# Patient Record
Sex: MALE | Race: WHITE | Employment: OTHER | RURAL
[De-identification: names, ages, dates, MRNs, and addresses within clinical notes are randomized per-mention and may not be internally consistent; named-entity substitution may affect disease eponyms.]

---

## 2017-06-14 ENCOUNTER — HOSPITAL ENCOUNTER (OUTPATIENT)
Dept: PHYSICAL THERAPY | Age: 77
Discharge: HOME | End: 2017-06-30
Attending: INTERNAL MEDICINE | Admitting: INTERNAL MEDICINE

## 2017-06-14 ASSESSMENT — PAIN DESCRIPTION - DESCRIPTORS: DESCRIPTORS: ACHING;THROBBING

## 2017-06-14 ASSESSMENT — PAIN DESCRIPTION - LOCATION: LOCATION: KNEE

## 2017-06-14 ASSESSMENT — PAIN DESCRIPTION - ORIENTATION: ORIENTATION: RIGHT

## 2017-06-14 ASSESSMENT — PAIN SCALES - GENERAL: PAINLEVEL_OUTOF10: 7

## 2017-06-19 ENCOUNTER — LAB REQUISITION (OUTPATIENT)
Dept: LAB | Facility: HOSPITAL | Age: 77
End: 2017-06-19

## 2017-06-19 DIAGNOSIS — M25.461 EFFUSION OF RIGHT KNEE: ICD-10-CM

## 2017-06-19 LAB
APPEARANCE FLD: ABNORMAL
COLOR FLD: YELLOW
CRYSTALS FLD MICRO: NORMAL
GLUCOSE FLD-MCNC: 85 MG/DL
GRAM STN SPEC: NORMAL
GRAM STN SPEC: NORMAL
MONOS+MACROS NFR FLD: 7 %
NEUTROPHILS NFR FLD MANUAL: 93 %
PROT FLD-MCNC: 3.6 G/DL
RBC # FLD AUTO: 0 /MM3 (ref 0–200000)
WBC # FLD: ABNORMAL /MM3 (ref 0–1000)

## 2017-06-19 PROCEDURE — 89060 EXAM SYNOVIAL FLUID CRYSTALS: CPT | Performed by: INTERNAL MEDICINE

## 2017-06-19 PROCEDURE — 89051 BODY FLUID CELL COUNT: CPT | Performed by: INTERNAL MEDICINE

## 2017-06-19 PROCEDURE — 87015 SPECIMEN INFECT AGNT CONCNTJ: CPT | Performed by: INTERNAL MEDICINE

## 2017-06-19 PROCEDURE — 84157 ASSAY OF PROTEIN OTHER: CPT | Performed by: INTERNAL MEDICINE

## 2017-06-19 PROCEDURE — 87070 CULTURE OTHR SPECIMN AEROBIC: CPT | Performed by: INTERNAL MEDICINE

## 2017-06-19 PROCEDURE — 82945 GLUCOSE OTHER FLUID: CPT | Performed by: INTERNAL MEDICINE

## 2017-06-19 PROCEDURE — 87205 SMEAR GRAM STAIN: CPT | Performed by: INTERNAL MEDICINE

## 2017-06-23 LAB — BACTERIA FLD CULT: NO GROWTH

## 2017-08-30 PROBLEM — I10 ESSENTIAL HYPERTENSION: Status: ACTIVE | Noted: 2017-08-30

## 2017-08-30 PROBLEM — R00.2 PALPITATIONS: Status: ACTIVE | Noted: 2017-08-30

## 2017-08-30 PROBLEM — I48.19 PERSISTENT ATRIAL FIBRILLATION: Status: ACTIVE | Noted: 2017-08-30

## 2017-08-30 RX ORDER — METHOCARBAMOL 500 MG/1
500 TABLET, FILM COATED ORAL 2 TIMES DAILY
COMMUNITY
End: 2018-04-04 | Stop reason: ALTCHOICE

## 2017-08-30 RX ORDER — FUROSEMIDE 40 MG/1
40 TABLET ORAL 2 TIMES DAILY
COMMUNITY
End: 2017-09-06 | Stop reason: DRUGHIGH

## 2017-08-30 RX ORDER — MELOXICAM 7.5 MG/1
7.5 TABLET ORAL DAILY
COMMUNITY
End: 2017-09-06

## 2017-08-30 RX ORDER — HYDROCODONE BITARTRATE AND ACETAMINOPHEN 7.5; 325 MG/1; MG/1
1 TABLET ORAL EVERY 6 HOURS PRN
COMMUNITY
End: 2018-04-04 | Stop reason: ALTCHOICE

## 2017-08-30 RX ORDER — RAMIPRIL 10 MG/1
10 CAPSULE ORAL DAILY
COMMUNITY
End: 2018-04-04 | Stop reason: HOSPADM

## 2017-08-30 RX ORDER — DICLOFENAC SODIUM 75 MG/1
75 TABLET, DELAYED RELEASE ORAL 2 TIMES DAILY
COMMUNITY
End: 2017-09-06

## 2017-08-30 RX ORDER — ASPIRIN 325 MG
325 TABLET ORAL DAILY
COMMUNITY
End: 2017-09-06

## 2017-08-30 RX ORDER — TRIAMTERENE AND HYDROCHLOROTHIAZIDE 37.5; 25 MG/1; MG/1
1 CAPSULE ORAL EVERY MORNING
COMMUNITY
End: 2017-09-06

## 2017-08-31 ENCOUNTER — TELEPHONE (OUTPATIENT)
Dept: CARDIOLOGY | Facility: CLINIC | Age: 77
End: 2017-08-31

## 2017-09-06 ENCOUNTER — CONSULT (OUTPATIENT)
Dept: CARDIOLOGY | Facility: CLINIC | Age: 77
End: 2017-09-06

## 2017-09-06 VITALS
SYSTOLIC BLOOD PRESSURE: 100 MMHG | RESPIRATION RATE: 18 BRPM | HEART RATE: 94 BPM | DIASTOLIC BLOOD PRESSURE: 72 MMHG | BODY MASS INDEX: 45.04 KG/M2 | WEIGHT: 314.6 LBS | HEIGHT: 70 IN | OXYGEN SATURATION: 98 %

## 2017-09-06 DIAGNOSIS — I49.5 TACHY-BRADY SYNDROME (HCC): ICD-10-CM

## 2017-09-06 DIAGNOSIS — I50.21 ACUTE SYSTOLIC CONGESTIVE HEART FAILURE (HCC): ICD-10-CM

## 2017-09-06 DIAGNOSIS — E66.01 MORBID (SEVERE) OBESITY DUE TO EXCESS CALORIES (HCC): ICD-10-CM

## 2017-09-06 DIAGNOSIS — R06.02 SOB (SHORTNESS OF BREATH): ICD-10-CM

## 2017-09-06 DIAGNOSIS — I10 ESSENTIAL HYPERTENSION: ICD-10-CM

## 2017-09-06 DIAGNOSIS — I48.19 PERSISTENT ATRIAL FIBRILLATION (HCC): Primary | ICD-10-CM

## 2017-09-06 PROCEDURE — 99205 OFFICE O/P NEW HI 60 MIN: CPT | Performed by: INTERNAL MEDICINE

## 2017-09-06 RX ORDER — DIGOXIN 125 MCG
125 TABLET ORAL
Qty: 30 TABLET | Refills: 11 | Status: SHIPPED | OUTPATIENT
Start: 2017-09-06 | End: 2017-10-04 | Stop reason: ALTCHOICE

## 2017-09-06 RX ORDER — SPIRONOLACTONE 25 MG/1
12.5 TABLET ORAL DAILY
Qty: 15 TABLET | Refills: 11 | Status: SHIPPED | OUTPATIENT
Start: 2017-09-06 | End: 2018-08-06 | Stop reason: SDUPTHER

## 2017-09-06 RX ORDER — FUROSEMIDE 40 MG/1
40 TABLET ORAL DAILY
Qty: 45 TABLET | Refills: 11 | Status: SHIPPED | OUTPATIENT
Start: 2017-09-06 | End: 2018-10-03 | Stop reason: SDUPTHER

## 2017-09-06 RX ORDER — NEBIVOLOL 10 MG/1
10 TABLET ORAL DAILY
Qty: 30 TABLET | Refills: 11 | Status: SHIPPED | OUTPATIENT
Start: 2017-09-06 | End: 2018-08-06 | Stop reason: SDUPTHER

## 2017-09-06 NOTE — PROGRESS NOTES
Subjective:     Encounter Date:09/06/2017      Patient ID: Bobby Dodd is a 76 y.o. male.    Chief Complaint:Shortness of breath, edema, palpitations  HPI  This is a 76-year-old male patient who was recently diagnosed with new onset atrial fibrillation after having a preop cardiovascular evaluation with a 12-lead electrocardiogram showing atrial fibrillation.  The patient has had atrial fibrillation for the last 12+ years.  The patient was treated in 2003 with flecainide for atrial fibrillation with subsequent cardioversion to normal rhythm.  The patient required a synchronized external cardioversion in 2006 for recurrent atrial fibrillation.  At that time the patient also had a dual-chamber rate responsive pacemaker placed.  The patient was being evaluated for elective knee replacement surgery due to crippling arthritis affecting both knees.  A 12-lead electrocardiogram showed recurrent atrial fibrillation with rapid ventricular response.  A Holter monitor was placed which showed persistent atrial fibrillation.  The patient has been experiencing palpitations dizziness and fatigue.  There has been no syncope.  The patient was unaware that his heart was out of rhythm.  His palpitations however were similar in quality to that which she was experiencing with previous bouts of atrial fibrillation.  The patient underwent an echocardiogram which showed left ventricular dilatation with an ejection fraction of 20%.  There were no regional wall motion abnormalities.  There was evidence of significant biatrial enlargement.  There is no evidence of valvular disease or pericardial disease.  The patient reports having shortness of breath both at rest and with activity.  Patient indicates that his shortness of breath has been present for the last several years.  This has been progressively worse over the last several months.  The patient had his pacemaker interrogated which showed normal function with persistent atrial  fibrillation.  The patient has had increasing swelling in his lower extremities.  He has no history of DVT.  He has experienced some orthopnea but no PND.  He has no prior history of myocardial infarction or coronary revascularization.  The patient has morbid obesity with severe crippling arthritis in both knees.  His cholesterol status is unknown.  He has a history of hypertension but no personal history of diabetes.  His family history is strongly positive for premature coronary disease.  He is a former smoker.  The following portions of the patient's history were reviewed and updated as appropriate: allergies, current medications, past family history, past medical history, past social history, past surgical history and problem  Review of Systems   Constitution: Positive for weakness and malaise/fatigue. Negative for chills, diaphoresis, fever, night sweats, weight gain and weight loss.   HENT: Negative for ear discharge, hearing loss, hoarse voice and nosebleeds.    Eyes: Negative for discharge, double vision, pain and photophobia.   Cardiovascular: Positive for dyspnea on exertion, irregular heartbeat, leg swelling and palpitations. Negative for chest pain, claudication, cyanosis, near-syncope, orthopnea, paroxysmal nocturnal dyspnea and syncope.   Respiratory: Positive for shortness of breath. Negative for cough, hemoptysis, sputum production and wheezing.    Endocrine: Negative for cold intolerance, heat intolerance, polydipsia, polyphagia and polyuria.   Hematologic/Lymphatic: Negative for adenopathy and bleeding problem. Does not bruise/bleed easily.   Skin: Negative for color change, flushing, itching and rash.   Musculoskeletal: Negative for muscle cramps, muscle weakness, myalgias and stiffness.   Gastrointestinal: Negative for abdominal pain, diarrhea, hematemesis, hematochezia, nausea and vomiting.   Genitourinary: Negative for dysuria, frequency and nocturia.   Neurological: Positive for dizziness.  Negative for focal weakness, loss of balance, numbness, paresthesias and seizures.   Psychiatric/Behavioral: Negative for altered mental status, hallucinations and suicidal ideas.   Allergic/Immunologic: Negative for HIV exposure, hives and persistent infections.       Current Outpatient Prescriptions:   •  HYDROcodone-acetaminophen (NORCO) 7.5-325 MG per tablet, Take 1 tablet by mouth Every 6 (Six) Hours As Needed for Moderate Pain ., Disp: , Rfl:   •  methocarbamol (ROBAXIN) 500 MG tablet, Take 500 mg by mouth 2 (Two) Times a Day., Disp: , Rfl:   •  ramipril (ALTACE) 10 MG capsule, Take 10 mg by mouth Daily., Disp: , Rfl:      Objective:     Physical Exam   Constitutional: He is oriented to person, place, and time. He appears well-developed and well-nourished.   HENT:   Head: Normocephalic and atraumatic.   Mouth/Throat: Oropharynx is clear and moist.   Eyes: Conjunctivae and EOM are normal. Pupils are equal, round, and reactive to light. No scleral icterus.   Neck: Normal range of motion. Neck supple. No JVD present. No tracheal deviation present. No thyromegaly present.   Cardiovascular: Normal rate, S1 normal, S2 normal, normal heart sounds, intact distal pulses and normal pulses.  An irregularly irregular rhythm present. PMI is not displaced.  Exam reveals no gallop and no friction rub.    No murmur heard.  Pulmonary/Chest: Effort normal and breath sounds normal. No respiratory distress. He has no wheezes. He has no rales.   Abdominal: Soft. Bowel sounds are normal. He exhibits no distension and no mass. There is no tenderness. There is no rebound and no guarding.   Musculoskeletal: Normal range of motion. He exhibits edema. He exhibits no deformity.   Neurological: He is alert and oriented to person, place, and time. He displays normal reflexes. No cranial nerve deficit. Coordination normal.   Skin: Skin is warm and dry. No rash noted. No erythema.   Psychiatric: He has a normal mood and affect. His behavior  "is normal. Thought content normal.     Blood pressure 100/72, pulse 94, resp. rate 18, height 70\" (177.8 cm), weight (!) 314 lb 9.6 oz (143 kg), SpO2 98 %.   Lab Review:       Assessment:         1. Persistent atrial fibrillation  Newly recognized atrial fibrillation with rapid ventricular response.  He is currently a chads 2 vascular score of 5.  Aspirin has no place in his management.  - Stress Test With Myocardial Perfusion (2 Day)    2. Essential hypertension  Acceptable blood pressure control.  - Stress Test With Myocardial Perfusion (2 Day)    3. Tachy-mercedes syndrome  Normal dual-chamber rate responsive pacemaker function  - Stress Test With Myocardial Perfusion (2 Day)    4. SOB (shortness of breath)  I suspect this is multifactorial due to morbid obesity, aerobic deconditioning, congestive heart failure, arrhythmia and possibly an ischemic equivalent.  The patient has multiple risk factors for atherosclerosis.  He is unable to do treadmill stress testing due to severe bilateral knee pain.  - Stress Test With Myocardial Perfusion (2 Day)    5. Acute systolic congestive heart failure  This was discovered after an echocardiogram showed an ejection fraction of 20%.  It is certainly likely that atrial fibrillation and tachycardia mediated LV dysfunction is the etiology to his decreased ejection fraction.  He is currently [place] Heart Association functional class III in regards to symptoms.  He has not had an ischemic evaluation.  His ejection fraction was previously 60%.  - Stress Test With Myocardial Perfusion (2 Day)    6. Morbid (severe) obesity due to excess calories  This is certainly contributing to his medical issues at hand.  Procedures     Plan:       I have recommended discontinuation of the Maxide diuretic.  I have recommended that his Lasix be changed to 80 mg taken once in the morning for 2-3 days on an as-needed basis for increasing peripheral edema plus shortness of breath plus increased weight " gain.  Once his symptoms are back to baseline the Lasix should drop back to 40 mg once in the morning.  The patient has been counseled to avoid twice a day or 3 times a day Lasix dosing in order to avoid diuretic resistance.  The patient has been counseled regarding the essential need to eliminate dietary sodium.  He has been given specific instructions on dietary sodium restriction.  The patient has been started on a 1.5 L per day fluid restriction.  All of the patient's prescription nonsteroidal anti-inflammatory medications have been discontinued.  He has been advised to no longer use over-the-counter nonsteroidal anti-inflammatory medications.  He has been instructed to use over-the-counter extra strength Tylenol for osteoarthritis of the knees.  He can continue using his muscle relaxant as well as moderate potency narcotic analgesia as necessary.  He is not a candidate for elective knee surgery at this time.  I have recommended discontinuation of aspirin.  I have recommended the initiation of Eliquis 5 mg orally twice per day.  I have recommended starting digoxin 0.125 mg once per day.  I have recommended starting Bystolic 10 mg once per day.  I have recommended starting spironolactone 12.5 mg once in the morning.  I have recommended a 2 day vasodilator nuclear stress test given his inability to exercise due to crippling knee pain as well as morbid obesity.  Further recommendations will be predicated on the results of this testing.  Diet and weigh reduction or essential.  The patient will have a pacemaker interrogation on follow-up visit.  Her 4 weeks of anticoagulation with a DOAC we will initiate antiarrhythmic drug therapy.  If he does not pharmacologically cardiovert he will require synchronized external cardioversion as an outpatient.  If his ejection fraction remains less than 35% he will require upgrade of his dual-chamber pacemaker to a prophylactic defibrillator.  A total of 2-1/2 hours were spent  reviewing the patient's medical records and discussing the plan with the patient as well as entering his orders and other treatment plans.  More than 50% of the time was spent with direct face-to-face contact with the patient and his daughter.  The patient has been given written instructions to all of the recommendations described above.  Both the patient and the daughter express understanding.

## 2017-09-07 ENCOUNTER — TELEPHONE (OUTPATIENT)
Dept: CARDIOLOGY | Facility: CLINIC | Age: 77
End: 2017-09-07

## 2017-09-07 NOTE — TELEPHONE ENCOUNTER
Patient notified to stop aspirin as per instructed by Dr. Martel at end of day 9-6-17.  Patient states understanding.  Shelli Laird MA

## 2017-09-08 NOTE — TELEPHONE ENCOUNTER
Pharmacist notified of Eliquis being sent in and Copay $47.00.  I also went over all the changes that Dr. Martel made on Wednesday.  Shelli Larid MA

## 2017-09-13 ENCOUNTER — HOSPITAL ENCOUNTER (OUTPATIENT)
Dept: NUCLEAR MEDICINE | Age: 77
Discharge: HOME | End: 2017-09-13
Attending: INTERNAL MEDICINE | Admitting: INTERNAL MEDICINE

## 2017-09-14 ENCOUNTER — HOSPITAL ENCOUNTER (OUTPATIENT)
Dept: OTHER | Age: 77
Discharge: HOME | End: 2017-09-14
Attending: INTERNAL MEDICINE | Admitting: INTERNAL MEDICINE

## 2017-09-20 ENCOUNTER — OUTSIDE FACILITY SERVICE (OUTPATIENT)
Dept: CARDIOLOGY | Facility: CLINIC | Age: 77
End: 2017-09-20

## 2017-09-20 PROCEDURE — 78452 HT MUSCLE IMAGE SPECT MULT: CPT | Performed by: INTERNAL MEDICINE

## 2017-10-04 ENCOUNTER — OFFICE VISIT (OUTPATIENT)
Dept: CARDIOLOGY | Facility: CLINIC | Age: 77
End: 2017-10-04

## 2017-10-04 VITALS
OXYGEN SATURATION: 97 % | SYSTOLIC BLOOD PRESSURE: 92 MMHG | HEIGHT: 70 IN | DIASTOLIC BLOOD PRESSURE: 62 MMHG | RESPIRATION RATE: 18 BRPM | HEART RATE: 85 BPM | WEIGHT: 297.5 LBS | BODY MASS INDEX: 42.59 KG/M2

## 2017-10-04 DIAGNOSIS — I10 ESSENTIAL HYPERTENSION: ICD-10-CM

## 2017-10-04 DIAGNOSIS — I48.19 PERSISTENT ATRIAL FIBRILLATION (HCC): Primary | ICD-10-CM

## 2017-10-04 DIAGNOSIS — I50.22 CHRONIC SYSTOLIC CONGESTIVE HEART FAILURE (HCC): ICD-10-CM

## 2017-10-04 DIAGNOSIS — I49.5 TACHY-BRADY SYNDROME (HCC): ICD-10-CM

## 2017-10-04 DIAGNOSIS — E66.01 MORBID (SEVERE) OBESITY DUE TO EXCESS CALORIES (HCC): ICD-10-CM

## 2017-10-04 DIAGNOSIS — R06.02 SOB (SHORTNESS OF BREATH): ICD-10-CM

## 2017-10-04 PROCEDURE — 99214 OFFICE O/P EST MOD 30 MIN: CPT | Performed by: INTERNAL MEDICINE

## 2017-10-04 RX ORDER — AMIODARONE HYDROCHLORIDE 200 MG/1
200 TABLET ORAL DAILY
Qty: 90 TABLET | Refills: 4 | Status: SHIPPED | OUTPATIENT
Start: 2017-10-04 | End: 2018-10-03 | Stop reason: SDUPTHER

## 2017-10-04 NOTE — PROGRESS NOTES
Subjective:     Encounter Date:10/04/2017      Patient ID: Bobby Dodd is a 76 y.o. male.    Chief Complaint:Shortness of breath  HPI  This is a 76-year-old male patient who presents to follow up after having outpatient testing.  In August of this year the patient underwent an echocardiogram through a local cardiologist's office and was reported to have a global ejection fraction of 20-25%.  The patient had been experiencing shortness of breath at rest as well as dyspnea with activity and intermittent swelling of his feet and ankles.  The patient has permanent atrial fibrillation with tachycardia-bradycardia syndrome and a single-chamber pacemaker.  The patient underwent a vasodilator nuclear stress test which showed a large area of inferior and apical photopenia with no reversibility consistent with either prior myocardial infarction and subsequent scar formation versus diaphragm attenuation artifact.  The patient also has morbid obesity.  The patient had his pacemaker interrogated today which has been programmed to the VVIr mode.  The patient's lead impedance on the right ventricular lead was 540 AND there was almost 10 years of battery life remaining.  Ventricular lead sensing was greater than 12 and Sugar was 0.5 mV at 0.5 ms.  The patient is paced 30% of the time in the ventricle.  Over the last 6 months the patient had 271 high ventricular heart rate episodes consistent with atrial fibrillation and rapid ventricular response.  He remains anticoagulated on Eliquis.  Achieving adequate heart rate control has been quite difficult.  The following portions of the patient's history were reviewed and updated as appropriate: allergies, current medications, past family history, past medical history, past social history, past surgical history and problem  Review of Systems   Constitution: Positive for weakness and malaise/fatigue. Negative for chills, diaphoresis, fever, night sweats, weight gain and weight loss.    HENT: Negative for ear discharge, hearing loss, hoarse voice and nosebleeds.    Eyes: Negative for discharge, double vision, pain and photophobia.   Cardiovascular: Positive for dyspnea on exertion. Negative for chest pain, claudication, cyanosis, irregular heartbeat, leg swelling, near-syncope, orthopnea, palpitations, paroxysmal nocturnal dyspnea and syncope.   Respiratory: Positive for shortness of breath. Negative for cough, hemoptysis, sputum production and wheezing.    Endocrine: Negative for cold intolerance, heat intolerance, polydipsia, polyphagia and polyuria.   Hematologic/Lymphatic: Negative for adenopathy and bleeding problem. Does not bruise/bleed easily.   Skin: Negative for color change, flushing, itching and rash.   Musculoskeletal: Negative for muscle cramps, muscle weakness, myalgias and stiffness.   Gastrointestinal: Negative for abdominal pain, diarrhea, hematemesis, hematochezia, nausea and vomiting.   Genitourinary: Negative for dysuria, frequency and nocturia.   Neurological: Negative for focal weakness, loss of balance, numbness, paresthesias and seizures.   Psychiatric/Behavioral: Negative for altered mental status, hallucinations and suicidal ideas.   Allergic/Immunologic: Negative for HIV exposure, hives and persistent infections.       Current Outpatient Prescriptions:   •  apixaban (ELIQUIS) 5 MG tablet tablet, Take 1 tablet by mouth 2 (Two) Times a Day., Disp: 60 tablet, Rfl: 6  •  furosemide (LASIX) 40 MG tablet, Take 1 tablet by mouth Daily., Disp: 45 tablet, Rfl: 11  •  nebivolol (BYSTOLIC) 10 MG tablet, Take 1 tablet by mouth Daily., Disp: 30 tablet, Rfl: 11  •  ramipril (ALTACE) 10 MG capsule, Take 10 mg by mouth Daily., Disp: , Rfl:   •  spironolactone (ALDACTONE) 25 MG tablet, Take 0.5 tablets by mouth Daily., Disp: 15 tablet, Rfl: 11  •  amiodarone (PACERONE) 200 MG tablet, Take 1 tablet by mouth Daily., Disp: 90 tablet, Rfl: 4  •  HYDROcodone-acetaminophen (NORCO) 7.5-325 MG  "per tablet, Take 1 tablet by mouth Every 6 (Six) Hours As Needed for Moderate Pain ., Disp: , Rfl:   •  methocarbamol (ROBAXIN) 500 MG tablet, Take 500 mg by mouth 2 (Two) Times a Day., Disp: , Rfl:      Objective:     Physical Exam  Blood pressure 92/62, pulse 85, resp. rate 18, height 70\" (177.8 cm), weight 297 lb 8 oz (135 kg), SpO2 97 %.   Lab Review:       Assessment:         1. Persistent atrial fibrillation  There is no reasonable option for cardioversion or other invasive therapies for atrial fibrillation.  If we are unable to rate control with pharmacology he may require AV node ablation.    2. Essential hypertension  His blood pressure control is excellent.    3. Tachy-mercedes syndrome  Normal pacemaker function    4. Morbid (severe) obesity due to excess calories  The patient has lost 17 pounds since his last visit.    5. Chronic systolic congestive heart failure  The patient's ejection fraction by nuclear criteria was 47% which is decidedly better than that which was estimated from his echocardiogram.  His nuclear scan shows no inducible ischemia.    6. SOB (shortness of breath)  His shortness of breath is multifactorial in etiology.  The bulk is related to his underlying left ventricular systolic heart failure.  There is also an element related to his morbid obesity.  He may have an element of unrecognized obstructive sleep apnea.  The parade control overall for his atrial fibrillation is also contributing to his shortness of breath.  Procedures     Plan:       I recommended discontinuation of digoxin.  We will initiate amiodarone 200 mg orally once per day as a measure to hopefully achieve better rate control.  There is no hope that the patient will pharmacologically cardiovert to sinus rhythm therefore the use of amiodarone is primarily for rate control purposes.  If the patient continues to demonstrate frequent tachycardia despite multiple AV node blocking medications we will have to consider AV node " ablation.  We will continue to monitor the patient's ejection fraction closely.  If he continues to demonstrate a left ventricular ejection fraction of less than 30% he may require conversion of his single chamber pacemaker to a defibrillator.  No additional ischemic testing is necessary at this point as the patient's vasodilator stress test showed only a fixed inferior perfusion defect.  I continue to emphasize the importance of diet and exercise and weight management.  He is congratulated on his weight loss and encouraged to continue these efforts.  We have also reinforced the need to greatly restrict dietary sodium intake.

## 2017-10-05 ENCOUNTER — TELEPHONE (OUTPATIENT)
Dept: CARDIOLOGY | Facility: CLINIC | Age: 77
End: 2017-10-05

## 2017-10-05 DIAGNOSIS — M25.561 ACUTE PAIN OF RIGHT KNEE: Primary | ICD-10-CM

## 2017-10-05 RX ORDER — DICLOFENAC SODIUM 75 MG/1
75 TABLET, DELAYED RELEASE ORAL 2 TIMES DAILY
Qty: 60 TABLET | Refills: 1 | Status: SHIPPED | OUTPATIENT
Start: 2017-10-05 | End: 2017-11-02 | Stop reason: SDUPTHER

## 2017-10-05 NOTE — TELEPHONE ENCOUNTER
Patient called and stated Diclofenac 75mg bid should be been called in but was not. Can I send this in to [facility] [place].

## 2017-11-02 DIAGNOSIS — M25.561 ACUTE PAIN OF RIGHT KNEE: ICD-10-CM

## 2017-11-02 RX ORDER — DICLOFENAC SODIUM 75 MG/1
TABLET, DELAYED RELEASE ORAL
Qty: 60 TABLET | Refills: 1 | Status: SHIPPED | OUTPATIENT
Start: 2017-11-02 | End: 2018-03-28 | Stop reason: SDUPTHER

## 2018-03-06 RX ORDER — APIXABAN 5 MG/1
TABLET, FILM COATED ORAL
Qty: 60 TABLET | Refills: 6 | Status: SHIPPED | OUTPATIENT
Start: 2018-03-06 | End: 2018-04-04 | Stop reason: SDUPTHER

## 2018-03-28 DIAGNOSIS — M25.561 ACUTE PAIN OF RIGHT KNEE: ICD-10-CM

## 2018-03-28 RX ORDER — DICLOFENAC SODIUM 75 MG/1
TABLET, DELAYED RELEASE ORAL
Qty: 180 TABLET | Refills: 3 | Status: SHIPPED | OUTPATIENT
Start: 2018-03-28 | End: 2019-10-01 | Stop reason: HOSPADM

## 2018-04-04 ENCOUNTER — HOSPITAL ENCOUNTER (OUTPATIENT)
Dept: OTHER | Age: 78
Discharge: HOME | End: 2018-04-04
Attending: INTERNAL MEDICINE | Admitting: INTERNAL MEDICINE

## 2018-04-04 ENCOUNTER — OFFICE VISIT (OUTPATIENT)
Dept: CARDIOLOGY | Facility: CLINIC | Age: 78
End: 2018-04-04

## 2018-04-04 VITALS
HEART RATE: 75 BPM | HEIGHT: 70 IN | SYSTOLIC BLOOD PRESSURE: 136 MMHG | OXYGEN SATURATION: 95 % | RESPIRATION RATE: 18 BRPM | BODY MASS INDEX: 45.1 KG/M2 | WEIGHT: 315 LBS | DIASTOLIC BLOOD PRESSURE: 82 MMHG

## 2018-04-04 DIAGNOSIS — E66.01 MORBID (SEVERE) OBESITY DUE TO EXCESS CALORIES (HCC): ICD-10-CM

## 2018-04-04 DIAGNOSIS — R06.02 SOB (SHORTNESS OF BREATH): ICD-10-CM

## 2018-04-04 DIAGNOSIS — I10 ESSENTIAL HYPERTENSION: ICD-10-CM

## 2018-04-04 DIAGNOSIS — Z95.0 CARDIAC PACEMAKER IN SITU: ICD-10-CM

## 2018-04-04 DIAGNOSIS — I49.5 TACHY-BRADY SYNDROME (HCC): ICD-10-CM

## 2018-04-04 DIAGNOSIS — I48.20 CHRONIC ATRIAL FIBRILLATION (HCC): ICD-10-CM

## 2018-04-04 DIAGNOSIS — I50.22 CHRONIC SYSTOLIC CONGESTIVE HEART FAILURE (HCC): Primary | ICD-10-CM

## 2018-04-04 PROCEDURE — 99214 OFFICE O/P EST MOD 30 MIN: CPT | Performed by: INTERNAL MEDICINE

## 2018-04-04 PROCEDURE — 93288 INTERROG EVL PM/LDLS PM IP: CPT | Performed by: INTERNAL MEDICINE

## 2018-04-04 NOTE — PROGRESS NOTES
Subjective:     Encounter Date:04/04/2018      Patient ID: Bobby Dodd is a 77 y.o. male.    Chief Complaint:Shortness of breath  HPI  This is a 77-year-old male patient with persistent atrial fibrillation and tachycardia-bradycardia syndrome.  The patient underwent pacemaker interrogation today.  He has a dual-chamber rate responsive [employer] model number 2210.  The patient is paced 83% in the right ventricle.  The device is programmed to VVIr.  The R-wave is greater than 12 mV the pacing threshold in the right ventricular chamber is 0.5 V at 0.5 ms.  The right ventricular lead impedance is 480 ohms.  There have been no high heart rate events.  The patient reports having shortness of breath both at rest but primarily with light physical activity.  He has had increasing peripheral edema.  His weight has increased to 335 pounds from his previous 297 pounds.  There is no orthopnea or PND.  He has no dizziness palpitations or syncope.  He has no chest discomfort at rest or with activity.  There is no exertional chest arm neck jaw shoulder or back discomfort.  The following portions of the patient's history were reviewed and updated as appropriate: allergies, current medications, past family history, past medical history, past social history, past surgical history and problem  Review of Systems   Constitution: Negative for chills, diaphoresis, fever, weakness, malaise/fatigue, weight gain and weight loss.   HENT: Negative for ear discharge, hearing loss, hoarse voice and nosebleeds.    Eyes: Negative for discharge, double vision, pain and photophobia.   Cardiovascular: Positive for dyspnea on exertion and leg swelling. Negative for chest pain, claudication, cyanosis, irregular heartbeat, near-syncope, orthopnea, palpitations, paroxysmal nocturnal dyspnea and syncope.   Respiratory: Positive for shortness of breath. Negative for cough, hemoptysis, sputum production and wheezing.    Endocrine: Negative for cold  intolerance, heat intolerance, polydipsia, polyphagia and polyuria.   Hematologic/Lymphatic: Negative for adenopathy and bleeding problem. Does not bruise/bleed easily.   Skin: Negative for color change, flushing, itching and rash.   Musculoskeletal: Negative for muscle cramps, muscle weakness, myalgias and stiffness.   Gastrointestinal: Negative for abdominal pain, diarrhea, hematemesis, hematochezia, nausea and vomiting.   Genitourinary: Negative for dysuria, frequency and nocturia.   Neurological: Negative for focal weakness, loss of balance, numbness, paresthesias and seizures.   Psychiatric/Behavioral: Negative for altered mental status, hallucinations and suicidal ideas.   Allergic/Immunologic: Negative for HIV exposure, hives and persistent infections.           Current Outpatient Prescriptions:   •  amiodarone (PACERONE) 200 MG tablet, Take 1 tablet by mouth Daily., Disp: 90 tablet, Rfl: 4  •  diclofenac (VOLTAREN) 75 MG EC tablet, TAKE ONE TABLET BY MOUTH TWO TIMES A DAY, Disp: 180 tablet, Rfl: 3  •  ELIQUIS 5 MG tablet tablet, TAKE ONE TABLET BY MOUTH TWO TIMES A DAY, Disp: 60 tablet, Rfl: 6  •  furosemide (LASIX) 40 MG tablet, Take 1 tablet by mouth Daily., Disp: 45 tablet, Rfl: 11  •  nebivolol (BYSTOLIC) 10 MG tablet, Take 1 tablet by mouth Daily., Disp: 30 tablet, Rfl: 11  •  spironolactone (ALDACTONE) 25 MG tablet, Take 0.5 tablets by mouth Daily., Disp: 15 tablet, Rfl: 11     Objective:     Physical Exam   Constitutional: He is oriented to person, place, and time. He appears well-developed and well-nourished.   HENT:   Head: Normocephalic and atraumatic.   Eyes: Conjunctivae are normal. No scleral icterus.   Neck: No JVD present.   Cardiovascular: Normal rate, regular rhythm, normal heart sounds and intact distal pulses.  Exam reveals no gallop and no friction rub.    No murmur heard.  Pulmonary/Chest: Effort normal and breath sounds normal. No respiratory distress.   Abdominal: Soft. Bowel sounds  "are normal. There is no tenderness.   Musculoskeletal: He exhibits edema.   Neurological: He is alert and oriented to person, place, and time.   Skin: Skin is warm and dry. No rash noted.   Psychiatric: He has a normal mood and affect. His behavior is normal.     Blood pressure 136/82, pulse 75, resp. rate 18, height 177.8 cm (70\"), weight (!) 152 kg (335 lb 6.4 oz), SpO2 95 %.   Lab Review:       Assessment:         1. Chronic systolic congestive heart failure  His left ventricular ejection fraction is 47% by calculated analysis.  We have not started ACE inhibitor therapy or an angiotensin II receptor blockade as his previous blood pressures have been 90/60.  As his ejection fraction is greater than 40% he is not a candidate for Entresto.    2. Essential hypertension  Acceptable blood pressure control.    3. Tachy-mercedes syndrome  Normal permanent pacemaker function    4. Morbid (severe) obesity due to excess calories  The patient has gained 40 pounds of weight since his last visit.  He indicates that he ate excessively over the holidays particularly [place].  He is also started drinking sodas again.    5. SOB (shortness of breath)  This is multifactorial in etiology.  Some is certainly related to his morbid obesity and aerobic deconditioning.  There may also be an element related to his underlying congestive heart failure.    6. Chronic atrial fibrillation  The patient is now better rate control than before.  We have use beta blocker in combination with amiodarone for rate control.  Now that his blood pressure is somewhat higher we may actually be able to eliminate the amiodarone in favor of a higher dose of beta blockade.  I have discussed with the patient the option of an attempted radiofrequency ablation procedure.  The patient may not be a candidate for pulmonary vein isolation but may be a candidate for Rotor ablation.  AV node ablation does not appear to be necessary any longer as we have been unable to " adequately rate control him with medications.  His chads 2 vascular score is 5.  He is tolerating direct oral anticoagulation therapy much better than his previous experience with Coumadin.  He has had no signs or symptoms of bleeding.      ECG 12 Lead  Date/Time: 4/4/2018 1:37 PM  Performed by: RUSTY CARDONA  Authorized by: RUSTY CARDONA   Rhythm: atrial fibrillation and paced  Rate: normal  QRS axis: left  Clinical impression: abnormal ECG             Plan:       I have recommended an opinion from the electrophysiologist regarding possible ablation options.  No changes in his medication therapy have been made at today's visit.  His anticoagulation therapy have been renewed.  No additional cardiovascular testing is necessary at this time.

## 2018-05-07 NOTE — PROGRESS NOTES
Electrophysiology Consult     Bobby Dodd  1940  [phone]      05/16/18    DATE OF ADMISSION: (Not on file)  [facility] CARDIOLOGY    Jaya Schmitt MD  None    Chief Complaint   Patient presents with   • Atrial Fibrillation   • Congestive Heart Failure     Problem List:  1. Persistent atrial fibrillation:   A. Diagnosed during preop cardiovascular evaluation in 2003   B. Initially treated with flecainide with external cardioversion to normal sinus rhythm   C. Recurrent atrial fibrillation in 2006, s/p external cardioversion to normal sinus rhythm   D. Left and right heart catheterization 5/29/2009: EF 60%, no significant coronary artery disease, normal right heart catheterization   E. Initiation of sotalol with ECV to NSR 04/2013   F. Echocardiogram 10/28/13: EF 50%, grade 1 diastolic dysfunction, no significant valvular disease   G. Echocardiogram 8/1/2017: EF 20%, severe biatrial enlargement, no significant valvular disease   H. 24-hour Holter monitor 8/7/17: Predominantly atrial fibrillation, average heart rate 106 bpm, min 74 bpm, maximum 169 bpm, rare PVCs   I. Lexiscan stress test 9/20/17: EF 47%, positive stress myocardial perfusion image with a large severe inferoapical scar with no reversibility   J. Initiated on amiodarone 10/2017  2. Tachy mercedes syndrome:   A.  Significant bradycardia noted after sotalol and ECV to normal sinus rhythm 04/2013, remained with symptomatic sinus bradycardia off sotalol and underwent dual-chamber permanent pacemaker ([employer]) 4/19/2013  3. Chronic systolic congestive heart failure   A.  Echocardiogram 8/1/2017: EF 20%, severe biatrial enlargement, no significant valvular disease   B.  Lexiscan stress test 9/20/17: EF 47%, positive stress myocardial perfusion image with a large severe inferoapical scar with no reversibility  4. Essential hypertension  5. Hyperlipidemia  6. Obstructive sleep apnea  7. Morbid obesity  8.  Surgical  history:   A.  Removal of benign lung mass, 2004   B.  Prostate surgery, 2008   C.  Hernia repair, 2007   D.  Cholecystectomy, 2007   E.  Right knee arthroscopy   F.  Goiter removal, 2001   G.  Dual-chamber permanent pacemaker implant, 2013    History of Present Illness:   Patient is a 77-year-old  male with the above-noted past medical history who we are asked to see in consultation by Dr. Martel for further recommendations regarding persistent atrial fibrillation.  He was diagnosed with atrial fibrillation back in 2003 as part of a preop cardiovascular evaluation.  He has previously been treated with flecainide and sotalol with resultant symptomatic sinus bradycardia and is now s/p dual chamber PPM in 2013.  He is currently on amiodarone.  He is anticoagulated with Eliquis for a CHADSVasc score of 4.  He denies any bleeding issues, missed doses, or TIA/CVA symptoms.  He has been in persistent atrial fibrillation since the fall despite treatment with beta blocker and amiodarone so we are now asked to see him to evaluate for possible AV node ablation.  He notes chronic fatigue but is unsure if this is from his afib.  Some shortness of breath with exertion but again he is unsure if this is from his afib since he has had SOB with exertion for quite some time.  He is otherwise unaware of it.  No palpitations.  No chest pains.  Blood pressures 115-120/80's at home.  He does have YUNG but he felt the mask didn't help him much so he no longer wears it.  He is a former smoker, no ETOH, occasional caffeine.  No stimulants.  No GERD symptoms.      No Known Allergies     Cannot display prior to admission medications because the patient has not been admitted in this contact.            Current Outpatient Prescriptions:   •  amiodarone (PACERONE) 200 MG tablet, Take 1 tablet by mouth Daily., Disp: 90 tablet, Rfl: 4  •  apixaban (ELIQUIS) 5 MG tablet tablet, Take 5 mg by mouth 2 (Two) Times a Day., Disp: , Rfl:   •   "diclofenac (VOLTAREN) 75 MG EC tablet, TAKE ONE TABLET BY MOUTH TWO TIMES A DAY, Disp: 180 tablet, Rfl: 3  •  furosemide (LASIX) 40 MG tablet, Take 1 tablet by mouth Daily., Disp: 45 tablet, Rfl: 11  •  nebivolol (BYSTOLIC) 10 MG tablet, Take 1 tablet by mouth Daily., Disp: 30 tablet, Rfl: 11  •  spironolactone (ALDACTONE) 25 MG tablet, Take 0.5 tablets by mouth Daily., Disp: 15 tablet, Rfl: 11    Social History     Social History   • Marital status: Unknown     Social History Main Topics   • Smoking status: Former Smoker     Types: Cigarettes     Quit date: 1975   • Smokeless tobacco: Never Used   • Alcohol use No   • Drug use: No     Other Topics Concern   • Not on file       Family History   Problem Relation Age of Onset   • Arthritis Mother    • Hypertension Mother    • Heart attack Father    • Arthritis Sister        REVIEW OF SYSTEMS:   CONST:  + fatigue, no weight loss, fever, chills  HEENT:  No visual loss, blurred vision, double vision, yellow sclerae.                   No hearing loss, congestion, sore throat.   SKIN:      No rashes, urticaria, ulcers, sores.     RESP:     + shortness of breath, hemoptysis, cough, sputum.   GI:           No anorexia, nausea, vomiting, diarrhea. No abdominal pain, melena.   :         No burning on urination, hematuria or increased frequency.  ENDO:    No diaphoresis, cold or heat intolerance. No polyuria or polydipsia.   NEURO:  No headache, dizziness, syncope, paralysis, ataxia, or parasthesias.                  No change in bowel or bladder control. No history of CVA/TIA  MUSC:    No muscle, back pain, joint pain or stiffness.   HEME:    No anemia, bleeding, bruising. No history of DVT/PE.  PSYCH:  No history of depression, anxiety    Vitals:    05/16/18 1551   BP: 134/90   BP Location: Left arm   Patient Position: Sitting   Pulse: 86   Weight: (!) 144 kg (317 lb)   Height: 175.3 cm (69\")                 Physical Exam:  GEN: Obese gentleman, Well nourished, Well- " developed  No acute distress  HEENT: Normocephalic, Atraumatic, PERRLA, moist mucous membranes  NECK: supple, NO JVD, no thyromegaly, no lymphadenopathy  CARD: Irregularly irregular, S1S2, no murmur, gallop, rub  LUNGS: Clear to auscultataion, normal respiratory effort  ABDOMEN: Soft, nontender, normal bowel sounds  EXTREMITIES:No gross deformities,  No clubbing, cyanosis, trace LE edema  SKIN: Warm, dry  NEURO: No focal deficits  PSYCHIATRIC: Normal affect and mood        ECG 12 Lead  Date/Time: 5/16/2018 4:27 PM  Performed by: JOSELITO BOYER  Authorized by: JOSELITO BOYER   Comparison: compared with previous ECG from 4/4/2018  Similar to previous ECG  Rhythm: atrial fibrillation  BPM: 90            Device interrogation: [employer] dual chamber PPM set at VVIR due to atrial fibrillation, device with normal function, RV paced 79%, 9.6 years battery life       ICD-10-CM ICD-9-CM   1. Persistent atrial fibrillation I48.1 427.31   2. Tachy-mercedes syndrome I49.5 427.81   3. Chronic systolic congestive heart failure I50.22 428.22     428.0   4. Essential hypertension I10 401.9   5. YUNG (obstructive sleep apnea) G47.33 327.23       Assessment and Plan:   1. Persistent atrial fibrillation:  - Recurrent, mildy symptomatic atrial fibrillation despite previous treatment with flecainide and sotalol, now on amiodarone but with persistent atrial fibrillation.  Unclear if his symptoms are from atrial fibrillation versus deconditioning/CHF.   - Since he has been on amiodarone for quite some time now, we will try ECV to get him back in normal sinus rhythm.  He has been anticoagulated with Eliquis with no missed doses so can schedule ECV at earliest convenience.  He would need to be off amiodarone for a few months prior to an ablation so would benefit from ECV first to try and get him back in normal rhythm.  If he feels better in normal rhythm, then we can consider PVA for the future.    - Need MUGA scan to better assess LVEF  once he is back in NSR  2. Tachy-mercedes syndrome:  - S/p dual chamber PPM ([employer]), device with normal function  3. Chronic systolic CHF:  - Euvolemic, no ACE/ARB due to reported hypotension with these, continue current medications, continue follow up with Dr. Martel  - MUGA scan as noted above  4. Essential hypertension:  - Well controlled on current medications  5. YUNG:  - Discussed retrying CPAP mask and role of YUNG as a trigger for afib    Scribed for Tho Richards MD by ROSALINDA Ramirez. 5/16/2018  4:59 PM     I, Tho Richards MD, personally performed the services described in this documentation as scribed by the above named individual in my presence, and it is both accurate and complete.  5/16/2018  5:22 PM

## 2018-05-16 ENCOUNTER — OFFICE VISIT (OUTPATIENT)
Dept: CARDIOLOGY | Facility: CLINIC | Age: 78
End: 2018-05-16

## 2018-05-16 VITALS
SYSTOLIC BLOOD PRESSURE: 134 MMHG | DIASTOLIC BLOOD PRESSURE: 90 MMHG | HEIGHT: 69 IN | HEART RATE: 86 BPM | WEIGHT: 315 LBS | BODY MASS INDEX: 46.65 KG/M2

## 2018-05-16 DIAGNOSIS — I49.5 TACHY-BRADY SYNDROME (HCC): ICD-10-CM

## 2018-05-16 DIAGNOSIS — I50.22 CHRONIC SYSTOLIC CONGESTIVE HEART FAILURE (HCC): ICD-10-CM

## 2018-05-16 DIAGNOSIS — I10 ESSENTIAL HYPERTENSION: ICD-10-CM

## 2018-05-16 DIAGNOSIS — I48.19 PERSISTENT ATRIAL FIBRILLATION (HCC): Primary | ICD-10-CM

## 2018-05-16 DIAGNOSIS — G47.33 OSA (OBSTRUCTIVE SLEEP APNEA): ICD-10-CM

## 2018-05-16 PROCEDURE — 99204 OFFICE O/P NEW MOD 45 MIN: CPT | Performed by: INTERNAL MEDICINE

## 2018-05-16 PROCEDURE — 93280 PM DEVICE PROGR EVAL DUAL: CPT | Performed by: INTERNAL MEDICINE

## 2018-05-21 ENCOUNTER — PREP FOR SURGERY (OUTPATIENT)
Dept: OTHER | Facility: HOSPITAL | Age: 78
End: 2018-05-21

## 2018-05-21 DIAGNOSIS — I48.19 PERSISTENT ATRIAL FIBRILLATION (HCC): Primary | ICD-10-CM

## 2018-05-21 RX ORDER — PROMETHAZINE HYDROCHLORIDE 25 MG/ML
12.5 INJECTION, SOLUTION INTRAMUSCULAR; INTRAVENOUS EVERY 4 HOURS PRN
Status: CANCELLED | OUTPATIENT
Start: 2018-05-21

## 2018-05-21 RX ORDER — ACETAMINOPHEN 325 MG/1
650 TABLET ORAL EVERY 4 HOURS PRN
Status: CANCELLED | OUTPATIENT
Start: 2018-05-21

## 2018-05-21 RX ORDER — NITROGLYCERIN 0.4 MG/1
0.4 TABLET SUBLINGUAL
Status: CANCELLED | OUTPATIENT
Start: 2018-05-21

## 2018-05-21 RX ORDER — SODIUM CHLORIDE 0.9 % (FLUSH) 0.9 %
1-10 SYRINGE (ML) INJECTION AS NEEDED
Status: CANCELLED | OUTPATIENT
Start: 2018-05-21

## 2018-05-31 ENCOUNTER — HOSPITAL ENCOUNTER (OUTPATIENT)
Dept: CARDIOLOGY | Facility: HOSPITAL | Age: 78
Discharge: HOME | End: 2018-05-31
Attending: INTERNAL MEDICINE | Admitting: INTERNAL MEDICINE

## 2018-05-31 LAB
MAXIMAL PREDICTED HEART RATE: 144 BPM
STRESS TARGET HR: 122 BPM

## 2018-05-31 PROCEDURE — 78472 GATED HEART PLANAR SINGLE: CPT

## 2018-05-31 PROCEDURE — 78472 GATED HEART PLANAR SINGLE: CPT | Performed by: INTERNAL MEDICINE

## 2018-05-31 PROCEDURE — A9560 TC99M LABELED RBC: HCPCS | Performed by: INTERNAL MEDICINE

## 2018-05-31 PROCEDURE — 0 TECHNETIUM LABELED RED BLOOD CELLS: Performed by: INTERNAL MEDICINE

## 2018-05-31 RX ADMIN — TECHNETIUM TC 99M-LABELED RED BLOOD CELLS 1 DOSE: KIT at 08:30

## 2018-06-04 ENCOUNTER — HOSPITAL ENCOUNTER (OUTPATIENT)
Dept: CARDIOLOGY | Facility: HOSPITAL | Age: 78
Discharge: HOME | End: 2018-06-04
Attending: INTERNAL MEDICINE | Admitting: INTERNAL MEDICINE

## 2018-06-04 VITALS
WEIGHT: 315 LBS | RESPIRATION RATE: 14 BRPM | OXYGEN SATURATION: 97 % | DIASTOLIC BLOOD PRESSURE: 76 MMHG | HEIGHT: 69 IN | SYSTOLIC BLOOD PRESSURE: 112 MMHG | TEMPERATURE: 98.5 F | HEART RATE: 69 BPM | BODY MASS INDEX: 46.65 KG/M2

## 2018-06-04 DIAGNOSIS — I48.19 PERSISTENT ATRIAL FIBRILLATION (HCC): ICD-10-CM

## 2018-06-04 LAB
ANION GAP SERPL CALCULATED.3IONS-SCNC: 7 MMOL/L (ref 3–11)
BUN BLD-MCNC: 24 MG/DL (ref 9–23)
BUN/CREAT SERPL: 16 (ref 7–25)
CALCIUM SPEC-SCNC: 9 MG/DL (ref 8.7–10.4)
CHLORIDE SERPL-SCNC: 103 MMOL/L (ref 99–109)
CO2 SERPL-SCNC: 32 MMOL/L (ref 20–31)
CREAT BLD-MCNC: 1.5 MG/DL (ref 0.6–1.3)
DEPRECATED RDW RBC AUTO: 48 FL (ref 37–54)
ERYTHROCYTE [DISTWIDTH] IN BLOOD BY AUTOMATED COUNT: 14.2 % (ref 11.3–14.5)
GFR SERPL CREATININE-BSD FRML MDRD: 46 ML/MIN/1.73
GLUCOSE BLD-MCNC: 94 MG/DL (ref 70–100)
HCT VFR BLD AUTO: 43.4 % (ref 38.9–50.9)
HGB BLD-MCNC: 14.2 G/DL (ref 13.1–17.5)
MCH RBC QN AUTO: 30.1 PG (ref 27–31)
MCHC RBC AUTO-ENTMCNC: 32.7 G/DL (ref 32–36)
MCV RBC AUTO: 92.1 FL (ref 80–99)
PLATELET # BLD AUTO: 191 10*3/MM3 (ref 150–450)
PMV BLD AUTO: 10.5 FL (ref 6–12)
POTASSIUM BLD-SCNC: 4.3 MMOL/L (ref 3.5–5.5)
RBC # BLD AUTO: 4.71 10*6/MM3 (ref 4.2–5.76)
SODIUM BLD-SCNC: 142 MMOL/L (ref 132–146)
WBC NRBC COR # BLD: 4.81 10*3/MM3 (ref 3.5–10.8)

## 2018-06-04 PROCEDURE — 25010000002 ONDANSETRON PER 1 MG: Performed by: INTERNAL MEDICINE

## 2018-06-04 PROCEDURE — 92960 CARDIOVERSION ELECTRIC EXT: CPT | Performed by: INTERNAL MEDICINE

## 2018-06-04 PROCEDURE — 80048 BASIC METABOLIC PNL TOTAL CA: CPT | Performed by: INTERNAL MEDICINE

## 2018-06-04 PROCEDURE — 36415 COLL VENOUS BLD VENIPUNCTURE: CPT

## 2018-06-04 PROCEDURE — C1894 INTRO/SHEATH, NON-LASER: HCPCS | Performed by: INTERNAL MEDICINE

## 2018-06-04 PROCEDURE — 92961 CARDIOVERSION ELECTRIC INT: CPT | Performed by: INTERNAL MEDICINE

## 2018-06-04 PROCEDURE — 92960 CARDIOVERSION ELECTRIC EXT: CPT

## 2018-06-04 PROCEDURE — 25010000002 MIDAZOLAM PER 1 MG: Performed by: INTERNAL MEDICINE

## 2018-06-04 PROCEDURE — 99153 MOD SED SAME PHYS/QHP EA: CPT

## 2018-06-04 PROCEDURE — 93010 ELECTROCARDIOGRAM REPORT: CPT | Performed by: INTERNAL MEDICINE

## 2018-06-04 PROCEDURE — 93005 ELECTROCARDIOGRAM TRACING: CPT | Performed by: INTERNAL MEDICINE

## 2018-06-04 PROCEDURE — 85027 COMPLETE CBC AUTOMATED: CPT | Performed by: INTERNAL MEDICINE

## 2018-06-04 PROCEDURE — G0378 HOSPITAL OBSERVATION PER HR: HCPCS

## 2018-06-04 PROCEDURE — 99152 MOD SED SAME PHYS/QHP 5/>YRS: CPT

## 2018-06-04 PROCEDURE — 99152 MOD SED SAME PHYS/QHP 5/>YRS: CPT | Performed by: INTERNAL MEDICINE

## 2018-06-04 RX ORDER — ONDANSETRON 2 MG/ML
INJECTION INTRAMUSCULAR; INTRAVENOUS AS NEEDED
Status: DISCONTINUED | OUTPATIENT
Start: 2018-06-04 | End: 2018-06-04 | Stop reason: HOSPADM

## 2018-06-04 RX ORDER — ACETAMINOPHEN 325 MG/1
650 TABLET ORAL EVERY 4 HOURS PRN
Status: DISCONTINUED | OUTPATIENT
Start: 2018-06-04 | End: 2018-06-04 | Stop reason: HOSPADM

## 2018-06-04 RX ORDER — MIDAZOLAM HYDROCHLORIDE 1 MG/ML
INJECTION INTRAMUSCULAR; INTRAVENOUS
Status: COMPLETED | OUTPATIENT
Start: 2018-06-04 | End: 2018-06-04

## 2018-06-04 RX ORDER — FLUMAZENIL 0.1 MG/ML
INJECTION INTRAVENOUS
Status: DISCONTINUED
Start: 2018-06-04 | End: 2018-06-04 | Stop reason: WASHOUT

## 2018-06-04 RX ORDER — NALOXONE HYDROCHLORIDE 0.4 MG/ML
INJECTION, SOLUTION INTRAMUSCULAR; INTRAVENOUS; SUBCUTANEOUS
Status: DISCONTINUED
Start: 2018-06-04 | End: 2018-06-04 | Stop reason: WASHOUT

## 2018-06-04 RX ORDER — SODIUM CHLORIDE 0.9 % (FLUSH) 0.9 %
1-10 SYRINGE (ML) INJECTION AS NEEDED
Status: DISCONTINUED | OUTPATIENT
Start: 2018-06-04 | End: 2018-06-04 | Stop reason: HOSPADM

## 2018-06-04 RX ORDER — MIDAZOLAM HYDROCHLORIDE 1 MG/ML
INJECTION INTRAMUSCULAR; INTRAVENOUS AS NEEDED
Status: DISCONTINUED | OUTPATIENT
Start: 2018-06-04 | End: 2018-06-04 | Stop reason: HOSPADM

## 2018-06-04 RX ORDER — MIDAZOLAM HYDROCHLORIDE 1 MG/ML
INJECTION INTRAMUSCULAR; INTRAVENOUS
Status: DISCONTINUED
Start: 2018-06-04 | End: 2018-06-04 | Stop reason: HOSPADM

## 2018-06-04 RX ORDER — PROMETHAZINE HYDROCHLORIDE 25 MG/ML
12.5 INJECTION, SOLUTION INTRAMUSCULAR; INTRAVENOUS EVERY 4 HOURS PRN
Status: DISCONTINUED | OUTPATIENT
Start: 2018-06-04 | End: 2018-06-04 | Stop reason: HOSPADM

## 2018-06-04 RX ORDER — NITROGLYCERIN 0.4 MG/1
0.4 TABLET SUBLINGUAL
Status: DISCONTINUED | OUTPATIENT
Start: 2018-06-04 | End: 2018-06-04 | Stop reason: HOSPADM

## 2018-06-04 RX ORDER — FENTANYL CITRATE 50 UG/ML
INJECTION, SOLUTION INTRAMUSCULAR; INTRAVENOUS
Status: DISCONTINUED
Start: 2018-06-04 | End: 2018-06-04 | Stop reason: WASHOUT

## 2018-06-04 RX ORDER — SODIUM CHLORIDE 9 MG/ML
INJECTION, SOLUTION INTRAVENOUS CONTINUOUS PRN
Status: COMPLETED | OUTPATIENT
Start: 2018-06-04 | End: 2018-06-04

## 2018-06-04 RX ADMIN — METHOHEXITAL SODIUM 10 MG: 500 INJECTION, POWDER, LYOPHILIZED, FOR SOLUTION INTRAMUSCULAR; INTRAVENOUS; RECTAL at 10:25

## 2018-06-04 RX ADMIN — METHOHEXITAL SODIUM 10 MG: 500 INJECTION, POWDER, LYOPHILIZED, FOR SOLUTION INTRAMUSCULAR; INTRAVENOUS; RECTAL at 10:22

## 2018-06-04 RX ADMIN — METHOHEXITAL SODIUM 10 MG: 500 INJECTION, POWDER, LYOPHILIZED, FOR SOLUTION INTRAMUSCULAR; INTRAVENOUS; RECTAL at 10:21

## 2018-06-04 RX ADMIN — METHOHEXITAL SODIUM 20 MG: 500 INJECTION, POWDER, LYOPHILIZED, FOR SOLUTION INTRAMUSCULAR; INTRAVENOUS; RECTAL at 10:19

## 2018-06-04 RX ADMIN — MIDAZOLAM HYDROCHLORIDE 1 MG: 1 INJECTION, SOLUTION INTRAMUSCULAR; INTRAVENOUS at 10:20

## 2018-06-04 RX ADMIN — METHOHEXITAL SODIUM 20 MG: 500 INJECTION, POWDER, LYOPHILIZED, FOR SOLUTION INTRAMUSCULAR; INTRAVENOUS; RECTAL at 10:20

## 2018-06-04 RX ADMIN — METHOHEXITAL SODIUM 10 MG: 500 INJECTION, POWDER, LYOPHILIZED, FOR SOLUTION INTRAMUSCULAR; INTRAVENOUS; RECTAL at 10:24

## 2018-06-04 RX ADMIN — METHOHEXITAL SODIUM 10 MG: 500 INJECTION, POWDER, LYOPHILIZED, FOR SOLUTION INTRAMUSCULAR; INTRAVENOUS; RECTAL at 10:26

## 2018-06-04 RX ADMIN — METHOHEXITAL SODIUM 10 MG: 500 INJECTION, POWDER, LYOPHILIZED, FOR SOLUTION INTRAMUSCULAR; INTRAVENOUS; RECTAL at 10:23

## 2018-06-04 NOTE — INTERVAL H&P NOTE
H&P reviewed. The patient was examined and there are no changes to the H&P.   Patient has been compliant with Eliquis with no missed doses.  We will proceed today with external cardioversion.  The risks, benefits, and alternatives of the procedure have been reviewed and the patient wishes to proceed.     ROSALINDA Ramirez  [facility]  6/4/2018  9:59 AM

## 2018-06-04 NOTE — H&P (VIEW-ONLY)
Electrophysiology Consult     Bobby Dodd  1940  [phone]      05/16/18    DATE OF ADMISSION: (Not on file)  [facility] CARDIOLOGY    Jaya Schmitt MD  None    Chief Complaint   Patient presents with   • Atrial Fibrillation   • Congestive Heart Failure     Problem List:  1. Persistent atrial fibrillation:   A. Diagnosed during preop cardiovascular evaluation in 2003   B. Initially treated with flecainide with external cardioversion to normal sinus rhythm   C. Recurrent atrial fibrillation in 2006, s/p external cardioversion to normal sinus rhythm   D. Left and right heart catheterization 5/29/2009: EF 60%, no significant coronary artery disease, normal right heart catheterization   E. Initiation of sotalol with ECV to NSR 04/2013   F. Echocardiogram 10/28/13: EF 50%, grade 1 diastolic dysfunction, no significant valvular disease   G. Echocardiogram 8/1/2017: EF 20%, severe biatrial enlargement, no significant valvular disease   H. 24-hour Holter monitor 8/7/17: Predominantly atrial fibrillation, average heart rate 106 bpm, min 74 bpm, maximum 169 bpm, rare PVCs   I. Lexiscan stress test 9/20/17: EF 47%, positive stress myocardial perfusion image with a large severe inferoapical scar with no reversibility   J. Initiated on amiodarone 10/2017  2. Tachy mercedes syndrome:   A.  Significant bradycardia noted after sotalol and ECV to normal sinus rhythm 04/2013, remained with symptomatic sinus bradycardia off sotalol and underwent dual-chamber permanent pacemaker ([employer]) 4/19/2013  3. Chronic systolic congestive heart failure   A.  Echocardiogram 8/1/2017: EF 20%, severe biatrial enlargement, no significant valvular disease   B.  Lexiscan stress test 9/20/17: EF 47%, positive stress myocardial perfusion image with a large severe inferoapical scar with no reversibility  4. Essential hypertension  5. Hyperlipidemia  6. Obstructive sleep apnea  7. Morbid obesity  8.  Surgical  history:   A.  Removal of benign lung mass, 2004   B.  Prostate surgery, 2008   C.  Hernia repair, 2007   D.  Cholecystectomy, 2007   E.  Right knee arthroscopy   F.  Goiter removal, 2001   G.  Dual-chamber permanent pacemaker implant, 2013    History of Present Illness:   Patient is a 77-year-old  male with the above-noted past medical history who we are asked to see in consultation by Dr. Martel for further recommendations regarding persistent atrial fibrillation.  He was diagnosed with atrial fibrillation back in 2003 as part of a preop cardiovascular evaluation.  He has previously been treated with flecainide and sotalol with resultant symptomatic sinus bradycardia and is now s/p dual chamber PPM in 2013.  He is currently on amiodarone.  He is anticoagulated with Eliquis for a CHADSVasc score of 4.  He denies any bleeding issues, missed doses, or TIA/CVA symptoms.  He has been in persistent atrial fibrillation since the fall despite treatment with beta blocker and amiodarone so we are now asked to see him to evaluate for possible AV node ablation.  He notes chronic fatigue but is unsure if this is from his afib.  Some shortness of breath with exertion but again he is unsure if this is from his afib since he has had SOB with exertion for quite some time.  He is otherwise unaware of it.  No palpitations.  No chest pains.  Blood pressures 115-120/80's at home.  He does have YUNG but he felt the mask didn't help him much so he no longer wears it.  He is a former smoker, no ETOH, occasional caffeine.  No stimulants.  No GERD symptoms.      No Known Allergies     Cannot display prior to admission medications because the patient has not been admitted in this contact.            Current Outpatient Prescriptions:   •  amiodarone (PACERONE) 200 MG tablet, Take 1 tablet by mouth Daily., Disp: 90 tablet, Rfl: 4  •  apixaban (ELIQUIS) 5 MG tablet tablet, Take 5 mg by mouth 2 (Two) Times a Day., Disp: , Rfl:   •   "diclofenac (VOLTAREN) 75 MG EC tablet, TAKE ONE TABLET BY MOUTH TWO TIMES A DAY, Disp: 180 tablet, Rfl: 3  •  furosemide (LASIX) 40 MG tablet, Take 1 tablet by mouth Daily., Disp: 45 tablet, Rfl: 11  •  nebivolol (BYSTOLIC) 10 MG tablet, Take 1 tablet by mouth Daily., Disp: 30 tablet, Rfl: 11  •  spironolactone (ALDACTONE) 25 MG tablet, Take 0.5 tablets by mouth Daily., Disp: 15 tablet, Rfl: 11    Social History     Social History   • Marital status: Unknown     Social History Main Topics   • Smoking status: Former Smoker     Types: Cigarettes     Quit date: 1975   • Smokeless tobacco: Never Used   • Alcohol use No   • Drug use: No     Other Topics Concern   • Not on file       Family History   Problem Relation Age of Onset   • Arthritis Mother    • Hypertension Mother    • Heart attack Father    • Arthritis Sister        REVIEW OF SYSTEMS:   CONST:  + fatigue, no weight loss, fever, chills  HEENT:  No visual loss, blurred vision, double vision, yellow sclerae.                   No hearing loss, congestion, sore throat.   SKIN:      No rashes, urticaria, ulcers, sores.     RESP:     + shortness of breath, hemoptysis, cough, sputum.   GI:           No anorexia, nausea, vomiting, diarrhea. No abdominal pain, melena.   :         No burning on urination, hematuria or increased frequency.  ENDO:    No diaphoresis, cold or heat intolerance. No polyuria or polydipsia.   NEURO:  No headache, dizziness, syncope, paralysis, ataxia, or parasthesias.                  No change in bowel or bladder control. No history of CVA/TIA  MUSC:    No muscle, back pain, joint pain or stiffness.   HEME:    No anemia, bleeding, bruising. No history of DVT/PE.  PSYCH:  No history of depression, anxiety    Vitals:    05/16/18 1551   BP: 134/90   BP Location: Left arm   Patient Position: Sitting   Pulse: 86   Weight: (!) 144 kg (317 lb)   Height: 175.3 cm (69\")                 Physical Exam:  GEN: Obese gentleman, Well nourished, Well- " developed  No acute distress  HEENT: Normocephalic, Atraumatic, PERRLA, moist mucous membranes  NECK: supple, NO JVD, no thyromegaly, no lymphadenopathy  CARD: Irregularly irregular, S1S2, no murmur, gallop, rub  LUNGS: Clear to auscultataion, normal respiratory effort  ABDOMEN: Soft, nontender, normal bowel sounds  EXTREMITIES:No gross deformities,  No clubbing, cyanosis, trace LE edema  SKIN: Warm, dry  NEURO: No focal deficits  PSYCHIATRIC: Normal affect and mood        ECG 12 Lead  Date/Time: 5/16/2018 4:27 PM  Performed by: JOSELITO BOYER  Authorized by: JOSELITO BOYER   Comparison: compared with previous ECG from 4/4/2018  Similar to previous ECG  Rhythm: atrial fibrillation  BPM: 90            Device interrogation: [employer] dual chamber PPM set at VVIR due to atrial fibrillation, device with normal function, RV paced 79%, 9.6 years battery life       ICD-10-CM ICD-9-CM   1. Persistent atrial fibrillation I48.1 427.31   2. Tachy-mercedes syndrome I49.5 427.81   3. Chronic systolic congestive heart failure I50.22 428.22     428.0   4. Essential hypertension I10 401.9   5. YUNG (obstructive sleep apnea) G47.33 327.23       Assessment and Plan:   1. Persistent atrial fibrillation:  - Recurrent, mildy symptomatic atrial fibrillation despite previous treatment with flecainide and sotalol, now on amiodarone but with persistent atrial fibrillation.  Unclear if his symptoms are from atrial fibrillation versus deconditioning/CHF.   - Since he has been on amiodarone for quite some time now, we will try ECV to get him back in normal sinus rhythm.  He has been anticoagulated with Eliquis with no missed doses so can schedule ECV at earliest convenience.  He would need to be off amiodarone for a few months prior to an ablation so would benefit from ECV first to try and get him back in normal rhythm.  If he feels better in normal rhythm, then we can consider PVA for the future.    - Need MUGA scan to better assess LVEF  once he is back in NSR  2. Tachy-mercedes syndrome:  - S/p dual chamber PPM ([employer]), device with normal function  3. Chronic systolic CHF:  - Euvolemic, no ACE/ARB due to reported hypotension with these, continue current medications, continue follow up with Dr. Martel  - MUGA scan as noted above  4. Essential hypertension:  - Well controlled on current medications  5. YUNG:  - Discussed retrying CPAP mask and role of YUNG as a trigger for afib    Scribed for Tho Richards MD by ROSALINDA Ramirez. 5/16/2018  4:59 PM     I, Tho Richards MD, personally performed the services described in this documentation as scribed by the above named individual in my presence, and it is both accurate and complete.  5/16/2018  5:22 PM

## 2018-06-04 NOTE — DISCHARGE INSTR - APPOINTMENTS
You have an appointment scheduled with Dr. Richards on Thursday August 16th, 2018 at 11 am. Please arrive by 10:45 am. If you have any questions or need to reschedule please call [phone].

## 2018-06-07 ENCOUNTER — TELEPHONE (OUTPATIENT)
Dept: CARDIOLOGY | Facility: CLINIC | Age: 78
End: 2018-06-07

## 2018-06-07 NOTE — TELEPHONE ENCOUNTER
I talked with the patient this morning.  He has intermittent headaches and slight lightheadedness.  His blood pressure and heart rates have been stable.  His headache is frontal in nature and only mild per his description.  He denies any trauma or recent falls.  He denies any other associated neurological symptoms.  I asked him to take Tylenol ATC and see if the headache improves.  He is a notify us if it worsens or persist over the next 48 hours.  If it does persist or worsen, would consider head CT scan to rule out possible bleed since he is on Eliquis.

## 2018-06-07 NOTE — TELEPHONE ENCOUNTER
Patient had an internal cardioversion for persisitent A-fib on 6/4/18. Since the procedure he has been lightheaded with a slight headache during the day. When he lays down at night he said that his head starts pounding. Tylenol is not helping at all.

## 2018-08-06 RX ORDER — NEBIVOLOL HYDROCHLORIDE 10 MG/1
10 TABLET ORAL DAILY
Qty: 30 TABLET | Refills: 11 | Status: SHIPPED | OUTPATIENT
Start: 2018-08-06 | End: 2019-07-08 | Stop reason: SDUPTHER

## 2018-08-06 RX ORDER — SPIRONOLACTONE 25 MG/1
TABLET ORAL
Qty: 15 TABLET | Refills: 11 | Status: SHIPPED | OUTPATIENT
Start: 2018-08-06 | End: 2019-08-08 | Stop reason: SDUPTHER

## 2018-08-15 NOTE — PROGRESS NOTES
[facility] at [facility] - Office Note  Bobby Dodd         [address] [address]  1940   [phone] (home)      LOCATION:  [place] office.  Visit Type: Follow Up.    PCP:  Jaya Schmitt MD    08/16/18   Bobby Dodd is a 77 y.o.  male  retired.      Chief Complaint:   FU on AFib, CHF, HTN  1. Persistent atrial fibrillation:              A. Diagnosed during preop cardiovascular evaluation in 2003              B. Initially treated with flecainide with external cardioversion to normal sinus rhythm              C. Recurrent atrial fibrillation in 2006, s/p external cardioversion to normal sinus rhythm              D. Left and right heart catheterization 5/29/2009: EF 60%, no significant coronary artery disease, normal right heart catheterization              E. Initiation of sotalol with ECV to NSR 04/2013              F. Echocardiogram 10/28/13: EF 50%, grade 1 diastolic dysfunction, no significant valvular disease              G. Echocardiogram 8/1/2017: EF 20%, severe biatrial enlargement, no significant valvular disease              H. 24-hour Holter monitor 8/7/17: Predominantly atrial fibrillation, average heart rate 106 bpm, min 74 bpm, maximum 169 bpm, rare PVCs              I. Lexiscan stress test 9/20/17: EF 47%, positive stress myocardial perfusion image with a large severe inferoapical scar with no reversibility              J. Initiated on amiodarone 10/2017   K.  Recurrent AFib s/p Successful internal cardioversion of atrial fibrillation using a 50 -joule shock, 6/4/18.     2. Tachy mercedes syndrome:              A.  Significant bradycardia noted after sotalol and ECV to normal sinus rhythm 04/2013, remained with symptomatic sinus bradycardia off sotalol and underwent dual-chamber permanent pacemaker ([employer]) 4/19/2013  3. Chronic systolic congestive heart failure              A.  Echocardiogram 8/1/2017: EF 20%, severe biatrial enlargement, no significant  valvular disease              B.  Lexiscan stress test 9/20/17: EF 47%, positive stress myocardial perfusion image with a large severe inferoapical scar with no reversibility  4. Essential hypertension  5. Hyperlipidemia  6. Obstructive sleep apnea, noncompliant with CPAP  7. Morbid obesity  8.  Former tobacco abuse, quit 1975  9.  Surgical history:              A.  Removal of benign lung mass, 2004              B.  Prostate surgery, 2008              C.  Hernia repair, 2007              D.  Cholecystectomy, 2007              E.  Right knee arthroscopy              F.  Goiter removal, 2001              G.  Dual-chamber permanent pacemaker implant, 2013               Current Outpatient Prescriptions:   •  amiodarone (PACERONE) 200 MG tablet, Take 1 tablet by mouth Daily., Disp: 90 tablet, Rfl: 4  •  apixaban (ELIQUIS) 5 MG tablet tablet, Take 5 mg by mouth 2 (Two) Times a Day., Disp: , Rfl:   •  BYSTOLIC 10 MG tablet, TAKE 1 TABLET BY MOUTH DAILY., Disp: 30 tablet, Rfl: 11  •  diclofenac (VOLTAREN) 75 MG EC tablet, TAKE ONE TABLET BY MOUTH TWO TIMES A DAY, Disp: 180 tablet, Rfl: 3  •  furosemide (LASIX) 40 MG tablet, Take 1 tablet by mouth Daily., Disp: 45 tablet, Rfl: 11  •  spironolactone (ALDACTONE) 25 MG tablet, TAKE ONE-HALF TABLET BY MOUTH EVERY DAY, Disp: 15 tablet, Rfl: 11    HPI  Bobby Dodd is here today for his first office follow up after undergoing internal cardioversion for persistent atrial fibrillation.  He also has controlled hypertension and chronic systolic CHF which exacerbates with AFib.  Overall, he is doing well from a cardiac standpoint.  When he was in atrial fibrillation he was quite fatigued, had shortness of breath and was weak.  The symptoms have improved any feeling much better.  Over the last 3 days he has noticed increased swelling of his legs particularly of the right side but admits to not having elevated his feet as much as usual.  He also admits to an increased sodium intake with  "fresh vegetables from the garden.  He denies chest pain, denies wheezing, denies bleeding issues.  He has been compliant with medications.    When asked if he is compliant with his CPAP machine, he he states he is not.        The following portions of the patient's history were reviewed in the chart and updated as appropriate: allergies, current medications, past family history, past medical history, past social history, past surgical history and problem list.    Review of Systems   Constitution: Negative for weakness and malaise/fatigue.   Cardiovascular: Positive for leg swelling. Negative for chest pain, dyspnea on exertion, irregular heartbeat and palpitations.   Respiratory: Positive for shortness of breath.    Hematologic/Lymphatic: Negative for bleeding problem. Does not bruise/bleed easily.   Musculoskeletal: Positive for arthritis.   All other systems reviewed and are negative.            height is 175.3 cm (69\") and weight is 146 kg (322 lb 6.4 oz) (abnormal). His blood pressure is 136/82 and his pulse is 71.   Physical Exam   Constitutional: Vital signs are normal. He appears well-developed and well-nourished.   Cardiovascular: Normal rate, regular rhythm, S1 normal, S2 normal, normal heart sounds, intact distal pulses and normal pulses.    Pulmonary/Chest: Effort normal and breath sounds normal. He has no wheezes. He has no rhonchi. He has no rales.   Abdominal: Soft. Normal appearance and bowel sounds are normal. There is no hepatosplenomegaly.   Neurological: He is alert.           ECG 12 Lead  Date/Time: 8/16/2018 11:26 AM  Performed by: ALEM AVALOS  Authorized by: ALEM AVALOS   Comparison: compared with previous ECG from 6/4/2018  Similar to previous ECG  Rhythm: sinus rhythm and paced  Rate: normal  QRS axis: normal  Clinical impression: abnormal ECG           [employer] pacemaker:  Atrial pacing 98%, threshold 1.25, atrial impedance 450 ohms.  Ventricular pacing less than 1%, R-wave " couldn't 12, threshold 0.75, impedance 490 ohms.  Battery voltage is at 6.4 years.  No events noted.  He does not have a home monitor and is not actively transmitting.  Assessment/ Plan   Persistent atrial fibrillation (CMS/HCC):  EKG performed and reviewed, a paced today.  Device interrogation    Essential hypertension:  Well-controlled.  Continue current medical regimen.    Chronic systolic congestive heart failure (CMS/HCC):  Overall stable with mild exacerbation of lower extremity edema secondary to decreased activity and increased sodium intake.  I advised him to monitor his diet and elevate his feet as much as possible.  I noted that his creatinine was 1.5 approximately 2 months ago.  I would like to increase his spironolactone to 1 tablet daily for the next 3 days then half a tablet daily as scheduled.  He is to repeat a BMP in one week.        Anastasia Carlin PA-C functioning independently.  8/16/2018 11:24 AM

## 2018-08-16 ENCOUNTER — OFFICE VISIT (OUTPATIENT)
Dept: CARDIOLOGY | Facility: CLINIC | Age: 78
End: 2018-08-16

## 2018-08-16 VITALS
DIASTOLIC BLOOD PRESSURE: 82 MMHG | WEIGHT: 315 LBS | HEIGHT: 69 IN | BODY MASS INDEX: 46.65 KG/M2 | SYSTOLIC BLOOD PRESSURE: 136 MMHG | HEART RATE: 71 BPM

## 2018-08-16 DIAGNOSIS — I10 ESSENTIAL HYPERTENSION: ICD-10-CM

## 2018-08-16 DIAGNOSIS — I50.22 CHRONIC SYSTOLIC CONGESTIVE HEART FAILURE (HCC): ICD-10-CM

## 2018-08-16 DIAGNOSIS — I48.19 PERSISTENT ATRIAL FIBRILLATION (HCC): Primary | ICD-10-CM

## 2018-08-16 PROCEDURE — 99214 OFFICE O/P EST MOD 30 MIN: CPT | Performed by: PHYSICIAN ASSISTANT

## 2018-08-16 PROCEDURE — 93000 ELECTROCARDIOGRAM COMPLETE: CPT | Performed by: PHYSICIAN ASSISTANT

## 2018-08-23 ENCOUNTER — HOSPITAL ENCOUNTER (OUTPATIENT)
Facility: HOSPITAL | Age: 78
Discharge: HOME | End: 2018-08-23
Payer: COMMERCIAL

## 2018-08-23 LAB
ANION GAP SERPL CALCULATED.3IONS-SCNC: 8 MMOL/L (ref 3–16)
BUN BLDV-MCNC: 29 MG/DL (ref 6–20)
CALCIUM SERPL-MCNC: 9.7 MG/DL (ref 8.5–10.5)
CHLORIDE BLD-SCNC: 102 MMOL/L (ref 98–107)
CO2: 33 MMOL/L (ref 20–30)
CREAT SERPL-MCNC: 1.6 MG/DL (ref 0.4–1.2)
GFR AFRICAN AMERICAN: 51
GFR NON-AFRICAN AMERICAN: 42
GLUCOSE BLD-MCNC: 97 MG/DL (ref 74–106)
POTASSIUM SERPL-SCNC: 5.1 MMOL/L (ref 3.4–5.1)
SODIUM BLD-SCNC: 143 MMOL/L (ref 136–145)

## 2018-08-23 PROCEDURE — 36415 COLL VENOUS BLD VENIPUNCTURE: CPT

## 2018-08-23 PROCEDURE — 80048 BASIC METABOLIC PNL TOTAL CA: CPT

## 2018-10-03 ENCOUNTER — OFFICE VISIT (OUTPATIENT)
Dept: CARDIOLOGY | Facility: CLINIC | Age: 78
End: 2018-10-03

## 2018-10-03 ENCOUNTER — HOSPITAL ENCOUNTER (OUTPATIENT)
Facility: HOSPITAL | Age: 78
Discharge: HOME | End: 2018-10-03
Payer: COMMERCIAL

## 2018-10-03 VITALS
DIASTOLIC BLOOD PRESSURE: 70 MMHG | HEART RATE: 90 BPM | BODY MASS INDEX: 46.65 KG/M2 | SYSTOLIC BLOOD PRESSURE: 114 MMHG | HEIGHT: 69 IN | OXYGEN SATURATION: 98 % | WEIGHT: 315 LBS | RESPIRATION RATE: 18 BRPM

## 2018-10-03 DIAGNOSIS — I48.20 CHRONIC ATRIAL FIBRILLATION (HCC): Primary | ICD-10-CM

## 2018-10-03 DIAGNOSIS — I50.22 CHRONIC SYSTOLIC CONGESTIVE HEART FAILURE (HCC): ICD-10-CM

## 2018-10-03 DIAGNOSIS — I10 ESSENTIAL HYPERTENSION: ICD-10-CM

## 2018-10-03 DIAGNOSIS — Z95.0 CARDIAC PACEMAKER IN SITU: ICD-10-CM

## 2018-10-03 DIAGNOSIS — E66.01 MORBID (SEVERE) OBESITY DUE TO EXCESS CALORIES (HCC): ICD-10-CM

## 2018-10-03 DIAGNOSIS — I49.5 TACHY-BRADY SYNDROME (HCC): ICD-10-CM

## 2018-10-03 PROCEDURE — 93000 ELECTROCARDIOGRAM COMPLETE: CPT | Performed by: INTERNAL MEDICINE

## 2018-10-03 PROCEDURE — 93005 ELECTROCARDIOGRAM TRACING: CPT

## 2018-10-03 PROCEDURE — 99214 OFFICE O/P EST MOD 30 MIN: CPT | Performed by: INTERNAL MEDICINE

## 2018-10-03 PROCEDURE — 93281 PM DEVICE PROGR EVAL MULTI: CPT | Performed by: INTERNAL MEDICINE

## 2018-10-03 RX ORDER — FUROSEMIDE 40 MG/1
40 TABLET ORAL DAILY
Qty: 90 TABLET | Refills: 4 | Status: SHIPPED | OUTPATIENT
Start: 2018-10-03 | End: 2023-03-17 | Stop reason: HOSPADM

## 2018-10-03 RX ORDER — AMIODARONE HYDROCHLORIDE 200 MG/1
200 TABLET ORAL DAILY
Qty: 90 TABLET | Refills: 4 | Status: SHIPPED | OUTPATIENT
Start: 2018-10-03 | End: 2019-10-25 | Stop reason: SDUPTHER

## 2018-10-03 NOTE — PROGRESS NOTES
Subjective:     Encounter Date:10/03/2018      Patient ID: Bobby Dodd is a 77 y.o. male.    Chief Complaint:Shortness of breath and edema  HPI  This is a 77-year-old male patient who presents to cardiology clinic for routine follow-up and pacemaker interrogation.  The patient has a [employer] permanent pacemaker model number 2210. DDD-r: 70/110 with an implant date of April 19, 2013 for tachycardia-bradycardia syndrome exacerbated by antiarrhythmic drug therapy administered for paroxysmal atrial fibrillation.  The patient underwent successful synchronized external cardioversion for recurrent atrial fibrillation earlier this year.  The patient has reverted back into atrial fibrillation in 1 September.  He has been in atrial fibrillation with a relative rapid ventricular response tends that time.  He has had peak heart rates up to 140 bpm.  The patient is paced 45% of the time in the right atrium with the P-wave of 1.3 mV.  The right atrial lead impedance is 430 ohms.  He has 5.6 years of battery longevity.  He is paced 51% of the time in the right ventricle.  His R-wave is greater than 12.0 mV.  The right ventricular pacing threshold is 0.75 V at 0.5 ms with a lead impedance of 480 ohms.  The patient indicates that he gets shortness of breath doing physical activities.  He indicates that he recently done a great deal of mowing and working in a without any shortness of breath.  He has no orthopnea PND or lower extremity edema.  He has had intermittent swelling of his feet and ankles but this has not been a consistent abnormality.  The patient had a MUGA scan which calculated his ejection fraction at 47% after his synchronized cardioversion.  His previous ejection fraction had been as low as 20%.  The patient indicates that he could not say with convincing certainty that he felt any better while he was in normal sinus rhythm and has not noticed any deterioration in his symptoms since going back into atrial  fibrillation one month ago.  He has no dizziness palpitations or syncope.  He remains a nonsmoker.  The following portions of the patient's history were reviewed and updated as appropriate: allergies, current medications, past family history, past medical history, past social history, past surgical history and problem  Review of Systems   Constitution: Negative for chills, diaphoresis, fever, weakness, malaise/fatigue, weight gain and weight loss.   HENT: Negative for ear discharge, hearing loss, hoarse voice and nosebleeds.    Eyes: Negative for discharge, double vision, pain and photophobia.   Cardiovascular: Negative for chest pain, claudication, cyanosis, dyspnea on exertion, irregular heartbeat, leg swelling, near-syncope, orthopnea, palpitations, paroxysmal nocturnal dyspnea and syncope.   Respiratory: Negative for cough, hemoptysis, shortness of breath, sputum production and wheezing.    Endocrine: Negative for cold intolerance, heat intolerance, polydipsia, polyphagia and polyuria.   Hematologic/Lymphatic: Negative for adenopathy and bleeding problem. Does not bruise/bleed easily.   Skin: Negative for color change, flushing, itching and rash.   Musculoskeletal: Negative for muscle cramps, muscle weakness, myalgias and stiffness.   Gastrointestinal: Negative for abdominal pain, diarrhea, hematemesis, hematochezia, nausea and vomiting.   Genitourinary: Negative for dysuria, frequency and nocturia.   Neurological: Negative for focal weakness, loss of balance, numbness, paresthesias and seizures.   Psychiatric/Behavioral: Negative for altered mental status, hallucinations and suicidal ideas.   Allergic/Immunologic: Negative for HIV exposure, hives and persistent infections.           Current Outpatient Prescriptions:   •  amiodarone (PACERONE) 200 MG tablet, Take 1 tablet by mouth Daily., Disp: 90 tablet, Rfl: 4  •  apixaban (ELIQUIS) 5 MG tablet tablet, Take 1 tablet by mouth Every 12 (Twelve) Hours., Disp: 180  "tablet, Rfl: 4  •  BYSTOLIC 10 MG tablet, TAKE 1 TABLET BY MOUTH DAILY., Disp: 30 tablet, Rfl: 11  •  diclofenac (VOLTAREN) 75 MG EC tablet, TAKE ONE TABLET BY MOUTH TWO TIMES A DAY, Disp: 180 tablet, Rfl: 3  •  furosemide (LASIX) 40 MG tablet, Take 1 tablet by mouth Daily., Disp: 90 tablet, Rfl: 4  •  spironolactone (ALDACTONE) 25 MG tablet, TAKE ONE-HALF TABLET BY MOUTH EVERY DAY, Disp: 15 tablet, Rfl: 11     Objective:     Physical Exam   Constitutional: He is oriented to person, place, and time. He appears well-developed and well-nourished.   HENT:   Head: Normocephalic and atraumatic.   Mouth/Throat: Oropharynx is clear and moist.   Eyes: Pupils are equal, round, and reactive to light. Conjunctivae and EOM are normal. No scleral icterus.   Neck: Normal range of motion. Neck supple. No JVD present. No tracheal deviation present. No thyromegaly present.   Cardiovascular: Normal rate, S1 normal, S2 normal, normal heart sounds, intact distal pulses and normal pulses.  An irregularly irregular rhythm present. PMI is not displaced.  Exam reveals no gallop and no friction rub.    No murmur heard.  Pulmonary/Chest: Effort normal and breath sounds normal. No respiratory distress. He has no wheezes. He has no rales.   Abdominal: Soft. Bowel sounds are normal. He exhibits no distension and no mass. There is no tenderness. There is no rebound and no guarding.   Musculoskeletal: Normal range of motion. He exhibits edema. He exhibits no deformity.   Neurological: He is alert and oriented to person, place, and time. He displays normal reflexes. No cranial nerve deficit. Coordination normal.   Skin: Skin is warm and dry. No rash noted. No erythema.   Psychiatric: He has a normal mood and affect. His behavior is normal. Thought content normal.     Blood pressure 114/70, pulse 90, resp. rate 18, height 175.3 cm (69.02\"), weight (!) 149 kg (328 lb 9.6 oz), SpO2 98 %.   Lab Review:       Assessment:         1. Chronic atrial " fibrillation (CMS/HCC)  The patient has been back in atrial fibrillation since earlier in the month.  He indicates that he does not feel any better or any worse that he can tell when he is in normal sinus rhythm.  The patient had previously undergone successful synchronized external cardioversion in the context of chronic atrial fibrillation and long-term Coumadin anticoagulation which was performed by the electrophysiologist in [facility].  The patient had a MUGA scan which showed his ejection fraction to be 47%.  His previous ejection fraction was felt to be 20%.  Despite the apparent improvement in his overall LV systolic performance in normal sinus rhythm the patient does not feel that he was in the [place] symptomatic.    2. Chronic systolic congestive heart failure (CMS/HCC)  He is currently experiencing New York Heart Association functional class 2-3 symptoms.  He has had intermittent swelling of his feet and ankles.  Overall he appears to be reasonably well compensated.    3. Essential hypertension  Excellent blood pressure control.  At this point his blood pressure is low enough that I would be somewhat reluctant to start ACE inhibitor therapy\angiotensin II receptor blockade\ Entresto    4. Tachy-mercedes syndrome (CMS/HCC)  Normal pacemaker function    5. Morbid (severe) obesity due to excess calories (CMS/HCC)  Body mass index is greater than 48.  The patient has gained 6 pounds since his last visit.  His obesity pattern is central.  There is evidence of comorbidities.      ECG 12 Lead  Date/Time: 10/3/2018 1:36 PM  Performed by: RUSTY CARDONA  Authorized by: RUSTY CARDONA   Rhythm: atrial fibrillation and paced  Clinical impression: abnormal ECG             Plan:       I have discussed with the patient ongoing option of ablation.  The patient is somewhat reluctant to have any invasive procedures given his age and the fact that he does not feel any worse in atrial fibrillation compared to normal  sinus rhythm.  At this point he recommends ongoing medical therapy without any additional procedures or testing.  Despite his marginally low blood pressure I would recommend starting Entresto 24-26mg PO BID. The patient has been counseled regarding the importance of diet exercise and weight management.  The importance of dietary sodium restriction have been reinforced with the patient.

## 2018-10-04 ENCOUNTER — TELEPHONE (OUTPATIENT)
Dept: CARDIOLOGY | Facility: CLINIC | Age: 78
End: 2018-10-04

## 2018-10-04 NOTE — TELEPHONE ENCOUNTER
Pt notified of the New Medication started today Entresto to let pt know Dr. Martel sent it in after pt left his appt. Samples given to pt also 2 week supply.  Shelli Laird MA

## 2018-10-08 RX ORDER — APIXABAN 5 MG/1
TABLET, FILM COATED ORAL
Qty: 60 TABLET | Refills: 6 | OUTPATIENT
Start: 2018-10-08

## 2018-10-15 ENCOUNTER — TELEPHONE (OUTPATIENT)
Dept: CARDIOLOGY | Facility: CLINIC | Age: 78
End: 2018-10-15

## 2018-10-15 NOTE — TELEPHONE ENCOUNTER
Do you want to keep patient in samples? Because the patient has been taking them? I could try to do a letter if you want.

## 2018-10-15 NOTE — TELEPHONE ENCOUNTER
Dr. Martel Prior Authorization for Entresto was denied due to patient's EF > 40 %. Is there something else you would like to give patient? Or would you want to Repeat his ECHO since it was done 8-1-2017? MUGA was done 5-31-18. Prior to Cardioversion.  Please review and advise.  Shelli Laird MA

## 2018-10-15 NOTE — TELEPHONE ENCOUNTER
Pt. Ask about one of the medications that you started when he first saw you. Either Digoxin,Spironolactone or Bystolic could cause damage to his system. He mentioned it may hurt his liver and kidney's and he states you did not want him on the medication long term.   Is there one of these medications that you would like to stop or eventually stop?  Shelli Laird MA

## 2019-04-03 ENCOUNTER — OFFICE VISIT (OUTPATIENT)
Dept: CARDIOLOGY | Facility: CLINIC | Age: 79
End: 2019-04-03

## 2019-04-03 ENCOUNTER — HOSPITAL ENCOUNTER (OUTPATIENT)
Facility: HOSPITAL | Age: 79
Discharge: HOME | End: 2019-04-03
Payer: COMMERCIAL

## 2019-04-03 VITALS
WEIGHT: 315 LBS | DIASTOLIC BLOOD PRESSURE: 70 MMHG | RESPIRATION RATE: 20 BRPM | BODY MASS INDEX: 46.65 KG/M2 | HEIGHT: 69 IN | SYSTOLIC BLOOD PRESSURE: 118 MMHG | OXYGEN SATURATION: 93 % | HEART RATE: 73 BPM

## 2019-04-03 DIAGNOSIS — I10 ESSENTIAL HYPERTENSION: ICD-10-CM

## 2019-04-03 DIAGNOSIS — I48.20 CHRONIC ATRIAL FIBRILLATION (HCC): Primary | ICD-10-CM

## 2019-04-03 DIAGNOSIS — I50.22 CHRONIC SYSTOLIC CONGESTIVE HEART FAILURE (HCC): ICD-10-CM

## 2019-04-03 DIAGNOSIS — Z95.0 CARDIAC PACEMAKER IN SITU: ICD-10-CM

## 2019-04-03 DIAGNOSIS — I49.5 TACHY-BRADY SYNDROME (HCC): ICD-10-CM

## 2019-04-03 DIAGNOSIS — E66.01 MORBID (SEVERE) OBESITY DUE TO EXCESS CALORIES (HCC): ICD-10-CM

## 2019-04-03 PROCEDURE — 93280 PM DEVICE PROGR EVAL DUAL: CPT | Performed by: INTERNAL MEDICINE

## 2019-04-03 PROCEDURE — 93005 ELECTROCARDIOGRAM TRACING: CPT

## 2019-04-03 PROCEDURE — 99214 OFFICE O/P EST MOD 30 MIN: CPT | Performed by: INTERNAL MEDICINE

## 2019-04-03 NOTE — PROGRESS NOTES
Subjective:     Encounter Date:04/03/2019      Patient ID: Bobby Dodd is a 78 y.o. male.    Chief Complaint: Palpitations, shortness of breath and edema  HPI  This is a 78-year-old male patient with advanced systolic congestive heart failure and recurrent atrial fibrillation.  The patient had previously undergone an unsuccessful attempt at synchronized external cardioversion.  He subsequently then underwent an attempt at an internal cardioversion which was successful.  However, the patient has been essentially in persistent atrial fibrillation over the last 6 months.  Interrogation of his pacemaker indicates that he has 100% mode switches with continuous atrial fibrillation since October 3, 2018.  He is successful internal cardioversion was in June 2018.  The patient has had multiple high heart rate events.  The patient indicates that he feels these as a sense that his heart is racing.  The patient has a [employer] 2210 DDDr 70/110 permanent pacemaker.  He is paced less than 1% of the time in the right atrium and 97% of the time in the right ventricle.  He has 4.4 years of generator longevity.  His P wave sensing is 0.5 mV.  Threshold testing was not possible due to atrial fibrillation.  His lead impedance is 430 ohms.  His right ventricular lead demonstrates R wave sensing of greater than 12 mV.  The pacing threshold of 0.62 V at 0.5 ms and a lead impedance of 440 ohms.  His device has been reprogrammed to VVIr 70 for the time being.  The patient indicates that his insurance is refusing to pay for his Entresto.  He has noticed increasing lower extremity edema but is not strictly adhering to our previously recommended fluid and sodium restriction.  He reports compliance with his other medications.  His weight is unchanged at 328 pounds from his last visit.  He is therapeutically anticoagulated with a direct oral anticoagulant.  He has had no signs or symptoms to suggest stroke or TIA.  He has  had no bleeding complications.  His shortness of breath both at rest and with activity has worsened.  He has no chest discomfort at rest or with activity.  There is no exertional chest arm neck jaw shoulder or back discomfort.  He has had no dizziness or syncope.  The following portions of the patient's history were reviewed and updated as appropriate: allergies, current medications, past family history, past medical history, past social history, past surgical history and problem  Review of Systems   Constitution: Positive for weakness and malaise/fatigue. Negative for chills, diaphoresis, fever, weight gain and weight loss.   HENT: Negative for ear discharge, hearing loss, hoarse voice and nosebleeds.    Eyes: Negative for discharge, double vision, pain and photophobia.   Cardiovascular: Positive for dyspnea on exertion, irregular heartbeat, leg swelling and palpitations. Negative for chest pain, claudication, cyanosis, near-syncope, orthopnea, paroxysmal nocturnal dyspnea and syncope.   Respiratory: Positive for shortness of breath. Negative for cough, hemoptysis, sputum production and wheezing.    Endocrine: Negative for cold intolerance, heat intolerance, polydipsia, polyphagia and polyuria.   Hematologic/Lymphatic: Negative for adenopathy and bleeding problem. Does not bruise/bleed easily.   Skin: Negative for color change, flushing, itching and rash.   Musculoskeletal: Negative for muscle cramps, muscle weakness, myalgias and stiffness.   Gastrointestinal: Negative for abdominal pain, diarrhea, hematemesis, hematochezia, nausea and vomiting.   Genitourinary: Negative for dysuria, frequency and nocturia.   Neurological: Negative for focal weakness, loss of balance, numbness, paresthesias and seizures.   Psychiatric/Behavioral: Negative for altered mental status, hallucinations and suicidal ideas.   Allergic/Immunologic: Negative for HIV exposure, hives and persistent infections.           Current Outpatient  Medications:   •  amiodarone (PACERONE) 200 MG tablet, Take 1 tablet by mouth Daily., Disp: 90 tablet, Rfl: 4  •  apixaban (ELIQUIS) 5 MG tablet tablet, Take 1 tablet by mouth Every 12 (Twelve) Hours., Disp: 180 tablet, Rfl: 4  •  BYSTOLIC 10 MG tablet, TAKE 1 TABLET BY MOUTH DAILY., Disp: 30 tablet, Rfl: 11  •  diclofenac (VOLTAREN) 75 MG EC tablet, TAKE ONE TABLET BY MOUTH TWO TIMES A DAY, Disp: 180 tablet, Rfl: 3  •  furosemide (LASIX) 40 MG tablet, Take 1 tablet by mouth Daily., Disp: 90 tablet, Rfl: 4  •  sacubitril-valsartan (ENTRESTO) 24-26 MG tablet, Take 1 tablet by mouth Every 12 (Twelve) Hours., Disp: 60 tablet, Rfl: 11  •  spironolactone (ALDACTONE) 25 MG tablet, TAKE ONE-HALF TABLET BY MOUTH EVERY DAY, Disp: 15 tablet, Rfl: 11    Objective:   Physical Exam   Constitutional: He is oriented to person, place, and time. He appears well-developed and well-nourished. No distress.   HENT:   Head: Normocephalic and atraumatic.   Mouth/Throat: Oropharynx is clear and moist.   Eyes: Conjunctivae and EOM are normal. Pupils are equal, round, and reactive to light. No scleral icterus.   Neck: Normal range of motion. Neck supple. No JVD present. No tracheal deviation present. No thyromegaly present.   Cardiovascular: Normal rate, S1 normal, S2 normal, normal heart sounds, intact distal pulses and normal pulses. An irregularly irregular rhythm present. PMI is not displaced. Exam reveals no gallop and no friction rub.   No murmur heard.  Pulmonary/Chest: Effort normal and breath sounds normal. No stridor. No respiratory distress. He has no wheezes. He has no rales.   Abdominal: Soft. Bowel sounds are normal. He exhibits no distension and no mass. There is no tenderness. There is no rebound and no guarding.   Musculoskeletal: Normal range of motion. He exhibits edema. He exhibits no deformity.   Neurological: He is alert and oriented to person, place, and time. He displays normal reflexes. No cranial nerve deficit.  "Coordination normal.   Skin: Skin is warm and dry. No rash noted. He is not diaphoretic. No erythema.   Psychiatric: He has a normal mood and affect. His behavior is normal. Thought content normal.     Blood pressure 118/70, pulse 73, resp. rate 20, height 175.3 cm (69.02\"), weight (!) 149 kg (328 lb 12.8 oz), SpO2 93 %.   Lab Review:     Assessment:       1. Chronic atrial fibrillation (CMS/HCC)  The patient has been in permanent atrial fibrillation since October of last year.  He had successful internal cardioversion in June 2018.  This followed an unsuccessful attempt at synchronized external cardioversion due to his morbid obesity.  He has been maintained on low-dose antiarrhythmic drug therapy.  He is therapeutically anticoagulated with a direct oral anticoagulant.    2. Chronic systolic congestive heart failure (CMS/HCC)  His left ventricular ejection fraction is 40%.  He is currently experiencing New York Heart Association functional class III-IV symptoms.  For unknown reasons his insurance company is denying payment for Entresto.  The patient would greatly benefit from this medication as it would have a significant impact on preventing future hospitalizations for congestive heart failure.  It would also improve his symptoms significantly.  In addition there is a clear cardiovascular benefit to this medication.  We are trying to arrange for him to get samples.  We will make an appeal to his insurance company.    3. Essential hypertension  Acceptable blood pressure control.    4. Tachy-mercedes syndrome (CMS/HCC)  Only functioning permanent pacemaker.  We have reprogrammed his pacemaker to VVIr as he is currently in persistent atrial fibrillation.    5. Morbid (severe) obesity due to excess calories (CMS/HCC)  His body mass index remains greater than 48.  His obesity pattern is central.  There is evidence of multiple comorbidities.      ECG 12 Lead  Date/Time: 4/3/2019 2:40 PM  Performed by: José Miguel Martel, " MD  Authorized by: José Miguel Martel MD   Rhythm: atrial fibrillation and paced  Pacing: ventricular paced rhythm  Clinical impression: abnormal EKG            Plan:     The patient is encouraged to keep his follow-up appointment with Dr. Richards.  I suspect he will be offered the option of pulmonary vein isolation and radiofrequency ablation.  An alternative would be to attempt another internal cardioversion with changing his amiodarone therapy to tikosyn.  The importance of diet exercise and weight management have been reinforced to the patient.  We have reinforced the importance of fluid and sodium restriction.  I have recommended a 1.5 L/day fluid restriction and a 1500 mg/day sodium restriction.  We are making arrangements for the patient to meet with an outpatient dietitian to discuss strategies as to how to maintain these goals.  We are making an appeal to his insurance company for the necessity of treating with Entresto for both survival benefit as well as symptom improvement and potentially avoiding rehospitalization for congestive heart failure.

## 2019-04-17 RX ORDER — IRBESARTAN 300 MG/1
300 TABLET ORAL NIGHTLY
Qty: 30 TABLET | Refills: 6 | Status: ON HOLD | OUTPATIENT
Start: 2019-04-17 | End: 2019-06-04

## 2019-04-17 NOTE — TELEPHONE ENCOUNTER
Pt does not want to take Entresto because it is from [facility]. Is there something else you would like to have the patient take?

## 2019-04-26 ENCOUNTER — APPOINTMENT (OUTPATIENT)
Dept: NUTRITION | Facility: HOSPITAL | Age: 79
End: 2019-04-26

## 2019-05-09 ENCOUNTER — TELEPHONE (OUTPATIENT)
Dept: CARDIOLOGY | Facility: CLINIC | Age: 79
End: 2019-05-09

## 2019-05-09 NOTE — TELEPHONE ENCOUNTER
Patient states that his feet and legs are swollen to the point that he now has blisters on them . Legs are a little red. Patient states he is restricted his sodium intake but he does drink 1 or 2 sodas a day. He is elevating is legs at night. Patient is drinking around 50 oz of fluids a day.  Advise.

## 2019-05-09 NOTE — TELEPHONE ENCOUNTER
Increase lasix to 80mg PO BID for3-5 days until swelling gets back to baseline then back off to 80mg PO daily

## 2019-05-24 ENCOUNTER — OFFICE VISIT (OUTPATIENT)
Dept: CARDIOLOGY | Facility: CLINIC | Age: 79
End: 2019-05-24

## 2019-05-24 VITALS
BODY MASS INDEX: 45.1 KG/M2 | OXYGEN SATURATION: 96 % | DIASTOLIC BLOOD PRESSURE: 84 MMHG | HEART RATE: 75 BPM | WEIGHT: 315 LBS | HEIGHT: 70 IN | SYSTOLIC BLOOD PRESSURE: 134 MMHG

## 2019-05-24 DIAGNOSIS — I10 ESSENTIAL HYPERTENSION: ICD-10-CM

## 2019-05-24 DIAGNOSIS — I50.21 ACUTE SYSTOLIC CONGESTIVE HEART FAILURE (HCC): ICD-10-CM

## 2019-05-24 DIAGNOSIS — E66.01 MORBID (SEVERE) OBESITY DUE TO EXCESS CALORIES (HCC): ICD-10-CM

## 2019-05-24 DIAGNOSIS — I50.22 CHRONIC SYSTOLIC CONGESTIVE HEART FAILURE (HCC): Primary | ICD-10-CM

## 2019-05-24 DIAGNOSIS — G47.33 OSA (OBSTRUCTIVE SLEEP APNEA): ICD-10-CM

## 2019-05-24 DIAGNOSIS — I48.20 CHRONIC ATRIAL FIBRILLATION (HCC): ICD-10-CM

## 2019-05-24 PROCEDURE — 93000 ELECTROCARDIOGRAM COMPLETE: CPT | Performed by: PHYSICIAN ASSISTANT

## 2019-05-24 PROCEDURE — 99214 OFFICE O/P EST MOD 30 MIN: CPT | Performed by: PHYSICIAN ASSISTANT

## 2019-05-24 PROCEDURE — 93280 PM DEVICE PROGR EVAL DUAL: CPT | Performed by: PHYSICIAN ASSISTANT

## 2019-05-24 NOTE — PROGRESS NOTES
[facility]   OFFICE NOTE      Bobby Dodd  1940  PCP: Provider, No Known    SUBJECTIVE:   Bobby Dodd is a 78 y.o. male seen for a follow up visit regarding the following:     CC:Persistent Afib    Problem List:  1. Persistent atrial fibrillation:              A. Diagnosed during preop cardiovascular evaluation in 2003              B. Initially treated with flecainide with external cardioversion to normal sinus rhythm              C. Recurrent atrial fibrillation in 2006, s/p external cardioversion to normal sinus rhythm              D. Left and right heart catheterization 5/29/2009: EF 60%, no significant coronary artery disease, normal right heart catheterization              E. Initiation of sotalol with ECV to NSR 04/2013              F. Echocardiogram 10/28/13: EF 50%, grade 1 diastolic dysfunction, no significant valvular disease              G. Echocardiogram 8/1/2017: EF 20%, severe biatrial enlargement, no significant valvular disease              H. 24-hour Holter monitor 8/7/17: Predominantly atrial fibrillation, average heart rate 106 bpm, min 74 bpm, maximum 169 bpm, rare PVCs              I. Lexiscan stress test 9/20/17: EF 47%, positive stress myocardial perfusion image with a large severe inferoapical scar with no reversibility              J. Initiated on amiodarone 10/2017   I. ECV 6/18.  Recurrent Afib 10/18, increased SOB.   2. Tachy mercedes syndrome:              A.  Significant bradycardia noted after sotalol and ECV to normal sinus rhythm 04/2013, remained with symptomatic sinus bradycardia off sotalol and underwent dual-chamber permanent  pacemaker ([employer]) 4/19/2013  3. Chronic systolic congestive heart failure              A.  Echocardiogram 8/1/2017: EF 20%, severe biatrial enlargement, no significant valvular disease              B.  Lexiscan stress test 9/20/17: EF 47%, positive stress myocardial perfusion image with a large severe  inferoapical scar with no reversibility  4. Essential hypertension  5. Hyperlipidemia  6. Obstructive sleep apnea  7. Morbid obesity  8.  Surgical history:              A.  Removal of benign lung mass, 2004              B.  Prostate surgery, 2008              C.  Hernia repair, 2007              D.  Cholecystectomy, 2007              E.  Right knee arthroscopy              F.  Goiter removal, 2001              G.  Dual-chamber permanent pacemaker implant, 2013      HPI:   The patient is a 78-year-old gentleman presents today for follow-up regarding atrial fibrillation, nonischemic or myopathy, chronic congestive heart failure, tachybradycardia syndrome with [employer] pacemaker.  The patient had a cardioversion with Dr. Richards last summer maintain normal rhythm for approximately 2 months and states he has less shortness of breath and less fatigue.  During this time he was a paced on his pacemaker.  He is continue medical management with the idea of hopefully the future having a pulmonary vein ablation procedure.  He did follow-up Dr. Martel shortly after he was found to be in Afib in October 2018.  He was continued on medical therapy.  In April he had a visit with Dr. Martel was found to be 100% mode switched in atrial fibrillation 70% paced from the RV.  He was switched to VVIR at the time.  He had worsening shortness of breath fatigue and lower extreme edema.  Dr. Martel recommend he titrate his Lasix to help with his edema.  It was recommended he take Entresto which has been reluctant to do so but is taking a beta-blocker and ARB.  He states his blood pressures well controlled.  He denies any chest pain.  He denies any syncope or near syncope events.  He has gained over 15 pounds over the past 6 months.  He requests further recommendations from Maurice regarding possible ablation procedure.        ROS:  Review of Symptoms:  General: + Fatigue  Skin: no rashes, lumps, or other skin changes  HEENT: no  dizziness, lightheadedness, or vision changes  Respiratory: no cough or hemoptysis  Cardiovascular: no palpitations, and tachycardia  Gastrointestinal: no black/tarry stools or diarrhea  Urinary: no change in frequency or urgency  Peripheral Vascular: no claudication or leg cramps  Musculoskeletal: no muscle or joint pain/stiffness  Psychiatric: no depression or excessive stress  Neurological: no sensory or motor loss, no syncope  Hematologic: no anemia, easy bruising or bleeding  Endocrine: no thyroid problems, nor heat or cold intolerance    Cardiac PMH: (Old records have been reviewed and summarized below)      Past Medical History, Past Surgical History, Family history, Social History, and Medications were all reviewed with the patient today and updated as necessary.       Current Outpatient Medications:   •  amiodarone (PACERONE) 200 MG tablet, Take 1 tablet by mouth Daily., Disp: 90 tablet, Rfl: 4  •  apixaban (ELIQUIS) 5 MG tablet tablet, Take 1 tablet by mouth Every 12 (Twelve) Hours., Disp: 180 tablet, Rfl: 4  •  BYSTOLIC 10 MG tablet, TAKE 1 TABLET BY MOUTH DAILY., Disp: 30 tablet, Rfl: 11  •  diclofenac (VOLTAREN) 75 MG EC tablet, TAKE ONE TABLET BY MOUTH TWO TIMES A DAY, Disp: 180 tablet, Rfl: 3  •  furosemide (LASIX) 40 MG tablet, Take 1 tablet by mouth Daily., Disp: 90 tablet, Rfl: 4  •  irbesartan (AVAPRO) 300 MG tablet, Take 1 tablet by mouth Every Night., Disp: 30 tablet, Rfl: 6  •  spironolactone (ALDACTONE) 25 MG tablet, TAKE ONE-HALF TABLET BY MOUTH EVERY DAY, Disp: 15 tablet, Rfl: 11      No Known Allergies  Patient Active Problem List   Diagnosis   • Persistent atrial fibrillation (CMS/HCC)   • Essential hypertension   • Palpitations   • Tachy-mercedes syndrome (CMS/HCC)   • SOB (shortness of breath)   • Acute systolic congestive heart failure (CMS/HCC)   • Morbid (severe) obesity due to excess calories (CMS/HCC)   • Chronic systolic congestive heart failure (CMS/HCC)   • Chronic atrial  "fibrillation (CMS/[facility])   • YUNG (obstructive sleep apnea)     Past Medical History:   Diagnosis Date   • A-fib (CMS/[facility])    • Arrhythmia    • Arthritis    • CHF (congestive heart failure) (CMS/[facility])    • Edema    • Elevated prostate specific antigen (PSA)    • Hyperlipidemia    • Hypertension    • Hypogonadism in male    • Joint pain, elbow    • Kidney stones    • Knee pain    • Low back pain    • Obstructive sleep apnea    • Pacemaker    • Palpitations    • Rheumatic fever    • Trochanteric bursitis      Past Surgical History:   Procedure Laterality Date   • CARDIAC ELECTROPHYSIOLOGY PROCEDURE N/A 2018    Procedure: Internal Cardioversion;  Surgeon: Tho Richards MD;  Location: [facility] INVAS[facility] LOCATION;  Service: Cardiology   • CARDIOVERSION      per DR. RICHARDS   • CHOLECYSTECTOMY     • HERNIA REPAIR     • KNEE ARTHROSCOPY      RIGHT   • LUNG SURGERY     • OTHER SURGICAL HISTORY      GOITER REMOVED .   • PACEMAKER IMPLANTATION      [facility]     Family History   Problem Relation Age of Onset   • Arthritis Mother    • Hypertension Mother    • Heart attack Father    • Arthritis Sister      Social History     Tobacco Use   • Smoking status: Former Smoker     Types: Cigarettes     Last attempt to quit: 1975     Years since quittin.4   • Smokeless tobacco: Never Used   Substance Use Topics   • Alcohol use: No         PHYSICAL EXAM:    /84 (BP Location: Left arm, Patient Position: Sitting)   Pulse 75   Ht 177.8 cm (70\")   Wt (!) 151 kg (333 lb)   SpO2 96%   BMI 47.78 kg/m²        Wt Readings from Last 5 Encounters:   19 (!) 151 kg (333 lb)   19 (!) 149 kg (328 lb 12.8 oz)   10/03/18 (!) 149 kg (328 lb 9.6 oz)   18 (!) 146 kg (322 lb 6.4 oz)   18 (!) 143 kg (315 lb 7.7 oz)       BP Readings from Last 5 Encounters:   19 134/84   19 118/70   10/03/18 114/70   18 136/82   18 112/76       General appearance - Alert, well appearing,  Obesity, and in " no distress   Mental status - Affect appropriate to mood.  Eyes - Sclerae anicteric,  ENMT - Hearing grossly normal bilaterally, Dental hygiene good.  Neck - Carotids upstroke normal bilaterally, no bruits, no JVD.  Resp - Clear to auscultation, no wheezes, rales or rhonchi, symmetric air entry.  Heart - Normal rate, regular rhythm, normal S1, S2, no murmurs, rubs, clicks or gallops.  GI - Soft, nontender, nondistended, no masses or organomegaly.  Neurological - Grossly intact - normal speech, no focal findings  Musculoskeletal - No joint tenderness, deformity or swelling, no muscular tenderness noted.  Extremities - Peripheral pulses normal, three plus pedal edema, no clubbing or cyanosis.  Skin - Normal coloration and turgor.  Psych -  oriented to person, place, and time.    Medical problems and test results were reviewed with the patient today.     No results found for this or any previous visit (from the past 672 hour(s)).      EKG: (EKG has been independently visualized by me and summarized below)    ECG 12 Lead  Date/Time: 5/24/2019 3:37 PM  Performed by: Alvaro Carey PA  Authorized by: Alvaro Carey PA   Comparison: compared with previous ECG from 4/3/2019  Similar to previous ECG  Rhythm: atrial fibrillation and paced  Rate: normal  Conduction: left bundle branch block  QRS axis: normal    Clinical impression: abnormal EKG          Device Interrogation:  Please see device interrogation form which has been signed and updated and reviewed.  [employer] dual-chamber device that is set at VVIR.  RV paced 70%.  R wave 12.0 mV.  Acceptable threshold impedance.  5 years remaining on the battery.    ASSESSMENT   1. Persistent Afib:  On Amiodarone since 10/17. Last ECV 6/4/18.  Patient had less fatigue,sob in Sinus.  Recurrent AFib 10/18 persistently out of [facility] 100% Afib by Device interrogation with Dr. Martel 4/3/19.    Rate controlled on Bystolic, Amiodarone.  2. Chronic SHF Class III, LE Edema  "worse.  Currently on BB, Avapro, and Aldactone.  Increased Lasix followed by Dr. Martel  3. HTN: Controlled.  4. Morbid Obesity  5. NICM:  Remote Normal LHC 2009, EF 20% by Echo 2017.  MPS inferior \"scar\" EF 47%. On BB, ARB, and Aldactone.  Patient refused Entresto in the past.   6. SSS/Tachybrady syndrome: [employer], Changed to VVIR. 4/3/19, Increased RV pacing since 10/18 with  70% RV  paced.  Battery voltage 2.89v    PLAN  · Long discussion with the patient regarding his atrial fibrillation persistent symptoms of congestive heart failure and Cardiomyopathy.  Dr. Richards last office visit discussed with the patient possible  pulmonary vein ablation.  He would like to pursue this as he feels that he was better in sinus rhythm as well as he has had progressive worsening heart failure symptoms.   We also discussed option of a repeat cardioversion int the near term although success rate would be low.      · We encouraged him to take Entresto and stop Avapro which Dr. Martel has recommend in the past.  We explained in detail this may improve upon his heart failure symptoms.  He would like to \"think about\".  He has not had an echocardiogram since 2017 to review his LV function. We also discussed another option would be if is EF remains low with  HF systems is consider a AVN and up grade to a BIV PM per block HF trial.   · Schedule ECV in the near term, Risk and benefits explained in detail and patient wants to proceed.  Change Device settings at the time of ECV to DDDIR with lower rate 60 to prevent RV pacing.   · Follow up with Dr. Richards in 6 months.     5/24/2019  2:10 PM    Will Cherry BLAIR  "

## 2019-05-28 DIAGNOSIS — I48.20 ATRIAL FIBRILLATION, CHRONIC (HCC): Primary | ICD-10-CM

## 2019-05-30 ENCOUNTER — PREP FOR SURGERY (OUTPATIENT)
Dept: OTHER | Facility: HOSPITAL | Age: 79
End: 2019-05-30

## 2019-05-30 DIAGNOSIS — I48.19 PERSISTENT ATRIAL FIBRILLATION (HCC): Primary | ICD-10-CM

## 2019-05-30 RX ORDER — PROMETHAZINE HYDROCHLORIDE 25 MG/ML
12.5 INJECTION, SOLUTION INTRAMUSCULAR; INTRAVENOUS EVERY 4 HOURS PRN
Status: CANCELLED | OUTPATIENT
Start: 2019-05-30

## 2019-05-30 RX ORDER — ACETAMINOPHEN 325 MG/1
650 TABLET ORAL EVERY 4 HOURS PRN
Status: CANCELLED | OUTPATIENT
Start: 2019-05-30

## 2019-05-30 RX ORDER — SODIUM CHLORIDE 0.9 % (FLUSH) 0.9 %
1-10 SYRINGE (ML) INJECTION AS NEEDED
Status: CANCELLED | OUTPATIENT
Start: 2019-05-30

## 2019-05-30 RX ORDER — NITROGLYCERIN 0.4 MG/1
0.4 TABLET SUBLINGUAL
Status: CANCELLED | OUTPATIENT
Start: 2019-05-30

## 2019-05-30 RX ORDER — SODIUM CHLORIDE 0.9 % (FLUSH) 0.9 %
3 SYRINGE (ML) INJECTION EVERY 12 HOURS SCHEDULED
Status: CANCELLED | OUTPATIENT
Start: 2019-05-30

## 2019-05-31 ENCOUNTER — TELEPHONE (OUTPATIENT)
Dept: CARDIOLOGY | Facility: CLINIC | Age: 79
End: 2019-05-31

## 2019-06-03 ENCOUNTER — HOSPITAL ENCOUNTER (OUTPATIENT)
Dept: CARDIOLOGY | Facility: HOSPITAL | Age: 79
Discharge: HOME | End: 2019-06-03
Admitting: PHYSICIAN ASSISTANT

## 2019-06-03 VITALS — BODY MASS INDEX: 45.1 KG/M2 | WEIGHT: 315 LBS | HEIGHT: 70 IN

## 2019-06-03 DIAGNOSIS — I48.20 CHRONIC ATRIAL FIBRILLATION (HCC): ICD-10-CM

## 2019-06-03 DIAGNOSIS — I10 ESSENTIAL HYPERTENSION: ICD-10-CM

## 2019-06-03 DIAGNOSIS — G47.33 OSA (OBSTRUCTIVE SLEEP APNEA): ICD-10-CM

## 2019-06-03 DIAGNOSIS — E66.01 MORBID (SEVERE) OBESITY DUE TO EXCESS CALORIES (HCC): ICD-10-CM

## 2019-06-03 DIAGNOSIS — I50.21 ACUTE SYSTOLIC CONGESTIVE HEART FAILURE (HCC): ICD-10-CM

## 2019-06-03 DIAGNOSIS — I50.22 CHRONIC SYSTOLIC CONGESTIVE HEART FAILURE (HCC): ICD-10-CM

## 2019-06-03 PROCEDURE — 93306 TTE W/DOPPLER COMPLETE: CPT | Performed by: INTERNAL MEDICINE

## 2019-06-03 PROCEDURE — 93306 TTE W/DOPPLER COMPLETE: CPT

## 2019-06-04 ENCOUNTER — HOSPITAL ENCOUNTER (OUTPATIENT)
Dept: CARDIOLOGY | Facility: HOSPITAL | Age: 79
Discharge: HOME | End: 2019-06-04
Attending: INTERNAL MEDICINE | Admitting: INTERNAL MEDICINE

## 2019-06-04 VITALS
TEMPERATURE: 98.8 F | RESPIRATION RATE: 16 BRPM | BODY MASS INDEX: 45.1 KG/M2 | DIASTOLIC BLOOD PRESSURE: 82 MMHG | WEIGHT: 315 LBS | OXYGEN SATURATION: 100 % | HEART RATE: 69 BPM | HEIGHT: 70 IN | SYSTOLIC BLOOD PRESSURE: 124 MMHG

## 2019-06-04 DIAGNOSIS — I48.20 ATRIAL FIBRILLATION, CHRONIC (HCC): ICD-10-CM

## 2019-06-04 DIAGNOSIS — I48.19 PERSISTENT ATRIAL FIBRILLATION (HCC): ICD-10-CM

## 2019-06-04 LAB
BH CV ECHO MEAS - AO MAX PG (FULL): 4.8 MMHG
BH CV ECHO MEAS - AO MAX PG: 7 MMHG
BH CV ECHO MEAS - AO MEAN PG (FULL): 3 MMHG
BH CV ECHO MEAS - AO MEAN PG: 4 MMHG
BH CV ECHO MEAS - AO V2 MAX: 133 CM/SEC
BH CV ECHO MEAS - AO V2 MEAN: 98.1 CM/SEC
BH CV ECHO MEAS - AO V2 VTI: 29.3 CM
BH CV ECHO MEAS - AVA(I,A): 1.8 CM^2
BH CV ECHO MEAS - AVA(I,D): 1.8 CM^2
BH CV ECHO MEAS - AVA(V,A): 1.9 CM^2
BH CV ECHO MEAS - AVA(V,D): 1.9 CM^2
BH CV ECHO MEAS - BSA(HAYCOCK): 2.8 M^2
BH CV ECHO MEAS - BSA: 2.6 M^2
BH CV ECHO MEAS - BZI_BMI: 47.8 KILOGRAMS/M^2
BH CV ECHO MEAS - BZI_METRIC_HEIGHT: 177.8 CM
BH CV ECHO MEAS - BZI_METRIC_WEIGHT: 151 KG
BH CV ECHO MEAS - EDV(CUBED): 158.3 ML
BH CV ECHO MEAS - EDV(TEICH): 141.9 ML
BH CV ECHO MEAS - EF(CUBED): 48.7 %
BH CV ECHO MEAS - EF(TEICH): 40.5 %
BH CV ECHO MEAS - ESV(CUBED): 81.2 ML
BH CV ECHO MEAS - ESV(TEICH): 84.4 ML
BH CV ECHO MEAS - FS: 20 %
BH CV ECHO MEAS - IVS/LVPW: 1
BH CV ECHO MEAS - IVSD: 1.3 CM
BH CV ECHO MEAS - LA DIMENSION: 5 CM
BH CV ECHO MEAS - LAD MAJOR: 6.9 CM
BH CV ECHO MEAS - LAT PEAK E' VEL: 10.5 CM/SEC
BH CV ECHO MEAS - LATERAL E/E' RATIO: 7.8
BH CV ECHO MEAS - LV MASS(C)D: 302.9 GRAMS
BH CV ECHO MEAS - LV MASS(C)DI: 116.8 GRAMS/M^2
BH CV ECHO MEAS - LV MAX PG: 2.2 MMHG
BH CV ECHO MEAS - LV MEAN PG: 1 MMHG
BH CV ECHO MEAS - LV V1 MAX: 73.7 CM/SEC
BH CV ECHO MEAS - LV V1 MEAN: 50.1 CM/SEC
BH CV ECHO MEAS - LV V1 VTI: 15 CM
BH CV ECHO MEAS - LVIDD: 5.4 CM
BH CV ECHO MEAS - LVIDS: 4.3 CM
BH CV ECHO MEAS - LVOT AREA (M): 3.5 CM^2
BH CV ECHO MEAS - LVOT AREA: 3.5 CM^2
BH CV ECHO MEAS - LVOT DIAM: 2.1 CM
BH CV ECHO MEAS - LVPWD: 1.3 CM
BH CV ECHO MEAS - MED PEAK E' VEL: 8.1 CM/SEC
BH CV ECHO MEAS - MEDIAL E/E' RATIO: 10.2
BH CV ECHO MEAS - MV A MAX VEL: 56.5 CM/SEC
BH CV ECHO MEAS - MV DEC TIME: 0.18 SEC
BH CV ECHO MEAS - MV E MAX VEL: 82.2 CM/SEC
BH CV ECHO MEAS - MV E/A: 1.5
BH CV ECHO MEAS - PA ACC SLOPE: 537 CM/SEC^2
BH CV ECHO MEAS - PA ACC TIME: 0.13 SEC
BH CV ECHO MEAS - PA MAX PG: 3.2 MMHG
BH CV ECHO MEAS - PA PR(ACCEL): 21.9 MMHG
BH CV ECHO MEAS - PA V2 MAX: 89 CM/SEC
BH CV ECHO MEAS - RAP SYSTOLE: 12 MMHG
BH CV ECHO MEAS - RVSP: 40 MMHG
BH CV ECHO MEAS - SI(CUBED): 29.8 ML/M^2
BH CV ECHO MEAS - SI(LVOT): 20 ML/M^2
BH CV ECHO MEAS - SI(TEICH): 22.2 ML/M^2
BH CV ECHO MEAS - SV(CUBED): 77.2 ML
BH CV ECHO MEAS - SV(LVOT): 52 ML
BH CV ECHO MEAS - SV(TEICH): 57.5 ML
BH CV ECHO MEAS - TAPSE (>1.6): 3.1 CM2
BH CV ECHO MEAS - TR MAX PG: 25 MMHG
BH CV ECHO MEAS - TR MAX VEL: 251.5 CM/SEC
BH CV ECHO MEASUREMENTS AVERAGE E/E' RATIO: 8.84
BH CV VAS BP RIGHT ARM: NORMAL MMHG
BH CV XLRA - RV BASE: 4.8 CM
BH CV XLRA - RV LENGTH: 7.5 CM
BH CV XLRA - RV MID: 4.1 CM
BH CV XLRA - TDI S': 10.2 CM/SEC
BUN BLDA-MCNC: 23 MG/DL (ref 8–26)
CA-I BLDA-SCNC: 1.17 MMOL/L (ref 1.2–1.32)
CHLORIDE BLDA-SCNC: 100 MMOL/L (ref 98–109)
CO2 BLDA-SCNC: 30 MMOL/L (ref 24–29)
CREAT BLDA-MCNC: 1.5 MG/DL (ref 0.6–1.3)
GLUCOSE BLDC GLUCOMTR-MCNC: 95 MG/DL (ref 70–130)
HCT VFR BLDA CALC: 45 % (ref 38–51)
HGB BLDA-MCNC: 15.3 G/DL (ref 12–17)
LEFT ATRIUM VOLUME INDEX: 47.8 ML/M^2
LEFT ATRIUM VOLUME: 124 ML
LV EF 2D ECHO EST: 35 %
MAXIMAL PREDICTED HEART RATE: 143 BPM
POTASSIUM BLDA-SCNC: 4.2 MMOL/L (ref 3.5–4.9)
SODIUM BLDA-SCNC: 141 MMOL/L (ref 138–146)
STRESS TARGET HR: 122 BPM

## 2019-06-04 PROCEDURE — 93005 ELECTROCARDIOGRAM TRACING: CPT | Performed by: INTERNAL MEDICINE

## 2019-06-04 PROCEDURE — 80047 BASIC METABLC PNL IONIZED CA: CPT

## 2019-06-04 PROCEDURE — 92960 CARDIOVERSION ELECTRIC EXT: CPT

## 2019-06-04 PROCEDURE — 85014 HEMATOCRIT: CPT

## 2019-06-04 PROCEDURE — 92960 CARDIOVERSION ELECTRIC EXT: CPT | Performed by: INTERNAL MEDICINE

## 2019-06-04 PROCEDURE — 25010000002 MIDAZOLAM PER 1 MG: Performed by: INTERNAL MEDICINE

## 2019-06-04 RX ORDER — ROSUVASTATIN CALCIUM 10 MG/1
10 TABLET, COATED ORAL DAILY
Qty: 90 TABLET | Refills: 3 | Status: SHIPPED | OUTPATIENT
Start: 2019-06-04 | End: 2020-06-19

## 2019-06-04 RX ORDER — PROMETHAZINE HYDROCHLORIDE 25 MG/ML
12.5 INJECTION, SOLUTION INTRAMUSCULAR; INTRAVENOUS EVERY 4 HOURS PRN
Status: DISCONTINUED | OUTPATIENT
Start: 2019-06-04 | End: 2019-06-04 | Stop reason: HOSPADM

## 2019-06-04 RX ORDER — NITROGLYCERIN 0.4 MG/1
0.4 TABLET SUBLINGUAL
Status: DISCONTINUED | OUTPATIENT
Start: 2019-06-04 | End: 2019-06-04 | Stop reason: HOSPADM

## 2019-06-04 RX ORDER — MIDAZOLAM HYDROCHLORIDE 1 MG/ML
INJECTION INTRAMUSCULAR; INTRAVENOUS
Status: COMPLETED | OUTPATIENT
Start: 2019-06-04 | End: 2019-06-04

## 2019-06-04 RX ORDER — FLUMAZENIL 0.1 MG/ML
INJECTION INTRAVENOUS
Status: DISCONTINUED
Start: 2019-06-04 | End: 2019-06-04 | Stop reason: WASHOUT

## 2019-06-04 RX ORDER — SODIUM CHLORIDE 0.9 % (FLUSH) 0.9 %
1-10 SYRINGE (ML) INJECTION AS NEEDED
Status: DISCONTINUED | OUTPATIENT
Start: 2019-06-04 | End: 2019-06-04 | Stop reason: HOSPADM

## 2019-06-04 RX ORDER — ACETAMINOPHEN 325 MG/1
650 TABLET ORAL EVERY 4 HOURS PRN
Status: DISCONTINUED | OUTPATIENT
Start: 2019-06-04 | End: 2019-06-04 | Stop reason: HOSPADM

## 2019-06-04 RX ORDER — MIDAZOLAM HYDROCHLORIDE 1 MG/ML
INJECTION INTRAMUSCULAR; INTRAVENOUS
Status: DISCONTINUED
Start: 2019-06-04 | End: 2019-06-04 | Stop reason: HOSPADM

## 2019-06-04 RX ORDER — SODIUM CHLORIDE 0.9 % (FLUSH) 0.9 %
3 SYRINGE (ML) INJECTION EVERY 12 HOURS SCHEDULED
Status: DISCONTINUED | OUTPATIENT
Start: 2019-06-04 | End: 2019-06-04 | Stop reason: HOSPADM

## 2019-06-04 RX ORDER — NALOXONE HYDROCHLORIDE 0.4 MG/ML
INJECTION, SOLUTION INTRAMUSCULAR; INTRAVENOUS; SUBCUTANEOUS
Status: DISCONTINUED
Start: 2019-06-04 | End: 2019-06-04 | Stop reason: WASHOUT

## 2019-06-04 RX ADMIN — METHOHEXITAL SODIUM 10 MG: 500 INJECTION, POWDER, LYOPHILIZED, FOR SOLUTION INTRAMUSCULAR; INTRAVENOUS; RECTAL at 10:26

## 2019-06-04 RX ADMIN — MIDAZOLAM HYDROCHLORIDE 1 MG: 1 INJECTION, SOLUTION INTRAMUSCULAR; INTRAVENOUS at 10:24

## 2019-06-04 RX ADMIN — METHOHEXITAL SODIUM 20 MG: 500 INJECTION, POWDER, LYOPHILIZED, FOR SOLUTION INTRAMUSCULAR; INTRAVENOUS; RECTAL at 10:24

## 2019-06-04 NOTE — INTERVAL H&P NOTE
H&P reviewed. The patient was examined and there are no changes to the H&P.   Patient with persistent, symptomatic atrial fibrillation, currently on amiodarone 200 mg daily.  He presents today for ECV with ultimate plan for PVA in the near future.  He is anticoagulated with Eliquis with no missed doses in the last 30 days.  Will proceed today with ECV with Dr. Richards.  The risks, benefits, and alternatives of the procedure have been reviewed and the patient wishes to proceed.     Sodium 136 - 145 mmol/L 143    Potassium 3.4 - 5.1 mmol/L 5.1    Chloride 98 - 107 mmol/L 102    CO2 20 - 30 mmol/L 33 Abnormally high     Anion Gap 3 - 16  8    Glucose 74 - 106 mg/dL 97    BUN 6 - 20 mg/dL 29 Abnormally high     Creatinine 0.4 - 1.2 mg/dL 1.6 Abnormally high     eGFR Non  Am >59  42 Abnormally low     Comment: >60 mL/min/1.73m2 EGFR, calc. for ages 18 and older using the   MDRD formula (not corrected for weight), is valid for stable   renal function.   eGFR   Amer >59  51 Abnormally low     Comment: Chronic Kidney Disease: less than 60 ml/min/1.73 sq.m.           Kidney Failure: less than 15 ml/min/1.73 sq.m.   Results valid for patients 18 years and older.   Calcium 8.5 - 10.5 mg/dL 9.7            Electronically signed by ROSALINDA Fisher, 06/04/19, 9:56 AM.      I, Tho Richards MD, personally performed the services face to face as described and documented by the above named individual. I have made any necessary edits and it is both accurate and complete 6/4/2019  10:37 AM

## 2019-06-26 ENCOUNTER — TELEPHONE (OUTPATIENT)
Dept: CARDIOLOGY | Facility: CLINIC | Age: 79
End: 2019-06-26

## 2019-06-26 ENCOUNTER — HOSPITAL ENCOUNTER (EMERGENCY)
Facility: HOSPITAL | Age: 79
Discharge: HOME | End: 2019-06-26
Attending: EMERGENCY MEDICINE
Payer: MEDICARE

## 2019-06-26 ENCOUNTER — APPOINTMENT (OUTPATIENT)
Dept: GENERAL RADIOLOGY | Facility: HOSPITAL | Age: 79
End: 2019-06-26
Payer: MEDICARE

## 2019-06-26 VITALS
WEIGHT: 315 LBS | RESPIRATION RATE: 20 BRPM | DIASTOLIC BLOOD PRESSURE: 78 MMHG | HEART RATE: 71 BPM | OXYGEN SATURATION: 95 % | TEMPERATURE: 97.8 F | SYSTOLIC BLOOD PRESSURE: 129 MMHG | BODY MASS INDEX: 45.1 KG/M2 | HEIGHT: 70 IN

## 2019-06-26 DIAGNOSIS — R60.0 BILATERAL LOWER EXTREMITY EDEMA: Primary | ICD-10-CM

## 2019-06-26 LAB
A/G RATIO: 1.7 (ref 0.8–2)
ALBUMIN SERPL-MCNC: 4 G/DL (ref 3.4–4.8)
ALP BLD-CCNC: 83 U/L (ref 25–100)
ALT SERPL-CCNC: 16 U/L (ref 4–36)
ANION GAP SERPL CALCULATED.3IONS-SCNC: 11 MMOL/L (ref 3–16)
AST SERPL-CCNC: 24 U/L (ref 8–33)
BASOPHILS ABSOLUTE: 0 K/UL (ref 0–0.1)
BASOPHILS RELATIVE PERCENT: 0.8 %
BILIRUB SERPL-MCNC: 0.5 MG/DL (ref 0.3–1.2)
BUN BLDV-MCNC: 24 MG/DL (ref 6–20)
CALCIUM SERPL-MCNC: 9.2 MG/DL (ref 8.5–10.5)
CHLORIDE BLD-SCNC: 99 MMOL/L (ref 98–107)
CO2: 30 MMOL/L (ref 20–30)
CREAT SERPL-MCNC: 1.5 MG/DL (ref 0.4–1.2)
EOSINOPHILS ABSOLUTE: 0.1 K/UL (ref 0–0.4)
EOSINOPHILS RELATIVE PERCENT: 1.7 %
GFR AFRICAN AMERICAN: 55
GFR NON-AFRICAN AMERICAN: 45
GLOBULIN: 2.3 G/DL
GLUCOSE BLD-MCNC: 99 MG/DL (ref 74–106)
HCT VFR BLD CALC: 41.7 % (ref 40–54)
HEMOGLOBIN: 13.5 G/DL (ref 13–18)
IMMATURE GRANULOCYTES #: 0 K/UL
IMMATURE GRANULOCYTES %: 0.2 % (ref 0–5)
LYMPHOCYTES ABSOLUTE: 1 K/UL (ref 1.5–4)
LYMPHOCYTES RELATIVE PERCENT: 21.2 %
MCH RBC QN AUTO: 30.8 PG (ref 27–32)
MCHC RBC AUTO-ENTMCNC: 32.4 G/DL (ref 31–35)
MCV RBC AUTO: 95 FL (ref 80–100)
MONOCYTES ABSOLUTE: 0.4 K/UL (ref 0.2–0.8)
MONOCYTES RELATIVE PERCENT: 8.7 %
NEUTROPHILS ABSOLUTE: 3.2 K/UL (ref 2–7.5)
NEUTROPHILS RELATIVE PERCENT: 67.4 %
PDW BLD-RTO: 14.2 % (ref 11–16)
PLATELET # BLD: 210 K/UL (ref 150–400)
PMV BLD AUTO: 10.5 FL (ref 6–10)
POTASSIUM SERPL-SCNC: 4.3 MMOL/L (ref 3.4–5.1)
PRO-BNP: 307 PG/ML (ref 0–1800)
RBC # BLD: 4.39 M/UL (ref 4.5–6)
SODIUM BLD-SCNC: 140 MMOL/L (ref 136–145)
TOTAL PROTEIN: 6.3 G/DL (ref 6.4–8.3)
TROPONIN: <0.3 NG/ML
WBC # BLD: 4.7 K/UL (ref 4–11)

## 2019-06-26 PROCEDURE — 85025 COMPLETE CBC W/AUTO DIFF WBC: CPT

## 2019-06-26 PROCEDURE — 36415 COLL VENOUS BLD VENIPUNCTURE: CPT

## 2019-06-26 PROCEDURE — 93005 ELECTROCARDIOGRAM TRACING: CPT

## 2019-06-26 PROCEDURE — 71045 X-RAY EXAM CHEST 1 VIEW: CPT

## 2019-06-26 PROCEDURE — 83880 ASSAY OF NATRIURETIC PEPTIDE: CPT

## 2019-06-26 PROCEDURE — 80053 COMPREHEN METABOLIC PANEL: CPT

## 2019-06-26 PROCEDURE — 84484 ASSAY OF TROPONIN QUANT: CPT

## 2019-06-26 PROCEDURE — 99284 EMERGENCY DEPT VISIT MOD MDM: CPT

## 2019-06-26 RX ORDER — AMIODARONE HYDROCHLORIDE 200 MG/1
200 TABLET ORAL DAILY
COMMUNITY
End: 2022-04-19 | Stop reason: ALTCHOICE

## 2019-06-26 RX ORDER — NEBIVOLOL 10 MG/1
10 TABLET ORAL DAILY
Status: ON HOLD | COMMUNITY
End: 2019-08-28 | Stop reason: HOSPADM

## 2019-06-26 RX ORDER — DICLOFENAC SODIUM 75 MG/1
75 TABLET, DELAYED RELEASE ORAL DAILY
Status: ON HOLD | COMMUNITY
End: 2019-08-28 | Stop reason: HOSPADM

## 2019-06-26 RX ORDER — FUROSEMIDE 40 MG/1
40 TABLET ORAL DAILY
Status: ON HOLD | COMMUNITY
End: 2019-08-28 | Stop reason: SDUPTHER

## 2019-06-26 RX ORDER — ROSUVASTATIN CALCIUM 10 MG/1
10 TABLET, COATED ORAL DAILY
COMMUNITY

## 2019-06-26 RX ORDER — SPIRONOLACTONE 25 MG/1
12.5 TABLET ORAL DAILY
Status: ON HOLD | COMMUNITY
End: 2019-08-28 | Stop reason: HOSPADM

## 2019-06-26 ASSESSMENT — ENCOUNTER SYMPTOMS
SHORTNESS OF BREATH: 0
COUGH: 0
NAUSEA: 0
TROUBLE SWALLOWING: 0
CONSTIPATION: 0
RHINORRHEA: 0
ABDOMINAL PAIN: 0
SINUS PRESSURE: 0
CHEST TIGHTNESS: 0
VOMITING: 0
WHEEZING: 0
DIARRHEA: 0
EYE REDNESS: 0
SORE THROAT: 0
BACK PAIN: 0
EYE DISCHARGE: 0
EYE PAIN: 0

## 2019-06-26 NOTE — ED NOTES
Dc instructions given to patient, patient to increase his lasix for 7 days and follow up with Dr Therese Bourne in [place] in no improvement, patient and daughter fully aware, no other questions or concerns.      Sarwat Riggs RN  06/26/19 [id number]

## 2019-06-26 NOTE — ED NOTES
Placed call to Dr. Ana Jones to speak with Dr. Lino Aguilar about patient. She is currently speaking with him.      Justin ASENCIO ([facility]  06/26/19 [id number]

## 2019-06-26 NOTE — ED PROVIDER NOTES
[address] [place] [address] Court  eMERGENCY dEPARTMENT eNCOUnter      Pt Name: Esdras Gaytan  MRN: [id number]  Deangfjarrod 1940  Date of evaluation: 6/26/2019  Provider: Alex Guerra MD    [address] [place] [address]       Chief Complaint   Patient presents with    Cellulitis         HISTORY OF PRESENT ILLNESS   (Location/Symptom, Timing/Onset, Context/Setting, Quality, Duration, Modifying Factors, Severity)  Note limiting factors. Esdras Gaytan is a 69 y.o. male who presents to the emergency department because of weeping edema to BLE more so on the right leg for days now. Patient on Lasix po but no relief. No CP. No SOB. Nursing Notes were reviewed. REVIEW OF SYSTEMS    (2-9 systems for level 4, 10 or more forlevel 5)     Review of Systems   Constitutional: Negative for chills and fever. HENT: Negative for congestion, ear pain, postnasal drip, rhinorrhea, sinus pressure, sneezing, sore throat and trouble swallowing. Eyes: Negative for pain, discharge and redness. Respiratory: Negative for cough, chest tightness, shortness of breath and wheezing. Cardiovascular: Positive for leg swelling. Negative for chest pain and palpitations. Gastrointestinal: Negative for abdominal pain, constipation, diarrhea, nausea and vomiting. Genitourinary: Negative for dysuria, flank pain, frequency, hematuria and urgency. Musculoskeletal: Negative for back pain, joint swelling and neck pain. Skin: Negative for pallor and rash. Neurological: Negative for dizziness, syncope, weakness, numbness and headaches. Psychiatric/Behavioral: Negative for confusion and hallucinations. The patient is not nervous/anxious.             PAST MEDICAL HISTORY     Past Medical History:   Diagnosis Date    Atrial fibrillation ([address]     [facility]     Hyperlipidemia     Hypertension     Kidney stone          SURGICAL HISTORY       Past Surgical History:   Procedure Laterality Date    CARDIAC CATHETERIZATION      CARDIOVERSION  06/2019    Dr. Colleen Sandifer LUNG SURGERY Right     mass removed    PACEMAKER INSERTION  2012    PROSTATE SURGERY      THYROID SURGERY      goiter removed         CURRENT MEDICATIONS       Previous Medications    AMIODARONE (CORDARONE) 200 MG TABLET    Take 200 mg by mouth daily    APIXABAN (ELIQUIS) 5 MG TABS TABLET    Take by mouth 2 times daily    DICLOFENAC (VOLTAREN) 75 MG EC TABLET    Take 75 mg by mouth daily    FUROSEMIDE (LASIX) 40 MG TABLET    Take 40 mg by mouth daily    NEBIVOLOL (BYSTOLIC) 10 MG TABLET    Take 10 mg by mouth daily    ROSUVASTATIN (CRESTOR) 10 MG TABLET    Take 10 mg by mouth daily    SACUBITRIL-VALSARTAN (ENTRESTO) 24-26 MG PER TABLET    Take 1 tablet by mouth 2 times daily    SPIRONOLACTONE (ALDACTONE) 25 MG TABLET    Take 12.5 mg by mouth daily       ALLERGIES     Patient has no known allergies. FAMILY HISTORY     History reviewed. No pertinent family history.        SOCIAL HISTORY       Social History     Socioeconomic History    Marital status:      Spouse name: None    Number of children: None    Years of education: None    Highest education level: None   Occupational History    None   Social Needs    Financial resource strain: None    Food insecurity:     Worry: None     Inability: None    Transportation needs:     Medical: None     Non-medical: None   Tobacco Use    Smoking status: Former Smoker    Smokeless tobacco: Never Used   Substance and Sexual Activity    Alcohol use: No    Drug use: No    Sexual activity: None   Lifestyle    Physical activity:     Days per week: None     Minutes per session: None    Stress: None   Relationships    Social connections:     Talks on phone: None     Gets together: None     Attends Judaism service: None     Active member of club or organization: None     Attends meetings of clubs or organizations: None     Relationship status: None    Intimate partner violence:     Fear of current or ex partner: None     Emotionally abused: None     Physically abused: None     Forced sexual activity: None   Other Topics Concern    None   Social History Narrative    None       SCREENINGS             PHYSICAL EXAM    (up to 7 for level 4, 8 or more for level 5)     ED Triage Vitals   BP Temp Temp Source Pulse Resp SpO2 Height Weight   06/26/19 1622 06/26/19 1619 06/26/19 1619 06/26/19 1619 06/26/19 1622 06/26/19 1622 06/26/19 1622 06/26/19 1622   (!) 107/56 97.8 °F (36.6 °C) Oral 74 20 92 % 5' 10\" (1.778 m) (!) 333 lb (151 kg)       Physical Exam   Constitutional: He is oriented to person, place, and time. He appears well-developed and well-nourished. HENT:   Head: Normocephalic and atraumatic. Mouth/Throat: Oropharynx is clear and moist.   Eyes: Pupils are equal, round, and reactive to light. Conjunctivae and EOM are normal.   Neck: Neck supple. Cardiovascular: Normal rate, regular rhythm and normal heart sounds. Pulmonary/Chest: Effort normal and breath sounds normal. No respiratory distress. He has no wheezes. Abdominal: Soft. Bowel sounds are normal.   Musculoskeletal: Normal range of motion. He exhibits edema. Neurological: He is alert and oriented to person, place, and time. Skin: Skin is warm and dry. Psychiatric: He has a normal mood and affect. DIAGNOSTIC RESULTS     EKG: All EKG's are interpreted by the Emergency Department Physician who either signs or Co-signs this chart in the absence of a cardiologist.    EKG done showed atrial paced rhythm at 70 bpm    RADIOLOGY:   Non-plain film images such as CT, Ultrasound and MRI are read by the radiologist. Plain radiographic images are visualized andpreliminarily interpreted by the emergency physician with the below findings:    CXR no acute changes. Interpretationper the Radiologist below, if available at the time of this note:    XR CHEST PORTABLE   Final Result   1.   No NOTES:    Called and discussed patient with Dr Laura Arreola and he recommends increasing Lasix dose to 40 MG BID for 7 days. Call for an  Appointment for  follow up at his [facility] office if no better. Patient understands and agrees. FINAL IMPRESSION      1.  Bilateral lower extremity edema          Final diagnoses:   Bilateral lower extremity edema       DISPOSITION/PLAN   DISPOSITION Decision To Discharge 06/26/2019 06:07:38 PM      PATIENT REFERRED TO:  Samira Quarles MD  [facility]  [phone]      If symptoms worsen      DISCHARGE MEDICATIONS:  New Prescriptions    No medications on file         (Please note thatportions of this note may have been completed with a voice recognition program.  Efforts were [facility] edit the dictations but occasionally words are mis-transcribed.)    Areli Ferguson MD (electronically signed)  Attending Emergency Physician        Janalee Gottron, MD  06/26/19 8919

## 2019-06-26 NOTE — ED NOTES
Patient with right lower extremity cellulitis, patient with edema that is weeping x 2 weeks.      Breanne De Jesus RN  06/26/19 [id number]

## 2019-06-26 NOTE — ED NOTES
Called [facility] to have dr Eder Pink at [facility] paged.  Spoke with kyle, gave pt information and she will have him paged     Tova Gloria Duty  06/26/19 [id number]

## 2019-06-26 NOTE — TELEPHONE ENCOUNTER
Patient states that his legs and feet have been swelling for the past two weeks to the point of getting blisters and the blisters busting, patient is worried about possible infection from the blisters due to white spots on them that have been present since 6-, patient has been putting peroxide on the blisters. Mingo TELLEZ advised verbally that Patient needs to go to the ER. Patient was informed to do so but states it will be later in the day because he is waiting on someone to  a stool sample kit.

## 2019-07-08 DIAGNOSIS — M25.561 ACUTE PAIN OF RIGHT KNEE: ICD-10-CM

## 2019-07-09 RX ORDER — NEBIVOLOL HYDROCHLORIDE 10 MG/1
10 TABLET ORAL DAILY
Qty: 90 TABLET | Refills: 3 | Status: SHIPPED | OUTPATIENT
Start: 2019-07-09 | End: 2019-10-01 | Stop reason: HOSPADM

## 2019-07-09 RX ORDER — DICLOFENAC SODIUM 75 MG/1
TABLET, DELAYED RELEASE ORAL
Qty: 180 TABLET | Refills: 3 | OUTPATIENT
Start: 2019-07-09

## 2019-07-26 DIAGNOSIS — M25.561 ACUTE PAIN OF RIGHT KNEE: ICD-10-CM

## 2019-07-26 RX ORDER — DICLOFENAC SODIUM 75 MG/1
TABLET, DELAYED RELEASE ORAL
Qty: 180 TABLET | Refills: 3 | OUTPATIENT
Start: 2019-07-26

## 2019-07-27 DIAGNOSIS — M25.561 ACUTE PAIN OF RIGHT KNEE: ICD-10-CM

## 2019-07-29 RX ORDER — DICLOFENAC SODIUM 75 MG/1
TABLET, DELAYED RELEASE ORAL
Qty: 180 TABLET | Refills: 3 | OUTPATIENT
Start: 2019-07-29

## 2019-07-31 DIAGNOSIS — M25.561 ACUTE PAIN OF RIGHT KNEE: ICD-10-CM

## 2019-07-31 RX ORDER — DICLOFENAC SODIUM 75 MG/1
TABLET, DELAYED RELEASE ORAL
Qty: 180 TABLET | Refills: 3 | OUTPATIENT
Start: 2019-07-31

## 2019-08-01 DIAGNOSIS — M25.561 ACUTE PAIN OF RIGHT KNEE: ICD-10-CM

## 2019-08-01 RX ORDER — DICLOFENAC SODIUM 75 MG/1
TABLET, DELAYED RELEASE ORAL
Qty: 180 TABLET | Refills: 3 | OUTPATIENT
Start: 2019-08-01

## 2019-08-08 RX ORDER — SPIRONOLACTONE 25 MG/1
TABLET ORAL
Qty: 15 TABLET | Refills: 11 | Status: SHIPPED | OUTPATIENT
Start: 2019-08-08 | End: 2019-10-01 | Stop reason: HOSPADM

## 2019-08-24 ENCOUNTER — HOSPITAL ENCOUNTER (INPATIENT)
Facility: HOSPITAL | Age: 79
LOS: 4 days | Discharge: HOME | DRG: 315 | End: 2019-08-28
Attending: HOSPITALIST | Admitting: PEDIATRICS
Payer: MEDICARE

## 2019-08-24 DIAGNOSIS — D64.9 ANEMIA, UNSPECIFIED TYPE: ICD-10-CM

## 2019-08-24 DIAGNOSIS — R13.10 DYSPHAGIA, UNSPECIFIED TYPE: ICD-10-CM

## 2019-08-24 DIAGNOSIS — E86.1 HYPOTENSION DUE TO HYPOVOLEMIA: ICD-10-CM

## 2019-08-24 DIAGNOSIS — I95.89 HYPOTENSION DUE TO HYPOVOLEMIA: ICD-10-CM

## 2019-08-24 DIAGNOSIS — G47.34 NOCTURNAL HYPOXIA: ICD-10-CM

## 2019-08-24 DIAGNOSIS — N17.9 ACUTE RENAL FAILURE, UNSPECIFIED ACUTE RENAL FAILURE TYPE (HCC): Primary | ICD-10-CM

## 2019-08-24 LAB
A/G RATIO: 1 (ref 0.8–2)
ALBUMIN SERPL-MCNC: 3.3 G/DL (ref 3.4–4.8)
ALP BLD-CCNC: 101 U/L (ref 25–100)
ALT SERPL-CCNC: 10 U/L (ref 4–36)
ANION GAP SERPL CALCULATED.3IONS-SCNC: 17 MMOL/L (ref 3–16)
AST SERPL-CCNC: 13 U/L (ref 8–33)
BASOPHILS ABSOLUTE: 0.1 K/UL (ref 0–0.1)
BASOPHILS RELATIVE PERCENT: 0.4 %
BILIRUB SERPL-MCNC: 0.4 MG/DL (ref 0.3–1.2)
BUN BLDV-MCNC: 59 MG/DL (ref 6–20)
CALCIUM SERPL-MCNC: 9.6 MG/DL (ref 8.5–10.5)
CHLORIDE BLD-SCNC: 98 MMOL/L (ref 98–107)
CO2: 22 MMOL/L (ref 20–30)
CREAT SERPL-MCNC: 4.5 MG/DL (ref 0.4–1.2)
EOSINOPHILS ABSOLUTE: 0.1 K/UL (ref 0–0.4)
EOSINOPHILS RELATIVE PERCENT: 0.7 %
GFR AFRICAN AMERICAN: 15
GFR NON-AFRICAN AMERICAN: 13
GLOBULIN: 3.4 G/DL
GLUCOSE BLD-MCNC: 112 MG/DL (ref 74–106)
HCT VFR BLD CALC: 34.4 % (ref 40–54)
HEMOGLOBIN: 10.8 G/DL (ref 13–18)
IMMATURE GRANULOCYTES #: 0.1 K/UL
IMMATURE GRANULOCYTES %: 0.5 % (ref 0–5)
LYMPHOCYTES ABSOLUTE: 1 K/UL (ref 1.5–4)
LYMPHOCYTES RELATIVE PERCENT: 7.2 %
MCH RBC QN AUTO: 29.5 PG (ref 27–32)
MCHC RBC AUTO-ENTMCNC: 31.4 G/DL (ref 31–35)
MCV RBC AUTO: 94 FL (ref 80–100)
MONOCYTES ABSOLUTE: 1 K/UL (ref 0.2–0.8)
MONOCYTES RELATIVE PERCENT: 7.1 %
NEUTROPHILS ABSOLUTE: 11.7 K/UL (ref 2–7.5)
NEUTROPHILS RELATIVE PERCENT: 84.1 %
PDW BLD-RTO: 14.3 % (ref 11–16)
PLATELET # BLD: 223 K/UL (ref 150–400)
PMV BLD AUTO: 10.4 FL (ref 6–10)
POTASSIUM REFLEX MAGNESIUM: 5.2 MMOL/L (ref 3.4–5.1)
RBC # BLD: 3.66 M/UL (ref 4.5–6)
SODIUM BLD-SCNC: 137 MMOL/L (ref 136–145)
TOTAL PROTEIN: 6.7 G/DL (ref 6.4–8.3)
TROPONIN: <0.3 NG/ML
WBC # BLD: 13.8 K/UL (ref 4–11)

## 2019-08-24 PROCEDURE — 81001 URINALYSIS AUTO W/SCOPE: CPT

## 2019-08-24 PROCEDURE — 2580000003 HC RX 258: Performed by: NURSE PRACTITIONER

## 2019-08-24 PROCEDURE — 6360000002 HC RX W HCPCS: Performed by: NURSE PRACTITIONER

## 2019-08-24 PROCEDURE — 6370000000 HC RX 637 (ALT 250 FOR IP): Performed by: NURSE PRACTITIONER

## 2019-08-24 PROCEDURE — 93005 ELECTROCARDIOGRAM TRACING: CPT

## 2019-08-24 PROCEDURE — 85025 COMPLETE CBC W/AUTO DIFF WBC: CPT

## 2019-08-24 PROCEDURE — 2580000003 HC RX 258: Performed by: HOSPITALIST

## 2019-08-24 PROCEDURE — 84484 ASSAY OF TROPONIN QUANT: CPT

## 2019-08-24 PROCEDURE — 99285 EMERGENCY DEPT VISIT HI MDM: CPT

## 2019-08-24 PROCEDURE — 80053 COMPREHEN METABOLIC PANEL: CPT

## 2019-08-24 PROCEDURE — 36415 COLL VENOUS BLD VENIPUNCTURE: CPT

## 2019-08-24 PROCEDURE — 96360 HYDRATION IV INFUSION INIT: CPT

## 2019-08-24 PROCEDURE — 1200000000 HC SEMI PRIVATE

## 2019-08-24 RX ORDER — SODIUM CHLORIDE 0.9 % (FLUSH) 0.9 %
10 SYRINGE (ML) INJECTION EVERY 12 HOURS SCHEDULED
Status: DISCONTINUED | OUTPATIENT
Start: 2019-08-24 | End: 2019-08-28 | Stop reason: HOSPADM

## 2019-08-24 RX ORDER — SODIUM CHLORIDE 9 MG/ML
INJECTION, SOLUTION INTRAVENOUS CONTINUOUS
Status: DISCONTINUED | OUTPATIENT
Start: 2019-08-24 | End: 2019-08-25

## 2019-08-24 RX ORDER — 0.9 % SODIUM CHLORIDE 0.9 %
1000 INTRAVENOUS SOLUTION INTRAVENOUS ONCE
Status: COMPLETED | OUTPATIENT
Start: 2019-08-24 | End: 2019-08-24

## 2019-08-24 RX ORDER — PANTOPRAZOLE SODIUM 40 MG/1
40 TABLET, DELAYED RELEASE ORAL
Status: DISCONTINUED | OUTPATIENT
Start: 2019-08-25 | End: 2019-08-28 | Stop reason: HOSPADM

## 2019-08-24 RX ORDER — AMIODARONE HYDROCHLORIDE 200 MG/1
200 TABLET ORAL DAILY
Status: DISCONTINUED | OUTPATIENT
Start: 2019-08-24 | End: 2019-08-28 | Stop reason: HOSPADM

## 2019-08-24 RX ORDER — SODIUM CHLORIDE 9 MG/ML
INJECTION, SOLUTION INTRAVENOUS CONTINUOUS
Status: DISCONTINUED | OUTPATIENT
Start: 2019-08-24 | End: 2019-08-24

## 2019-08-24 RX ORDER — SODIUM CHLORIDE 0.9 % (FLUSH) 0.9 %
10 SYRINGE (ML) INJECTION PRN
Status: DISCONTINUED | OUTPATIENT
Start: 2019-08-24 | End: 2019-08-28 | Stop reason: HOSPADM

## 2019-08-24 RX ORDER — SENNA PLUS 8.6 MG/1
1 TABLET ORAL DAILY PRN
Status: DISCONTINUED | OUTPATIENT
Start: 2019-08-24 | End: 2019-08-28 | Stop reason: HOSPADM

## 2019-08-24 RX ORDER — ONDANSETRON 2 MG/ML
4 INJECTION INTRAMUSCULAR; INTRAVENOUS EVERY 6 HOURS PRN
Status: DISCONTINUED | OUTPATIENT
Start: 2019-08-24 | End: 2019-08-28 | Stop reason: HOSPADM

## 2019-08-24 RX ADMIN — CEFTRIAXONE 1 G: 1 INJECTION, POWDER, FOR SOLUTION INTRAMUSCULAR; INTRAVENOUS at 16:33

## 2019-08-24 RX ADMIN — SODIUM CHLORIDE 1000 ML: 9 INJECTION, SOLUTION INTRAVENOUS at 14:34

## 2019-08-24 RX ADMIN — SODIUM CHLORIDE: 9 INJECTION, SOLUTION INTRAVENOUS at 16:27

## 2019-08-24 RX ADMIN — SODIUM CHLORIDE 1000 ML: 9 INJECTION, SOLUTION INTRAVENOUS at 14:56

## 2019-08-24 RX ADMIN — APIXABAN 5 MG: 5 TABLET, FILM COATED ORAL at 20:36

## 2019-08-24 RX ADMIN — SODIUM CHLORIDE: 9 INJECTION, SOLUTION INTRAVENOUS at 15:17

## 2019-08-24 ASSESSMENT — PAIN SCALES - GENERAL: PAINLEVEL_OUTOF10: 0

## 2019-08-24 NOTE — ED PROVIDER NOTES
[address] [address] ENCOUN[address]      Pt Name: Ninfa Kirkpatrick  MRN: [id number]  YOB: 1940  Date of evaluation: 8/24/2019  Provider: Dean Castaneda, [facility]       Chief Complaint   Patient presents with    Hypotension         HISTORY OF PRESENT ILLNESS  (Location/Symptom, Timing/Onset, Context/Setting, Quality, Duration, Modifying Factors, Severity.)   Beltarn Tang is a 69 y.o. male who presents to the emergency department for multiple complaints. She states his symptoms started over a month ago 1 evening he tried to get to the bathroom and had a laceration of nausea but states he never actually threw up but he did gag. He also has some diarrhea. Patient states that this is been ongoing since that time. Patient states he feels like he has something in his throat especially when he attempts to swallow since that episode. Patient states he's had lightheaded and dizziness along with the symptoms and his blood pressures been running low however he still continues to take for different blood pressure medications on his medication list. Patient takes Entresto, Bystolic, spironolactone and Lasix. Patient states he had episode today of some lightheaded and dizziness just hasn't felt very well. He's had decreased oral intake secondary to all the lightheadedness nausea and diarrhea. Patient denies any abdominal pain except that he states he will cramp little before he has a bowel movement. Patient states he's probably had at least 5 loose bowel movements today already since awakening early this morning. Has not taken anything over-the-counter for his diarrhea even though is been ongoing intermittently for the past month or more. The only medication he states he took for his diarrhea was a stomach pill for heartburn and that did not help with the diarrhea at all. Patient denies any chest pain or shortness of breath with the symptoms.  He denies any Height:           MEDICATIONS ADMINISTERED IN ED:  Medications   0.9 % sodium chloride bolus (1,000 mLs Intravenous New Bag 8/24/19 1456)   0.9 % sodium chloride infusion ( Intravenous New Bag 8/24/19 1517)   0.9 % sodium chloride bolus (0 mLs Intravenous Stopped 8/24/19 1514)       After initial evaluation and examination I did have a conversation with the patient and his family about the upcoming plan, treatment and possible disposition which they were agreeable to the time of this dictation. Patient denies his blood pressure was low here on his vital signs with a pressure ranging anywhere between the 84T and 89B systolic. Patient states it is our he taken all of his medications today including the for a dictated above blood pressure medication. Patient advised that we would start an IV and given fluid boluses to see if we can get his blood pressure up. We will also check a CBC, CMP, troponin and a EKG. Patient's final disposition will be determined once his diagnostic studies been performed and reviewed. Admission will determine if the patient is orthostatic or symptomatic with low blood pressure once his workup is completely performed or if he is in acute renal failure secondary to low volume intake and continue use of diuretics along with an ACE inhibitor. Blood work showed white count 13,800, hemoglobin is 10.8, hematocrit is 34.4, platelet counts 213. Comprehensive metabolic panel was benign except for potassium of 5.2 mildly elevated, glucose 112, BUN of 59, creatinine of 4.5, alkaline phosphatase of 101. Troponin was nondetectable less than 0.30. Patient's diagnostic studies were discussed with him and his family do state her understanding. Patient's previous BUN and creatinine back on 6/26/2019 was 24 and 1.5 respectively.  Patient advised of these changes and he is technically an acute renal failure secondary to his poor oral intake and continued use of his hypertensive medications especially due

## 2019-08-24 NOTE — ED TRIAGE NOTES
Patient states during triage that he started around 7/14 with complaints of nausea, sore throat, diarrhea and low BP. He states he took himself off his BP meds about a year ago. Also complains of dizziness and not being able to eat due to it feels like a lump in his throat.

## 2019-08-25 ENCOUNTER — APPOINTMENT (OUTPATIENT)
Dept: CT IMAGING | Facility: HOSPITAL | Age: 79
DRG: 315 | End: 2019-08-25
Payer: MEDICARE

## 2019-08-25 PROBLEM — I95.9 HYPOTENSION: Status: ACTIVE | Noted: 2019-08-25

## 2019-08-25 PROBLEM — D64.9 ANEMIA: Status: ACTIVE | Noted: 2019-08-25

## 2019-08-25 PROBLEM — R19.7 DIARRHEA: Status: ACTIVE | Noted: 2019-08-25

## 2019-08-25 LAB
AMORPHOUS: ABNORMAL /HPF
ANION GAP SERPL CALCULATED.3IONS-SCNC: 13 MMOL/L (ref 3–16)
BILIRUBIN URINE: NEGATIVE
BLOOD, URINE: NEGATIVE
BUN BLDV-MCNC: 52 MG/DL (ref 6–20)
C DIFF TOXIN/ANTIGEN: NORMAL
CALCIUM SERPL-MCNC: 8.8 MG/DL (ref 8.5–10.5)
CASTS: ABNORMAL /LPF
CHLORIDE BLD-SCNC: 104 MMOL/L (ref 98–107)
CLARITY: CLEAR
CO2: 21 MMOL/L (ref 20–30)
COLOR: YELLOW
CREAT SERPL-MCNC: 3.6 MG/DL (ref 0.4–1.2)
EPITHELIAL CELLS, UA: ABNORMAL /HPF
FERRITIN: 545.3 NG/ML (ref 22–322)
FOLATE: 3.33 NG/ML
GFR AFRICAN AMERICAN: 20
GFR NON-AFRICAN AMERICAN: 16
GLUCOSE BLD-MCNC: 114 MG/DL (ref 74–106)
GLUCOSE URINE: NEGATIVE MG/DL
HCT VFR BLD CALC: 29.1 % (ref 40–54)
HEMOGLOBIN: 9.3 G/DL (ref 13–18)
IRON SATURATION: 14 % (ref 20–50)
IRON: 23 UG/DL (ref 59–158)
KETONES, URINE: NEGATIVE MG/DL
LEUKOCYTE ESTERASE, URINE: NEGATIVE
MAGNESIUM: 1.7 MG/DL (ref 1.7–2.4)
MCH RBC QN AUTO: 29.9 PG (ref 27–32)
MCHC RBC AUTO-ENTMCNC: 32 G/DL (ref 31–35)
MCV RBC AUTO: 93.6 FL (ref 80–100)
MICROSCOPIC EXAMINATION: YES
MUCUS: ABNORMAL /LPF
NITRITE, URINE: NEGATIVE
OCCULT BLOOD SCREENING: NORMAL
PDW BLD-RTO: 14.3 % (ref 11–16)
PH UA: 5 (ref 5–8)
PLATELET # BLD: 194 K/UL (ref 150–400)
PMV BLD AUTO: 10.2 FL (ref 6–10)
POTASSIUM REFLEX MAGNESIUM: 4.8 MMOL/L (ref 3.4–5.1)
PROTEIN UA: 30 MG/DL
RBC # BLD: 3.11 M/UL (ref 4.5–6)
RBC UA: ABNORMAL /HPF (ref 0–2)
SODIUM BLD-SCNC: 138 MMOL/L (ref 136–145)
SPECIFIC GRAVITY UA: 1.01 (ref 1–1.03)
TOTAL IRON BINDING CAPACITY: 162 UG/DL (ref 250–450)
TSH SERPL DL<=0.05 MIU/L-ACNC: 0.01 UIU/ML (ref 0.35–5.5)
URINE REFLEX TO CULTURE: ABNORMAL
URINE TYPE: ABNORMAL
UROBILINOGEN, URINE: 0.2 E.U./DL
VITAMIN B-12: 431 PG/ML (ref 211–911)
WBC # BLD: 10.3 K/UL (ref 4–11)
WBC UA: ABNORMAL /HPF (ref 0–5)
WHITE BLOOD CELLS (WBC), STOOL: ABNORMAL

## 2019-08-25 PROCEDURE — 80048 BASIC METABOLIC PNL TOTAL CA: CPT

## 2019-08-25 PROCEDURE — 84443 ASSAY THYROID STIM HORMONE: CPT

## 2019-08-25 PROCEDURE — 83550 IRON BINDING TEST: CPT

## 2019-08-25 PROCEDURE — 83630 LACTOFERRIN FECAL (QUAL): CPT

## 2019-08-25 PROCEDURE — 82728 ASSAY OF FERRITIN: CPT

## 2019-08-25 PROCEDURE — 99222 1ST HOSP IP/OBS MODERATE 55: CPT | Performed by: PEDIATRICS

## 2019-08-25 PROCEDURE — 6370000000 HC RX 637 (ALT 250 FOR IP): Performed by: NURSE PRACTITIONER

## 2019-08-25 PROCEDURE — 1200000000 HC SEMI PRIVATE

## 2019-08-25 PROCEDURE — 83735 ASSAY OF MAGNESIUM: CPT

## 2019-08-25 PROCEDURE — 2580000003 HC RX 258: Performed by: NURSE PRACTITIONER

## 2019-08-25 PROCEDURE — 6360000002 HC RX W HCPCS: Performed by: NURSE PRACTITIONER

## 2019-08-25 PROCEDURE — 74176 CT ABD & PELVIS W/O CONTRAST: CPT

## 2019-08-25 PROCEDURE — 87324 CLOSTRIDIUM AG IA: CPT

## 2019-08-25 PROCEDURE — 36415 COLL VENOUS BLD VENIPUNCTURE: CPT

## 2019-08-25 PROCEDURE — 82607 VITAMIN B-12: CPT

## 2019-08-25 PROCEDURE — 83540 ASSAY OF IRON: CPT

## 2019-08-25 PROCEDURE — 6370000000 HC RX 637 (ALT 250 FOR IP): Performed by: PEDIATRICS

## 2019-08-25 PROCEDURE — 87449 NOS EACH ORGANISM AG IA: CPT

## 2019-08-25 PROCEDURE — 87046 STOOL CULTR AEROBIC BACT EA: CPT

## 2019-08-25 PROCEDURE — 51798 US URINE CAPACITY MEASURE: CPT

## 2019-08-25 PROCEDURE — G0328 FECAL BLOOD SCRN IMMUNOASSAY: HCPCS

## 2019-08-25 PROCEDURE — 71250 CT THORAX DX C-: CPT

## 2019-08-25 PROCEDURE — 82746 ASSAY OF FOLIC ACID SERUM: CPT

## 2019-08-25 PROCEDURE — 85027 COMPLETE CBC AUTOMATED: CPT

## 2019-08-25 PROCEDURE — 87505 NFCT AGENT DETECTION GI: CPT

## 2019-08-25 RX ORDER — LORAZEPAM 0.5 MG/1
0.5 TABLET ORAL
Status: COMPLETED | OUTPATIENT
Start: 2019-08-25 | End: 2019-08-25

## 2019-08-25 RX ORDER — ACETAMINOPHEN 325 MG/1
650 TABLET ORAL EVERY 4 HOURS PRN
Status: DISCONTINUED | OUTPATIENT
Start: 2019-08-25 | End: 2019-08-28 | Stop reason: HOSPADM

## 2019-08-25 RX ORDER — SODIUM CHLORIDE 9 MG/ML
INJECTION, SOLUTION INTRAVENOUS CONTINUOUS
Status: ACTIVE | OUTPATIENT
Start: 2019-08-25 | End: 2019-08-26

## 2019-08-25 RX ORDER — FOLIC ACID 1 MG/1
1 TABLET ORAL DAILY
Status: DISCONTINUED | OUTPATIENT
Start: 2019-08-25 | End: 2019-08-28 | Stop reason: HOSPADM

## 2019-08-25 RX ORDER — METRONIDAZOLE 500 MG/1
500 TABLET ORAL EVERY 8 HOURS SCHEDULED
Status: DISCONTINUED | OUTPATIENT
Start: 2019-08-25 | End: 2019-08-28 | Stop reason: HOSPADM

## 2019-08-25 RX ADMIN — FOLIC ACID 1 MG: 1 TABLET ORAL at 17:36

## 2019-08-25 RX ADMIN — AMIODARONE HYDROCHLORIDE 200 MG: 200 TABLET ORAL at 07:59

## 2019-08-25 RX ADMIN — APIXABAN 5 MG: 5 TABLET, FILM COATED ORAL at 07:59

## 2019-08-25 RX ADMIN — PANTOPRAZOLE SODIUM 40 MG: 40 TABLET, DELAYED RELEASE ORAL at 06:20

## 2019-08-25 RX ADMIN — Medication 10 ML: at 07:59

## 2019-08-25 RX ADMIN — ACETAMINOPHEN 650 MG: 325 TABLET, FILM COATED ORAL at 00:39

## 2019-08-25 RX ADMIN — METRONIDAZOLE 500 MG: 500 TABLET ORAL at 22:39

## 2019-08-25 RX ADMIN — CEFTRIAXONE 1 G: 1 INJECTION, POWDER, FOR SOLUTION INTRAMUSCULAR; INTRAVENOUS at 15:23

## 2019-08-25 RX ADMIN — APIXABAN 5 MG: 5 TABLET, FILM COATED ORAL at 19:58

## 2019-08-25 RX ADMIN — SODIUM CHLORIDE: 9 INJECTION, SOLUTION INTRAVENOUS at 14:45

## 2019-08-25 RX ADMIN — SODIUM CHLORIDE: 9 INJECTION, SOLUTION INTRAVENOUS at 06:27

## 2019-08-25 RX ADMIN — LORAZEPAM 0.5 MG: 0.5 TABLET ORAL at 22:53

## 2019-08-25 RX ADMIN — IRON SUCROSE 200 MG: 20 INJECTION, SOLUTION INTRAVENOUS at 17:36

## 2019-08-25 RX ADMIN — METRONIDAZOLE 500 MG: 500 TABLET ORAL at 15:23

## 2019-08-25 RX ADMIN — ACETAMINOPHEN 650 MG: 325 TABLET, FILM COATED ORAL at 18:59

## 2019-08-25 ASSESSMENT — PAIN SCALES - GENERAL
PAINLEVEL_OUTOF10: 4
PAINLEVEL_OUTOF10: 0
PAINLEVEL_OUTOF10: 2
PAINLEVEL_OUTOF10: 2

## 2019-08-25 NOTE — FLOWSHEET NOTE
Patient awake, sitting up on side of bed. Alert and oriented x 4. No c/o pain this AM. No distress noted. Lung sounds clear. Encouraged to cough & deep breathe. Call bell and bedside table in reach. Instructed to call out for assistance when getting up since he is hypotensive. Pt states he is feeling better and hopes to go home today. LBM today. Pt reports still having diarrhea. Will continue to monitor. 08/25/19 0803   Assessment   Charting Type Shift assessment   Neurological   Neuro (WDL) WDL   Level of Consciousness 0   Lexii Coma Scale   Eye Opening 4   Best Verbal Response 5   Best Motor Response 6   Lexii Coma Scale Score 15   HEENT   HEENT (WDL) X   Right Eye Impaired vision   Left Eye Impaired vision   Respiratory   Respiratory (WDL) WDL   Cardiac   Cardiac (WDL) X   Cardiac Monitor   Telemetry Monitor On No   Gastrointestinal   Abdominal (WDL) X   Last BM (including prior to admit) 08/25/19   GI Symptoms Diarrhea   Peripheral Vascular   Peripheral Vascular (WDL) X   Edema Right lower extremity; Left lower extremity   RLE Edema +1;Pitting   LLE Edema Non-pitting;+1   Skin Color/Condition   Skin Color/Condition (WDL) WDL   Skin Integrity   Skin Integrity (WDL) WDL   Musculoskeletal   Musculoskeletal (WDL) WDL   Genitourinary   Genitourinary (WDL) X  (Decreased output per patient)   Flank Tenderness No   Suprapubic Tenderness No   Dysuria No   Psychosocial   Psychosocial (WDL) WDL

## 2019-08-25 NOTE — H&P
BMP in a.m.   08/25/2019    Acute renal failure (ARF) ([facility] [address] [N17.9]  As above   08/24/2019     Patient was seen and examined by Dr. Marilia Kyle and plan of care reviewed.       Mohamud Akhtar certifies per [employer] regulation for 42 CFR [phone]), that the patient may reasonably be expected to be discharged or transferred to a hospital within 96 hours after admission to [address] [place] [address]    Electronically signed by OSCAR Mallory Cha on 8/25/2019 at 3:17 PM

## 2019-08-25 NOTE — PLAN OF CARE
Problem: Cardiac Output - Decreased:  Goal: Hemodynamic stability will improve  Description  Hemodynamic stability will improve  Outcome: Ongoing     Problem: Fluid Volume - Imbalance:  Goal: Absence of imbalanced fluid volume signs and symptoms  Description  Absence of imbalanced fluid volume signs and symptoms  Outcome: Ongoing

## 2019-08-26 PROBLEM — Z86.79 HISTORY OF ATRIAL FIBRILLATION: Status: ACTIVE | Noted: 2019-08-26

## 2019-08-26 PROBLEM — E53.8 FOLATE DEFICIENCY: Status: ACTIVE | Noted: 2019-08-26

## 2019-08-26 PROBLEM — I50.22 CHRONIC SYSTOLIC HEART FAILURE (HCC): Status: ACTIVE | Noted: 2019-08-26

## 2019-08-26 LAB
A/G RATIO: 0.9 (ref 0.8–2)
ALBUMIN SERPL-MCNC: 2.9 G/DL (ref 3.4–4.8)
ALP BLD-CCNC: 86 U/L (ref 25–100)
ALT SERPL-CCNC: 8 U/L (ref 4–36)
ANION GAP SERPL CALCULATED.3IONS-SCNC: 11 MMOL/L (ref 3–16)
AST SERPL-CCNC: 11 U/L (ref 8–33)
BILIRUB SERPL-MCNC: 0.3 MG/DL (ref 0.3–1.2)
BUN BLDV-MCNC: 31 MG/DL (ref 6–20)
CALCIUM SERPL-MCNC: 8.8 MG/DL (ref 8.5–10.5)
CHLORIDE BLD-SCNC: 103 MMOL/L (ref 98–107)
CO2: 23 MMOL/L (ref 20–30)
CREAT SERPL-MCNC: 2.1 MG/DL (ref 0.4–1.2)
GFR AFRICAN AMERICAN: 37
GFR NON-AFRICAN AMERICAN: 31
GI BACTERIAL PATHOGENS BY PCR: NORMAL
GLOBULIN: 3.3 G/DL
GLUCOSE BLD-MCNC: 133 MG/DL (ref 74–106)
HCT VFR BLD CALC: 30.1 % (ref 40–54)
HEMOGLOBIN: 9.5 G/DL (ref 13–18)
MCH RBC QN AUTO: 29.7 PG (ref 27–32)
MCHC RBC AUTO-ENTMCNC: 31.6 G/DL (ref 31–35)
MCV RBC AUTO: 94.1 FL (ref 80–100)
PDW BLD-RTO: 14.3 % (ref 11–16)
PLATELET # BLD: 212 K/UL (ref 150–400)
PMV BLD AUTO: 10.1 FL (ref 6–10)
POTASSIUM SERPL-SCNC: 4.9 MMOL/L (ref 3.4–5.1)
RBC # BLD: 3.2 M/UL (ref 4.5–6)
SODIUM BLD-SCNC: 137 MMOL/L (ref 136–145)
T3 TOTAL: 0.69 NG/ML (ref 0.8–2)
T4 FREE: 1.17 NG/DL (ref 0.89–1.76)
TOTAL PROTEIN: 6.2 G/DL (ref 6.4–8.3)
WBC # BLD: 9.3 K/UL (ref 4–11)

## 2019-08-26 PROCEDURE — 1200000000 HC SEMI PRIVATE

## 2019-08-26 PROCEDURE — 85027 COMPLETE CBC AUTOMATED: CPT

## 2019-08-26 PROCEDURE — 6360000002 HC RX W HCPCS: Performed by: NURSE PRACTITIONER

## 2019-08-26 PROCEDURE — 6370000000 HC RX 637 (ALT 250 FOR IP): Performed by: NURSE PRACTITIONER

## 2019-08-26 PROCEDURE — 84439 ASSAY OF FREE THYROXINE: CPT

## 2019-08-26 PROCEDURE — 2580000003 HC RX 258: Performed by: PHYSICIAN ASSISTANT

## 2019-08-26 PROCEDURE — 99232 SBSQ HOSP IP/OBS MODERATE 35: CPT | Performed by: INTERNAL MEDICINE

## 2019-08-26 PROCEDURE — 80053 COMPREHEN METABOLIC PANEL: CPT

## 2019-08-26 PROCEDURE — 36415 COLL VENOUS BLD VENIPUNCTURE: CPT

## 2019-08-26 PROCEDURE — 84480 ASSAY TRIIODOTHYRONINE (T3): CPT

## 2019-08-26 PROCEDURE — 6370000000 HC RX 637 (ALT 250 FOR IP): Performed by: PEDIATRICS

## 2019-08-26 PROCEDURE — 6370000000 HC RX 637 (ALT 250 FOR IP): Performed by: PHYSICIAN ASSISTANT

## 2019-08-26 PROCEDURE — 2580000003 HC RX 258: Performed by: NURSE PRACTITIONER

## 2019-08-26 RX ORDER — SODIUM CHLORIDE 9 MG/ML
INJECTION, SOLUTION INTRAVENOUS CONTINUOUS
Status: DISCONTINUED | OUTPATIENT
Start: 2019-08-26 | End: 2019-08-26

## 2019-08-26 RX ORDER — FERROUS SULFATE TAB EC 324 MG (65 MG FE EQUIVALENT) 324 (65 FE) MG
324 TABLET DELAYED RESPONSE ORAL 2 TIMES DAILY WITH MEALS
Status: DISCONTINUED | OUTPATIENT
Start: 2019-08-26 | End: 2019-08-28 | Stop reason: HOSPADM

## 2019-08-26 RX ORDER — SODIUM CHLORIDE 9 MG/ML
INJECTION, SOLUTION INTRAVENOUS CONTINUOUS
Status: ACTIVE | OUTPATIENT
Start: 2019-08-26 | End: 2019-08-26

## 2019-08-26 RX ADMIN — SODIUM CHLORIDE: 9 INJECTION, SOLUTION INTRAVENOUS at 14:18

## 2019-08-26 RX ADMIN — Medication 10 ML: at 10:19

## 2019-08-26 RX ADMIN — CEFTRIAXONE 1 G: 1 INJECTION, POWDER, FOR SOLUTION INTRAMUSCULAR; INTRAVENOUS at 16:56

## 2019-08-26 RX ADMIN — AMIODARONE HYDROCHLORIDE 200 MG: 200 TABLET ORAL at 10:19

## 2019-08-26 RX ADMIN — METRONIDAZOLE 500 MG: 500 TABLET ORAL at 05:49

## 2019-08-26 RX ADMIN — APIXABAN 5 MG: 5 TABLET, FILM COATED ORAL at 20:32

## 2019-08-26 RX ADMIN — FERROUS SULFATE TAB EC 324 MG (65 MG FE EQUIVALENT) 324 MG: 324 (65 FE) TABLET DELAYED RESPONSE at 14:17

## 2019-08-26 RX ADMIN — FOLIC ACID 1 MG: 1 TABLET ORAL at 10:19

## 2019-08-26 RX ADMIN — PANTOPRAZOLE SODIUM 40 MG: 40 TABLET, DELAYED RELEASE ORAL at 05:49

## 2019-08-26 RX ADMIN — FERROUS SULFATE TAB EC 324 MG (65 MG FE EQUIVALENT) 324 MG: 324 (65 FE) TABLET DELAYED RESPONSE at 16:56

## 2019-08-26 RX ADMIN — Medication 10 ML: at 20:33

## 2019-08-26 RX ADMIN — SODIUM CHLORIDE: 9 INJECTION, SOLUTION INTRAVENOUS at 05:52

## 2019-08-26 RX ADMIN — METRONIDAZOLE 500 MG: 500 TABLET ORAL at 14:17

## 2019-08-26 RX ADMIN — ACETAMINOPHEN 650 MG: 325 TABLET, FILM COATED ORAL at 22:01

## 2019-08-26 RX ADMIN — APIXABAN 5 MG: 5 TABLET, FILM COATED ORAL at 10:19

## 2019-08-26 RX ADMIN — METRONIDAZOLE 500 MG: 500 TABLET ORAL at 20:33

## 2019-08-26 ASSESSMENT — PAIN SCALES - GENERAL
PAINLEVEL_OUTOF10: 0
PAINLEVEL_OUTOF10: 5
PAINLEVEL_OUTOF10: 0
PAINLEVEL_OUTOF10: 0

## 2019-08-27 PROBLEM — R13.10 DYSPHAGIA: Status: ACTIVE | Noted: 2019-08-27

## 2019-08-27 PROBLEM — R79.89 LOW TSH LEVEL: Status: ACTIVE | Noted: 2019-08-27

## 2019-08-27 PROBLEM — N28.1 RENAL CYST: Status: ACTIVE | Noted: 2019-08-27

## 2019-08-27 LAB
ANION GAP SERPL CALCULATED.3IONS-SCNC: 12 MMOL/L (ref 3–16)
BUN BLDV-MCNC: 22 MG/DL (ref 6–20)
CALCIUM SERPL-MCNC: 8.6 MG/DL (ref 8.5–10.5)
CHLORIDE BLD-SCNC: 102 MMOL/L (ref 98–107)
CO2: 23 MMOL/L (ref 20–30)
CREAT SERPL-MCNC: 1.8 MG/DL (ref 0.4–1.2)
GFR AFRICAN AMERICAN: 44
GFR NON-AFRICAN AMERICAN: 37
GLUCOSE BLD-MCNC: 124 MG/DL (ref 74–106)
HCT VFR BLD CALC: 28.7 % (ref 40–54)
HEMOGLOBIN: 9.1 G/DL (ref 13–18)
MCH RBC QN AUTO: 30.1 PG (ref 27–32)
MCHC RBC AUTO-ENTMCNC: 31.7 G/DL (ref 31–35)
MCV RBC AUTO: 95 FL (ref 80–100)
PDW BLD-RTO: 14.3 % (ref 11–16)
PLATELET # BLD: 189 K/UL (ref 150–400)
PMV BLD AUTO: 11 FL (ref 6–10)
POTASSIUM SERPL-SCNC: 4.4 MMOL/L (ref 3.4–5.1)
RBC # BLD: 3.02 M/UL (ref 4.5–6)
SODIUM BLD-SCNC: 137 MMOL/L (ref 136–145)
WBC # BLD: 9.3 K/UL (ref 4–11)

## 2019-08-27 PROCEDURE — 6360000002 HC RX W HCPCS: Performed by: NURSE PRACTITIONER

## 2019-08-27 PROCEDURE — G8996 SWALLOW CURRENT STATUS: HCPCS

## 2019-08-27 PROCEDURE — 99232 SBSQ HOSP IP/OBS MODERATE 35: CPT | Performed by: INTERNAL MEDICINE

## 2019-08-27 PROCEDURE — 97802 MEDICAL NUTRITION INDIV IN: CPT

## 2019-08-27 PROCEDURE — 6370000000 HC RX 637 (ALT 250 FOR IP): Performed by: NURSE PRACTITIONER

## 2019-08-27 PROCEDURE — 80048 BASIC METABOLIC PNL TOTAL CA: CPT

## 2019-08-27 PROCEDURE — 1200000000 HC SEMI PRIVATE

## 2019-08-27 PROCEDURE — 36415 COLL VENOUS BLD VENIPUNCTURE: CPT

## 2019-08-27 PROCEDURE — 85027 COMPLETE CBC AUTOMATED: CPT

## 2019-08-27 PROCEDURE — 2580000003 HC RX 258: Performed by: NURSE PRACTITIONER

## 2019-08-27 PROCEDURE — 92610 EVALUATE SWALLOWING FUNCTION: CPT

## 2019-08-27 PROCEDURE — G8998 SWALLOW D/C STATUS: HCPCS

## 2019-08-27 PROCEDURE — 6370000000 HC RX 637 (ALT 250 FOR IP): Performed by: PHYSICIAN ASSISTANT

## 2019-08-27 PROCEDURE — G8997 SWALLOW GOAL STATUS: HCPCS

## 2019-08-27 RX ADMIN — METRONIDAZOLE 500 MG: 500 TABLET ORAL at 14:23

## 2019-08-27 RX ADMIN — CEFTRIAXONE 1 G: 1 INJECTION, POWDER, FOR SOLUTION INTRAMUSCULAR; INTRAVENOUS at 16:23

## 2019-08-27 RX ADMIN — APIXABAN 5 MG: 5 TABLET, FILM COATED ORAL at 23:00

## 2019-08-27 RX ADMIN — AMIODARONE HYDROCHLORIDE 200 MG: 200 TABLET ORAL at 09:59

## 2019-08-27 RX ADMIN — FERROUS SULFATE TAB EC 324 MG (65 MG FE EQUIVALENT) 324 MG: 324 (65 FE) TABLET DELAYED RESPONSE at 10:00

## 2019-08-27 RX ADMIN — FOLIC ACID 1 MG: 1 TABLET ORAL at 09:59

## 2019-08-27 RX ADMIN — PANTOPRAZOLE SODIUM 40 MG: 40 TABLET, DELAYED RELEASE ORAL at 06:31

## 2019-08-27 RX ADMIN — Medication 10 ML: at 23:02

## 2019-08-27 RX ADMIN — METRONIDAZOLE 500 MG: 500 TABLET ORAL at 06:31

## 2019-08-27 RX ADMIN — METRONIDAZOLE 500 MG: 500 TABLET ORAL at 23:00

## 2019-08-27 RX ADMIN — FERROUS SULFATE TAB EC 324 MG (65 MG FE EQUIVALENT) 324 MG: 324 (65 FE) TABLET DELAYED RESPONSE at 16:25

## 2019-08-27 RX ADMIN — APIXABAN 5 MG: 5 TABLET, FILM COATED ORAL at 09:59

## 2019-08-27 ASSESSMENT — PAIN SCALES - GENERAL
PAINLEVEL_OUTOF10: 0

## 2019-08-27 NOTE — PROGRESS NOTES
Recommendation: Thin  Recommended Form of Meds: PO     Compensatory Swallowing Strategies  Compensatory Swallowing Strategies: Alternate solids and liquids;Small bites/sips;Eat/Feed slowly; Remain upright for 30-45 minutes after meals;Upright as possible for all oral intake    Treatment/Goals  Short-term Goals  Timeframe for Short-term Goals: N/A - Skilled ST services not indicated at this time. Long-term Goals  Timeframe for Long-term Goals: N/A - Skilled ST services not indicated at this time. General  Chart Reviewed: Yes  Comments: Patient reports having h/o goiter removal from thyroid. Subjective  Subjective: Patient upright in chair consuming lunch; daughter present and assisted with providing PMH. Patient pleasant and agreeable to ST eval; reports difficulty swallowing for approximately the past 6 months. Behavior/Cognition: Alert; Cooperative;Pleasant mood  Respiratory Status: Room air  Communication Observation: Functional  Follows Directions: Complex  Dentition: Adequate  Patient Positioning: Upright in chair  Baseline Vocal Quality: Normal  Volitional Cough: Strong  Prior Dysphagia History: None indicated or reported. Consistencies Administered: Reg solid; Thin - straw    Vision/Hearing  Vision  Vision: Impaired  Vision Exceptions: Wears glasses at all times  Hearing  Hearing: Within functional limits    Oral Motor Deficits  Oral/Motor  Oral Motor: Within functional limits    Oral Phase Dysfunction  Oral Phase  Oral Phase: WNL  Oral Phase  Oral Phase - Comment: No deficits indicated     Indicators of Pharyngeal Phase Dysfunction   Pharyngeal Phase  Pharyngeal Phase: WNL  Pharyngeal Phase   Pharyngeal: No deficits indicated    Prognosis  Prognosis  Prognosis for safe diet advancement: good  Barriers to reach goals: age  Barriers/Prognosis Comment: Good for recommended diet  Individuals consulted  Consulted and agree with results and recommendations: Patient; Family member  Family member consulted:

## 2019-08-28 ENCOUNTER — OUTSIDE FACILITY SERVICE (OUTPATIENT)
Dept: CARDIOLOGY | Facility: CLINIC | Age: 79
End: 2019-08-28

## 2019-08-28 VITALS
WEIGHT: 299.2 LBS | HEART RATE: 75 BPM | TEMPERATURE: 98.4 F | RESPIRATION RATE: 18 BRPM | HEIGHT: 70 IN | DIASTOLIC BLOOD PRESSURE: 54 MMHG | SYSTOLIC BLOOD PRESSURE: 92 MMHG | OXYGEN SATURATION: 94 % | BODY MASS INDEX: 42.83 KG/M2

## 2019-08-28 LAB
ANION GAP SERPL CALCULATED.3IONS-SCNC: 9 MMOL/L (ref 3–16)
BUN BLDV-MCNC: 16 MG/DL (ref 6–20)
CALCIUM SERPL-MCNC: 8.8 MG/DL (ref 8.5–10.5)
CHLORIDE BLD-SCNC: 103 MMOL/L (ref 98–107)
CO2: 24 MMOL/L (ref 20–30)
CREAT SERPL-MCNC: 1.4 MG/DL (ref 0.4–1.2)
GFR AFRICAN AMERICAN: >59
GFR NON-AFRICAN AMERICAN: 49
GLUCOSE BLD-MCNC: 103 MG/DL (ref 74–106)
HCT VFR BLD CALC: 28.1 % (ref 40–54)
HEMOGLOBIN: 8.8 G/DL (ref 13–18)
MCH RBC QN AUTO: 29.6 PG (ref 27–32)
MCHC RBC AUTO-ENTMCNC: 31.3 G/DL (ref 31–35)
MCV RBC AUTO: 94.6 FL (ref 80–100)
PDW BLD-RTO: 14.5 % (ref 11–16)
PLATELET # BLD: 200 K/UL (ref 150–400)
PMV BLD AUTO: 10.1 FL (ref 6–10)
POTASSIUM SERPL-SCNC: 4.5 MMOL/L (ref 3.4–5.1)
RBC # BLD: 2.97 M/UL (ref 4.5–6)
SODIUM BLD-SCNC: 136 MMOL/L (ref 136–145)
WBC # BLD: 8.2 K/UL (ref 4–11)

## 2019-08-28 PROCEDURE — 6370000000 HC RX 637 (ALT 250 FOR IP): Performed by: NURSE PRACTITIONER

## 2019-08-28 PROCEDURE — 2580000003 HC RX 258: Performed by: NURSE PRACTITIONER

## 2019-08-28 PROCEDURE — 99239 HOSP IP/OBS DSCHRG MGMT >30: CPT | Performed by: INTERNAL MEDICINE

## 2019-08-28 PROCEDURE — 2500000003 HC RX 250 WO HCPCS: Performed by: INTERNAL MEDICINE

## 2019-08-28 PROCEDURE — 6370000000 HC RX 637 (ALT 250 FOR IP): Performed by: PHYSICIAN ASSISTANT

## 2019-08-28 PROCEDURE — 6360000002 HC RX W HCPCS: Performed by: NURSE PRACTITIONER

## 2019-08-28 PROCEDURE — 80048 BASIC METABOLIC PNL TOTAL CA: CPT

## 2019-08-28 PROCEDURE — 6370000000 HC RX 637 (ALT 250 FOR IP): Performed by: PEDIATRICS

## 2019-08-28 PROCEDURE — 36415 COLL VENOUS BLD VENIPUNCTURE: CPT

## 2019-08-28 PROCEDURE — 99222 1ST HOSP IP/OBS MODERATE 55: CPT | Performed by: INTERNAL MEDICINE

## 2019-08-28 PROCEDURE — 85027 COMPLETE CBC AUTOMATED: CPT

## 2019-08-28 RX ORDER — FERROUS SULFATE TAB EC 324 MG (65 MG FE EQUIVALENT) 324 (65 FE) MG
324 TABLET DELAYED RESPONSE ORAL 2 TIMES DAILY WITH MEALS
Qty: 60 TABLET | Refills: 0 | Status: SHIPPED | OUTPATIENT
Start: 2019-08-28 | End: 2019-10-03 | Stop reason: SDUPTHER

## 2019-08-28 RX ORDER — FOLIC ACID 1 MG/1
1 TABLET ORAL DAILY
Qty: 30 TABLET | Refills: 3 | Status: SHIPPED | OUTPATIENT
Start: 2019-08-29 | End: 2019-12-19 | Stop reason: SDUPTHER

## 2019-08-28 RX ORDER — FUROSEMIDE 40 MG/1
20 TABLET ORAL DAILY
Qty: 60 TABLET | Refills: 3 | Status: SHIPPED
Start: 2019-08-28 | End: 2019-10-10

## 2019-08-28 RX ORDER — PANTOPRAZOLE SODIUM 40 MG/1
40 TABLET, DELAYED RELEASE ORAL
Qty: 30 TABLET | Refills: 3 | Status: SHIPPED | OUTPATIENT
Start: 2019-08-29 | End: 2019-12-19 | Stop reason: SDUPTHER

## 2019-08-28 RX ORDER — BUMETANIDE 0.25 MG/ML
1 INJECTION, SOLUTION INTRAMUSCULAR; INTRAVENOUS ONCE
Status: COMPLETED | OUTPATIENT
Start: 2019-08-28 | End: 2019-08-28

## 2019-08-28 RX ORDER — METRONIDAZOLE 500 MG/1
500 TABLET ORAL EVERY 8 HOURS SCHEDULED
Qty: 15 TABLET | Refills: 0 | Status: SHIPPED | OUTPATIENT
Start: 2019-08-28 | End: 2019-09-02

## 2019-08-28 RX ADMIN — ACETAMINOPHEN 650 MG: 325 TABLET, FILM COATED ORAL at 03:40

## 2019-08-28 RX ADMIN — CEFTRIAXONE 1 G: 1 INJECTION, POWDER, FOR SOLUTION INTRAMUSCULAR; INTRAVENOUS at 14:22

## 2019-08-28 RX ADMIN — BUMETANIDE 1 MG: 0.25 INJECTION INTRAMUSCULAR; INTRAVENOUS at 06:57

## 2019-08-28 RX ADMIN — APIXABAN 5 MG: 5 TABLET, FILM COATED ORAL at 09:58

## 2019-08-28 RX ADMIN — FOLIC ACID 1 MG: 1 TABLET ORAL at 09:58

## 2019-08-28 RX ADMIN — AMIODARONE HYDROCHLORIDE 200 MG: 200 TABLET ORAL at 09:58

## 2019-08-28 RX ADMIN — FERROUS SULFATE TAB EC 324 MG (65 MG FE EQUIVALENT) 324 MG: 324 (65 FE) TABLET DELAYED RESPONSE at 09:59

## 2019-08-28 RX ADMIN — Medication 10 ML: at 09:59

## 2019-08-28 RX ADMIN — METRONIDAZOLE 500 MG: 500 TABLET ORAL at 05:55

## 2019-08-28 RX ADMIN — PANTOPRAZOLE SODIUM 40 MG: 40 TABLET, DELAYED RELEASE ORAL at 05:55

## 2019-08-28 ASSESSMENT — PAIN SCALES - GENERAL
PAINLEVEL_OUTOF10: 4
PAINLEVEL_OUTOF10: 0

## 2019-08-28 NOTE — FLOWSHEET NOTE
Pt laying in bed, Alert, No acute distress noted at this time. Pt continues on RA. Pt is Alert and Oriented x 4. Pt has mild increase in swelling in BLE, UOP has improved, pt denies any diarrhea, denies any pain. POC for the day reviewed, Pt denies questions. Pt denies any Needs at this time. Bed in lowest, locked position, call light with in reach. Will continue to monitor. 08/28/19 0905   Assessment   Charting Type Shift assessment   Neurological   Neuro (WDL) WDL   Level of Consciousness 0   Allgood Coma Scale   Eye Opening 4   Best Verbal Response 5   Best Motor Response 6   Allgood Coma Scale Score 15   HEENT   HEENT (WDL) X   Right Eye Impaired vision   Left Eye Impaired vision   Respiratory   Respiratory (WDL) WDL   Cardiac   Cardiac (WDL) X   Cardiac Monitor   Telemetry Monitor On No   Gastrointestinal   Abdominal (WDL) WDL   Abdomen Inspection Rotund   Tenderness Soft;Nontender   RUQ Bowel Sounds Active   LUQ Bowel Sounds Active   RLQ Bowel Sounds Active   LLQ Bowel Sounds Active   Peripheral Vascular   Peripheral Vascular (WDL) X   Edema Right lower extremity; Left lower extremity   RLE Edema Pitting;+2   LLE Edema +1;Pitting   Skin Color/Condition   Skin Color/Condition (WDL) WDL   Skin Integrity   Skin Integrity (WDL) WDL   Musculoskeletal   Musculoskeletal (WDL) WDL   Genitourinary   Genitourinary (WDL) X   Flank Tenderness No   Suprapubic Tenderness No   Dysuria No   Psychosocial   Psychosocial (WDL) WDL

## 2019-08-28 NOTE — DISCHARGE SUMMARY
Discharge Summary      Patient ID: Jong Lacey      Patient's PCP: Nay Sapp MD    Admit Date: 8/24/2019     Discharge Date:  8/28/2019    Admitting Provider: Rosa Clark DO    Discharging Provider: ALMA Murphy     Reason for this admission:   Diarrhea, acute renal failure, hypotension     Discharge Diagnoses: Active Hospital Problems    Diagnosis Date Noted    Dysphagia [R13.10] 08/27/2019    Low TSH level [R79.89] 08/27/2019    Renal cyst [N28.1] 08/27/2019    Chronic systolic heart failure ([facility] [address] [I50.22] 08/26/2019    History of atrial fibrillation [Z86.79] 08/26/2019    Folate deficiency [E53.8] 08/26/2019    Diarrhea [R19.7] 08/25/2019    Anemia [D64.9] 08/25/2019    Hypotension [I95.9] 08/25/2019    Acute renal failure (ARF) ([facility] [address] [N17.9] 08/24/2019       Procedures:  CT ABDOMEN PELVIS WO CONTRAST Additional Contrast? None   Final Result      1. No acute intra-abdominopelvic abnormality. 2. Hepatic and renal cysts. 3. Prior cholecystectomy. 4. Mild diverticulosis. 5. Prostate enlargement. CT CHEST WO CONTRAST   Final Result      1. Postoperative changes and scarring within the right lung base. 2. No definite active airspace disease. 3. Soft tissue mass within the thoracic inlet, possibly prominent goiter. Prior study from 2009 described a similar finding. Consults:   IP CONSULT TO DIETITIAN  IP CONSULT TO CARDIOLOGY   SLP    Briefly:   Mr. Dagoberto Huggins is a 68 yo male with PMH of CHF, atrial fibrillation, anemia, HTN who presents due to diarrhea and generalized weakness. Hospital Course: Active Hospital Problems    Diagnosis Date Noted    Dysphagia [R13.10]  SLP consulted and no sign of aspiration or dysphagia however patient complaining of difficulty swallowing and pressure in his throat while trying to swallow . On PPI for possible gerd component.  He also has a mass in thoracic inlet noted on imaging that could be goiter according to medications (entresto, bystolic, and aldactone) one at a time every 5-7 days as your blood pressure tolerates. Discuss with PCP at follow up appointment as well. Labs: For convenience and continuity at follow-up the following most recent labs are provided:    CBC:   Lab Results   Component Value Date    WBC 8.2 08/28/2019    HGB 8.8 08/28/2019    HCT 28.1 08/28/2019     08/28/2019       RENAL:   Lab Results   Component Value Date     08/28/2019    K 4.5 08/28/2019    K 4.8 08/25/2019     08/28/2019    CO2 24 08/28/2019    BUN 16 08/28/2019    CREATININE 1.4 08/28/2019         Discharge Medications:      Discharge Medication List as of 8/28/2019  2:40 PM           Details   ferrous sulfate 324 (65 Fe) MG EC tablet Take 1 tablet by mouth 2 times daily (with meals), Disp-60 tablet, [id number]      folic acid (FOLVITE) 1 MG tablet Take 1 tablet by mouth daily, Disp-30 tablet, R-3Normal      metroNIDAZOLE (FLAGYL) 500 MG tablet Take 1 tablet by mouth every 8 hours for 5 days, Disp-15 tablet, R-0Normal      pantoprazole (PROTONIX) 40 MG tablet Take 1 tablet by mouth every morning (before breakfast), Disp-30 tablet, R-3Normal              Details   furosemide (LASIX) 40 MG tablet Take 0.5 tablets by mouth daily, Disp-60 tablet, R-3Adjust Sig              Details   apixaban (ELIQUIS) 5 MG TABS tablet Take by mouth 2 times dailyHistorical Med      amiodarone (CORDARONE) 200 MG tablet Take 200 mg by mouth dailyHistorical Med      rosuvastatin (CRESTOR) 10 MG tablet Take 10 mg by mouth dailyHistorical Med            Patient was seen and examined by Dr. Osiel Woods and plan of care reviewed. Signed:  Electronically signed by ALMA Varner on 8/28/2019 at 2:11 PM       Thank you Ian Quijano MD for the opportunity to be involved in this patient's care. If you have any questions or concerns please feel free to contact me at [phone].

## 2019-08-28 NOTE — PLAN OF CARE
Problem: Daily Care:  Goal: Daily care needs are met  Description  Daily care needs are met  Outcome: Ongoing     Problem: Pain:  Goal: Patient's pain/discomfort is manageable  Description  Patient's pain/discomfort is manageable  Outcome: Ongoing

## 2019-09-05 ENCOUNTER — HOSPITAL ENCOUNTER (OUTPATIENT)
Facility: HOSPITAL | Age: 79
Discharge: HOME | End: 2019-09-05
Payer: MEDICARE

## 2019-09-05 DIAGNOSIS — D64.9 ANEMIA, UNSPECIFIED TYPE: ICD-10-CM

## 2019-09-05 DIAGNOSIS — N17.9 ACUTE RENAL FAILURE, UNSPECIFIED ACUTE RENAL FAILURE TYPE (HCC): ICD-10-CM

## 2019-09-05 LAB
ANION GAP SERPL CALCULATED.3IONS-SCNC: 11 MMOL/L (ref 3–16)
BUN BLDV-MCNC: 19 MG/DL (ref 6–20)
CALCIUM SERPL-MCNC: 9.1 MG/DL (ref 8.5–10.5)
CHLORIDE BLD-SCNC: 102 MMOL/L (ref 98–107)
CO2: 31 MMOL/L (ref 20–30)
CREAT SERPL-MCNC: 1.8 MG/DL (ref 0.4–1.2)
GFR AFRICAN AMERICAN: 44
GFR NON-AFRICAN AMERICAN: 37
GLUCOSE BLD-MCNC: 124 MG/DL (ref 74–106)
HCT VFR BLD CALC: 33.9 % (ref 40–54)
HEMOGLOBIN: 10.5 G/DL (ref 13–18)
MCH RBC QN AUTO: 29.6 PG (ref 27–32)
MCHC RBC AUTO-ENTMCNC: 31 G/DL (ref 31–35)
MCV RBC AUTO: 95.5 FL (ref 80–100)
PDW BLD-RTO: 15.1 % (ref 11–16)
PLATELET # BLD: 254 K/UL (ref 150–400)
PMV BLD AUTO: 10 FL (ref 6–10)
POTASSIUM SERPL-SCNC: 4.5 MMOL/L (ref 3.4–5.1)
RBC # BLD: 3.55 M/UL (ref 4.5–6)
SODIUM BLD-SCNC: 144 MMOL/L (ref 136–145)
WBC # BLD: 6.7 K/UL (ref 4–11)

## 2019-09-05 PROCEDURE — 85027 COMPLETE CBC AUTOMATED: CPT

## 2019-09-05 PROCEDURE — 80048 BASIC METABOLIC PNL TOTAL CA: CPT

## 2019-09-05 PROCEDURE — 36415 COLL VENOUS BLD VENIPUNCTURE: CPT

## 2019-09-11 ENCOUNTER — HOSPITAL ENCOUNTER (OUTPATIENT)
Facility: HOSPITAL | Age: 79
Discharge: HOME | End: 2019-09-11
Payer: MEDICARE

## 2019-09-11 ENCOUNTER — OFFICE VISIT (OUTPATIENT)
Dept: PRIMARY CARE CLINIC | Age: 79
End: 2019-09-11
Payer: MEDICARE

## 2019-09-11 ENCOUNTER — CARE COORDINATION (OUTPATIENT)
Dept: CARE COORDINATION | Age: 79
End: 2019-09-11

## 2019-09-11 VITALS
HEART RATE: 75 BPM | RESPIRATION RATE: 18 BRPM | DIASTOLIC BLOOD PRESSURE: 60 MMHG | SYSTOLIC BLOOD PRESSURE: 128 MMHG | BODY MASS INDEX: 42.73 KG/M2 | WEIGHT: 297.8 LBS | OXYGEN SATURATION: 95 %

## 2019-09-11 DIAGNOSIS — D64.9 ANEMIA, UNSPECIFIED TYPE: ICD-10-CM

## 2019-09-11 DIAGNOSIS — I48.91 ATRIAL FIBRILLATION, UNSPECIFIED TYPE (HCC): ICD-10-CM

## 2019-09-11 DIAGNOSIS — E53.8 FOLATE DEFICIENCY: ICD-10-CM

## 2019-09-11 DIAGNOSIS — N17.9 ACUTE RENAL FAILURE, UNSPECIFIED ACUTE RENAL FAILURE TYPE (HCC): ICD-10-CM

## 2019-09-11 DIAGNOSIS — G47.33 OSA (OBSTRUCTIVE SLEEP APNEA): ICD-10-CM

## 2019-09-11 DIAGNOSIS — I50.22 CHRONIC SYSTOLIC HEART FAILURE (HCC): ICD-10-CM

## 2019-09-11 DIAGNOSIS — I10 ESSENTIAL HYPERTENSION: ICD-10-CM

## 2019-09-11 DIAGNOSIS — R79.89 ABNORMAL TSH: ICD-10-CM

## 2019-09-11 DIAGNOSIS — I50.22 CHRONIC SYSTOLIC HEART FAILURE (HCC): Primary | ICD-10-CM

## 2019-09-11 LAB
ANION GAP SERPL CALCULATED.3IONS-SCNC: 11 MMOL/L (ref 3–16)
BASOPHILS ABSOLUTE: 0.1 K/UL (ref 0–0.1)
BASOPHILS RELATIVE PERCENT: 1.2 %
BUN BLDV-MCNC: 13 MG/DL (ref 6–20)
CALCIUM SERPL-MCNC: 9.4 MG/DL (ref 8.5–10.5)
CHLORIDE BLD-SCNC: 102 MMOL/L (ref 98–107)
CO2: 31 MMOL/L (ref 20–30)
CREAT SERPL-MCNC: 1.3 MG/DL (ref 0.4–1.2)
EOSINOPHILS ABSOLUTE: 0.1 K/UL (ref 0–0.4)
EOSINOPHILS RELATIVE PERCENT: 1.2 %
GFR AFRICAN AMERICAN: >59
GFR NON-AFRICAN AMERICAN: 53
GLUCOSE BLD-MCNC: 104 MG/DL (ref 74–106)
HCT VFR BLD CALC: 35.6 % (ref 40–54)
HEMOGLOBIN: 10.9 G/DL (ref 13–18)
IMMATURE GRANULOCYTES #: 0 K/UL
IMMATURE GRANULOCYTES %: 0.2 % (ref 0–5)
LYMPHOCYTES ABSOLUTE: 0.9 K/UL (ref 1.5–4)
LYMPHOCYTES RELATIVE PERCENT: 18.9 %
MCH RBC QN AUTO: 29.6 PG (ref 27–32)
MCHC RBC AUTO-ENTMCNC: 30.6 G/DL (ref 31–35)
MCV RBC AUTO: 96.7 FL (ref 80–100)
MONOCYTES ABSOLUTE: 0.5 K/UL (ref 0.2–0.8)
MONOCYTES RELATIVE PERCENT: 10.6 %
NEUTROPHILS ABSOLUTE: 3.3 K/UL (ref 2–7.5)
NEUTROPHILS RELATIVE PERCENT: 67.9 %
PDW BLD-RTO: 15.5 % (ref 11–16)
PLATELET # BLD: 292 K/UL (ref 150–400)
PMV BLD AUTO: 10.8 FL (ref 6–10)
POTASSIUM SERPL-SCNC: 4.5 MMOL/L (ref 3.4–5.1)
RBC # BLD: 3.68 M/UL (ref 4.5–6)
SODIUM BLD-SCNC: 144 MMOL/L (ref 136–145)
WBC # BLD: 4.8 K/UL (ref 4–11)

## 2019-09-11 PROCEDURE — 85025 COMPLETE CBC W/AUTO DIFF WBC: CPT

## 2019-09-11 PROCEDURE — 99214 OFFICE O/P EST MOD 30 MIN: CPT | Performed by: INTERNAL MEDICINE

## 2019-09-11 PROCEDURE — 80048 BASIC METABOLIC PNL TOTAL CA: CPT

## 2019-09-11 RX ORDER — NEBIVOLOL 10 MG/1
10 TABLET ORAL
COMMUNITY
Start: 2019-07-09 | End: 2019-10-10

## 2019-09-11 RX ORDER — SPIRONOLACTONE 25 MG/1
TABLET ORAL
COMMUNITY
Start: 2019-08-08 | End: 2019-10-10

## 2019-09-11 ASSESSMENT — ENCOUNTER SYMPTOMS
SHORTNESS OF BREATH: 0
VOMITING: 0
NAUSEA: 0
BACK PAIN: 1
WHEEZING: 0
SINUS PRESSURE: 0
ABDOMINAL PAIN: 0
COUGH: 0
EYE DISCHARGE: 0

## 2019-09-11 ASSESSMENT — PATIENT HEALTH QUESTIONNAIRE - PHQ9
SUM OF ALL RESPONSES TO PHQ9 QUESTIONS 1 & 2: 0
2. FEELING DOWN, DEPRESSED OR HOPELESS: 0
1. LITTLE INTEREST OR PLEASURE IN DOING THINGS: 0
SUM OF ALL RESPONSES TO PHQ QUESTIONS 1-9: 0
SUM OF ALL RESPONSES TO PHQ QUESTIONS 1-9: 0

## 2019-09-11 NOTE — PROGRESS NOTES
reviewed. Lab Results   Component Value Date     09/05/2019    K 4.5 09/05/2019    K 4.8 08/25/2019     09/05/2019    CO2 31 09/05/2019    GLUCOSE 124 09/05/2019    BUN 19 09/05/2019    CREATININE 1.8 09/05/2019    CALCIUM 9.1 09/05/2019    PROT 6.2 08/26/2019    LABALBU 2.9 08/26/2019    BILITOT 0.3 08/26/2019    ALT 8 08/26/2019    AST 11 08/26/2019       Hemoglobin A1C (%)   Date Value   03/14/2015 5.5     Microscopic Examination (no units)   Date Value   08/24/2019 YES         Lab Results   Component Value Date    WBC 6.7 09/05/2019    NEUTROABS 11.7 08/24/2019    HGB 10.5 09/05/2019    HCT 33.9 09/05/2019    MCV 95.5 09/05/2019     09/05/2019       Lab Results   Component Value Date    TSH 0.01 (L) 08/25/2019       ASSESSMENT/PLAN:     1. Chronic systolic heart failure (HCC)  Will cont current meds. Ejection fraction per cardiology note 20-40%. Will have patient to monitor daily wt, edema, any worsening of ARCHER and SOA. Low Na diet and fluid restriction. Monitor renal function closely. Long discussion with patient to be very compliant with taking meds. Advised patient with using diuretics and how to adjust based on daily weights. Continue following up with cardiology. - CBC Auto Differential; Future  - Basic Metabolic Panel; Future    2. Atrial fibrillation, unspecified type ([address]  Sinus rhythm on exam. Will cont on meds. I had a long discussion with the patient regarding the risk/benefit from anticoagulation. Patient is aware of that. 3. Essential hypertension  BP is stable. I have advised him on low-sodium diet, exercise and weight control. I advised him on when he needs to add back his Bystolic and Aldactone when needed. Continue taking Entresto. Will monitor his renal function every few months, have advised him to check blood pressure frequently and to keep a record of this. - CBC Auto Differential; Future  - Basic Metabolic Panel; Future    4.  CATHIE (obstructive sleep apnea)  Patient could not tolerate CPAP and is not interested in continuing to use it. I discussed with him the long term effects this can have on his health. Long conversation with him regarding the importance of weight control. 5. Folate deficiency  Advised him on taking folic acid daily long term. Will monitor labs periodically. 6. Anemia, unspecified type  Mild. Check labs. Continue on replacement. Further recommendation based on test results. - CBC Auto Differential; Future  - Basic Metabolic Panel; Future    7. Acute renal failure, unspecified acute renal failure type ([address]  Improved on discharge, slightly worse on follow up BMP. I have advised him to avoid any nephrotoxic agents. I am going to monitor renal function periodically. I will make sure that the blood pressure and other medical problems are under good control. Will refer to nephrology if the renal function should begin to deteriorate. Creatinine will be checked today. Last Creatinine is   Lab Results   Component Value Date    CREATININE 1.8 (H) 09/05/2019     8. Abnormal TSH  Significantly suppressed while in the hospital.  Will repeat in a few weeks. Thought to be related to his acute illness. I did discuss the patient with our care coordinator and advised to follow-up with him next week to make sure understanding and to check if he needs any help with his medical problems. Questionable positive Cologuard. Will check into this and discuss at next visit. Review records from recent hospital stay/previous cardiology records. Written by Bebeto Moy, acting as a scribe for Dr. Adelina Mariee on 9/11/2019 at 9:55 AM.     I, Dr. Reshma Welsh, personally performed the services described in the documentation as scribed by Lore Hannah CMA, in my presence and it is both accurate and complete.

## 2019-09-12 PROBLEM — I10 ESSENTIAL HYPERTENSION: Status: ACTIVE | Noted: 2019-09-12

## 2019-09-12 PROBLEM — I48.91 ATRIAL FIBRILLATION (HCC): Status: ACTIVE | Noted: 2019-09-12

## 2019-09-17 ENCOUNTER — OFFICE VISIT (OUTPATIENT)
Dept: SURGERY | Facility: CLINIC | Age: 79
End: 2019-09-17

## 2019-09-17 VITALS
BODY MASS INDEX: 42.12 KG/M2 | DIASTOLIC BLOOD PRESSURE: 84 MMHG | OXYGEN SATURATION: 97 % | WEIGHT: 294.2 LBS | HEART RATE: 72 BPM | HEIGHT: 70 IN | SYSTOLIC BLOOD PRESSURE: 130 MMHG | TEMPERATURE: 98.6 F

## 2019-09-17 DIAGNOSIS — R13.10 DYSPHAGIA, UNSPECIFIED TYPE: ICD-10-CM

## 2019-09-17 DIAGNOSIS — R22.2 CHEST WALL MASS: Primary | ICD-10-CM

## 2019-09-17 PROCEDURE — 99203 OFFICE O/P NEW LOW 30 MIN: CPT | Performed by: SURGERY

## 2019-09-17 NOTE — PROGRESS NOTES
Patient: Bobby Dodd    YOB: 1940    Date: 09/17/2019    Primary Care Provider: Asmita Mcpherson PA    Reason for Consultation:Epigastric pain, GERD    Chief Complaint   Patient presents with   • Difficulty Swallowing       SUBJECTIVE:    History of present illness:  I saw the patient in the office  today as a consultation for evaluation and treatment of dysphagia. Patient complains since July of difficulty swallowing worse with drinking or random meals, relived with bending head forward or vomiting. Patient had lung surgery in 2009 for a benign mass. 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    Patient reports difficulty swallowing, he states things seem to get stuck in the area of the thoracic inlet.  He states he did have a thyroidectomy performed many years ago.    The following portions of the patient's history were reviewed and updated as appropriate: allergies, current medications, past family history, past medical history, past social history, past surgical history and problem list.      Review of Systems   Constitutional: Negative for activity change, appetite change and chills.   HENT: Positive for trouble swallowing and voice change. Negative for congestion and hearing loss.    Respiratory: Negative for cough, choking, chest tightness and shortness of breath.    Cardiovascular: Negative for chest pain, palpitations and leg swelling.   Gastrointestinal: Positive for abdominal pain (epigastric pain).   Endocrine: Negative for cold intolerance and heat intolerance.   Genitourinary: Negative for dysuria, flank pain and frequency.   Musculoskeletal: Negative for back pain and myalgias.   Skin: Negative for color change and rash.   Neurological: Negative for seizures, facial asymmetry, light-headedness, numbness and headaches.   Psychiatric/Behavioral: Negative for agitation, behavioral problems  and confusion.       History:  Past Medical History:   Diagnosis Date   • A-fib (CMS/HCC)    • Arrhythmia    • Arthritis    • CHF (congestive heart failure) (CMS/HCC)    • Edema    • Elevated prostate specific antigen (PSA)    • Hyperlipidemia    • Hypertension    • Hypogonadism in male    • Joint pain, elbow    • Kidney stones    • Knee pain    • Low back pain    • Obstructive sleep apnea    • Pacemaker    • Palpitations    • Rheumatic fever    • Trochanteric bursitis        Past Surgical History:   Procedure Laterality Date   • CARDIAC ELECTROPHYSIOLOGY PROCEDURE N/A 2018    Procedure: Internal Cardioversion;  Surgeon: Tho Richards MD;  Location: [facility] INVASIVE LOCATION;  Service: Cardiology   • CARDIOVERSION      per DR. RICHARDS   • CHOLECYSTECTOMY     • HERNIA REPAIR     • KNEE ARTHROSCOPY      RIGHT   • LUNG SURGERY     • OTHER SURGICAL HISTORY      GOITER REMOVED .   • PACEMAKER IMPLANTATION      [facility]       Family History   Problem Relation Age of Onset   • Arthritis Mother    • Hypertension Mother    • Heart attack Father    • Arthritis Sister        Social History     Tobacco Use   • Smoking status: Former Smoker     Types: Cigarettes     Last attempt to quit: 1975     Years since quittin.7   • Smokeless tobacco: Never Used   Substance Use Topics   • Alcohol use: No   • Drug use: No       Allergies:  No Known Allergies    Medications:    Current Outpatient Medications:   •  amiodarone (PACERONE) 200 MG tablet, Take 1 tablet by mouth Daily., Disp: 90 tablet, Rfl: 4  •  apixaban (ELIQUIS) 5 MG tablet tablet, Take 1 tablet by mouth Every 12 (Twelve) Hours., Disp: 180 tablet, Rfl: 4  •  BYSTOLIC 10 MG tablet, TAKE 1 TABLET BY MOUTH DAILY., Disp: 90 tablet, Rfl: 3  •  diclofenac (VOLTAREN) 75 MG EC tablet, TAKE ONE TABLET BY MOUTH TWO TIMES A DAY, Disp: 180 tablet, Rfl: 3  •  furosemide (LASIX) 40 MG tablet, Take 1 tablet by mouth Daily., Disp: 90 tablet, Rfl: 4  •  rosuvastatin  "(CRESTOR) 10 MG tablet, Take 1 tablet by mouth Daily., Disp: 90 tablet, Rfl: 3  •  sacubitril-valsartan (ENTRESTO) 24-26 MG tablet, Take 1 tablet by mouth 2 (Two) Times a Day., Disp: , Rfl:   •  spironolactone (ALDACTONE) 25 MG tablet, TAKE ONE-HALF TABLET BY MOUTH EVERY DAY, Disp: 15 tablet, Rfl: 11    OBJECTIVE:    Vital Signs:   Vitals:    09/17/19 1528   BP: 130/84   Pulse: 72   Temp: 98.6 °F (37 °C)   SpO2: 97%   Weight: 133 kg (294 lb 3.2 oz)   Height: 177.8 cm (70\")       Physical Exam:   General Appearance:    Alert, cooperative, in no acute distress   Head:    Normocephalic, without obvious abnormality, atraumatic   Eyes:            Lids and lashes normal, conjunctivae and sclerae normal, no   icterus, no pallor, corneas clear, PERRLA   Ears:    Ears appear intact with no abnormalities noted   Throat:   No oral lesions, no thrush, oral mucosa moist   Neck:   No adenopathy, supple, trachea midline, no thyromegaly, no   carotid bruit, no JVD   Lungs:     Clear to auscultation,respirations regular, even and                  unlabored    Heart:    Regular rhythm and normal rate, normal S1 and S2, no            murmur, no gallop, no rub, no click   Chest Wall:    No abnormalities observed   Abdomen:     Normal bowel sounds, no masses, no organomegaly, soft        non-tender, non-distended, no guarding, there is evidence of epigastric  tenderness, no evidence of peritoneal signs   Extremities:   Moves all extremities well, no edema, no cyanosis, no             redness   Pulses:   Pulses palpable and equal bilaterally   Skin:   No bleeding, bruising or rash   Lymph nodes:   No palpable adenopathy   Neurologic:   Cranial nerves 2 - 12 grossly intact, sensation intact     Results Review:   I reviewed the patient's new clinical results.  I reviewed the patient's new imaging results and agree with the interpretation.  I reviewed the patient's other test results and agree with the interpretation    Review of Systems " was reviewed and confirmed as accurate today.    ASSESSMENT/PLAN:    1. Chest wall mass    2. Dysphagia, unspecified type        I did have a detailed and extensive discussion with the patient in the office and they understand that they need to undergo upper endoscopy. Full risks and benefits of operative versus nonoperative intervention were discussed with the patient and these include bleeding and esophageal injury. The patient understands, agrees, and wishes to proceed with the surgical treatment plan as mentioned above. The patient had no questions for me at the end of the discussion.       I discussed the patients findings and my recommendations with patient.     Electronically signed by Jose Alfredo Brewer MD  09/17/19 3:30 PM          Portions of this note have been scribed for Jose Alfredo Brewer MD by Gloria Najera. 9/17/2019  4:06 PM

## 2019-09-18 ENCOUNTER — CARE COORDINATION (OUTPATIENT)
Dept: CARE COORDINATION | Age: 79
End: 2019-09-18

## 2019-09-19 PROBLEM — R13.10 DYSPHAGIA: Status: ACTIVE | Noted: 2019-09-19

## 2019-09-19 PROBLEM — R22.2 CHEST WALL MASS: Status: ACTIVE | Noted: 2019-09-19

## 2019-09-30 RX ORDER — PANTOPRAZOLE SODIUM 40 MG/1
40 TABLET, DELAYED RELEASE ORAL DAILY
Status: ON HOLD | COMMUNITY
End: 2023-03-15 | Stop reason: SDUPTHER

## 2019-09-30 RX ORDER — FOLIC ACID 1 MG/1
1 TABLET ORAL DAILY
COMMUNITY
End: 2023-03-17 | Stop reason: HOSPADM

## 2019-10-01 ENCOUNTER — HOSPITAL ENCOUNTER (OUTPATIENT)
Facility: HOSPITAL | Age: 79
Setting detail: HOSPITAL OUTPATIENT SURGERY
Discharge: HOME | End: 2019-10-01
Attending: SURGERY | Admitting: SURGERY

## 2019-10-01 ENCOUNTER — ANESTHESIA EVENT (OUTPATIENT)
Dept: GASTROENTEROLOGY | Facility: HOSPITAL | Age: 79
End: 2019-10-01

## 2019-10-01 ENCOUNTER — ANESTHESIA (OUTPATIENT)
Dept: GASTROENTEROLOGY | Facility: HOSPITAL | Age: 79
End: 2019-10-01

## 2019-10-01 VITALS
SYSTOLIC BLOOD PRESSURE: 114 MMHG | HEIGHT: 70 IN | TEMPERATURE: 98.8 F | WEIGHT: 294 LBS | OXYGEN SATURATION: 95 % | BODY MASS INDEX: 42.09 KG/M2 | HEART RATE: 70 BPM | DIASTOLIC BLOOD PRESSURE: 66 MMHG | RESPIRATION RATE: 18 BRPM

## 2019-10-01 DIAGNOSIS — R22.2 CHEST WALL MASS: ICD-10-CM

## 2019-10-01 DIAGNOSIS — R13.10 DYSPHAGIA, UNSPECIFIED TYPE: ICD-10-CM

## 2019-10-01 PROCEDURE — 25010000002 PROPOFOL 200 MG/20ML EMULSION: Performed by: NURSE ANESTHETIST, CERTIFIED REGISTERED

## 2019-10-01 RX ORDER — LIDOCAINE HYDROCHLORIDE 20 MG/ML
INJECTION, SOLUTION INTRAVENOUS AS NEEDED
Status: DISCONTINUED | OUTPATIENT
Start: 2019-10-01 | End: 2019-10-01 | Stop reason: SURG

## 2019-10-01 RX ORDER — SODIUM CHLORIDE, SODIUM LACTATE, POTASSIUM CHLORIDE, CALCIUM CHLORIDE 600; 310; 30; 20 MG/100ML; MG/100ML; MG/100ML; MG/100ML
1000 INJECTION, SOLUTION INTRAVENOUS CONTINUOUS
Status: DISCONTINUED | OUTPATIENT
Start: 2019-10-01 | End: 2019-10-01 | Stop reason: HOSPADM

## 2019-10-01 RX ORDER — PROPOFOL 10 MG/ML
INJECTION, EMULSION INTRAVENOUS AS NEEDED
Status: DISCONTINUED | OUTPATIENT
Start: 2019-10-01 | End: 2019-10-01 | Stop reason: SURG

## 2019-10-01 RX ORDER — SODIUM CHLORIDE 0.9 % (FLUSH) 0.9 %
10 SYRINGE (ML) INJECTION AS NEEDED
Status: DISCONTINUED | OUTPATIENT
Start: 2019-10-01 | End: 2019-10-01 | Stop reason: HOSPADM

## 2019-10-01 RX ORDER — MAGNESIUM HYDROXIDE 1200 MG/15ML
LIQUID ORAL AS NEEDED
Status: DISCONTINUED | OUTPATIENT
Start: 2019-10-01 | End: 2019-10-01 | Stop reason: HOSPADM

## 2019-10-01 RX ADMIN — PROPOFOL 50 MG: 10 INJECTION, EMULSION INTRAVENOUS at 12:20

## 2019-10-01 RX ADMIN — SODIUM CHLORIDE, POTASSIUM CHLORIDE, SODIUM LACTATE AND CALCIUM CHLORIDE: 600; 310; 30; 20 INJECTION, SOLUTION INTRAVENOUS at 12:17

## 2019-10-01 RX ADMIN — LIDOCAINE HYDROCHLORIDE 100 MG: 20 INJECTION, SOLUTION INTRAVENOUS at 12:19

## 2019-10-01 NOTE — ANESTHESIA PREPROCEDURE EVALUATION
Anesthesia Evaluation     Patient summary reviewed and Nursing notes reviewed   no history of anesthetic complications:  NPO Solid Status: > 8 hours  NPO Liquid Status: > 8 hours           Airway   Mallampati: III  TM distance: >3 FB  Neck ROM: full  Possible difficult intubation  Dental - normal exam     Pulmonary - normal exam   (+) a smoker (quit in 1975) Former, shortness of breath, sleep apnea on CPAP,   Cardiovascular - normal exam    ECG reviewed    (+) hypertension well controlled less than 2 medications, CAD, dysrhythmias (tachy-mercedes syndrome ) Atrial Fib, CHF, hyperlipidemia,     ROS comment: EKG: Atrial paced     Echo 6/2019:  · Estimated EF = 35%.  · Left ventricular systolic function is severely decreased.  · Left ventricular diastolic dysfunction (grade III) consistent with reversible restrictive pattern.  · The left ventricular cavity is mild-to-moderately dilated.    Neuro/Psych  GI/Hepatic/Renal/Endo    (+) obesity, morbid obesity, GERD,  renal disease stones and ARF,     Musculoskeletal     (+) back pain,   Abdominal    Substance History      OB/GYN          Other   (+) arthritis     ROS/Med Hx Other: 10.9/35.6                      Anesthesia Plan    ASA 3     MAC   (Risks and benefits discussed including risk of aspiration, recall and dental damage. All patient questions answered. Will continue with POC.)  intravenous induction   Anesthetic plan, all risks, benefits, and alternatives have been provided, discussed and informed consent has been obtained with: patient.

## 2019-10-01 NOTE — ANESTHESIA POSTPROCEDURE EVALUATION
Patient: Bobby Dodd    Procedure Summary     Date:  10/01/19 Room / Location:  [facility] ENDOSCOPY 3 / [facility] ENDOSCOPY    Anesthesia Start:  1217 Anesthesia Stop:  1230    Procedure:  ESOPHAGOGASTRODUODENOSCOPY WITH BIOPSIES (N/A Esophagus) Diagnosis:       Chest wall mass      Dysphagia, unspecified type      (Chest wall mass [R22.2])      (Dysphagia, unspecified type [R13.10])    Surgeon:  Jose Alfredo Brewer MD Provider:  Beto Puri CRNA    Anesthesia Type:  MAC ASA Status:  3          Anesthesia Type: MAC  Last vitals  BP   114/66 (10/01/19 1304)   Temp   98.8 °F (37.1 °C) (10/01/19 1304)   Pulse   70 (10/01/19 1304)   Resp   18 (10/01/19 1304)     SpO2   95 % (10/01/19 1304)     Post Anesthesia Care and Evaluation    Patient location during evaluation: bedside  Patient participation: complete - patient participated  Level of consciousness: awake and alert  Pain management: satisfactory to patient  Airway patency: patent  Anesthetic complications: No anesthetic complications  PONV Status: none  Cardiovascular status: acceptable and hemodynamically stable  Respiratory status: acceptable  Hydration status: acceptable

## 2019-10-02 ENCOUNTER — OFFICE VISIT (OUTPATIENT)
Dept: CARDIOLOGY | Facility: CLINIC | Age: 79
End: 2019-10-02

## 2019-10-02 VITALS
WEIGHT: 292.7 LBS | HEART RATE: 73 BPM | OXYGEN SATURATION: 93 % | HEIGHT: 70 IN | DIASTOLIC BLOOD PRESSURE: 60 MMHG | SYSTOLIC BLOOD PRESSURE: 102 MMHG | RESPIRATION RATE: 18 BRPM | BODY MASS INDEX: 41.9 KG/M2

## 2019-10-02 DIAGNOSIS — I49.5 TACHY-BRADY SYNDROME (HCC): ICD-10-CM

## 2019-10-02 DIAGNOSIS — Z95.0 CARDIAC PACEMAKER IN SITU: ICD-10-CM

## 2019-10-02 DIAGNOSIS — I10 ESSENTIAL HYPERTENSION: ICD-10-CM

## 2019-10-02 DIAGNOSIS — E66.01 MORBID (SEVERE) OBESITY DUE TO EXCESS CALORIES (HCC): ICD-10-CM

## 2019-10-02 DIAGNOSIS — I50.22 CHRONIC SYSTOLIC CONGESTIVE HEART FAILURE (HCC): ICD-10-CM

## 2019-10-02 DIAGNOSIS — I48.0 PAROXYSMAL ATRIAL FIBRILLATION (HCC): Primary | ICD-10-CM

## 2019-10-02 PROCEDURE — 99214 OFFICE O/P EST MOD 30 MIN: CPT | Performed by: INTERNAL MEDICINE

## 2019-10-02 PROCEDURE — 93280 PM DEVICE PROGR EVAL DUAL: CPT | Performed by: INTERNAL MEDICINE

## 2019-10-02 RX ORDER — FERROUS SULFATE TAB EC 324 MG (65 MG FE EQUIVALENT) 324 (65 FE) MG
324 TABLET DELAYED RESPONSE ORAL 2 TIMES DAILY WITH MEALS
COMMUNITY
End: 2023-03-17 | Stop reason: HOSPADM

## 2019-10-02 NOTE — PROGRESS NOTES
Subjective:     Encounter Date:10/02/2019      Patient ID: Bobby Dodd is a 78 y.o. male.    Chief Complaint: Congestive heart failure  HPI  This is a 78-year-old male patient who presents to cardiology clinic for routine follow-up and pacemaker interrogation.  The patient has a [employer] DR RF [id number] with date of implant on April 19, 2013.  The patient has a DDD-r 70\95 with underlying sinus rate of 60.  The patient is paced 95% of the time in the right atrium less than 1% of the time the right ventricle.  Right atrial lead interrogation demonstrates P wave sensing of 2.5 mV with a pacing threshold of 1.5 V 0.7 ms and a lead impedance of 430 ohms.  R wave sensing is greater than 12 mV with pacing threshold of 0.75 V at 0.5 ms.  Lead impedance is 490 ohms.  The patient has a estimated generator longevity of 3.8 years.  He has had no high heart rate events and no mode switches.  The patient continues to have swelling of his feet and ankles.  He is not restricting his fluid or sodium intake.  He indicates that he commonly eats at fast food restaurants such as [employer], [employer] and [employer].  He has had no chest discomfort at rest or with activity.  His shortness of breath remains at baseline.  He has had no orthopnea or PND.  He has had no dizziness palpitations or syncope.  He has had no signs or symptoms to suggest stroke or TIA.  He is tolerating anticoagulation therapy without signs or symptoms of bleeding or bruising.  The following portions of the patient's history were reviewed and updated as appropriate: allergies, current medications, past family history, past medical history, past social history, past surgical history and problem  Review of Systems   Constitution: Negative for chills, diaphoresis, fever, weakness, malaise/fatigue, weight gain and weight loss.   HENT: Negative for ear discharge, hearing loss, hoarse voice and nosebleeds.    Eyes: Negative for discharge,  double vision, pain and photophobia.   Cardiovascular: Positive for leg swelling. Negative for chest pain, claudication, cyanosis, dyspnea on exertion, irregular heartbeat, near-syncope, orthopnea, palpitations, paroxysmal nocturnal dyspnea and syncope.   Respiratory: Negative for cough, hemoptysis, shortness of breath, sputum production and wheezing.    Endocrine: Negative for cold intolerance, heat intolerance, polydipsia, polyphagia and polyuria.   Hematologic/Lymphatic: Negative for adenopathy and bleeding problem. Does not bruise/bleed easily.   Skin: Negative for color change, flushing, itching and rash.   Musculoskeletal: Negative for muscle cramps, muscle weakness, myalgias and stiffness.   Gastrointestinal: Negative for abdominal pain, diarrhea, hematemesis, hematochezia, nausea and vomiting.   Genitourinary: Negative for dysuria, frequency and nocturia.   Neurological: Negative for focal weakness, loss of balance, numbness, paresthesias and seizures.   Psychiatric/Behavioral: Negative for altered mental status, hallucinations and suicidal ideas.   Allergic/Immunologic: Negative for HIV exposure, hives and persistent infections.           Current Outpatient Medications:   •  amiodarone (PACERONE) 200 MG tablet, Take 1 tablet by mouth Daily., Disp: 90 tablet, Rfl: 4  •  apixaban (ELIQUIS) 5 MG tablet tablet, Take 1 tablet by mouth Every 12 (Twelve) Hours., Disp: 180 tablet, Rfl: 4  •  ferrous sulfate 324 (65 Fe) MG tablet delayed-release EC tablet, Take 324 mg by mouth 2 (Two) Times a Day With Meals., Disp: , Rfl:   •  folic acid (FOLVITE) 1 MG tablet, Take 1 mg by mouth Daily., Disp: , Rfl:   •  furosemide (LASIX) 40 MG tablet, Take 1 tablet by mouth Daily., Disp: 90 tablet, Rfl: 4  •  pantoprazole (PROTONIX) 40 MG EC tablet, Take 40 mg by mouth Daily., Disp: , Rfl:   •  rosuvastatin (CRESTOR) 10 MG tablet, Take 1 tablet by mouth Daily., Disp: 90 tablet, Rfl: 3  •  sacubitril-valsartan (ENTRESTO) 24-26 MG  "tablet, Take 1 tablet by mouth 2 (Two) Times a Day., Disp: , Rfl:   No current facility-administered medications for this visit.     Objective:   Physical Exam   Constitutional: He is oriented to person, place, and time. He appears well-developed and well-nourished. No distress.   HENT:   Head: Normocephalic and atraumatic.   Mouth/Throat: Oropharynx is clear and moist.   Eyes: Conjunctivae and EOM are normal. Pupils are equal, round, and reactive to light. No scleral icterus.   Neck: Normal range of motion. Neck supple. No JVD present. No tracheal deviation present. No thyromegaly present.   Cardiovascular: Normal rate, regular rhythm, S1 normal, S2 normal, normal heart sounds, intact distal pulses and normal pulses. PMI is not displaced. Exam reveals no gallop and no friction rub.   No murmur heard.  Pulmonary/Chest: Effort normal and breath sounds normal. No stridor. No respiratory distress. He has no wheezes. He has no rales.   Abdominal: Soft. Bowel sounds are normal. He exhibits no distension and no mass. There is no tenderness. There is no rebound and no guarding.   Musculoskeletal: Normal range of motion. He exhibits edema. He exhibits no deformity.   Neurological: He is alert and oriented to person, place, and time. He displays normal reflexes. No cranial nerve deficit. Coordination normal.   Skin: Skin is warm and dry. No rash noted. He is not diaphoretic. No erythema.   Psychiatric: He has a normal mood and affect. His behavior is normal. Thought content normal.     Blood pressure 102/60, pulse 73, resp. rate 18, height 177.8 cm (70\"), weight 133 kg (292 lb 11.2 oz), SpO2 93 %.   Lab Review:     Assessment:       1. Paroxysmal atrial fibrillation (CMS/HCC)  Maintaining normal sinus rhythm.    2. Tachy-mercedes syndrome (CMS/HCC)  Normal pacemaker function.    3. Morbid (severe) obesity due to excess calories (CMS/HCC)  Body mass index 42.  This is due to excess calorie intake.    4. Essential " hypertension  Excellent blood pressure control.    5. Chronic systolic congestive heart failure (CMS/HCC)  Heart failure with reduced ejection fraction.  Stage C.  New York Heart Association functional class II symptoms.  Mild volume overload.    Procedures    Plan:     I have recommended a 1.5 L/day fluid restriction and a 1500 mg/day sodium restriction.  The patient has been counseled regarding foods and beverages that are high in sodium content with instructions to avoid these.  We are making arrangements for the patient to meet with an outpatient dietitian to discuss strategies to maintain these restrictions.  If this is unsuccessful he will need escalation in diuretic therapy.  If escalation of his Lasix therapy is unsuccessful I would recommend changing to Demadex or Bumex he may also benefit from initiation of Spironolactone 12.5 mg orally once in the morning if he is peripheral edema does not improve with fluid and sodium restriction..  No additional cardiovascular testing is warranted.

## 2019-10-03 RX ORDER — FERROUS SULFATE TAB EC 324 MG (65 MG FE EQUIVALENT) 324 (65 FE) MG
324 TABLET DELAYED RESPONSE ORAL 2 TIMES DAILY WITH MEALS
Qty: 60 TABLET | Refills: 0 | Status: SHIPPED | OUTPATIENT
Start: 2019-10-03 | End: 2019-10-25 | Stop reason: SDUPTHER

## 2019-10-04 LAB
LAB AP CASE REPORT: NORMAL
PATH REPORT.FINAL DX SPEC: NORMAL

## 2019-10-10 ENCOUNTER — HOSPITAL ENCOUNTER (OUTPATIENT)
Facility: HOSPITAL | Age: 79
Discharge: HOME | End: 2019-10-10
Payer: MEDICARE

## 2019-10-10 ENCOUNTER — OFFICE VISIT (OUTPATIENT)
Dept: PRIMARY CARE CLINIC | Age: 79
End: 2019-10-10
Payer: MEDICARE

## 2019-10-10 VITALS
HEART RATE: 76 BPM | BODY MASS INDEX: 41.35 KG/M2 | OXYGEN SATURATION: 93 % | HEIGHT: 70 IN | WEIGHT: 288.8 LBS | DIASTOLIC BLOOD PRESSURE: 60 MMHG | RESPIRATION RATE: 20 BRPM | TEMPERATURE: 98.9 F | SYSTOLIC BLOOD PRESSURE: 120 MMHG

## 2019-10-10 DIAGNOSIS — I50.22 CHRONIC SYSTOLIC HEART FAILURE (HCC): ICD-10-CM

## 2019-10-10 DIAGNOSIS — D64.9 ANEMIA, UNSPECIFIED TYPE: ICD-10-CM

## 2019-10-10 DIAGNOSIS — I48.91 ATRIAL FIBRILLATION, UNSPECIFIED TYPE (HCC): ICD-10-CM

## 2019-10-10 DIAGNOSIS — I50.22 CHRONIC SYSTOLIC HEART FAILURE (HCC): Primary | ICD-10-CM

## 2019-10-10 DIAGNOSIS — R79.89 LOW TSH LEVEL: ICD-10-CM

## 2019-10-10 DIAGNOSIS — I10 ESSENTIAL HYPERTENSION: ICD-10-CM

## 2019-10-10 PROCEDURE — 99213 OFFICE O/P EST LOW 20 MIN: CPT | Performed by: INTERNAL MEDICINE

## 2019-10-10 PROCEDURE — 80053 COMPREHEN METABOLIC PANEL: CPT

## 2019-10-10 PROCEDURE — 85025 COMPLETE CBC W/AUTO DIFF WBC: CPT

## 2019-10-10 PROCEDURE — 84443 ASSAY THYROID STIM HORMONE: CPT

## 2019-10-10 RX ORDER — FUROSEMIDE 40 MG/1
40 TABLET ORAL DAILY
Qty: 30 TABLET | Refills: 5 | OUTPATIENT
Start: 2019-10-10 | End: 2020-05-26 | Stop reason: SDUPTHER

## 2019-10-10 ASSESSMENT — ENCOUNTER SYMPTOMS
COUGH: 0
VOMITING: 0
EYE DISCHARGE: 0
NAUSEA: 0
SHORTNESS OF BREATH: 0
WHEEZING: 0
BACK PAIN: 1
SINUS PRESSURE: 0
ABDOMINAL PAIN: 0

## 2019-10-11 LAB
A/G RATIO: 1.7 (ref 0.8–2)
ALBUMIN SERPL-MCNC: 3.8 G/DL (ref 3.4–4.8)
ALP BLD-CCNC: 81 U/L (ref 25–100)
ALT SERPL-CCNC: 12 U/L (ref 4–36)
ANION GAP SERPL CALCULATED.3IONS-SCNC: 15 MMOL/L (ref 3–16)
AST SERPL-CCNC: 21 U/L (ref 8–33)
BASOPHILS ABSOLUTE: 0.1 K/UL (ref 0–0.1)
BASOPHILS RELATIVE PERCENT: 0.9 %
BILIRUB SERPL-MCNC: 0.7 MG/DL (ref 0.3–1.2)
BUN BLDV-MCNC: 15 MG/DL (ref 6–20)
CALCIUM SERPL-MCNC: 9.3 MG/DL (ref 8.5–10.5)
CHLORIDE BLD-SCNC: 101 MMOL/L (ref 98–107)
CO2: 30 MMOL/L (ref 20–30)
CREAT SERPL-MCNC: 1.6 MG/DL (ref 0.4–1.2)
EOSINOPHILS ABSOLUTE: 0.1 K/UL (ref 0–0.4)
EOSINOPHILS RELATIVE PERCENT: 1.9 %
GFR AFRICAN AMERICAN: 51
GFR NON-AFRICAN AMERICAN: 42
GLOBULIN: 2.3 G/DL
GLUCOSE BLD-MCNC: 88 MG/DL (ref 74–106)
HCT VFR BLD CALC: 37.8 % (ref 40–54)
HEMOGLOBIN: 12.1 G/DL (ref 13–18)
IMMATURE GRANULOCYTES #: 0 K/UL
IMMATURE GRANULOCYTES %: 0.2 % (ref 0–5)
LYMPHOCYTES ABSOLUTE: 1.4 K/UL (ref 1.5–4)
LYMPHOCYTES RELATIVE PERCENT: 24.5 %
MCH RBC QN AUTO: 30.5 PG (ref 27–32)
MCHC RBC AUTO-ENTMCNC: 32 G/DL (ref 31–35)
MCV RBC AUTO: 95.2 FL (ref 80–100)
MONOCYTES ABSOLUTE: 0.5 K/UL (ref 0.2–0.8)
MONOCYTES RELATIVE PERCENT: 7.9 %
NEUTROPHILS ABSOLUTE: 3.7 K/UL (ref 2–7.5)
NEUTROPHILS RELATIVE PERCENT: 64.6 %
PDW BLD-RTO: 14.9 % (ref 11–16)
PLATELET # BLD: 217 K/UL (ref 150–400)
PMV BLD AUTO: 11.7 FL (ref 6–10)
POTASSIUM SERPL-SCNC: 4.4 MMOL/L (ref 3.4–5.1)
RBC # BLD: 3.97 M/UL (ref 4.5–6)
SODIUM BLD-SCNC: 146 MMOL/L (ref 136–145)
TOTAL PROTEIN: 6.1 G/DL (ref 6.4–8.3)
TSH SERPL DL<=0.05 MIU/L-ACNC: 0.49 UIU/ML (ref 0.35–5.5)
WBC # BLD: 5.7 K/UL (ref 4–11)

## 2019-10-28 RX ORDER — APIXABAN 5 MG/1
TABLET, FILM COATED ORAL
Qty: 180 TABLET | Refills: 4 | Status: SHIPPED | OUTPATIENT
Start: 2019-10-28 | End: 2021-01-26

## 2019-10-28 RX ORDER — SACUBITRIL AND VALSARTAN 24; 26 MG/1; MG/1
TABLET, FILM COATED ORAL
Qty: 180 TABLET | Refills: 3 | Status: SHIPPED | OUTPATIENT
Start: 2019-10-28 | End: 2020-04-23

## 2019-10-28 RX ORDER — AMIODARONE HYDROCHLORIDE 200 MG/1
200 TABLET ORAL DAILY
Qty: 90 TABLET | Refills: 4 | Status: SHIPPED | OUTPATIENT
Start: 2019-10-28 | End: 2021-01-07

## 2019-10-28 RX ORDER — FERROUS SULFATE 325(65) MG
TABLET ORAL
Qty: 60 TABLET | Refills: 0 | Status: SHIPPED | OUTPATIENT
Start: 2019-10-28 | End: 2019-10-30 | Stop reason: SDUPTHER

## 2019-10-30 RX ORDER — FERROUS SULFATE 325(65) MG
TABLET ORAL
Qty: 60 TABLET | Refills: 0 | Status: SHIPPED | OUTPATIENT
Start: 2019-10-30 | End: 2020-05-18

## 2019-12-10 ENCOUNTER — OFFICE VISIT (OUTPATIENT)
Dept: PRIMARY CARE CLINIC | Age: 79
End: 2019-12-10
Payer: MEDICARE

## 2019-12-10 ENCOUNTER — HOSPITAL ENCOUNTER (OUTPATIENT)
Facility: HOSPITAL | Age: 79
Discharge: HOME | End: 2019-12-10
Payer: MEDICARE

## 2019-12-10 VITALS
SYSTOLIC BLOOD PRESSURE: 116 MMHG | BODY MASS INDEX: 42.15 KG/M2 | OXYGEN SATURATION: 92 % | HEIGHT: 70 IN | WEIGHT: 294.4 LBS | RESPIRATION RATE: 18 BRPM | HEART RATE: 72 BPM | DIASTOLIC BLOOD PRESSURE: 74 MMHG

## 2019-12-10 DIAGNOSIS — Z12.5 SCREENING FOR PROSTATE CANCER: ICD-10-CM

## 2019-12-10 DIAGNOSIS — I50.22 CHRONIC SYSTOLIC HEART FAILURE (HCC): ICD-10-CM

## 2019-12-10 DIAGNOSIS — I10 ESSENTIAL HYPERTENSION: ICD-10-CM

## 2019-12-10 DIAGNOSIS — I48.91 ATRIAL FIBRILLATION, UNSPECIFIED TYPE (HCC): ICD-10-CM

## 2019-12-10 DIAGNOSIS — N18.30 CHRONIC RENAL FAILURE, STAGE 3 (MODERATE) (HCC): ICD-10-CM

## 2019-12-10 DIAGNOSIS — D64.9 ANEMIA, UNSPECIFIED TYPE: ICD-10-CM

## 2019-12-10 LAB
A/G RATIO: 1.9 (ref 0.8–2)
ALBUMIN SERPL-MCNC: 4.3 G/DL (ref 3.4–4.8)
ALP BLD-CCNC: 86 U/L (ref 25–100)
ALT SERPL-CCNC: 18 U/L (ref 4–36)
ANION GAP SERPL CALCULATED.3IONS-SCNC: 11 MMOL/L (ref 3–16)
AST SERPL-CCNC: 21 U/L (ref 8–33)
BASOPHILS ABSOLUTE: 0.1 K/UL (ref 0–0.1)
BASOPHILS RELATIVE PERCENT: 0.9 %
BILIRUB SERPL-MCNC: 0.4 MG/DL (ref 0.3–1.2)
BUN BLDV-MCNC: 17 MG/DL (ref 6–20)
CALCIUM SERPL-MCNC: 9.7 MG/DL (ref 8.5–10.5)
CHLORIDE BLD-SCNC: 101 MMOL/L (ref 98–107)
CO2: 30 MMOL/L (ref 20–30)
CREAT SERPL-MCNC: 1.3 MG/DL (ref 0.4–1.2)
EOSINOPHILS ABSOLUTE: 0.1 K/UL (ref 0–0.4)
EOSINOPHILS RELATIVE PERCENT: 1.4 %
FERRITIN: 165.1 NG/ML (ref 22–322)
GFR AFRICAN AMERICAN: >59
GFR NON-AFRICAN AMERICAN: 53
GLOBULIN: 2.3 G/DL
GLUCOSE BLD-MCNC: 92 MG/DL (ref 74–106)
HCT VFR BLD CALC: 43.2 % (ref 40–54)
HEMOGLOBIN: 13.7 G/DL (ref 13–18)
IMMATURE GRANULOCYTES #: 0 K/UL
IMMATURE GRANULOCYTES %: 0.2 % (ref 0–5)
IRON: 53 UG/DL (ref 59–158)
LYMPHOCYTES ABSOLUTE: 1.2 K/UL (ref 1.5–4)
LYMPHOCYTES RELATIVE PERCENT: 21.7 %
MCH RBC QN AUTO: 29.6 PG (ref 27–32)
MCHC RBC AUTO-ENTMCNC: 31.7 G/DL (ref 31–35)
MCV RBC AUTO: 93.3 FL (ref 80–100)
MONOCYTES ABSOLUTE: 0.5 K/UL (ref 0.2–0.8)
MONOCYTES RELATIVE PERCENT: 9.2 %
NEUTROPHILS ABSOLUTE: 3.8 K/UL (ref 2–7.5)
NEUTROPHILS RELATIVE PERCENT: 66.6 %
PDW BLD-RTO: 13.5 % (ref 11–16)
PLATELET # BLD: 212 K/UL (ref 150–400)
PMV BLD AUTO: 11 FL (ref 6–10)
POTASSIUM SERPL-SCNC: 4.7 MMOL/L (ref 3.4–5.1)
PROSTATE SPECIFIC ANTIGEN: 9.73 NG/ML (ref 0–4)
RBC # BLD: 4.63 M/UL (ref 4.5–6)
SODIUM BLD-SCNC: 142 MMOL/L (ref 136–145)
TOTAL IRON BINDING CAPACITY: 309 UG/DL (ref 250–450)
TOTAL PROTEIN: 6.6 G/DL (ref 6.4–8.3)
WBC # BLD: 5.7 K/UL (ref 4–11)

## 2019-12-10 PROCEDURE — G0103 PSA SCREENING: HCPCS

## 2019-12-10 PROCEDURE — 82728 ASSAY OF FERRITIN: CPT

## 2019-12-10 PROCEDURE — 83550 IRON BINDING TEST: CPT

## 2019-12-10 PROCEDURE — 83540 ASSAY OF IRON: CPT

## 2019-12-10 PROCEDURE — 80053 COMPREHEN METABOLIC PANEL: CPT

## 2019-12-10 PROCEDURE — 84153 ASSAY OF PSA TOTAL: CPT

## 2019-12-10 PROCEDURE — 85025 COMPLETE CBC W/AUTO DIFF WBC: CPT

## 2019-12-10 PROCEDURE — 99213 OFFICE O/P EST LOW 20 MIN: CPT | Performed by: INTERNAL MEDICINE

## 2019-12-10 ASSESSMENT — ENCOUNTER SYMPTOMS
ABDOMINAL PAIN: 0
EYE DISCHARGE: 0
NAUSEA: 0
SINUS PRESSURE: 0
BACK PAIN: 1
VOMITING: 0
SHORTNESS OF BREATH: 0
COUGH: 0
WHEEZING: 0

## 2019-12-10 NOTE — PATIENT INSTRUCTIONS
· Keep a list of your medicines with you. List all of the prescription medicines, nonprescription medicines, supplements, natural remedies, and vitamins that you take. Tell your healthcare providers who treat you about all of the products you are taking. Your provider can provide you with a form to keep track of them. Just ask. · Follow the directions that come with your medicine, including information about food or alcohol. Make sure you know how and when to take your medicine. Do not take more or less than you are supposed to take. · Keep all medicines out of the reach of children. · Store medicines according to the directions on the label. · Monitor yourself. Learn to know how your body reacts to your new medicine and keep track of how it makes you feel before attempting (If your provider has allowed you to do so) to drive or go to work. · Seek emergency medical attention if you think you have used too much of this medicine. An overdose of any prescription medicine can be fatal. Overdose symptoms may include extreme drowsiness, muscle weakness, confusion, cold and clammy skin, pinpoint pupils, shallow breathing, slow heart rate, fainting, or coma. · Don't share prescription medicines with others, even when they seem to have the same symptoms. What may be good for you may be harmful to others. · If you are no longer taking a prescribed medication and you have pills left please take your pills out of their original containers. Mix crushed pills with an undesirable substance, such as cat litter or used coffee grounds. Put the mixture into a disposable container with a lid, such as an empty margarine tub, or into a sealable bag. Cover up or remove any of your personal information on the empty containers by covering it with black permanent marker or duct tape. Place the sealed container with the mixture, and the empty drug containers, in the trash.    · If you use a medication that is in the form of a patch, dispose of used patches by folding them in half so that the sticky sides meet, and then flushing them down a toilet. They should not be placed in the household trash where children or pets can find them. · If you have any questions, ask your provider or pharmacist for more information. · Be sure to keep all appointments for provider visits or tests. ips to Help You Stop Smoking       Cigarette smoking is a preventable cause of death in the [place]. If you have thought about quitting but haven't been able to, here are some reasons why you should and some ways to do it. Here's Why   Quitting smoking now can decrease your risk of getting smoking-related illnesses like:   Heart disease   Stroke   Several types of cancer, including:   Lung   Mouth   Esophagus   Larynx   Bladder   Pancreas   Kidney   Chronic lung diseases:   Bronchitis   Emphysema   Asthma   Cataracts   Macular degeneration   Thyroid conditions   Hearing loss   Erectile dysfunction   Dementia   Osteoporosis   Here's How   Once you've decided to quit smoking, set your target quit date a few weeks away. In the time leading up to your quit day, try some of these ideas offered by the [address] to help you successfully quit smoking. For the best results, work with your doctor. Together, you can test your lung function and compare the results to those of a nonsmoking person. The results can be given to you as your lung age. Finding out your lung age right after having the test done may help you to stop smoking. Your doctor can also discuss with you all of your options and refer you to smoking-cessation support groups. You may wish to use nicotine replacement (gum, patches, inhaler) or one of the prescription medications that have been shown to increase quit rates and prolong abstinence from smoking.  But whatever you and your doctor decide on these matters, it will still be you who decides when an how to quit. Here are some techniques:   Switch Brands   Switch to a brand you find distasteful. Change to a brand that is low in tar and nicotine a couple of weeks before your target quit date. This will help change your smoking behavior. However, do not smoke more cigarettes, inhale them more often or more deeply, or place your fingertips over the holes in the filters. All of these actions will increase your nicotine intake, and the idea is to get your body used to functioning without nicotine. Cut Down the Number of Cigarettes You Smoke   Smoke only half of each cigarette. Each day, postpone the lighting of your first cigarette by one hour. Decide you'll only smoke during odd or even hours of the day. Decide beforehand how many cigarettes you'll smoke during the day. For each additional cigarette, give a dollar to your favorite kiara. Change your eating habits to help you cut down. For example, drink milk, which many people consider incompatible with smoking. End meals or snacks with something that won't lead to a cigarette. Reach for a glass of juice instead of a cigarette for a \"pick-me-up. \"   Remember: Cutting down can help you quit, but it's not a substitute for quitting. If you're down to about seven cigarettes a day, it's time to set your target quit date, and get ready to stick to it. Don't Smoke \"Automatically\"   Smoke only those cigarettes you really want. Catch yourself before you light up a cigarette out of pure habit. Don't empty your ashtrays. This will remind you of how many cigarettes you've smoked each day, and the sight and the smell of stale cigarettes butts will be very unpleasant. Make yourself aware of each cigarette by using the opposite hand or putting cigarettes in an unfamiliar location or a different pocket to break the automatic reach.    If you light up many times during the day without even thinking about it, try to look in a mirror each time you put a

## 2019-12-10 NOTE — PROGRESS NOTES
congestion, ear pain and sinus pressure. Eyes: Negative for discharge and visual disturbance. Respiratory: Negative for cough, shortness of breath and wheezing. Cardiovascular: Positive for leg swelling. Negative for chest pain and palpitations. ARCHER   Gastrointestinal: Negative for abdominal pain, nausea and vomiting. Endocrine: Negative for cold intolerance and heat intolerance. Genitourinary: Negative for dysuria, frequency and urgency. Musculoskeletal: Positive for arthralgias, back pain and gait problem. Skin: Negative for pallor and rash. Allergic/Immunologic: Negative for food allergies and immunocompromised state. Neurological: Negative for dizziness, numbness and headaches. Hematological: Negative for adenopathy. Does not bruise/bleed easily. Psychiatric/Behavioral: Negative for agitation and sleep disturbance. The patient is not nervous/anxious. Past Medical History:   Diagnosis Date    Atrial fibrillation ([address]     CHF (congestive heart failure) (HCC)     Hyperlipidemia     Hypertension     Kidney stone      Past Surgical History:   Procedure Laterality Date    CARDIAC CATHETERIZATION      CARDIOVERSION  2019    Dr. Roland Estrada Right     mass removed    PACEMAKER INSERTION      PROSTATE SURGERY      THYROID SURGERY      goiter removed    UPPER GASTROINTESTINAL ENDOSCOPY  10/2019    Ben      No family history on file.    Social History     Tobacco Use   Smoking Status Former Smoker    Packs/day: 1.00    Years: 35.00    Pack years: 35.00    Types: Cigarettes    Last attempt to quit: 1973    Years since quittin.8   Smokeless Tobacco Never Used       OBJECTIVE:   Wt Readings from Last 3 Encounters:   12/10/19 294 lb 6.4 oz (133.5 kg)   10/10/19 288 lb 12.8 oz (131 kg)   19 297 lb 12.8 oz (135.1 kg)     BP Readings from Last 3 Encounters:   12/10/19 116/74   10/10/19 120/60 09/11/19 128/60       /74 (Site: Right Upper Arm, Position: Sitting, Cuff Size: Large Adult)   Pulse 72   Resp 18   Ht 5' 10\" (1.778 m)   Wt 294 lb 6.4 oz (133.5 kg)   SpO2 92% Comment: room air  BMI 42.24 kg/m²      Physical Exam  Vitals signs and nursing note reviewed. Constitutional:       Appearance: Normal appearance. He is well-developed. He is obese. HENT:      Head: Normocephalic and atraumatic. Right Ear: External ear normal.      Left Ear: External ear normal.      Nose: Nose normal.   Eyes:      Conjunctiva/sclera: Conjunctivae normal.      Pupils: Pupils are equal, round, and reactive to light. Neck:      Musculoskeletal: Neck supple. No neck rigidity or muscular tenderness. Thyroid: No thyromegaly. Vascular: No JVD. Cardiovascular:      Rate and Rhythm: Normal rate and regular rhythm. Heart sounds: Normal heart sounds. No murmur. No friction rub. Pulmonary:      Effort: Pulmonary effort is normal.      Breath sounds: Normal breath sounds. No wheezing or rales. Abdominal:      General: Bowel sounds are normal.      Palpations: Abdomen is soft. Tenderness: There is no tenderness. There is no rebound. Musculoskeletal:         General: No tenderness. Lumbar back: He exhibits decreased range of motion. He exhibits no tenderness and no spasm. Right lower leg: No edema. Left lower leg: No edema. Skin:     Findings: No erythema or rash. Neurological:      General: No focal deficit present. Mental Status: He is alert and oriented to person, place, and time.    Psychiatric:         Behavior: Behavior normal.         Judgment: Judgment normal.         Lab Results   Component Value Date     10/10/2019    K 4.4 10/10/2019    K 4.8 08/25/2019     10/10/2019    CO2 30 10/10/2019    GLUCOSE 88 10/10/2019    BUN 15 10/10/2019    CREATININE 1.6 10/10/2019    CALCIUM 9.3 10/10/2019    PROT 6.1 10/10/2019    LABALBU 3.8 10/10/2019

## 2019-12-10 NOTE — PROGRESS NOTES
Chief Complaint   Patient presents with    Hypertension    Congestive Heart Failure    Anemia    Atrial Fibrillation     Pt here for 2 month follow up    Have you seen any other physician or provider since your last visit no    Have you had any other diagnostic tests since your last visit? no    Have you changed or stopped any medications since your last visit? no         I have recommended that this patient have a flu and pneumo vaccine but he declines at this time. I have discussed the risks and benefits of this examination with him. The patient verbalizes understanding.

## 2019-12-11 DIAGNOSIS — R97.20 ELEVATED PSA: Primary | ICD-10-CM

## 2019-12-19 RX ORDER — FOLIC ACID 1 MG/1
1 TABLET ORAL DAILY
Qty: 30 TABLET | Refills: 3 | Status: SHIPPED | OUTPATIENT
Start: 2019-12-19 | End: 2020-03-23

## 2019-12-19 RX ORDER — PANTOPRAZOLE SODIUM 40 MG/1
40 TABLET, DELAYED RELEASE ORAL
Qty: 30 TABLET | Refills: 3 | Status: SHIPPED | OUTPATIENT
Start: 2019-12-19 | End: 2020-03-23

## 2020-01-24 RX ORDER — NEBIVOLOL HYDROCHLORIDE 10 MG/1
10 TABLET ORAL DAILY
Qty: 90 TABLET | Refills: 3 | OUTPATIENT
Start: 2020-01-24

## 2020-03-16 RX ORDER — ROSUVASTATIN CALCIUM 10 MG/1
10 TABLET, COATED ORAL DAILY
Qty: 90 TABLET | Refills: 3 | OUTPATIENT
Start: 2020-03-16

## 2020-03-17 ENCOUNTER — TELEPHONE (OUTPATIENT)
Dept: CARDIOLOGY | Facility: CLINIC | Age: 80
End: 2020-03-17

## 2020-04-10 ENCOUNTER — TELEPHONE (OUTPATIENT)
Dept: CARDIOLOGY | Facility: CLINIC | Age: 80
End: 2020-04-10

## 2020-04-10 NOTE — TELEPHONE ENCOUNTER
I called to let the patient know that we received Approval for Entresto to be on a reduced tier that will reduce his cost for the medication.

## 2020-04-21 ENCOUNTER — TELEPHONE (OUTPATIENT)
Dept: CARDIOLOGY | Facility: CLINIC | Age: 80
End: 2020-04-21

## 2020-04-21 NOTE — TELEPHONE ENCOUNTER
Pt called stating that is BP has been running about 89/40, Pt has complaints of dizzy and blurred vision along with the low BP.

## 2020-04-21 NOTE — TELEPHONE ENCOUNTER
He may need to be rechecked and have a pacemaker interrogation , last was 10/19, date of implant 04/2013.

## 2020-04-22 NOTE — TELEPHONE ENCOUNTER
He had over 2 years on the last check do we need to soon or is it ok to wait until his year appointment in October 2020?

## 2020-04-23 ENCOUNTER — OFFICE VISIT (OUTPATIENT)
Dept: CARDIOLOGY | Facility: CLINIC | Age: 80
End: 2020-04-23

## 2020-04-23 VITALS
HEART RATE: 91 BPM | DIASTOLIC BLOOD PRESSURE: 80 MMHG | BODY MASS INDEX: 42.66 KG/M2 | WEIGHT: 298 LBS | SYSTOLIC BLOOD PRESSURE: 118 MMHG | HEIGHT: 70 IN | OXYGEN SATURATION: 98 %

## 2020-04-23 DIAGNOSIS — I48.0 PAROXYSMAL ATRIAL FIBRILLATION (HCC): Primary | ICD-10-CM

## 2020-04-23 DIAGNOSIS — I49.5 TACHY-BRADY SYNDROME (HCC): ICD-10-CM

## 2020-04-23 DIAGNOSIS — I48.20 CHRONIC ATRIAL FIBRILLATION (HCC): ICD-10-CM

## 2020-04-23 DIAGNOSIS — R00.2 PALPITATIONS: ICD-10-CM

## 2020-04-23 DIAGNOSIS — I10 ESSENTIAL HYPERTENSION: ICD-10-CM

## 2020-04-23 DIAGNOSIS — I50.22 CHRONIC SYSTOLIC CONGESTIVE HEART FAILURE (HCC): ICD-10-CM

## 2020-04-23 DIAGNOSIS — E66.01 MORBID (SEVERE) OBESITY DUE TO EXCESS CALORIES (HCC): ICD-10-CM

## 2020-04-23 DIAGNOSIS — Z95.0 CARDIAC PACEMAKER IN SITU: ICD-10-CM

## 2020-04-23 PROCEDURE — 99214 OFFICE O/P EST MOD 30 MIN: CPT | Performed by: NURSE PRACTITIONER

## 2020-04-23 PROCEDURE — 93280 PM DEVICE PROGR EVAL DUAL: CPT | Performed by: NURSE PRACTITIONER

## 2020-04-23 RX ORDER — SPIRONOLACTONE 25 MG/1
TABLET ORAL
COMMUNITY
Start: 2020-04-17 | End: 2020-08-24

## 2020-04-23 RX ORDER — NEBIVOLOL HYDROCHLORIDE 10 MG/1
TABLET ORAL
COMMUNITY
Start: 2020-04-17 | End: 2020-04-23 | Stop reason: ALTCHOICE

## 2020-04-23 RX ORDER — NEBIVOLOL 5 MG/1
5 TABLET ORAL DAILY
Qty: 30 TABLET | Refills: 3 | Status: SHIPPED | OUTPATIENT
Start: 2020-04-23 | End: 2020-07-16

## 2020-04-23 NOTE — PROGRESS NOTES
Bobby Dodd is a 79 y.o. male.  MRN #: [id number]    Referring Provider: Derrick Gaytan MD    Chief Complaint: No chief complaint on file.   Low  blood pressure readings with vertigo and near syncopal episodes.    History of Present Illness:  Mr Dodd is a 79-year-old gentleman that presents for cardiac follow-up and for pacemaker interrogation.  Patient has a history of chronic systolic congestive heart failure with ejection fraction of 35%.  Paroxysmal atrial fibrillation, essential hypertension, cardiac pacemaker in situ.  Patient had synchronized cardioversion in June 2019 with a successful conversion to a sinus rhythm.  With today's visit,  reports 2 episodes of extremely low blood pressure with episodes of near syncopal spells.  He denies any prodrome symptoms, palpitations, shortness of breath, chest pressure.  He had pacemaker interrogation today which showed that he has redeveloped a atrial fibrillation with a controlled rate of 90.  He is presently on amiodarone 200 mg daily, and appropriately anticoagulated with Eliquis 5 mg twice daily.  Patient does relate that his cardiac electrophysiologist Dr Richards has discussed the possibility of ablation for his atrial fibrillation.      The patient presents today with their self who contributes to the history of their care.     The following portions of the patient's history were reviewed and updated as appropriate: allergies, current medications, past family history, past medical history, past social history, past surgical history and problem list.     Review of Systems:     Review of Systems   Constitutional: Positive for activity change and fatigue. Negative for appetite change, diaphoresis, unexpected weight gain and unexpected weight loss.   HENT: Negative.    Eyes: Positive for blurred vision. Negative for double vision, photophobia and visual disturbance.   Respiratory: Positive for shortness of breath. Negative for apnea, cough, chest  tightness and wheezing.    Cardiovascular: Negative.  Negative for chest pain, palpitations and leg swelling.   Gastrointestinal: Negative.  Negative for abdominal distention, abdominal pain, nausea, vomiting, GERD and indigestion.        Obesity with BMI of 42.8   Endocrine: Negative.  Negative for cold intolerance, heat intolerance, polydipsia, polyphagia and polyuria.   Genitourinary: Negative.  Negative for decreased libido, frequency, genital sores, hematuria and urgency.   Musculoskeletal: Negative.  Negative for back pain, joint swelling, myalgias, neck pain and neck stiffness.   Skin: Negative.  Negative for color change, pallor, rash and bruise.   Neurological: Positive for dizziness and light-headedness. Negative for tremors, seizures, syncope, facial asymmetry, speech difficulty, weakness and numbness.        Vertigo with near syncopal episode   Hematological: Negative.  Negative for adenopathy. Does not bruise/bleed easily.   Psychiatric/Behavioral: Negative.  Negative for agitation, decreased concentration, self-injury, sleep disturbance, suicidal ideas, negative for hyperactivity and stress. The patient is not nervous/anxious.           Current Outpatient Medications:   •  amiodarone (PACERONE) 200 MG tablet, TAKE 1 TABLET BY MOUTH DAILY., Disp: 90 tablet, Rfl: 4  •  ELIQUIS 5 MG tablet tablet, TAKE ONE TABLET BY MOUTH EVERY 12 HOURS, Disp: 180 tablet, Rfl: 4  •  ferrous sulfate 324 (65 Fe) MG tablet delayed-release EC tablet, Take 324 mg by mouth 2 (Two) Times a Day With Meals., Disp: , Rfl:   •  folic acid (FOLVITE) 1 MG tablet, Take 1 mg by mouth Daily., Disp: , Rfl:   •  furosemide (LASIX) 40 MG tablet, Take 1 tablet by mouth Daily., Disp: 90 tablet, Rfl: 4  •  pantoprazole (PROTONIX) 40 MG EC tablet, Take 40 mg by mouth Daily., Disp: , Rfl:   •  rosuvastatin (CRESTOR) 10 MG tablet, Take 1 tablet by mouth Daily., Disp: 90 tablet, Rfl: 3  •  spironolactone (ALDACTONE) 25 MG tablet, , Disp: , Rfl:  "  •  nebivolol (Bystolic) 5 MG tablet, Take 1 tablet by mouth Daily for 30 days., Disp: 30 tablet, Rfl: 3  •  sacubitril-valsartan (Entresto) 24-26 MG tablet, Take 1 tablet by mouth 2 (Two) Times a Day for 180 days., Disp: 60 tablet, Rfl: 5    Vitals:    04/23/20 1057   BP: 118/80   BP Location: Right arm   Patient Position: Sitting   Cuff Size: Adult   Pulse: 91   SpO2: 98%   Weight: 135 kg (298 lb)   Height: 177.8 cm (70\")       Physical Exam:     Physical Exam   Constitutional: He is oriented to person, place, and time. He appears well-developed and well-nourished. No distress.   HENT:   Head: Normocephalic and atraumatic.   Eyes: Pupils are equal, round, and reactive to light. Conjunctivae are normal. No scleral icterus.   Neck: Normal range of motion and full passive range of motion without pain. Neck supple. No JVD present. Carotid bruit is not present. No tracheal deviation present. No thyroid mass and no thyromegaly present.   Cardiovascular: Normal rate, S1 normal, S2 normal, normal heart sounds and intact distal pulses. An irregular rhythm present. PMI is not displaced. Exam reveals no gallop and no friction rub.   No murmur heard.  Atrial fibrillation with controlled rate, atrial paced rhythm.   Pulmonary/Chest: Effort normal and breath sounds normal. No respiratory distress. He has no wheezes. He has no rales. He exhibits no tenderness.   Abdominal: Soft. Bowel sounds are normal. He exhibits no mass. There is no tenderness.   Musculoskeletal: Normal range of motion. He exhibits no edema.   Neurological: He is alert and oriented to person, place, and time.   Skin: Skin is warm and dry. Capillary refill takes less than 2 seconds. No rash noted. He is not diaphoretic.   Psychiatric: He has a normal mood and affect. His behavior is normal. Judgment and thought content normal.   Nursing note and vitals reviewed.        ECG 12 Lead  Date/Time: 4/23/2020 1:28 PM  Performed by: Mingo Lee Jr., " APRN  Authorized by: Mingo Lee Jr., APRN   Comparison: compared with previous ECG from 4/5/2019  Similar to previous ECG  Rhythm: atrial fibrillation and paced  Rate: normal  BPM: 90  Conduction: non-specific intraventricular conduction delay  QRS axis: left    Clinical impression: abnormal EKG  Comments: Atrial paced rhythm with underlying atrial fibrillation.            Results:   Reviewed twelve-lead EKG compared with June 2019, today's EKG shows a recurrent atrial fibrillation with a rate of 90 with pacemaker rhythm    Pacemaker Evaluation Report    April 23, 2020    Bobby Dodd     Primary MD: Lucila TO  Primary Cardiologist: Tahmina TO  Implanting MD:   :[employer] Type PPM  Model# [id number]  Implant date: 04/19/2013    Reason for evaluation:symptoms  Indication for pacemaker: atrial fibrillation with slow ventricular response        Diagnostic Data    RA RV LV   Atrial paced: 61 % Ventricular paced: 39 % Ventricular paced:  %   P = 0.5 (A Fib)   R = > 12.0 R    Threshold   A Fib Threshold 0.75v@0.5  Threshold    Impedance 480 Impedance 480 Impedance         Battery status:  70 bpm Voltage: 2.81 V     HOME MONITORED? No  ACTIVELY TRANSMITTING? No     EVENTS: Mode switch response, developed atrial fibrillation that has been ongoing for 82 days, no RVR.           Final Parameters    Rate Drop:  Rate Smoothing:  Hysteresis:   Changes made:   Conclusions: stable pacing and sensing thresholds        Assessment/Plan:   Discussed the benefits of CHF clinic, improved cardiac functioning, quality of life, prevention of rehospitalization from congestive heart failure, and prolonged life.  He stated that he was called with this opportunity but he declines.    Follow up: 6 months for reevaluation and pacemaker interrogation.  Referral to cardiac electrophysiologist for evaluation for ablation.  Will reduce Bystolic to 5 mg daily.  Have patient monitor vital signs 2-3 times daily and document.    Diagnoses  and all orders for this visit:    Paroxysmal atrial fibrillation (CMS/HCC)  -     ECG 12 Lead  -     nebivolol (Bystolic) 5 MG tablet; Take 1 tablet by mouth Daily for 30 days.  -     Ambulatory Referral to Cardiac Electrophysiology  -     sacubitril-valsartan (Entresto) 24-26 MG tablet; Take 1 tablet by mouth 2 (Two) Times a Day for 180 days.    Tachy-mercedes syndrome (CMS/HCC)  -     ECG 12 Lead  -     nebivolol (Bystolic) 5 MG tablet; Take 1 tablet by mouth Daily for 30 days.  -     Ambulatory Referral to Cardiac Electrophysiology  -     sacubitril-valsartan (Entresto) 24-26 MG tablet; Take 1 tablet by mouth 2 (Two) Times a Day for 180 days.    Morbid (severe) obesity due to excess calories (CMS/HCC)  -     ECG 12 Lead  -     nebivolol (Bystolic) 5 MG tablet; Take 1 tablet by mouth Daily for 30 days.  -     Ambulatory Referral to Cardiac Electrophysiology  -     sacubitril-valsartan (Entresto) 24-26 MG tablet; Take 1 tablet by mouth 2 (Two) Times a Day for 180 days.    Essential hypertension  -     ECG 12 Lead  -     nebivolol (Bystolic) 5 MG tablet; Take 1 tablet by mouth Daily for 30 days.  -     Ambulatory Referral to Cardiac Electrophysiology  -     sacubitril-valsartan (Entresto) 24-26 MG tablet; Take 1 tablet by mouth 2 (Two) Times a Day for 180 days.    Chronic systolic congestive heart failure (CMS/HCC)  -     ECG 12 Lead  -     nebivolol (Bystolic) 5 MG tablet; Take 1 tablet by mouth Daily for 30 days.  -     Ambulatory Referral to Cardiac Electrophysiology  -     sacubitril-valsartan (Entresto) 24-26 MG tablet; Take 1 tablet by mouth 2 (Two) Times a Day for 180 days.    Cardiac pacemaker in situ  -     ECG 12 Lead  -     nebivolol (Bystolic) 5 MG tablet; Take 1 tablet by mouth Daily for 30 days.  -     Ambulatory Referral to Cardiac Electrophysiology  -     sacubitril-valsartan (Entresto) 24-26 MG tablet; Take 1 tablet by mouth 2 (Two) Times a Day for 180 days.    Chronic atrial fibrillation  -     ECG 12  Lead  -     nebivolol (Bystolic) 5 MG tablet; Take 1 tablet by mouth Daily for 30 days.  -     Ambulatory Referral to Cardiac Electrophysiology  -     sacubitril-valsartan (Entresto) 24-26 MG tablet; Take 1 tablet by mouth 2 (Two) Times a Day for 180 days.    Palpitations        Return in about 6 months (around 10/23/2020).    ROSALINDA Rogers

## 2020-05-18 RX ORDER — FERROUS SULFATE 325(65) MG
TABLET ORAL
Qty: 60 TABLET | Refills: 0 | Status: SHIPPED | OUTPATIENT
Start: 2020-05-18 | End: 2020-10-12

## 2020-05-26 RX ORDER — FUROSEMIDE 40 MG/1
40 TABLET ORAL DAILY
Qty: 30 TABLET | Refills: 5 | Status: SHIPPED | OUTPATIENT
Start: 2020-05-26 | End: 2020-10-29

## 2020-06-19 RX ORDER — ROSUVASTATIN CALCIUM 10 MG/1
10 TABLET, COATED ORAL DAILY
Qty: 90 TABLET | Refills: 1 | Status: SHIPPED | OUTPATIENT
Start: 2020-06-19 | End: 2020-09-08

## 2020-07-15 DIAGNOSIS — I50.22 CHRONIC SYSTOLIC CONGESTIVE HEART FAILURE (HCC): ICD-10-CM

## 2020-07-15 DIAGNOSIS — I48.20 CHRONIC ATRIAL FIBRILLATION (HCC): ICD-10-CM

## 2020-07-15 DIAGNOSIS — I49.5 TACHY-BRADY SYNDROME (HCC): ICD-10-CM

## 2020-07-15 DIAGNOSIS — I48.0 PAROXYSMAL ATRIAL FIBRILLATION (HCC): ICD-10-CM

## 2020-07-15 DIAGNOSIS — Z95.0 CARDIAC PACEMAKER IN SITU: ICD-10-CM

## 2020-07-15 DIAGNOSIS — E66.01 MORBID (SEVERE) OBESITY DUE TO EXCESS CALORIES (HCC): ICD-10-CM

## 2020-07-15 DIAGNOSIS — I10 ESSENTIAL HYPERTENSION: ICD-10-CM

## 2020-07-16 RX ORDER — NEBIVOLOL 5 MG/1
5 TABLET ORAL DAILY
Qty: 90 TABLET | Refills: 3 | Status: SHIPPED | OUTPATIENT
Start: 2020-07-16 | End: 2020-10-26 | Stop reason: DRUGHIGH

## 2020-07-16 RX ORDER — FOLIC ACID 1 MG/1
1 TABLET ORAL DAILY
Qty: 30 TABLET | Refills: 3 | Status: SHIPPED | OUTPATIENT
Start: 2020-07-16 | End: 2020-11-03

## 2020-07-16 RX ORDER — PANTOPRAZOLE SODIUM 40 MG/1
TABLET, DELAYED RELEASE ORAL
Qty: 30 TABLET | Refills: 3 | Status: SHIPPED | OUTPATIENT
Start: 2020-07-16 | End: 2020-11-03

## 2020-08-05 ENCOUNTER — OFFICE VISIT (OUTPATIENT)
Dept: PRIMARY CARE CLINIC | Age: 80
End: 2020-08-05
Payer: MEDICARE

## 2020-08-05 ENCOUNTER — HOSPITAL ENCOUNTER (OUTPATIENT)
Facility: HOSPITAL | Age: 80
Discharge: HOME | End: 2020-08-05
Payer: MEDICARE

## 2020-08-05 VITALS
OXYGEN SATURATION: 96 % | WEIGHT: 285.4 LBS | SYSTOLIC BLOOD PRESSURE: 118 MMHG | RESPIRATION RATE: 18 BRPM | TEMPERATURE: 98.7 F | HEART RATE: 97 BPM | DIASTOLIC BLOOD PRESSURE: 62 MMHG | BODY MASS INDEX: 40.95 KG/M2

## 2020-08-05 PROBLEM — Z86.79 HISTORY OF ATRIAL FIBRILLATION: Status: RESOLVED | Noted: 2019-08-26 | Resolved: 2020-08-05

## 2020-08-05 PROCEDURE — 99214 OFFICE O/P EST MOD 30 MIN: CPT | Performed by: INTERNAL MEDICINE

## 2020-08-05 PROCEDURE — 85025 COMPLETE CBC W/AUTO DIFF WBC: CPT

## 2020-08-05 PROCEDURE — 83550 IRON BINDING TEST: CPT

## 2020-08-05 PROCEDURE — 84153 ASSAY OF PSA TOTAL: CPT

## 2020-08-05 PROCEDURE — 82728 ASSAY OF FERRITIN: CPT

## 2020-08-05 PROCEDURE — G0009 ADMIN PNEUMOCOCCAL VACCINE: HCPCS | Performed by: INTERNAL MEDICINE

## 2020-08-05 PROCEDURE — 90732 PPSV23 VACC 2 YRS+ SUBQ/IM: CPT | Performed by: INTERNAL MEDICINE

## 2020-08-05 PROCEDURE — 83540 ASSAY OF IRON: CPT

## 2020-08-05 PROCEDURE — 84443 ASSAY THYROID STIM HORMONE: CPT

## 2020-08-05 PROCEDURE — 80053 COMPREHEN METABOLIC PANEL: CPT

## 2020-08-05 RX ORDER — SPIRONOLACTONE 25 MG/1
0.5 TABLET ORAL DAILY
Status: ON HOLD | COMMUNITY
Start: 2020-04-17 | End: 2022-10-14 | Stop reason: HOSPADM

## 2020-08-05 RX ORDER — NEBIVOLOL 5 MG/1
5 TABLET ORAL DAILY
COMMUNITY
Start: 2020-07-16 | End: 2021-11-22 | Stop reason: DRUGHIGH

## 2020-08-05 RX ORDER — HYDROCODONE BITARTRATE AND ACETAMINOPHEN 5; 325 MG/1; MG/1
1 TABLET ORAL 2 TIMES DAILY PRN
Qty: 30 TABLET | Refills: 0 | Status: SHIPPED | OUTPATIENT
Start: 2020-08-05 | End: 2020-09-04

## 2020-08-05 ASSESSMENT — PATIENT HEALTH QUESTIONNAIRE - PHQ9
SUM OF ALL RESPONSES TO PHQ QUESTIONS 1-9: 0
SUM OF ALL RESPONSES TO PHQ QUESTIONS 1-9: 0
SUM OF ALL RESPONSES TO PHQ9 QUESTIONS 1 & 2: 0
1. LITTLE INTEREST OR PLEASURE IN DOING THINGS: 0
2. FEELING DOWN, DEPRESSED OR HOPELESS: 0

## 2020-08-06 LAB
A/G RATIO: 2 (ref 0.8–2)
ALBUMIN SERPL-MCNC: 4 G/DL (ref 3.4–4.8)
ALP BLD-CCNC: 72 U/L (ref 25–100)
ALT SERPL-CCNC: 13 U/L (ref 4–36)
ANION GAP SERPL CALCULATED.3IONS-SCNC: 10 MMOL/L (ref 3–16)
AST SERPL-CCNC: 16 U/L (ref 8–33)
BASOPHILS ABSOLUTE: 0.1 K/UL (ref 0–0.1)
BASOPHILS RELATIVE PERCENT: 1 %
BILIRUB SERPL-MCNC: 0.8 MG/DL (ref 0.3–1.2)
BUN BLDV-MCNC: 30 MG/DL (ref 6–20)
CALCIUM SERPL-MCNC: 9.6 MG/DL (ref 8.5–10.5)
CHLORIDE BLD-SCNC: 104 MMOL/L (ref 98–107)
CO2: 29 MMOL/L (ref 20–30)
CREAT SERPL-MCNC: 1.9 MG/DL (ref 0.4–1.2)
EOSINOPHILS ABSOLUTE: 0.1 K/UL (ref 0–0.4)
EOSINOPHILS RELATIVE PERCENT: 1 %
FERRITIN: 152 NG/ML (ref 22–322)
GFR AFRICAN AMERICAN: 42
GFR NON-AFRICAN AMERICAN: 34
GLOBULIN: 2 G/DL
GLUCOSE BLD-MCNC: 94 MG/DL (ref 74–106)
HCT VFR BLD CALC: 41.8 % (ref 40–54)
HEMOGLOBIN: 13.4 G/DL (ref 13–18)
IMMATURE GRANULOCYTES #: 0 K/UL
IMMATURE GRANULOCYTES %: 0.2 % (ref 0–5)
IRON SATURATION: 22 % (ref 20–50)
IRON: 64 UG/DL (ref 59–158)
LYMPHOCYTES ABSOLUTE: 1.4 K/UL (ref 1.5–4)
LYMPHOCYTES RELATIVE PERCENT: 23.7 %
MCH RBC QN AUTO: 29.8 PG (ref 27–32)
MCHC RBC AUTO-ENTMCNC: 32.1 G/DL (ref 31–35)
MCV RBC AUTO: 92.9 FL (ref 80–100)
MONOCYTES ABSOLUTE: 0.5 K/UL (ref 0.2–0.8)
MONOCYTES RELATIVE PERCENT: 8.7 %
NEUTROPHILS ABSOLUTE: 3.8 K/UL (ref 2–7.5)
NEUTROPHILS RELATIVE PERCENT: 65.4 %
PDW BLD-RTO: 13.5 % (ref 11–16)
PLATELET # BLD: 218 K/UL (ref 150–400)
PMV BLD AUTO: 11.9 FL (ref 6–10)
POTASSIUM SERPL-SCNC: 5.3 MMOL/L (ref 3.4–5.1)
PROSTATE SPECIFIC ANTIGEN: 10.39 NG/ML (ref 0–4)
RBC # BLD: 4.5 M/UL (ref 4.5–6)
SODIUM BLD-SCNC: 143 MMOL/L (ref 136–145)
TOTAL IRON BINDING CAPACITY: 291 UG/DL (ref 250–450)
TOTAL PROTEIN: 6 G/DL (ref 6.4–8.3)
TSH SERPL DL<=0.05 MIU/L-ACNC: 0.56 UIU/ML (ref 0.35–5.5)
WBC # BLD: 5.7 K/UL (ref 4–11)

## 2020-08-06 ASSESSMENT — ENCOUNTER SYMPTOMS
WHEEZING: 0
VOMITING: 0
ABDOMINAL PAIN: 0
SHORTNESS OF BREATH: 0
SINUS PRESSURE: 0
COUGH: 0
NAUSEA: 0
EYE DISCHARGE: 0
BACK PAIN: 1

## 2020-08-06 NOTE — PROGRESS NOTES
SUBJECTIVE:    Patient ID: Hugo Traore is a 67 y.o.male. Chief Complaint   Patient presents with    Hypertension    Congestive Heart Failure    Anemia    Atrial Fibrillation     HPI:  He has some shortness of breath and dyspnea on exertion that is at baseline. No orthopnea. Swelling in the lower extremities about the same compared to before. He has been compliant with taking medications, without side effects from it. He has been following a low-sodium and monitoring fluid intake/daily weight. Weight is down 9 pounds, compared to last visit. His blood pressure is stable at this time. No chest pain or palpitation. Patient with history of Afib. He is taking Eliquis without any side effects from medication. He denies any easy bruising or bleeding. No CP or palpitations. Patient has had hypertension for several years. He has been compliant with taking medications, without side effects from it. He has been following a low-sodium, is  active and never exercises. Weight is down 9 pounds, compared to last visit. His blood pressure is stable at this time. Patient has been having moderate hip and back pain. Pain does not radiate. No tingling or numbness reported. He has been taking OTC medications for this with no relief. Pain is getting worse. He reported the pain is limiting his mobility. Patient's medications, allergies, past medical, surgical, social and family histories were reviewed and updated as appropriate in the electronic medical record/chart. Outpatient Medications Marked as Taking for the 8/5/20 encounter (Office Visit) with Marco A Barkley MD   Medication Sig Dispense Refill    nebivolol (BYSTOLIC) 5 MG tablet Take 5 mg by mouth daily      spironolactone (ALDACTONE) 25 MG tablet Take 0.5 tablets by mouth daily      HYDROcodone-acetaminophen (NORCO) 5-325 MG per tablet Take 1 tablet by mouth 2 times daily as needed for Pain for up to 30 days.  Intended supply: 30 days 30 tablet 0  folic acid (FOLVITE) 1 MG tablet TAKE 1 TABLET BY MOUTH DAILY 30 tablet 3    pantoprazole (PROTONIX) 40 MG tablet TAKE ONE TABLET BY MOUTH EVERY MORNING BEFORE BREAKFAST 30 tablet 3    furosemide (LASIX) 40 MG tablet Take 1 tablet by mouth daily 30 tablet 5    sacubitril-valsartan (ENTRESTO) 24-26 MG per tablet Take 1 tablet by mouth every 12 hours      apixaban (ELIQUIS) 5 MG TABS tablet Take by mouth 2 times daily      amiodarone (CORDARONE) 200 MG tablet Take 200 mg by mouth daily      rosuvastatin (CRESTOR) 10 MG tablet Take 10 mg by mouth daily          Review of Systems   Constitutional: Positive for fatigue. Negative for chills and fever. HENT: Negative for congestion, ear pain and sinus pressure. Eyes: Negative for discharge and visual disturbance. Respiratory: Negative for cough, shortness of breath and wheezing. Cardiovascular: Positive for leg swelling. Negative for chest pain and palpitations. ARCHER   Gastrointestinal: Negative for abdominal pain, nausea and vomiting. Endocrine: Negative for cold intolerance and heat intolerance. Genitourinary: Negative for dysuria, frequency and urgency. Musculoskeletal: Positive for arthralgias, back pain and gait problem. Skin: Negative for pallor and rash. Allergic/Immunologic: Negative for food allergies and immunocompromised state. Neurological: Negative for dizziness, numbness and headaches. Hematological: Negative for adenopathy. Does not bruise/bleed easily. Psychiatric/Behavioral: Negative for agitation and sleep disturbance. The patient is not nervous/anxious.         Past Medical History:   Diagnosis Date    Atrial fibrillation ([facility] [address]     CHF (congestive heart failure) (HCC)     Hyperlipidemia     Hypertension     Kidney stone      Past Surgical History:   Procedure Laterality Date    CARDIAC CATHETERIZATION      CARDIOVERSION  06/2019    Dr. Lynn Mayer Right     mass removed    PACEMAKER INSERTION      PROSTATE SURGERY      THYROID SURGERY      goiter removed    UPPER GASTROINTESTINAL ENDOSCOPY  10/2019    [place]      History reviewed. No pertinent family history. Social History     Tobacco Use   Smoking Status Former Smoker    Packs/day: 1.00    Years: 35.00    Pack years: 35.00    Types: Cigarettes    Last attempt to quit: 1973    Years since quittin.5   Smokeless Tobacco Never Used       OBJECTIVE:   Wt Readings from Last 3 Encounters:   20 285 lb 6.4 oz (129.5 kg)   12/10/19 294 lb 6.4 oz (133.5 kg)   10/10/19 288 lb 12.8 oz (131 kg)     BP Readings from Last 3 Encounters:   20 118/62   12/10/19 116/74   10/10/19 120/60       /62 (Site: Right Upper Arm, Position: Sitting, Cuff Size: Large Adult)   Pulse 97   Temp 98.7 °F (37.1 °C) (Temporal)   Resp 18   Wt 285 lb 6.4 oz (129.5 kg)   SpO2 96%   BMI 40.95 kg/m²      Physical Exam  Vitals signs and nursing note reviewed. Constitutional:       Appearance: Normal appearance. He is well-developed. He is obese. HENT:      Head: Normocephalic and atraumatic. Right Ear: External ear normal.      Left Ear: External ear normal.      Nose: Nose normal.   Eyes:      Conjunctiva/sclera: Conjunctivae normal.      Pupils: Pupils are equal, round, and reactive to light. Neck:      Musculoskeletal: Neck supple. No neck rigidity or muscular tenderness. Thyroid: No thyromegaly. Vascular: No JVD. Cardiovascular:      Rate and Rhythm: Normal rate and regular rhythm. Heart sounds: Normal heart sounds. No murmur. No friction rub. Pulmonary:      Effort: Pulmonary effort is normal.      Breath sounds: Normal breath sounds. No wheezing or rales. Abdominal:      General: Bowel sounds are normal.      Palpations: Abdomen is soft. Tenderness: There is no abdominal tenderness. There is no rebound. Musculoskeletal:         General: No tenderness. Lumbar back: He exhibits decreased range of motion. He exhibits no tenderness and no spasm. Right lower leg: No edema. Left lower leg: No edema. Skin:     Findings: No erythema or rash. Neurological:      General: No focal deficit present. Mental Status: He is alert and oriented to person, place, and time. Psychiatric:         Behavior: Behavior normal.         Judgment: Judgment normal.         Lab Results   Component Value Date     08/05/2020    K 5.3 08/05/2020    K 4.8 08/25/2019     08/05/2020    CO2 29 08/05/2020    GLUCOSE 94 08/05/2020    BUN 30 08/05/2020    CREATININE 1.9 08/05/2020    CALCIUM 9.6 08/05/2020    PROT 6.0 08/05/2020    LABALBU 4.0 08/05/2020    BILITOT 0.8 08/05/2020    ALT 13 08/05/2020    AST 16 08/05/2020       Hemoglobin A1C (%)   Date Value   03/14/2015 5.5     Microscopic Examination (no units)   Date Value   08/24/2019 YES         Lab Results   Component Value Date    WBC 5.7 08/05/2020    NEUTROABS 3.8 08/05/2020    HGB 13.4 08/05/2020    HCT 41.8 08/05/2020    MCV 92.9 08/05/2020     08/05/2020       Lab Results   Component Value Date    TSH 0.56 08/05/2020       ASSESSMENT/PLAN:     1. Chronic systolic heart failure (HCC)  Will cont current meds. Will have patient to monitor daily wt, edema, any worsening of ARCHER and SOA. Low Na diet and fluid restriction. Monitor renal function closely. Long discussion with patient to be very compliant with taking meds. I advised him to take Lasix 6 days per week. Take Spironolactone Monday, Wednesday, and Friday. - CBC Auto Differential; Future  - Comprehensive Metabolic Panel; Future  - TSH without Reflex; Future    2. Atrial fibrillation, unspecified type ([address]  Sinus rhythm on exam. Will cont on meds. I had a long discussion with the patient regarding the risk/benefit from anticoagulation. Patient is aware of that. - CBC Auto Differential; Future  - Comprehensive Metabolic Panel;  Future  - TSH without Reflex; Future    3. Essential hypertension  BP is stable. I have advised him on low-sodium diet, exercise and weight control. I am going to continue current medication. Will monitor his renal function every few months, have advised him to check blood pressure frequently and to keep a record of this. - CBC Auto Differential; Future  - Comprehensive Metabolic Panel; Future  - Ferritin; Future  - Iron and TIBC; Future  - TSH without Reflex; Future    4. Need for prophylactic vaccination against Streptococcus pneumoniae (pneumococcus)  Vaccine was given per request/rec after he was informed about possible SE/reaction to it. - Pneumococcal polysaccharide vaccine 23-valent PPSV23    5. Elevated PSA  PSA elevated. I am going to recheck today and refer to urology if cont to be elevated. - PSA, Prostatic Specific Antigen; Future    6. History of anemia  Mild. Check labs. Further recommendation based on test results. - CBC Auto Differential; Future  - Comprehensive Metabolic Panel; Future  - Ferritin; Future  - Iron and TIBC; Future  - TSH without Reflex; Future    7. Bilateral hip pain  I am going to treat with [place]. Get Xrays. Further recommendation based on test results. 1. Goal is to alleviate pain to a degree that the patient can participate in own ADLS social activity and improve function. 2. Risk and benefits were reviewed at length, including risk for addiction and possible side effects and interactions. Alternative therapy was discussed and will be a part of the patients overall care. Including but not limited to, physical therapy, other medications as well as behavioral and activity modification and the use of other modalities (chiropractic care ect. ). 3. This patient does not exhibit a potential for abuse or addiction at this time. Will monitor closely. 4. UDS has been compliant. Will randomly check drug screen. 5. Penny Moreno completed and compliant, will check every 3 months.    6. Advised his that UDS and possible pill counts and frequent monitoring will be a part of his care. 7. Patient to sign medication agreement and consent to treat with this office. Patient has been informed not to seek or obtain medication from other practitioners and to notify our office ASAP if this happened on emergent basis.      - HYDROcodone-acetaminophen (Antonia Jai) 5-325 MG per tablet; Take 1 tablet by mouth 2 times daily as needed for Pain for up to 30 days. Intended supply: 30 days  Dispense: 30 tablet; Refill: 0  - XR Hip Bilateral 2 VW; Future  - XR LUMBAR SPINE (MIN 4 VIEWS); Future    8. Chronic midline low back pain without sciatica  Xray was ordered today. Further recommendation based on test results. Start on Norco for pain.     - HYDROcodone-acetaminophen (NORCO) 5-325 MG per tablet; Take 1 tablet by mouth 2 times daily as needed for Pain for up to 30 days. Intended supply: 30 days  Dispense: 30 tablet; Refill: 0  - XR Hip Bilateral 2 VW; Future  - XR LUMBAR SPINE (MIN 4 VIEWS); Future      Orders Placed This Encounter   Medications    HYDROcodone-acetaminophen (NORCO) 5-325 MG per tablet     Sig: Take 1 tablet by mouth 2 times daily as needed for Pain for up to 30 days. Intended supply: 30 days     Dispense:  30 tablet     Refill:  0     Reduce doses taken as pain becomes manageable      Written by Kanu Rodrigues, acting as a scribe for Dr. Eva Rivers on 8/5/2020. I, Dr. Adela Murray, personally performed the services described in the documentation as scribed by Jeff Antony CMA, in my presence and it is both accurate and complete.

## 2020-08-20 DIAGNOSIS — I10 ESSENTIAL HYPERTENSION: ICD-10-CM

## 2020-08-20 DIAGNOSIS — I48.20 CHRONIC ATRIAL FIBRILLATION (HCC): ICD-10-CM

## 2020-08-20 DIAGNOSIS — Z95.0 CARDIAC PACEMAKER IN SITU: ICD-10-CM

## 2020-08-20 DIAGNOSIS — I50.22 CHRONIC SYSTOLIC CONGESTIVE HEART FAILURE (HCC): ICD-10-CM

## 2020-08-20 DIAGNOSIS — I48.0 PAROXYSMAL ATRIAL FIBRILLATION (HCC): ICD-10-CM

## 2020-08-20 DIAGNOSIS — I49.5 TACHY-BRADY SYNDROME (HCC): ICD-10-CM

## 2020-08-20 DIAGNOSIS — E66.01 MORBID (SEVERE) OBESITY DUE TO EXCESS CALORIES (HCC): ICD-10-CM

## 2020-08-24 RX ORDER — SPIRONOLACTONE 25 MG/1
TABLET ORAL
Qty: 45 TABLET | Refills: 1 | Status: SHIPPED | OUTPATIENT
Start: 2020-08-24 | End: 2020-12-08

## 2020-09-03 ENCOUNTER — HOSPITAL ENCOUNTER (OUTPATIENT)
Dept: GENERAL RADIOLOGY | Facility: HOSPITAL | Age: 80
Discharge: HOME | End: 2020-09-03
Payer: MEDICARE

## 2020-09-03 ENCOUNTER — HOSPITAL ENCOUNTER (OUTPATIENT)
Facility: HOSPITAL | Age: 80
Discharge: HOME | End: 2020-09-03
Payer: MEDICARE

## 2020-09-03 LAB
ANION GAP SERPL CALCULATED.3IONS-SCNC: 9 MMOL/L (ref 3–16)
BUN BLDV-MCNC: 24 MG/DL (ref 6–20)
CALCIUM SERPL-MCNC: 9.6 MG/DL (ref 8.5–10.5)
CHLORIDE BLD-SCNC: 104 MMOL/L (ref 98–107)
CO2: 30 MMOL/L (ref 20–30)
CREAT SERPL-MCNC: 1.8 MG/DL (ref 0.4–1.2)
GFR AFRICAN AMERICAN: 44
GFR NON-AFRICAN AMERICAN: 37
GLUCOSE BLD-MCNC: 90 MG/DL (ref 74–106)
POTASSIUM SERPL-SCNC: 4.7 MMOL/L (ref 3.4–5.1)
SODIUM BLD-SCNC: 143 MMOL/L (ref 136–145)

## 2020-09-03 PROCEDURE — 72110 X-RAY EXAM L-2 SPINE 4/>VWS: CPT

## 2020-09-03 PROCEDURE — 80048 BASIC METABOLIC PNL TOTAL CA: CPT

## 2020-09-03 PROCEDURE — 73521 X-RAY EXAM HIPS BI 2 VIEWS: CPT

## 2020-09-03 PROCEDURE — 36415 COLL VENOUS BLD VENIPUNCTURE: CPT

## 2020-09-08 RX ORDER — ROSUVASTATIN CALCIUM 10 MG/1
10 TABLET, COATED ORAL DAILY
Qty: 90 TABLET | Refills: 1 | Status: SHIPPED | OUTPATIENT
Start: 2020-09-08 | End: 2021-07-19

## 2020-09-28 ENCOUNTER — OFFICE VISIT (OUTPATIENT)
Dept: PRIMARY CARE CLINIC | Age: 80
End: 2020-09-28
Payer: MEDICARE

## 2020-09-28 VITALS
WEIGHT: 290.6 LBS | TEMPERATURE: 92 F | OXYGEN SATURATION: 96 % | SYSTOLIC BLOOD PRESSURE: 108 MMHG | RESPIRATION RATE: 18 BRPM | DIASTOLIC BLOOD PRESSURE: 62 MMHG | BODY MASS INDEX: 41.7 KG/M2

## 2020-09-28 PROCEDURE — 99214 OFFICE O/P EST MOD 30 MIN: CPT | Performed by: INTERNAL MEDICINE

## 2020-09-28 RX ORDER — HYDROCODONE BITARTRATE AND ACETAMINOPHEN 5; 325 MG/1; MG/1
1 TABLET ORAL 2 TIMES DAILY PRN
COMMUNITY
End: 2021-02-11

## 2020-09-28 ASSESSMENT — ENCOUNTER SYMPTOMS
SHORTNESS OF BREATH: 0
SINUS PRESSURE: 0
EYE DISCHARGE: 0
NAUSEA: 0
BACK PAIN: 1
COUGH: 0
WHEEZING: 0
VOMITING: 0
ABDOMINAL PAIN: 0

## 2020-09-28 NOTE — PROGRESS NOTES
SUBJECTIVE:    Patient ID: Joy Pierre is a 67 y.o.male. Chief Complaint   Patient presents with    Hypertension    Congestive Heart Failure    Atrial Fibrillation     HPI:  Patient has had chronic renal failure for the past few years. He continues to have good urinary output. He is not taking anything, but what was prescribed for him. BP has been stable. He has had blood work done and is here today for follow up. Patient does have an elevated PSA level. He states he has had issues in the past seeing a urologist and is not comfortable going. He was supposed to see one in the summer but he did cancel his appointment. Patient has had hypertension for several years. He has been compliant with taking medications, without side effects from it. He has been following a low-sodium, is  active and rarely exercises. His blood pressure is stable at this time. He has some shortness of breath and dyspnea on exertion that is at baseline. No orthopnea. Swelling in the lower extremities about the same compared to before. He has been compliant with taking medications, without side effects from it. He has been following a low-sodium and monitoring fluid intake/daily weight. Weight is up 5 pounds, compared to last visit. His blood pressure is stable at this time. No chest pain or palpitation. Patient is taking both Lasix and Spironolactone for the days in a row and then off for three days in a row. Patient with history of Afib. He is taking Eliquis without any side effects from medication. He denies any easy bruising or bleeding. No CP or palpitations. Patient's medications, allergies, past medical, surgical, social and family histories were reviewed and updated as appropriate in the electronic medical record/chart.         Outpatient Medications Marked as Taking for the 9/28/20 encounter (Office Visit) with Rhiannon Adame MD   Medication Sig Dispense Refill    HYDROcodone-acetaminophen ([facility]) 5-325 MG sleep disturbance. The patient is not nervous/anxious. Past Medical History:   Diagnosis Date    Atrial fibrillation ([address]     CHF (congestive heart failure) (HCC)     Hyperlipidemia     Hypertension     Kidney stone      Past Surgical History:   Procedure Laterality Date    CARDIAC CATHETERIZATION      CARDIOVERSION  2019    Dr. Charly Hardy Right     mass removed    PACEMAKER INSERTION      PROSTATE SURGERY      THYROID SURGERY      goiter removed    UPPER GASTROINTESTINAL ENDOSCOPY  10/2019    [facility]      No family history on file. Social History     Tobacco Use   Smoking Status Former Smoker    Packs/day: 1.00    Years: 35.00    Pack years: 35.00    Types: Cigarettes    Last attempt to quit: 1973    Years since quittin.6   Smokeless Tobacco Never Used       OBJECTIVE:   Wt Readings from Last 3 Encounters:   20 290 lb 9.6 oz (131.8 kg)   20 285 lb 6.4 oz (129.5 kg)   12/10/19 294 lb 6.4 oz (133.5 kg)     BP Readings from Last 3 Encounters:   20 108/62   20 118/62   12/10/19 116/74       /62 (Site: Right Upper Arm, Position: Sitting, Cuff Size: Large Adult)   Temp 92 °F (33.3 °C)   Resp 18   Wt 290 lb 9.6 oz (131.8 kg)   SpO2 96%   BMI 41.70 kg/m²      Physical Exam  Vitals signs and nursing note reviewed. Constitutional:       Appearance: Normal appearance. He is well-developed. He is obese. HENT:      Head: Normocephalic and atraumatic. Right Ear: External ear normal.      Left Ear: External ear normal.      Nose: Nose normal.   Eyes:      Conjunctiva/sclera: Conjunctivae normal.      Pupils: Pupils are equal, round, and reactive to light. Neck:      Musculoskeletal: Neck supple. No neck rigidity or muscular tenderness. Thyroid: No thyromegaly. Vascular: No JVD. Cardiovascular:      Rate and Rhythm: Normal rate and regular rhythm.       Heart sounds: Normal heart sounds. No murmur. No friction rub. Pulmonary:      Effort: Pulmonary effort is normal.      Breath sounds: Normal breath sounds. No wheezing or rales. Abdominal:      General: Bowel sounds are normal.      Palpations: Abdomen is soft. Tenderness: There is no abdominal tenderness. There is no rebound. Musculoskeletal:         General: No tenderness. Lumbar back: He exhibits decreased range of motion. He exhibits no tenderness and no spasm. Right lower leg: No edema. Left lower leg: No edema. Skin:     Findings: No erythema or rash. Neurological:      General: No focal deficit present. Mental Status: He is alert and oriented to person, place, and time. Psychiatric:         Behavior: Behavior normal.         Judgment: Judgment normal.         Lab Results   Component Value Date     09/03/2020    K 4.7 09/03/2020    K 4.8 08/25/2019     09/03/2020    CO2 30 09/03/2020    GLUCOSE 90 09/03/2020    BUN 24 09/03/2020    CREATININE 1.8 09/03/2020    CALCIUM 9.6 09/03/2020    PROT 6.0 08/05/2020    LABALBU 4.0 08/05/2020    BILITOT 0.8 08/05/2020    ALT 13 08/05/2020    AST 16 08/05/2020       Hemoglobin A1C (%)   Date Value   03/14/2015 5.5     Microscopic Examination (no units)   Date Value   08/24/2019 YES         Lab Results   Component Value Date    WBC 5.7 08/05/2020    NEUTROABS 3.8 08/05/2020    HGB 13.4 08/05/2020    HCT 41.8 08/05/2020    MCV 92.9 08/05/2020     08/05/2020       Lab Results   Component Value Date    TSH 0.56 08/05/2020       ASSESSMENT/PLAN:     1. Chronic renal failure, stage 3 (moderate)  Slightly worsening. I have advised him to avoid any nephrotoxic agents. I am going to monitor renal function periodically. I will make sure that the blood pressure and other medical problems are under good control. Will refer to nephrology if the renal function should begin to deteriorate. Creatinine will be checked in two weeks.  Last Creatinine is Lab Results   Component Value Date    CREATININE 1.8 (H) 09/03/2020   I am going to adjust his regimen to be taken more evenly throughout the week not 3 days on/3 days off.     - Comprehensive Metabolic Panel; Future    2. Elevated PSA  Check PSA on a yearly basis. HUYEN every year if agreable. Referral made to urology (patient refused in the past). - External Referral To Urology    3. Essential hypertension  BP is stable. I have advised him on low-sodium diet, exercise and weight control. I am going to continue current medication. Will monitor his renal function every few months, have advised him to check blood pressure frequently and to keep a record of this. - Comprehensive Metabolic Panel; Future    4. Chronic systolic heart failure (HCC)  Will cont current meds. Will have patient to monitor daily wt, edema, any worsening of ARCHER and SOA. Low Na diet and fluid restriction. Monitor renal function closely. Long discussion with patient to be very compliant with taking meds. Advised patient to take Lasix 5 days per week. Take spironolactone 0.5 tablet on Monday and Friday. - Comprehensive Metabolic Panel; Future    5. Atrial fibrillation, unspecified type ([address]  Sinus rhythm on exam. Will cont on meds. I had a long discussion with the patient regarding the risk/benefit from anticoagulation. Patient is aware of that. Written by Phuong Penaloza, acting as a scribe for Dr. Trinity Brothers on 9/28/2020 at 10:41 AM.     I, Dr. Imer Archer, personally performed the services described in the documentation as scribed by Kayli Solano CMA, in my presence and it is both accurate and complete.

## 2020-09-28 NOTE — PROGRESS NOTES
Chief Complaint   Patient presents with    Hypertension    Congestive Heart Failure    Atrial Fibrillation       Have you seen any other physician or provider since your last visit no    Have you had any other diagnostic tests since your last visit? yes - labs xray    Have you changed or stopped any medications since your last visit? no     I have recommended that this patient have a immunization for influenza but he declines at this time. I have discussed the risks and benefits of this examination with him. The patient verbalizes understanding.

## 2020-10-07 ENCOUNTER — OFFICE VISIT (OUTPATIENT)
Dept: CARDIOLOGY | Facility: CLINIC | Age: 80
End: 2020-10-07

## 2020-10-07 ENCOUNTER — HOSPITAL ENCOUNTER (OUTPATIENT)
Facility: HOSPITAL | Age: 80
Discharge: HOME | End: 2020-10-07
Payer: MEDICARE

## 2020-10-07 VITALS
SYSTOLIC BLOOD PRESSURE: 118 MMHG | DIASTOLIC BLOOD PRESSURE: 72 MMHG | HEIGHT: 70 IN | BODY MASS INDEX: 41.95 KG/M2 | RESPIRATION RATE: 18 BRPM | OXYGEN SATURATION: 97 % | WEIGHT: 293 LBS | HEART RATE: 91 BPM

## 2020-10-07 DIAGNOSIS — I10 ESSENTIAL HYPERTENSION: ICD-10-CM

## 2020-10-07 DIAGNOSIS — I49.5 TACHY-BRADY SYNDROME (HCC): ICD-10-CM

## 2020-10-07 DIAGNOSIS — Z95.0 CARDIAC PACEMAKER IN SITU: ICD-10-CM

## 2020-10-07 DIAGNOSIS — I48.0 PAROXYSMAL ATRIAL FIBRILLATION (HCC): Primary | ICD-10-CM

## 2020-10-07 DIAGNOSIS — I50.22 CHRONIC SYSTOLIC CONGESTIVE HEART FAILURE (HCC): ICD-10-CM

## 2020-10-07 PROCEDURE — 93280 PM DEVICE PROGR EVAL DUAL: CPT | Performed by: INTERNAL MEDICINE

## 2020-10-07 PROCEDURE — 93005 ELECTROCARDIOGRAM TRACING: CPT

## 2020-10-07 PROCEDURE — 99214 OFFICE O/P EST MOD 30 MIN: CPT | Performed by: INTERNAL MEDICINE

## 2020-10-07 RX ORDER — HYDROCODONE BITARTRATE AND ACETAMINOPHEN 5; 325 MG/1; MG/1
1 TABLET ORAL
COMMUNITY
End: 2023-03-17 | Stop reason: HOSPADM

## 2020-10-07 NOTE — PROGRESS NOTES
Subjective:     Encounter Date:10/07/2020      Patient ID: Bobby Dodd is a 79 y.o. male.    Chief Complaint: Atrial fibrillation  HPI  This is a 79-year-old male patient with a longstanding history of atrial fibrillation who presents today for pacemaker interrogation.  The patient has a [employer] dual-chamber rate responsive pacemaker [employer] DR- 2210.  Pacemaker interrogation demonstrates that he is paced in the right atrium 34% of the time all prior to February of this year.  He is paced 65% of the time in the right ventricle.  The right atrial lead interrogation shows a lead impedance of 390 ohms as he is currently in atrial fibrillation.  Right ventricular lead interrogation shows R wave sensing of greater than 12 mV with a pacing threshold of 0.75 V at 0.5 ms.  Lead impedance is 440 ohms.  The patient has an estimated 1.5 years of generator longevity.  The patient's overall atrial fibrillation burden is 45%.  He has been in atrial fibrillation 167 days since October 2019.  He has been in permanent atrial fibrillation since February of this year.  The patient indicates that he feels no different from day-to-day, week to week or month-to-month.  He is unaware when his heart is out of rhythm  in atrial fibrillation.  The patient has no chest discomfort at rest or with activity.  There is no exertional chest arm neck jaw shoulder or back discomfort.  There is no orthopnea PND or lower extremity edema.  There is no dizziness palpitations or syncope.He reports compliance with his medications with no perceived side effects.The following portions of the patient's history were reviewed and updated as appropriate: allergies, current medications, past family history, past medical history, past social history, past surgical history and problem  Review of Systems   Constitution: Negative for chills, diaphoresis, fever, malaise/fatigue, weight gain and weight loss.   HENT: Negative for ear discharge, hearing  loss, hoarse voice and nosebleeds.    Eyes: Negative for discharge, double vision, pain and photophobia.   Cardiovascular: Negative for chest pain, claudication, cyanosis, dyspnea on exertion, irregular heartbeat, leg swelling, near-syncope, orthopnea, palpitations, paroxysmal nocturnal dyspnea and syncope.   Respiratory: Negative for cough, hemoptysis, shortness of breath, sputum production and wheezing.    Endocrine: Negative for cold intolerance, heat intolerance, polydipsia, polyphagia and polyuria.   Hematologic/Lymphatic: Negative for adenopathy and bleeding problem. Does not bruise/bleed easily.   Skin: Negative for color change, flushing, itching and rash.   Musculoskeletal: Negative for muscle cramps, muscle weakness, myalgias and stiffness.   Gastrointestinal: Negative for abdominal pain, diarrhea, hematemesis, hematochezia, nausea and vomiting.   Genitourinary: Negative for dysuria, frequency and nocturia.   Neurological: Negative for focal weakness, loss of balance, numbness, paresthesias and seizures.   Psychiatric/Behavioral: Negative for altered mental status, hallucinations and suicidal ideas.   Allergic/Immunologic: Negative for HIV exposure, hives and persistent infections.           Current Outpatient Medications:   •  amiodarone (PACERONE) 200 MG tablet, TAKE 1 TABLET BY MOUTH DAILY., Disp: 90 tablet, Rfl: 4  •  DICLOFENAC PO, Take 75 mg by mouth., Disp: , Rfl:   •  ELIQUIS 5 MG tablet tablet, TAKE ONE TABLET BY MOUTH EVERY 12 HOURS, Disp: 180 tablet, Rfl: 4  •  ferrous sulfate 324 (65 Fe) MG tablet delayed-release EC tablet, Take 324 mg by mouth 2 (Two) Times a Day With Meals., Disp: , Rfl:   •  folic acid (FOLVITE) 1 MG tablet, Take 1 mg by mouth Daily., Disp: , Rfl:   •  furosemide (LASIX) 40 MG tablet, Take 1 tablet by mouth Daily., Disp: 90 tablet, Rfl: 4  •  HYDROcodone-acetaminophen (NORCO) 5-325 MG per tablet, Take 1 tablet by mouth., Disp: , Rfl:   •  nebivolol (Bystolic) 5 MG tablet,  "Take 1 tablet by mouth Daily., Disp: 90 tablet, Rfl: 3  •  pantoprazole (PROTONIX) 40 MG EC tablet, Take 40 mg by mouth Daily., Disp: , Rfl:   •  rosuvastatin (CRESTOR) 10 MG tablet, TAKE 1 TABLET BY MOUTH DAILY., Disp: 90 tablet, Rfl: 1  •  sacubitril-valsartan (Entresto) 24-26 MG tablet, Take 1 tablet by mouth 2 (Two) Times a Day for 180 days., Disp: 60 tablet, Rfl: 5  •  spironolactone (ALDACTONE) 25 MG tablet, TAKE ONE-HALF TABLET BY MOUTH EVERY DAY, Disp: 45 tablet, Rfl: 1    Objective:   Vitals signs and nursing note reviewed.   Constitutional:       Appearance: Healthy appearance. Not in distress.   Neck:      Vascular: No JVR. JVD normal.   Pulmonary:      Effort: Pulmonary effort is normal.      Breath sounds: Normal breath sounds. No wheezing. No rhonchi. No rales.   Chest:      Chest wall: Not tender to palpatation.   Cardiovascular:      PMI at left midclavicular line. Normal rate. Regular rhythm. Normal S1. Normal S2.      Murmurs: There is no murmur.      No gallop. No click. No rub.   Pulses:     Intact distal pulses.   Edema:     Peripheral edema absent.   Abdominal:      General: Bowel sounds are normal.      Palpations: Abdomen is soft.      Tenderness: There is no abdominal tenderness.   Musculoskeletal: Normal range of motion.         General: No tenderness.   Skin:     General: Skin is warm and dry.   Neurological:      General: No focal deficit present.      Mental Status: Alert and oriented to person, place and time.       Blood pressure 118/72, pulse 91, resp. rate 18, height 177.8 cm (70\"), weight 133 kg (293 lb), SpO2 97 %.   Lab Review:     Assessment:       1. Paroxysmal atrial fibrillation (CMS/HCC)  Prior to February of this year the patient was experiencing paroxysmal atrial fibrillation.  Device interrogation indicates that he has been in atrial fibrillation since his last defibrillator interrogation.  The patient has been in atrial fibrillation for 167 days.  He is anticoagulated.  " "The patient indicates that he feels no different when his heart is in rhythm versus \"out of rhythm\".    2. Tachy-mercedes syndrome (CMS/HCC)  Normal pacemaker function.    3. Essential hypertension  Excellent blood pressure control.    4. Chronic systolic congestive heart failure (CMS/HCC)  Heart failure with reduced ejection fraction.  Stage C.  New York Heart Association functional class I symptoms.  Euvolemic.      ECG 12 Lead    Date/Time: 10/7/2020 2:31 PM  Performed by: José Miguel Martel MD  Authorized by: José Miguel Martel MD   Previous ECG: no previous ECG available  Rhythm: atrial fibrillation and paced  Rate: normal  QRS axis: left  Comments: Ventricular paced rhythm with baseline atrial fibrillation            Plan:     Advance Care Planning   ACP discussion was held with the patient during this visit. Patient has an advance directive (not in EMR), copy requested.  No changes to his medication therapy have been made at today's visit.  No additional cardiovascular testing is warranted.      "

## 2020-10-12 RX ORDER — FERROUS SULFATE 325(65) MG
325 TABLET ORAL
Qty: 30 TABLET | Refills: 5 | Status: SHIPPED | OUTPATIENT
Start: 2020-10-12 | End: 2020-11-03

## 2020-10-14 RX ORDER — DICLOFENAC SODIUM 75 MG/1
75 TABLET, DELAYED RELEASE ORAL 2 TIMES DAILY
Qty: 60 TABLET | Refills: 3 | OUTPATIENT
Start: 2020-10-14

## 2020-10-14 NOTE — TELEPHONE ENCOUNTER
No NSAIDs with renal failure. Please notify not to take anything related to NSAIDs. Tylenol is fine. [facility] Note - [facility]   Jesus Winter [id number] MRN: [id number]      Reason for Visit:  69 y  o female here for annual follow-up    Assessment:  Doing well on current therapy of APAP 12 to 16 cm for mild obstructive sleep apnea (AHI = 12 9, diagnosed )  Plan:  Continue same  She is doing extremely well with her current settings and with her current equipment    Follow up: One year    History of Present Illness:  History of FAYE on PAP therapy  Fully compliant and deriving benefit  Review of Systems      Genitourinary none   Cardiology ankle/leg swelling   Gastrointestinal abdominal pain or cramping that disturb sleep    Neurology frequent headaches, muscle weakness, numbness/tingling of an extremity and balance problems   Constitutional fatigue and excessive sweating at night   Integumentary none   Psychiatry anxiety and depression   Musculoskeletal joint pain, muscle aches, back pain and leg cramps   Pulmonary shortness of breath with activity, chest tightness and difficulty breathing when lying flat    ENT none   Endocrine frequent urination   Hematological none         I have reviewed and updated the review of systems as necessary      Historical Information    Past Medical History: History reviewed  No pertinent past medical history  Past Surgical History: History reviewed  No pertinent surgical history  Social History:   Social History     Socioeconomic History    Marital status:       Spouse name: None    Number of children: None    Years of education: None    Highest education level: None   Occupational History    None   Social Needs    Financial resource strain: None    Food insecurity:     Worry: None     Inability: None    Transportation needs:     Medical: None     Non-medical: None   Tobacco Use    Smoking status: Former Smoker     Last attempt to quit: 1969     Years since quittin 1    Smokeless tobacco: Never Used   Substance and Sexual Activity    Alcohol use: No    Drug use: No    Sexual activity: None   Lifestyle    Physical activity:     Days per week: None     Minutes per session: None    Stress: None   Relationships    Social connections:     Talks on phone: None     Gets together: None     Attends Mosque service: None     Active member of club or organization: None     Attends meetings of clubs or organizations: None     Relationship status: None    Intimate partner violence:     Fear of current or ex partner: None     Emotionally abused: None     Physically abused: None     Forced sexual activity: None   Other Topics Concern    None   Social History Narrative    None       Family History: History reviewed  No pertinent family history      Medications/Allergies:      Current Outpatient Medications:     Ascorbic Acid, Vitamin C, (VITAMIN C) 100 MG tablet, Take 1,500 mg by mouth, Disp: , Rfl:     aspirin 81 mg chewable tablet, Daily, Disp: , Rfl:     B Complex Vitamins (VITAMIN B COMPLEX PO), take one tablet once daily, Disp: , Rfl:     Cholecalciferol 33103 units TABS, Take 50,000 Units by mouth, Disp: , Rfl:     Cinnamon 500 MG capsule, Take 500 mg by mouth daily, Disp: , Rfl:     Coenzyme Q10 200 MG capsule, Take 200 mg by mouth, Disp: , Rfl:     fluticasone (FLONASE) 50 mcg/act nasal spray, fluticasone propionate 50 mcg/actuation nasal spray,suspension, Disp: , Rfl:     hydrochlorothiazide (HYDRODIURIL) 12 5 mg tablet, hydrochlorothiazide 12 5 mg tablet, Disp: , Rfl:     lidocaine (LIDODERM) 5 %, Place 1 patch on the skin every 12 (twelve) hours, Disp: , Rfl:     magnesium oxide (MAG-OX) 400 mg tablet, Take 400 mg by mouth 2 (two) times a day, Disp: , Rfl:     methylphenidate (RITALIN) 5 mg tablet, Take 5 mg by mouth daily, Disp: , Rfl:     Multiple Vitamins-Minerals (MULTIVITAMIN ADULT PO), Daily, Disp: , Rfl:     mupirocin (BACTROBAN) 2 % ointment, APPLY TO INFECTED  AREA DAILY AS DIRECTED, Disp: , Rfl:     nystatin (nystatin) powder, Nyamyc 100,000 unit/gram topical powder, Disp: , Rfl:     omeprazole (PriLOSEC) 40 MG capsule, , Disp: , Rfl:     thyroid desiccated (ARMOUR THYROID) 30 mg tablet, Daily, Disp: , Rfl:     traMADol (ULTRAM) 50 mg tablet, TAKE 1 TO 2 TABLETS EVERY 6 HOURS AS NEEDED FOR PAIN, Disp: , Rfl: 0    allopurinol (ZYLOPRIM) 100 mg tablet, , Disp: , Rfl:     allopurinol (ZYLOPRIM) 100 mg tablet, Take 100 mg by mouth, Disp: , Rfl:     DOCOSAHEXAENOIC ACID PO, 6 caps daily, Disp: , Rfl:     lisinopril-hydrochlorothiazide (PRINZIDE,ZESTORETIC) 20-25 MG per tablet, Daily, Disp: , Rfl:     metFORMIN (GLUCOPHAGE) 500 mg tablet, , Disp: , Rfl:           Objective      Vital Signs:   Vitals:    03/12/20 1200   BP: 160/60   Pulse: (!) 48     [place] Sleepiness Scale: Total score: 4        Physical Exam:    General: Alert, appropriate, cooperative, overweight    Head: NC/AT    Skin: Warm, dry    Neuro: No motor abnormalities, cranial nerves appear intact    Extremity: No clubbing, cyanosis      DME Provider: [employer] [facility]        Counseling / Coordination of Care   I have spent 15 minutes with the patient today in which greater than 50% of this time was spent in counseling/coordination of care regarding: equipment and compliance  Board Certified Sleep Specialist    Portions of the record may have been created with voice recognition software  Occasional wrong word or "sound a like" substitutions may have occurred due to the inherent limitations of voice recognition software  Read the chart carefully and recognize, using context, where substitutions have occurred

## 2020-10-26 RX ORDER — NEBIVOLOL HYDROCHLORIDE 10 MG/1
10 TABLET ORAL DAILY
Qty: 90 TABLET | Refills: 3 | Status: SHIPPED | OUTPATIENT
Start: 2020-10-26 | End: 2021-08-17

## 2020-10-29 RX ORDER — FUROSEMIDE 40 MG/1
40 TABLET ORAL DAILY
Qty: 30 TABLET | Refills: 5 | Status: SHIPPED | OUTPATIENT
Start: 2020-10-29 | End: 2021-06-18

## 2020-11-03 RX ORDER — PANTOPRAZOLE SODIUM 40 MG/1
TABLET, DELAYED RELEASE ORAL
Qty: 30 TABLET | Refills: 3 | Status: SHIPPED | OUTPATIENT
Start: 2020-11-03 | End: 2021-03-20

## 2020-11-03 RX ORDER — FOLIC ACID 1 MG/1
1 TABLET ORAL DAILY
Qty: 30 TABLET | Refills: 3 | Status: SHIPPED | OUTPATIENT
Start: 2020-11-03 | End: 2021-03-20

## 2020-11-03 RX ORDER — FERROUS SULFATE 325(65) MG
TABLET ORAL
Qty: 60 TABLET | Refills: 0 | Status: SHIPPED | OUTPATIENT
Start: 2020-11-03 | End: 2021-04-14

## 2020-12-08 DIAGNOSIS — I48.0 PAROXYSMAL ATRIAL FIBRILLATION (HCC): ICD-10-CM

## 2020-12-08 DIAGNOSIS — I49.5 TACHY-BRADY SYNDROME (HCC): ICD-10-CM

## 2020-12-08 DIAGNOSIS — E66.01 MORBID (SEVERE) OBESITY DUE TO EXCESS CALORIES (HCC): ICD-10-CM

## 2020-12-08 DIAGNOSIS — I50.22 CHRONIC SYSTOLIC CONGESTIVE HEART FAILURE (HCC): ICD-10-CM

## 2020-12-08 DIAGNOSIS — I10 ESSENTIAL HYPERTENSION: ICD-10-CM

## 2020-12-08 DIAGNOSIS — I48.20 CHRONIC ATRIAL FIBRILLATION (HCC): ICD-10-CM

## 2020-12-08 DIAGNOSIS — Z95.0 CARDIAC PACEMAKER IN SITU: ICD-10-CM

## 2020-12-08 RX ORDER — SPIRONOLACTONE 25 MG/1
TABLET ORAL
Qty: 45 TABLET | Refills: 3 | Status: SHIPPED | OUTPATIENT
Start: 2020-12-08 | End: 2022-02-28

## 2020-12-10 ENCOUNTER — HOSPITAL ENCOUNTER (OUTPATIENT)
Facility: HOSPITAL | Age: 80
Discharge: HOME | End: 2020-12-10
Payer: MEDICARE

## 2020-12-10 ENCOUNTER — OFFICE VISIT (OUTPATIENT)
Dept: PRIMARY CARE CLINIC | Age: 80
End: 2020-12-10
Payer: MEDICARE

## 2020-12-10 VITALS
SYSTOLIC BLOOD PRESSURE: 106 MMHG | DIASTOLIC BLOOD PRESSURE: 70 MMHG | OXYGEN SATURATION: 98 % | HEART RATE: 90 BPM | RESPIRATION RATE: 18 BRPM | TEMPERATURE: 98.4 F | WEIGHT: 300 LBS | BODY MASS INDEX: 43.05 KG/M2

## 2020-12-10 PROCEDURE — 80053 COMPREHEN METABOLIC PANEL: CPT

## 2020-12-10 PROCEDURE — 99213 OFFICE O/P EST LOW 20 MIN: CPT | Performed by: INTERNAL MEDICINE

## 2020-12-10 ASSESSMENT — ENCOUNTER SYMPTOMS
BACK PAIN: 1
WHEEZING: 0
EYE DISCHARGE: 0
ABDOMINAL PAIN: 0
SINUS PRESSURE: 0
NAUSEA: 0
SHORTNESS OF BREATH: 0
COUGH: 0
VOMITING: 0

## 2020-12-10 NOTE — PROGRESS NOTES
Chief Complaint   Patient presents with    Hypertension    Congestive Heart Failure    Atrial Fibrillation       Have you seen any other physician or provider since your last visit no    Have you had any other diagnostic tests since your last visit? no    Have you changed or stopped any medications since your last visit? no     I have recommended that this patient have a flu shot but he declines at this time. I have discussed the risks and benefits of this examination with him.  The patient verbalizes understanding.  scheduled

## 2020-12-10 NOTE — PROGRESS NOTES
SUBJECTIVE:    Patient ID: Hipolito Akins is a 79 y.o.male. Chief Complaint   Patient presents with    Hypertension    Congestive Heart Failure    Atrial Fibrillation         HPI:  Patient with a chronic history of CHF. Patient reported being compliant with taking his medications and monitoring his fluid intake/sodium intake. His weight has been gradually trending up over the past few months. His diuretics were decreased after his last blood work due to worsening renal function. He reported chronic dyspnea on exertion which is at baseline. Patient reported that he has been eating slightly more and not been as active as he used to. Patient has had chronic renal failure for the past few years. He continues to have good urinary output. He is not taking anything, but what was prescribed for him. Patient did have some worsening renal function on his last blood work. Diuretics were adjusted and he was supposed to have follow-up labs but he never did. Patient has had hypertension for several years. He has been compliant with taking medications, without side effects from it. He has been following a low-sodium, is  active and rarely exercises. His blood pressure is stable at this time. Patient with history of Afib. He is taking Eliquis without any side effects from medication. He denies any easy bruising or bleeding. No CP or palpitations. Patient's medications, allergies, past medical, surgical, social and family histories were reviewed and updated as appropriate in the electronic medical record/chart.         Outpatient Medications Marked as Taking for the 12/10/20 encounter (Office Visit) with Ruid Vidal MD   Medication Sig Dispense Refill    pantoprazole (PROTONIX) 40 MG tablet TAKE ONE TABLET BY MOUTH EVERY MORNING BEFORE BREAKFAST 30 tablet 3    FEROSUL 325 (65 Fe) MG tablet TAKE ONE TABLET BY MOUTH TWO TIMES A DAY WITH MEALS 60 tablet 0    folic acid (FOLVITE) 1 MG tablet TAKE 1 TABLET BY MOUTH DAILY 30 tablet 3    furosemide (LASIX) 40 MG tablet TAKE 1 TABLET BY MOUTH DAILY 30 tablet 5    HYDROcodone-acetaminophen (NORCO) 5-325 MG per tablet Take 1 tablet by mouth 2 times daily as needed for Pain.  nebivolol (BYSTOLIC) 5 MG tablet Take 5 mg by mouth daily      spironolactone (ALDACTONE) 25 MG tablet Take 0.5 tablets by mouth daily      sacubitril-valsartan (ENTRESTO) 24-26 MG per tablet Take 1 tablet by mouth every 12 hours      apixaban (ELIQUIS) 5 MG TABS tablet Take by mouth 2 times daily      amiodarone (CORDARONE) 200 MG tablet Take 200 mg by mouth daily      rosuvastatin (CRESTOR) 10 MG tablet Take 10 mg by mouth daily          Review of Systems   Constitutional: Negative for chills, fatigue and fever. HENT: Negative for congestion, ear pain and sinus pressure. Eyes: Negative for discharge and visual disturbance. Respiratory: Negative for cough, shortness of breath and wheezing. Cardiovascular: Positive for leg swelling (Occasional). Negative for chest pain and palpitations. ARCHER   Gastrointestinal: Negative for abdominal pain, nausea and vomiting. Endocrine: Negative for cold intolerance and heat intolerance. Genitourinary: Positive for difficulty urinating. Negative for dysuria, frequency and urgency. Musculoskeletal: Positive for arthralgias, back pain and gait problem. Skin: Negative for pallor and rash. Allergic/Immunologic: Negative for food allergies and immunocompromised state. Neurological: Negative for dizziness, numbness and headaches. Hematological: Negative for adenopathy. Does not bruise/bleed easily. Psychiatric/Behavioral: Negative for agitation and sleep disturbance. The patient is not nervous/anxious.         Past Medical History:   Diagnosis Date    Atrial fibrillation ([address]     CHF (congestive heart failure) (HCC)     Hyperlipidemia     Hypertension     Kidney stone      Past Surgical History:   Procedure Laterality Date  CARDIAC CATHETERIZATION      CARDIOVERSION  2019    Dr. Darrel Kinsey Right     mass removed    PACEMAKER INSERTION      PROSTATE SURGERY      THYROID SURGERY      goiter removed    UPPER GASTROINTESTINAL ENDOSCOPY  10/2019    Ben      No family history on file. Social History     Tobacco Use   Smoking Status Former Smoker    Packs/day: 1.00    Years: 35.00    Pack years: 35.00    Types: Cigarettes    Last attempt to quit: 1973    Years since quittin.8   Smokeless Tobacco Never Used       OBJECTIVE:   Wt Readings from Last 3 Encounters:   12/10/20 300 lb (136.1 kg)   20 290 lb 9.6 oz (131.8 kg)   20 285 lb 6.4 oz (129.5 kg)     BP Readings from Last 3 Encounters:   12/10/20 106/70   20 108/62   20 118/62       /70 (Site: Left Upper Arm, Position: Sitting, Cuff Size: Medium Adult)   Pulse 90   Temp 98.4 °F (36.9 °C) (Temporal)   Resp 18   Wt 300 lb (136.1 kg)   SpO2 98%   BMI 43.05 kg/m²      Physical Exam  Vitals signs and nursing note reviewed. Constitutional:       Appearance: Normal appearance. He is well-developed. He is obese. HENT:      Head: Normocephalic and atraumatic. Right Ear: External ear normal.      Left Ear: External ear normal.      Nose: Nose normal.   Eyes:      Conjunctiva/sclera: Conjunctivae normal.      Pupils: Pupils are equal, round, and reactive to light. Neck:      Musculoskeletal: Neck supple. No neck rigidity or muscular tenderness. Thyroid: No thyromegaly. Vascular: No JVD. Cardiovascular:      Rate and Rhythm: Normal rate and regular rhythm. Heart sounds: Normal heart sounds. No murmur. No friction rub. Pulmonary:      Effort: Pulmonary effort is normal.      Breath sounds: Normal breath sounds. No wheezing or rales. Abdominal:      General: Bowel sounds are normal.      Palpations: Abdomen is soft. Tenderness:  There is no abdominal tenderness. There is no rebound. Musculoskeletal:         General: No tenderness. Lumbar back: He exhibits decreased range of motion. He exhibits no tenderness and no spasm. Right lower leg: Edema (trace) present. Left lower leg: Edema (Trace) present. Skin:     Findings: No erythema or rash. Neurological:      General: No focal deficit present. Mental Status: He is alert and oriented to person, place, and time. Psychiatric:         Behavior: Behavior normal.         Judgment: Judgment normal.         Lab Results   Component Value Date     09/03/2020    K 4.7 09/03/2020    K 4.8 08/25/2019     09/03/2020    CO2 30 09/03/2020    GLUCOSE 90 09/03/2020    BUN 24 09/03/2020    CREATININE 1.8 09/03/2020    CALCIUM 9.6 09/03/2020    PROT 6.0 08/05/2020    LABALBU 4.0 08/05/2020    BILITOT 0.8 08/05/2020    ALT 13 08/05/2020    AST 16 08/05/2020       Hemoglobin A1C (%)   Date Value   03/14/2015 5.5     Microscopic Examination (no units)   Date Value   08/24/2019 YES         Lab Results   Component Value Date    WBC 5.7 08/05/2020    NEUTROABS 3.8 08/05/2020    HGB 13.4 08/05/2020    HCT 41.8 08/05/2020    MCV 92.9 08/05/2020     08/05/2020       Lab Results   Component Value Date    TSH 0.56 08/05/2020       ASSESSMENT/PLAN:     1. Chronic systolic heart failure (HCC)  Will cont current meds. Will have patient to monitor daily wt, edema, any worsening of ARCHER and SOA. Low Na diet and fluid restriction. Monitor renal function closely. Long discussion with patient to be very compliant with taking meds. 2. Essential hypertension  BP is stable. I have advised him on low-sodium diet, exercise and weight control. I am going to continue current medication. Will monitor his renal function every few months, have advised him to check blood pressure frequently and to keep a record of this.      3. Atrial fibrillation, unspecified type ([facility] [address]  Sinus rhythm on exam. Will cont on meds. I had a long discussion with the patient regarding the risk/benefit from anticoagulation. Patient is aware of that. 4. Elevated PSA  Patient did not want to see urology due to outbreak. Will call and schedule when outbreak slows down. Patient is aware that cancer is on the differential diagnosis. Written by Jacqui Manjarrez, acting as a scribe for Dr. Lucy Comer on 12/10/2020 at 4:30 PM.     I, Dr. Meg Benavidez, personally performed the services described in the documentation as scribed by Jessica Kilpatrick CMA, in my presence and it is both accurate and complete.

## 2020-12-11 PROBLEM — R97.20 ELEVATED PSA: Status: ACTIVE | Noted: 2020-12-11

## 2020-12-11 LAB
A/G RATIO: 2.1 (ref 0.8–2)
ALBUMIN SERPL-MCNC: 3.8 G/DL (ref 3.4–4.8)
ALP BLD-CCNC: 84 U/L (ref 25–100)
ALT SERPL-CCNC: 15 U/L (ref 4–36)
ANION GAP SERPL CALCULATED.3IONS-SCNC: 9 MMOL/L (ref 3–16)
AST SERPL-CCNC: 18 U/L (ref 8–33)
BILIRUB SERPL-MCNC: 0.4 MG/DL (ref 0.3–1.2)
BUN BLDV-MCNC: 20 MG/DL (ref 6–20)
CALCIUM SERPL-MCNC: 9 MG/DL (ref 8.5–10.5)
CHLORIDE BLD-SCNC: 103 MMOL/L (ref 98–107)
CO2: 29 MMOL/L (ref 20–30)
CREAT SERPL-MCNC: 1.5 MG/DL (ref 0.4–1.2)
GFR AFRICAN AMERICAN: 55
GFR NON-AFRICAN AMERICAN: 45
GLOBULIN: 1.8 G/DL
GLUCOSE BLD-MCNC: 92 MG/DL (ref 74–106)
POTASSIUM SERPL-SCNC: 4.5 MMOL/L (ref 3.4–5.1)
SODIUM BLD-SCNC: 141 MMOL/L (ref 136–145)
TOTAL PROTEIN: 5.6 G/DL (ref 6.4–8.3)

## 2021-01-07 RX ORDER — AMIODARONE HYDROCHLORIDE 200 MG/1
200 TABLET ORAL DAILY
Qty: 90 TABLET | Refills: 3 | Status: SHIPPED | OUTPATIENT
Start: 2021-01-07 | End: 2021-10-29

## 2021-01-12 ENCOUNTER — OFFICE VISIT (OUTPATIENT)
Dept: PRIMARY CARE CLINIC | Age: 81
End: 2021-01-12
Payer: MEDICARE

## 2021-01-12 ENCOUNTER — HOSPITAL ENCOUNTER (OUTPATIENT)
Facility: HOSPITAL | Age: 81
Discharge: HOME | End: 2021-01-12
Payer: MEDICARE

## 2021-01-12 VITALS
BODY MASS INDEX: 42.61 KG/M2 | OXYGEN SATURATION: 96 % | DIASTOLIC BLOOD PRESSURE: 70 MMHG | SYSTOLIC BLOOD PRESSURE: 108 MMHG | RESPIRATION RATE: 16 BRPM | WEIGHT: 297.6 LBS | TEMPERATURE: 97.3 F | HEIGHT: 70 IN | HEART RATE: 91 BPM

## 2021-01-12 DIAGNOSIS — R97.20 ELEVATED PSA: ICD-10-CM

## 2021-01-12 DIAGNOSIS — I50.22 CHRONIC SYSTOLIC HEART FAILURE (HCC): ICD-10-CM

## 2021-01-12 DIAGNOSIS — Z72.89 OTHER PROBLEMS RELATED TO LIFESTYLE: ICD-10-CM

## 2021-01-12 DIAGNOSIS — Z00.00 ROUTINE GENERAL MEDICAL EXAMINATION AT A HEALTH CARE FACILITY: Primary | ICD-10-CM

## 2021-01-12 DIAGNOSIS — Z23 NEED FOR PROPHYLACTIC VACCINATION AGAINST DIPHTHERIA-TETANUS-PERTUSSIS (DTP): ICD-10-CM

## 2021-01-12 DIAGNOSIS — Z23 NEED FOR PROPHYLACTIC VACCINATION AND INOCULATION AGAINST VARICELLA: ICD-10-CM

## 2021-01-12 DIAGNOSIS — Z00.00 ROUTINE GENERAL MEDICAL EXAMINATION AT A HEALTH CARE FACILITY: ICD-10-CM

## 2021-01-12 LAB
A/G RATIO: 2.2 (ref 0.8–2)
ALBUMIN SERPL-MCNC: 3.9 G/DL (ref 3.4–4.8)
ALP BLD-CCNC: 75 U/L (ref 25–100)
ALT SERPL-CCNC: 10 U/L (ref 4–36)
ANION GAP SERPL CALCULATED.3IONS-SCNC: 7 MMOL/L (ref 3–16)
AST SERPL-CCNC: 16 U/L (ref 8–33)
BASOPHILS ABSOLUTE: 0.1 K/UL (ref 0–0.1)
BASOPHILS RELATIVE PERCENT: 1 %
BILIRUB SERPL-MCNC: 0.5 MG/DL (ref 0.3–1.2)
BUN BLDV-MCNC: 22 MG/DL (ref 6–20)
CALCIUM SERPL-MCNC: 9.3 MG/DL (ref 8.5–10.5)
CHLORIDE BLD-SCNC: 101 MMOL/L (ref 98–107)
CHOLESTEROL, TOTAL: 146 MG/DL (ref 0–200)
CO2: 32 MMOL/L (ref 20–30)
CREAT SERPL-MCNC: 1.4 MG/DL (ref 0.4–1.2)
EOSINOPHILS ABSOLUTE: 0 K/UL (ref 0–0.4)
EOSINOPHILS RELATIVE PERCENT: 0.8 %
GFR AFRICAN AMERICAN: >59
GFR NON-AFRICAN AMERICAN: 49
GLOBULIN: 1.8 G/DL
GLUCOSE BLD-MCNC: 100 MG/DL (ref 74–106)
HCT VFR BLD CALC: 44.1 % (ref 40–54)
HDLC SERPL-MCNC: 43 MG/DL (ref 40–60)
HEMOGLOBIN: 13.9 G/DL (ref 13–18)
IMMATURE GRANULOCYTES #: 0 K/UL
IMMATURE GRANULOCYTES %: 0.2 % (ref 0–5)
LDL CHOLESTEROL CALCULATED: 85 MG/DL
LYMPHOCYTES ABSOLUTE: 1.1 K/UL (ref 1.5–4)
LYMPHOCYTES RELATIVE PERCENT: 23.1 %
MCH RBC QN AUTO: 30 PG (ref 27–32)
MCHC RBC AUTO-ENTMCNC: 31.5 G/DL (ref 31–35)
MCV RBC AUTO: 95 FL (ref 80–100)
MONOCYTES ABSOLUTE: 0.4 K/UL (ref 0.2–0.8)
MONOCYTES RELATIVE PERCENT: 8.3 %
NEUTROPHILS ABSOLUTE: 3.2 K/UL (ref 2–7.5)
NEUTROPHILS RELATIVE PERCENT: 66.6 %
PDW BLD-RTO: 13.2 % (ref 11–16)
PLATELET # BLD: 209 K/UL (ref 150–400)
PMV BLD AUTO: 11.3 FL (ref 6–10)
POTASSIUM SERPL-SCNC: 4.6 MMOL/L (ref 3.4–5.1)
PROSTATE SPECIFIC ANTIGEN: 9.73 NG/ML (ref 0–4)
RBC # BLD: 4.64 M/UL (ref 4.5–6)
SODIUM BLD-SCNC: 140 MMOL/L (ref 136–145)
TOTAL PROTEIN: 5.7 G/DL (ref 6.4–8.3)
TRIGL SERPL-MCNC: 88 MG/DL (ref 0–249)
TSH SERPL DL<=0.05 MIU/L-ACNC: 0.5 UIU/ML (ref 0.27–4.2)
VITAMIN B-12: 302 PG/ML (ref 211–911)
VLDLC SERPL CALC-MCNC: 18 MG/DL
WBC # BLD: 4.8 K/UL (ref 4–11)

## 2021-01-12 PROCEDURE — 82607 VITAMIN B-12: CPT

## 2021-01-12 PROCEDURE — 85025 COMPLETE CBC W/AUTO DIFF WBC: CPT

## 2021-01-12 PROCEDURE — 86803 HEPATITIS C AB TEST: CPT

## 2021-01-12 PROCEDURE — 84153 ASSAY OF PSA TOTAL: CPT

## 2021-01-12 PROCEDURE — 80053 COMPREHEN METABOLIC PANEL: CPT

## 2021-01-12 PROCEDURE — 84443 ASSAY THYROID STIM HORMONE: CPT

## 2021-01-12 PROCEDURE — 80061 LIPID PANEL: CPT

## 2021-01-12 PROCEDURE — G0438 PPPS, INITIAL VISIT: HCPCS | Performed by: INTERNAL MEDICINE

## 2021-01-12 RX ORDER — PREDNISONE 10 MG/1
10 TABLET ORAL DAILY
Qty: 15 TABLET | Refills: 0 | Status: SHIPPED | OUTPATIENT
Start: 2021-01-12 | End: 2021-04-14

## 2021-01-12 ASSESSMENT — LIFESTYLE VARIABLES: HOW OFTEN DO YOU HAVE A DRINK CONTAINING ALCOHOL: 0

## 2021-01-12 ASSESSMENT — PATIENT HEALTH QUESTIONNAIRE - PHQ9
1. LITTLE INTEREST OR PLEASURE IN DOING THINGS: 0
SUM OF ALL RESPONSES TO PHQ QUESTIONS 1-9: 0

## 2021-01-12 NOTE — PROGRESS NOTES
Chief Complaint   Patient presents with    Medicare AWV     Pt here today for AWV    Have you seen any other physician or provider since your last visit no    Have you had any other diagnostic tests since your last visit? no    Have you changed or stopped any medications since your last visit? no

## 2021-01-12 NOTE — PROGRESS NOTES
Medicare Annual Wellness Visit  Name: Joshua Busby Date: 2021   MRN: [id number] Sex: Male   Age: 79 y.o. Ethnicity: Non-/Non    : 1940 Race: Marcelo Nahomi Gonzalez is here for Medicare AWV    Screenings for behavioral, psychosocial and functional/safety risks, and cognitive dysfunction are all negative except as indicated below. These results, as well as other patient data from the [address] form, are documented in Flowsheets linked to this Encounter. No Known Allergies  Prior to Visit Medications    Medication Sig Taking? Authorizing Provider   Tdap (ADACEL) 5-2-15.5 LF-MCG/0.5 injection Inject 0.5 mLs into the muscle once for 1 dose Yes Otoniel Gil MD   zoster recombinant adjuvanted vaccine [facility]) 50 MCG/0.5ML SUSR injection Inject 0.5 mLs into the muscle once for 1 dose Yes Otoniel Gil MD   pantoprazole (PROTONIX) 40 MG tablet TAKE ONE TABLET BY MOUTH EVERY MORNING BEFORE BREAKFAST Yes Otoniel Gil MD   folic acid (FOLVITE) 1 MG tablet TAKE 1 TABLET BY MOUTH DAILY Yes Otoniel Gil MD   furosemide (LASIX) 40 MG tablet TAKE 1 TABLET BY MOUTH DAILY Yes Otoniel Gil MD   HYDROcodone-acetaminophen (NORCO) 5-325 MG per tablet Take 1 tablet by mouth 2 times daily as needed for Pain.  Yes Historical Provider, MD   nebivolol (BYSTOLIC) 5 MG tablet Take 5 mg by mouth daily Yes Historical Provider, MD   spironolactone (ALDACTONE) 25 MG tablet Take 0.5 tablets by mouth daily Yes Historical Provider, MD   sacubitril-valsartan (ENTRESTO) 24-26 MG per tablet Take 1 tablet by mouth every 12 hours Yes Agatha Castano MD   apixaban (ELIQUIS) 5 MG TABS tablet Take by mouth 2 times daily Yes Historical Provider, MD   amiodarone (CORDARONE) 200 MG tablet Take 200 mg by mouth daily Yes Historical Provider, MD   rosuvastatin (CRESTOR) 10 MG tablet Take 10 mg by mouth daily Yes Historical Provider, MD   FEROSUL 325 (65 Fe) MG tablet TAKE ONE TABLET BY MOUTH TWO TIMES A DAY WITH Gerry Khan MD     Past Medical History:   Diagnosis Date    Atrial fibrillation [facility])     CHF (congestive heart failure) ([facility] [address]     Hyperlipidemia     Hypertension     Kidney stone      Past Surgical History:   Procedure Laterality Date    CARDIAC CATHETERIZATION      CARDIOVERSION  06/2019    Dr. Jorgito Mendoza Right     mass removed    PACEMAKER INSERTION  2012    PROSTATE SURGERY      THYROID SURGERY      goiter removed    UPPER GASTROINTESTINAL ENDOSCOPY  10/2019    [facility]     History reviewed. No pertinent family history. CareTeam (Including outside providers/suppliers regularly involved in providing care):   Patient Care Team:  Teresita Yoon MD as PCP - General (Internal Medicine)  Teresita Yoon MD as PCP - [facility]    Wt Readings from Last 3 Encounters:   01/12/21 297 lb 9.6 oz (135 kg)   12/10/20 300 lb (136.1 kg)   09/28/20 290 lb 9.6 oz (131.8 kg)     Vitals:    01/12/21 1336   BP: 108/70   Site: Right Upper Arm   Position: Sitting   Pulse: 91   Resp: 16   Temp: 97.3 °F (36.3 °C)   TempSrc: Temporal   SpO2: 96%   Weight: 297 lb 9.6 oz (135 kg)   Height: 5' 10\" (1.778 m)     Body mass index is 42.7 kg/m². Based upon direct observation of the patient, evaluation of cognition reveals remote memory intact, recent memory impaired. MMSE today showed 23/30. I did discuss with the patient the importance of starting medication for his possible dementia but he wants to wait at this time. Will readdress next visit. Physical Exam  Vitals signs and nursing note reviewed. Constitutional:       Appearance: Normal appearance. He is well-developed. He is obese. HENT:      Head: Normocephalic and atraumatic. Right Ear: External ear normal.      Left Ear: External ear normal.      Nose: Nose normal.   Eyes:      Conjunctiva/sclera: Conjunctivae normal.      Pupils: Pupils are equal, round, and reactive to light. Neck:      Musculoskeletal: Neck supple. No neck rigidity or muscular tenderness. Thyroid: No thyromegaly. Vascular: No JVD. Cardiovascular:      Rate and Rhythm: Normal rate and regular rhythm. Heart sounds: Normal heart sounds. No murmur. No friction rub. Pulmonary:      Effort: Pulmonary effort is normal.      Breath sounds: Normal breath sounds. No wheezing or rales. Abdominal:      General: Bowel sounds are normal.      Palpations: Abdomen is soft. Tenderness: There is no abdominal tenderness. There is no rebound. Musculoskeletal:         General: No tenderness. Lumbar back: He exhibits decreased range of motion. He exhibits no tenderness and no spasm. Right lower leg: Edema (trace) present. Left lower leg: Edema (Trace) present. Skin:     Findings: No erythema or rash. Comments: Two large lipomas at and above the level of right scapula   Neurological:      General: No focal deficit present. Mental Status: He is alert and oriented to person, place, and time.       Comments: MMSE 23/30   Psychiatric:         Behavior: Behavior normal.         Judgment: Judgment normal.            Lab Results   Component Value Date     12/10/2020    K 4.5 12/10/2020    K 4.8 08/25/2019     12/10/2020    CO2 29 12/10/2020    GLUCOSE 92 12/10/2020    BUN 20 12/10/2020    CREATININE 1.5 12/10/2020    CALCIUM 9.0 12/10/2020    PROT 5.6 12/10/2020    LABALBU 3.8 12/10/2020    BILITOT 0.4 12/10/2020    ALT 15 12/10/2020    AST 18 12/10/2020       Hemoglobin A1C (%)   Date Value   03/14/2015 5.5     Microscopic Examination (no units)   Date Value   08/24/2019 YES         Lab Results   Component Value Date    WBC 5.7 08/05/2020    NEUTROABS 3.8 08/05/2020    HGB 13.4 08/05/2020    HCT 41.8 08/05/2020    MCV 92.9 08/05/2020     08/05/2020       Lab Results   Component Value Date    TSH 0.56 08/05/2020       Patient's complete Health Risk Assessment and screening values have been reviewed and are found in Flowsheets. The following problems were reviewed today and where indicated follow up appointments were made and/or referrals ordered. Positive Risk Factor Screenings with Interventions:          General Health and ACP:  General  In general, how would you say your health is?: Very Good  In the past 7 days, have you experienced any of the following?  New or Increased Pain, New or Increased Fatigue, Loneliness, Social Isolation, Stress or Anger?: None of These  Do you get the social and emotional support that you need?: Yes  Do you have a Living Will?: (!) No  Advance Directives     Power of [facility] Will ACP-Advance Directive ACP-Power of     Not on File Not on File Not on File Not on File      General Health Risk Interventions:  · No Living Will: Advance Care Planning addressed with patient today    Health Habits/Nutrition:  Health Habits/Nutrition  Do you exercise for at least 20 minutes 2-3 times per week?: Yes  Have you lost any weight without trying in the past 3 months?: No  Do you eat fewer than 2 meals per day?: No  Have you seen a dentist within the past year?: Yes  Body mass index: (!) 42.7  Health Habits/Nutrition Interventions:  · Nutritional issues:  educational materials for healthy, well-balanced diet provided    Hearing/Vision:  No exam data present  Hearing/Vision  Do you or your family notice any trouble with your hearing?: No  Do you have difficulty driving, watching TV, or doing any of your daily activities because of your eyesight?: (!) Yes  Have you had an eye exam within the past year?: (!) No  Hearing/Vision Interventions:  · Vision concerns:  patient encouraged to make appointment with his/her eye specialist    Safety:  Safety  Do you have working smoke detectors?: Yes  Have all throw rugs been removed or fastened?: (!) No  Do you have non-slip mats or surfaces in all bathtubs/showers?: Yes  Are your doorways, halls and stairs free of clutter?: Yes  Do you always fasten your seatbelt when you are in a car?: Yes  Safety Interventions:  · Home safety tips provided  · Advised himt to fasten throw rugs. Personalized Preventive Plan   Current Health Maintenance Status  Immunization History   Administered Date(s) Administered    Pneumococcal Polysaccharide ([id number]) 08/05/2020        Health Maintenance   Topic Date Due    Hepatitis C screen  1940    DTaP/Tdap/Td vaccine (1 - Tdap) 11/06/1960    Shingles Vaccine (1 of 2) 11/06/1991    Lipid screen  03/14/2016    Annual Wellness Visit (AWV)  08/28/2019    Flu vaccine (1) 09/01/2020    TSH testing  08/05/2021    PSA counseling  08/05/2021    Potassium monitoring  12/10/2021    Creatinine monitoring  12/10/2021    Pneumococcal 65+ years Vaccine  Completed    Hepatitis A vaccine  Aged Out    Hepatitis B vaccine  Aged Out    Hib vaccine  Aged Out    Meningococcal (ACWY) vaccine  Aged Out     Recommendations for Zipments Due: see orders and patient instructions/AVS.    EKG last completed on 10/07/2020 and showed paced ventricular rhythm. Discussed cardiovascular risk. Patient with Afib and CHF. Taking Eliquis. Following with cardiology. Discussed the importance of making sure blood pressure, lipid profile and other medical problems under good control. Discussed the importance of increased activity level and weight control. Patient to continue follow-up with cardiology on a regular basis. Patient has been taking Diclofenac for arthritis once per week. Will d/c may use prednisone as needed instead. MMSE 23/30. Does not want to start medication at this time. Will reassess in early spring. Patient does live alone.      Recommended screening schedule for the next 5-10 years is provided to the patient in written form: see Patient Instructions/AVS.    Written by Aparna Aguero CMA acting as scribe for Dr. Manoj Cerrato on 1/12/2021 at 2:42 PM.       I, Dr. Osiris Panye, personally performed the services described in the documentation as scribed by Zoran Douglass CMA, in my presence and it is both accurate and complete.

## 2021-01-12 NOTE — PATIENT INSTRUCTIONS
Advance Directives: Care Instructions  Overview  An advance directive is a legal way to state your wishes at the end of your life. It tells your family and your doctor what to do if you can't say what you want. There are two main types of advance directives. You can change them any time your wishes change. Living will. This form tells your family and your doctor your wishes about life support and other treatment. The form is also called a declaration. Medical power of . This form lets you name a person to make treatment decisions for you when you can't speak for yourself. This person is called a health care agent (health care proxy, health care surrogate). The form is also called a durable power of  for health care. If you do not have an advance directive, decisions about your medical care may be made by a family member, or by a doctor or a  who doesn't know you. It may help to think of an advance directive as a gift to the people who care for you. If you have one, they won't have to make tough decisions by themselves. Follow-up care is a key part of your treatment and safety. Be sure to make and go to all appointments, and call your doctor if you are having problems. It's also a good idea to know your test results and keep a list of the medicines you take. What should you include in an advance directive? Many states have a unique advance directive form. (It may ask you to address specific issues.) Or you might use a universal form that's approved by many states. If your form doesn't tell you what to address, it may be hard to know what to include in your advance directive. Use the questions below to help you get started. · Who do you want to make decisions about your medical care if you are not able to? · What life-support measures do you want if you have a serious illness that gets worse over time or can't be cured? · What are you most afraid of that might happen? (Maybe you're afraid of having pain, losing your independence, or being kept alive by machines.)  · Where would you prefer to die? (Your home? A hospital? A nursing home?)  · Do you want to donate your organs when you die? · Do you want certain Spiritism practices performed before you die? When should you call for help? Be sure to contact your doctor if you have any questions. Where can you learn more? Go to https://Advanced Manufacturing Control Systemsbenigno.Vendor Registry. org and sign in to your [employer] account. Enter R264 in the [facility] [facility] [employer] box to learn more about \"Advance Directives: Care Instructions. \"     If you do not have an account, please click on the \"Sign Up Now\" link. Current as of: December 9, 2019               Content Version: 12.6  © 0240-8390 [employer]. Care instructions adapted under license by [facility] [facility] ([facility]). If you have questions about a medical condition or this instruction, always ask your healthcare professional. [place] [address] any warranty or liability for your use of this information. Learning About Medical Power of   What is a medical power of ? A medical power of , also called a durable power of  for health care, is one type of the legal forms called advance directives. It lets you name the person you want to make treatment decisions for you if you can't speak or decide for yourself. The person you choose is called your health care agent. This person is also called a health care proxy or health care surrogate. A medical power of  may be called something else in your state. How do you choose a health care agent? Choose your health care agent carefully. This person may or may not be a family member. Talk to the person before you make your final decision. Make sure he or she is comfortable with this responsibility.   It's a good idea to choose someone who: · Is at least 25years old. · Knows you well and understands what makes life meaningful for you. · Understands your Mandaeism and moral values. · Will do what you want, not what he or she wants. · Will be able to make difficult choices at a stressful time. · Will be able to refuse or stop treatment, if that is what you would want, even if you could die. · Will be firm and confident with health professionals if needed. · Will ask questions to get needed information. · Lives near you or agrees to travel to you if needed. Your family may help you make medical decisions while you can still be part of that process. But it's important to choose one person to be your health care agent in case you aren't able to make decisions for yourself. If you don't fill out the legal form and name a health care agent, the decisions your family can make may be limited. A health care agent may be called something else in your state. Who will make decisions for you if you don't have a health care agent? If you don't have a health care agent or a living will, you may not get the care you want. Decisions may be made by family members who disagree about your medical care. Or decisions may be made by a medical professional who doesn't know you well. In some cases, a  makes the decisions. When you name a health care agent, it is very clear who has the power to make health decisions for you. How do you name a health care agent? You name your health care agent on a legal form. This form is usually called a medical power of . Ask your hospital, state bar association, or office on aging where to find these forms. You must sign the form to make it legal. Some states require you to get the form notarized. This means that a person called a  watches you sign the form and then he or she signs the form. Some states also require that two or more witnesses sign the form. Be sure to tell your family members and doctors who your health care agent is. Where can you learn more? Go to https://chpepiceweb.healthCreditera. org and sign in to your agÃƒÂ¡mi Systems account. Enter [id number] in the [facility] box to learn more about \"Learning About [address]. \"     If you do not have an account, please click on the \"Sign Up Now\" link. Current as of: December 9, 2019               Content Version: 12.6  © 4399-6861 [employer]. Care instructions adapted under license by [facility] ([facility]). If you have questions about a medical condition or this instruction, always ask your healthcare professional. [address] any warranty or liability for your use of this information. Learning About Living Roxana Landry  What is a living will? A living will, also called a declaration, is a legal form. It tells your family and your doctor your wishes when you can't speak for yourself. It's used by the health professionals who will treat you as you near the end of your life or if you get seriously hurt or ill. If you put your wishes in writing, your loved ones and others will know what kind of care you want. They won't need to guess. This can ease your mind and be helpful to others. And you can change or cancel your living will at any time. A living will is not the same as an estate or property will. An estate will explains what you want to happen with your money and property after you die. How do you use it? A living will is used to describe the kinds of treatment or life support you want as you near the end of your life or if you get seriously hurt or ill. Keep these facts in mind about living robles. · Your living will is used only if you can't speak or make decisions for yourself. Most often, one or more doctors must certify that you can't speak or decide for yourself before your living will takes effect. · If you get better and can speak for yourself again, you can accept or refuse any treatment. It doesn't matter what you said in your living will. · Some states may limit your right to refuse treatment in certain cases. For example, you may need to clearly state in your living will that you don't want artificial hydration and nutrition, such as being fed through a tube. Is a living will a legal document? A living will is a legal document. Each state has its own laws about living robles. And a living will may be called something else in your state. Here are some things to know about living roblse. · You don't need an  to complete a living will. But legal advice can be helpful if your state's laws are unclear. It can also help if your health history is complicated or your family can't agree on what should be in your living will. · You can change your living will at any time. Some people find that their wishes about end-of-life care change as their health changes. If you make big changes to your living will, complete a new form. · If you move to another state, make sure that your living will is legal in the state where you now live. In most cases, doctors will respect your wishes even if you have a form from a different state. · You might use a universal form that has been approved by many states. This kind of form can sometimes be filled out and stored online. Your digital copy will then be available wherever you have a connection to the internet. The doctors and nurses who need to treat you can find it right away. · Your state may offer an online registry. This is another place where you can store your living will online. · It's a good idea to get your living will notarized. This means using a person called a  to watch two people sign, or witness, your living will. What should you know when you create a living will?   Here are some questions to ask yourself as you make your living will: · Do you know enough about life support methods that might be used? If not, talk to your doctor so you know what might be done if you can't breathe on your own, your heart stops, or you can't swallow. · What things would you still want to be able to do after you receive life-support methods? Would you want to be able to walk? To speak? To eat on your own? To live without the help of machines? · Do you want certain Taoist practices performed if you become very ill? · If you have a choice, where do you want to be cared for? In your home? At a hospital or nursing home? · If you have a choice at the end of your life, where would you prefer to die? At home? In a hospital or nursing home? Somewhere else? · Would you prefer to be buried or cremated? · Do you want your organs to be donated after you die? What should you do with your living will? · Make sure that your family members and your health care agent have copies of your living will (also called a declaration). · Give your doctor a copy of your living will. Ask him or her to keep it as part of your medical record. If you have more than one doctor, make sure that each one has a copy. · Put a copy of your living will where it can be easily found. For example, some people may put a copy on their refrigerator door. If you are using a digital copy, be sure your doctor, family members, and health care agent know how to find and access it. Where can you learn more? Go to https://Reduxbenigno.JotSpot. org and sign in to your [employer] account. Enter R169 in the [employer] box to learn more about \"Learning About Living Vincent Ends. \"     If you do not have an account, please click on the \"Sign Up Now\" link. Current as of: December 9, 2019               Content Version: 12.6  © 2554-7836 [employer]. Care instructions adapted under license by [address]. If you have questions about a medical condition or this instruction, always ask your healthcare professional. [address] any warranty or liability for your use of this information. Learning About Low-Carbohydrate Diets  What is a low-carbohydrate diet? A low-carbohydrate (or \"low-carb\") diet limits foods and drinks that have carbohydrates. This includes grains, fruits, milk and yogurt, and starchy vegetables like potatoes, beans, and corn. It also avoids foods and drinks that have added sugar. Instead, low-carb diets include foods that are high in protein and fat. Why might you follow a low-carb diet? Low-carb diets may be used for a variety of reasons, such as for weight loss. People who have diabetes may use a low-carb diet to help manage their blood sugar levels. What should you do before you start the diet? Talk to your doctor before you try any diet. This is even more important if you have health problems like kidney disease, heart disease, or diabetes. Your doctor may suggest that you meet with a registered dietitian. A dietitian can help you make an eating plan that works for you. What foods do you eat on a low-carb diet? On a low-carb diet, you choose foods that are high in protein and fat. Examples of these are:  · Meat, poultry, and fish. · Eggs. · Nuts, such as walnuts, pecans, almonds, and peanuts. · Peanut butter and other nut butters. · Tofu. · Avocado. · [place]. · Non-starchy vegetables like broccoli, cauliflower, green beans, mushrooms, peppers, lettuce, and spinach. · Unsweetened non-dairy milks like almond milk and coconut milk. · Cheese, cottage cheese, and cream cheese. Current as of: August 22, 2019               Content Version: 12.6  © 7631-4820 [employer]. Care instructions adapted under license by [facility] ([facility]). If you have questions about a medical condition or this instruction, always ask your healthcare professional. [address] any warranty or liability for your use of this information. Personalized Preventive Plan for Rickford Schirmer - 1/12/2021  Medicare offers a range of preventive health benefits. Some of the tests and screenings are paid in full while other may be subject to a deductible, co-insurance, and/or copay. Some of these benefits include a comprehensive review of your medical history including lifestyle, illnesses that may run in your family, and various assessments and screenings as appropriate. After reviewing your medical record and screening and assessments performed today your provider may have ordered immunizations, labs, imaging, and/or referrals for you. A list of these orders (if applicable) as well as your Preventive Care list are included within your After Visit Summary for your review. Other Preventive Recommendations:    · A preventive eye exam performed by an eye specialist is recommended every 1-2 years to screen for glaucoma; cataracts, macular degeneration, and other eye disorders. · A preventive dental visit is recommended every 6 months. · Try to get at least 150 minutes of exercise per week or 10,000 steps per day on a pedometer . · Order or download the FREE \"Exercise & Physical Activity: Your Everyday Guide\" from [employer]. Call 4-349.537.4368 or search [employer] online. · You need 5498-5286 mg of calcium and 2703-7854 IU of vitamin D per day.  It is possible to meet your calcium requirement with diet alone, but a vitamin D supplement is usually necessary to meet this goal. · When exposed to the sun, use a sunscreen that protects against both UVA and UVB radiation with an SPF of 30 or greater. Reapply every 2 to 3 hours or after sweating, drying off with a towel, or swimming. · Always wear a seat belt when traveling in a car. Always wear a helmet when riding a bicycle or motorcycle.

## 2021-01-13 LAB — HEPATITIS C ANTIBODY INTERPRETATION: NORMAL

## 2021-01-14 RX ORDER — VITAMIN B COMPLEX
2500 TABLET ORAL DAILY
Qty: 90 TABLET | Refills: 1 | Status: SHIPPED | OUTPATIENT
Start: 2021-01-14 | End: 2021-05-19

## 2021-01-18 ENCOUNTER — TELEPHONE (OUTPATIENT)
Dept: PRIMARY CARE CLINIC | Age: 81
End: 2021-01-18

## 2021-01-18 NOTE — TELEPHONE ENCOUNTER
Spoke with pt, he is planning to call Dr Ila Ly office for an appt. I tried scheduling the appt but they requested that pt call because he missed his last appt.  Pt v/u and I faxed PSA to Dr Kevin Erazo

## 2021-01-26 RX ORDER — APIXABAN 5 MG/1
TABLET, FILM COATED ORAL
Qty: 180 TABLET | Refills: 1 | Status: SHIPPED | OUTPATIENT
Start: 2021-01-26 | End: 2021-04-23

## 2021-02-11 DIAGNOSIS — M54.50 CHRONIC MIDLINE LOW BACK PAIN WITHOUT SCIATICA: ICD-10-CM

## 2021-02-11 DIAGNOSIS — G89.29 CHRONIC MIDLINE LOW BACK PAIN WITHOUT SCIATICA: ICD-10-CM

## 2021-02-11 DIAGNOSIS — M25.551 BILATERAL HIP PAIN: Primary | ICD-10-CM

## 2021-02-11 DIAGNOSIS — M25.552 BILATERAL HIP PAIN: Primary | ICD-10-CM

## 2021-02-11 RX ORDER — HYDROCODONE BITARTRATE AND ACETAMINOPHEN 5; 325 MG/1; MG/1
TABLET ORAL
Qty: 30 TABLET | Refills: 0 | Status: SHIPPED | OUTPATIENT
Start: 2021-02-11 | End: 2021-04-14

## 2021-02-15 RX ORDER — SACUBITRIL AND VALSARTAN 24; 26 MG/1; MG/1
1 TABLET, FILM COATED ORAL 2 TIMES DAILY
Qty: 60 TABLET | Refills: 5 | Status: SHIPPED | OUTPATIENT
Start: 2021-02-15 | End: 2021-08-30

## 2021-03-19 RX ORDER — ROSUVASTATIN CALCIUM 10 MG/1
10 TABLET, COATED ORAL DAILY
Qty: 90 TABLET | Refills: 1 | OUTPATIENT
Start: 2021-03-19

## 2021-03-20 RX ORDER — PANTOPRAZOLE SODIUM 40 MG/1
TABLET, DELAYED RELEASE ORAL
Qty: 30 TABLET | Refills: 3 | Status: SHIPPED | OUTPATIENT
Start: 2021-03-20 | End: 2021-08-18

## 2021-03-20 RX ORDER — FOLIC ACID 1 MG/1
1 TABLET ORAL DAILY
Qty: 30 TABLET | Refills: 3 | Status: SHIPPED | OUTPATIENT
Start: 2021-03-20 | End: 2021-04-15 | Stop reason: SDUPTHER

## 2021-04-06 RX ORDER — VITAMIN B COMPLEX
2500 TABLET ORAL
COMMUNITY
Start: 2021-01-14 | End: 2023-03-17 | Stop reason: HOSPADM

## 2021-04-07 ENCOUNTER — OFFICE VISIT (OUTPATIENT)
Dept: CARDIOLOGY | Facility: CLINIC | Age: 81
End: 2021-04-07

## 2021-04-07 VITALS
SYSTOLIC BLOOD PRESSURE: 98 MMHG | BODY MASS INDEX: 43.23 KG/M2 | DIASTOLIC BLOOD PRESSURE: 62 MMHG | HEART RATE: 78 BPM | WEIGHT: 302 LBS | OXYGEN SATURATION: 98 % | HEIGHT: 70 IN | RESPIRATION RATE: 16 BRPM

## 2021-04-07 DIAGNOSIS — I49.5 TACHY-BRADY SYNDROME (HCC): ICD-10-CM

## 2021-04-07 DIAGNOSIS — I48.20 CHRONIC ATRIAL FIBRILLATION (HCC): ICD-10-CM

## 2021-04-07 DIAGNOSIS — E66.01 MORBID (SEVERE) OBESITY DUE TO EXCESS CALORIES (HCC): ICD-10-CM

## 2021-04-07 DIAGNOSIS — I10 ESSENTIAL HYPERTENSION: ICD-10-CM

## 2021-04-07 DIAGNOSIS — Z95.0 CARDIAC PACEMAKER IN SITU: ICD-10-CM

## 2021-04-07 DIAGNOSIS — I50.22 CHRONIC SYSTOLIC CONGESTIVE HEART FAILURE (HCC): Primary | ICD-10-CM

## 2021-04-07 PROBLEM — D64.9 ANEMIA: Status: ACTIVE | Noted: 2019-08-25

## 2021-04-07 PROBLEM — N28.1 RENAL CYST: Status: ACTIVE | Noted: 2019-08-27

## 2021-04-07 PROBLEM — R79.89 LOW TSH LEVEL: Status: ACTIVE | Noted: 2019-08-27

## 2021-04-07 PROBLEM — R19.7 DIARRHEA: Status: ACTIVE | Noted: 2019-08-25

## 2021-04-07 PROBLEM — N17.9 ACUTE RENAL FAILURE (ARF) (HCC): Status: ACTIVE | Noted: 2019-08-24

## 2021-04-07 PROBLEM — I95.9 HYPOTENSION: Status: ACTIVE | Noted: 2019-08-25

## 2021-04-07 PROBLEM — R97.20 ELEVATED PSA: Status: ACTIVE | Noted: 2020-12-11

## 2021-04-07 PROBLEM — E53.8 FOLATE DEFICIENCY: Status: ACTIVE | Noted: 2019-08-26

## 2021-04-07 PROCEDURE — 93280 PM DEVICE PROGR EVAL DUAL: CPT | Performed by: INTERNAL MEDICINE

## 2021-04-07 PROCEDURE — 99214 OFFICE O/P EST MOD 30 MIN: CPT | Performed by: INTERNAL MEDICINE

## 2021-04-07 NOTE — PROGRESS NOTES
Subjective:     Encounter Date:04/07/2021      Patient ID: Bobby Dodd is a 80 y.o. male.    Chief Complaint: Atrial fibrillation  HPI  This is a 80-year-old male patient who presents to cardiology clinic for routine follow-up and pacemaker interrogation.  The patient has a [employer] medical dual-chamber rate responsive pacemaker model number: 2210.  The patient is in atrial fibrillation 100% of the time.  He has had no high heart rate events.  He is paced 100% of the time the right ventricle.  Lead interrogation shows normal function in the right atrial and right ventricular leads in regards to sensing, pacing threshold and lead impedance.  He has an estimated 7 months of generator longevity.  The patient indicates he has done well since his last visit.  He has had no active cardiovascular symptoms or hospitalizations.  He is remained exertionally active with no limitations or symptoms.  He reports compliance with his medications.  He has had no bleeding complications related to anticoagulation therapy with Eliquis.  He has had no signs or symptoms to suggest stroke, TIA or other cardioembolic event.  He reports compliance with his medications with no perceived side effects.  He is a non-smoker.  The following portions of the patient's history were reviewed and updated as appropriate: allergies, current medications, past family history, past medical history, past social history, past surgical history and problem  Review of Systems   Constitutional: Negative for chills, diaphoresis, fever, malaise/fatigue, weight gain and weight loss.   HENT: Negative for ear discharge, hearing loss, hoarse voice and nosebleeds.    Eyes: Negative for discharge, double vision, pain and photophobia.   Cardiovascular: Negative for chest pain, claudication, cyanosis, dyspnea on exertion, irregular heartbeat, leg swelling, near-syncope, orthopnea, palpitations, paroxysmal nocturnal dyspnea and syncope.   Respiratory: Negative for  cough, hemoptysis, shortness of breath, sputum production and wheezing.    Endocrine: Negative for cold intolerance, heat intolerance, polydipsia, polyphagia and polyuria.   Hematologic/Lymphatic: Negative for adenopathy and bleeding problem. Does not bruise/bleed easily.   Skin: Negative for color change, flushing, itching and rash.   Musculoskeletal: Negative for muscle cramps, muscle weakness, myalgias and stiffness.   Gastrointestinal: Negative for abdominal pain, diarrhea, hematemesis, hematochezia, nausea and vomiting.   Genitourinary: Negative for dysuria, frequency and nocturia.   Neurological: Negative for focal weakness, loss of balance, numbness, paresthesias and seizures.   Psychiatric/Behavioral: Negative for altered mental status, hallucinations and suicidal ideas.   Allergic/Immunologic: Negative for HIV exposure, hives and persistent infections.           Current Outpatient Medications:   •  amiodarone (PACERONE) 200 MG tablet, TAKE 1 TABLET BY MOUTH DAILY., Disp: 90 tablet, Rfl: 3  •  Bystolic 10 MG tablet, TAKE 1 TABLET BY MOUTH DAILY., Disp: 90 tablet, Rfl: 3  •  Cyanocobalamin 2500 MCG sublingual tablet, Place 2,500 mcg under the tongue., Disp: , Rfl:   •  DICLOFENAC PO, Take 75 mg by mouth., Disp: , Rfl:   •  Eliquis 5 MG tablet tablet, TAKE ONE TABLET BY MOUTH EVERY 12 HOURS, Disp: 180 tablet, Rfl: 1  •  ferrous sulfate 324 (65 Fe) MG tablet delayed-release EC tablet, Take 324 mg by mouth 2 (Two) Times a Day With Meals., Disp: , Rfl:   •  folic acid (FOLVITE) 1 MG tablet, Take 1 mg by mouth Daily., Disp: , Rfl:   •  furosemide (LASIX) 40 MG tablet, Take 1 tablet by mouth Daily., Disp: 90 tablet, Rfl: 4  •  HYDROcodone-acetaminophen (NORCO) 5-325 MG per tablet, Take 1 tablet by mouth., Disp: , Rfl:   •  pantoprazole (PROTONIX) 40 MG EC tablet, Take 40 mg by mouth Daily., Disp: , Rfl:   •  rosuvastatin (CRESTOR) 10 MG tablet, TAKE 1 TABLET BY MOUTH DAILY., Disp: 90 tablet, Rfl: 1  •   "sacubitril-valsartan (Entresto) 24-26 MG tablet, Take 1 tablet by mouth 2 (Two) Times a Day., Disp: 60 tablet, Rfl: 5  •  spironolactone (ALDACTONE) 25 MG tablet, TAKE 1/2 TABLET BY MOUTH EVERY DAY, Disp: 45 tablet, Rfl: 3    Objective:   Vitals and nursing note reviewed.   Constitutional:       Appearance: Healthy appearance. Not in distress.   Neck:      Vascular: No JVR. JVD normal.   Pulmonary:      Effort: Pulmonary effort is normal.      Breath sounds: Normal breath sounds. No wheezing. No rhonchi. No rales.   Chest:      Chest wall: Not tender to palpatation.   Cardiovascular:      PMI at left midclavicular line. Normal rate. Regular rhythm. Normal S1. Normal S2.      Murmurs: There is no murmur.      No gallop. No click. No rub.   Pulses:     Intact distal pulses.   Edema:     Peripheral edema absent.   Abdominal:      General: Bowel sounds are normal.      Palpations: Abdomen is soft.      Tenderness: There is no abdominal tenderness.   Musculoskeletal: Normal range of motion.         General: No tenderness. Skin:     General: Skin is warm and dry.   Neurological:      General: No focal deficit present.      Mental Status: Alert and oriented to person, place and time.       Blood pressure 98/62, pulse 78, resp. rate 16, height 177.8 cm (70\"), weight (!) 137 kg (302 lb), SpO2 98 %.   Lab Review:     Assessment:       1. Chronic systolic congestive heart failure (CMS/HCC)  Heart failure with reduced ejection fraction.  Stage C.  New York Heart Association functional class I symptoms.  Euvolemic.    2. Chronic atrial fibrillation (CMS/HCC)  Rate control and anticoagulation strategy.  Asymptomatic.    3. Essential hypertension  Acceptable blood pressure control.    4. Tachy-mercedes syndrome (CMS/HCC)  Normal pacemaker function.    5. Morbid (severe) obesity due to excess calories (CMS/HCC)  Body mass index is greater than 43.  The obesity pattern is central.  There is evidence of multiple " comorbidities.    Procedures    Plan:     No changes in his medications have been made at today's visit.  No further cardiovascular testing is indicated.  The patient will require generator change out after his 6-month follow-up visit.

## 2021-04-14 DIAGNOSIS — M25.552 BILATERAL HIP PAIN: ICD-10-CM

## 2021-04-14 DIAGNOSIS — M25.551 BILATERAL HIP PAIN: ICD-10-CM

## 2021-04-14 DIAGNOSIS — G89.29 CHRONIC MIDLINE LOW BACK PAIN WITHOUT SCIATICA: ICD-10-CM

## 2021-04-14 DIAGNOSIS — M54.50 CHRONIC MIDLINE LOW BACK PAIN WITHOUT SCIATICA: ICD-10-CM

## 2021-04-14 RX ORDER — FERROUS SULFATE 325(65) MG
TABLET ORAL
Qty: 60 TABLET | Refills: 0 | Status: SHIPPED | OUTPATIENT
Start: 2021-04-14 | End: 2021-04-15 | Stop reason: SDUPTHER

## 2021-04-14 RX ORDER — PREDNISONE 10 MG/1
10 TABLET ORAL DAILY
Qty: 15 TABLET | Refills: 0 | Status: SHIPPED | OUTPATIENT
Start: 2021-04-14 | End: 2021-04-29

## 2021-04-14 RX ORDER — HYDROCODONE BITARTRATE AND ACETAMINOPHEN 5; 325 MG/1; MG/1
TABLET ORAL
Qty: 30 TABLET | Refills: 0 | Status: SHIPPED | OUTPATIENT
Start: 2021-04-14 | End: 2021-05-19

## 2021-04-15 ENCOUNTER — OFFICE VISIT (OUTPATIENT)
Dept: PRIMARY CARE CLINIC | Age: 81
End: 2021-04-15
Payer: MEDICARE

## 2021-04-15 ENCOUNTER — HOSPITAL ENCOUNTER (OUTPATIENT)
Facility: HOSPITAL | Age: 81
Discharge: HOME | End: 2021-04-15
Payer: MEDICARE

## 2021-04-15 VITALS
BODY MASS INDEX: 43.45 KG/M2 | HEART RATE: 86 BPM | TEMPERATURE: 97.7 F | SYSTOLIC BLOOD PRESSURE: 102 MMHG | DIASTOLIC BLOOD PRESSURE: 64 MMHG | OXYGEN SATURATION: 94 % | RESPIRATION RATE: 18 BRPM | WEIGHT: 302.8 LBS

## 2021-04-15 DIAGNOSIS — N18.30 CHRONIC RENAL FAILURE, STAGE 3 (MODERATE), UNSPECIFIED WHETHER STAGE 3A OR 3B CKD (HCC): ICD-10-CM

## 2021-04-15 DIAGNOSIS — I10 ESSENTIAL HYPERTENSION: ICD-10-CM

## 2021-04-15 DIAGNOSIS — R97.20 ELEVATED PSA: ICD-10-CM

## 2021-04-15 DIAGNOSIS — I50.22 CHRONIC SYSTOLIC HEART FAILURE (HCC): ICD-10-CM

## 2021-04-15 DIAGNOSIS — I48.91 ATRIAL FIBRILLATION, UNSPECIFIED TYPE (HCC): ICD-10-CM

## 2021-04-15 DIAGNOSIS — I50.22 CHRONIC SYSTOLIC HEART FAILURE (HCC): Primary | ICD-10-CM

## 2021-04-15 PROCEDURE — 99213 OFFICE O/P EST LOW 20 MIN: CPT | Performed by: INTERNAL MEDICINE

## 2021-04-15 PROCEDURE — 80053 COMPREHEN METABOLIC PANEL: CPT

## 2021-04-15 PROCEDURE — 85025 COMPLETE CBC W/AUTO DIFF WBC: CPT

## 2021-04-15 RX ORDER — FERROUS SULFATE 325(65) MG
TABLET ORAL
Qty: 60 TABLET | Refills: 0 | Status: SHIPPED | OUTPATIENT
Start: 2021-04-15 | End: 2021-11-22 | Stop reason: SDUPTHER

## 2021-04-15 RX ORDER — FOLIC ACID 1 MG/1
1 TABLET ORAL DAILY
Qty: 30 TABLET | Refills: 3 | Status: SHIPPED | OUTPATIENT
Start: 2021-04-15 | End: 2021-07-13

## 2021-04-15 ASSESSMENT — ENCOUNTER SYMPTOMS
EYE DISCHARGE: 0
SINUS PRESSURE: 0
ABDOMINAL PAIN: 0
SHORTNESS OF BREATH: 0
NAUSEA: 0
WHEEZING: 0
VOMITING: 0
BACK PAIN: 1
COUGH: 0

## 2021-04-15 NOTE — PROGRESS NOTES
SUBJECTIVE:    Patient ID: Sherial Curling is a 79 y.o.male. Chief Complaint   Patient presents with    Hypertension    Congestive Heart Failure    Atrial Fibrillation       HPI:  Patient with history of CHF. Patient reported being compliant with taking his medications and monitoring his fluid intake/sodium intake. His weight has been gradually trending up over the past few months. He reported chronic dyspnea on exertion which is at baseline. Denies any swelling to lower extremities. No chest pain or palpitation. Patient has had hypertension for several years. He has been compliant with taking medications, without side effects from it. He has been following a low-sodium, is  not active and never exercises.  His blood pressure is stable at this time, his weight is up 5 pounds since last visit. Patient with history of Afib. He is taking Eliquis without any side effects from medication. He denies any easy bruising or bleeding. No CP or palpitations.     Patient has had chronic renal failure for the past few years.  He continues to have good urinary output. He is not taking anything, but what was prescribed for him. Patient with elevated PSA for some time. He has not seen urology for this as he does not want to go, he reports he has had bad experiences in the past and he is not willing to go at this time. Patient's medications, allergies, past medical, surgical, social and family histories were reviewed and updated as appropriate in the electronic medical record/chart.         Outpatient Medications Marked as Taking for the 4/15/21 encounter (Office Visit) with Ayana Fofana MD   Medication Sig Dispense Refill    FEROSUL 325 (65 Fe) MG tablet TAKE ONE TABLET BY MOUTH TWO TIMES A DAY WITH MEALS 60 tablet 0    predniSONE (DELTASONE) 10 MG tablet TAKE 1 TABLET BY MOUTH DAILY FOR 15 DAYS 15 tablet 0    HYDROcodone-acetaminophen (NORCO) 5-325 MG per tablet TAKE ONE TABLET BY MOUTH TWO TIMES A DAY AS NEEDED FOR PAIN . 30 tablet 0    pantoprazole (PROTONIX) 40 MG tablet TAKE ONE TABLET BY MOUTH EVERY MORNING BEFORE BREAKFAST 30 tablet 3    folic acid (FOLVITE) 1 MG tablet TAKE 1 TABLET BY MOUTH DAILY 30 tablet 3    Cyanocobalamin 2500 MCG SUBL Place 2,500 mcg under the tongue daily 90 tablet 1    furosemide (LASIX) 40 MG tablet TAKE 1 TABLET BY MOUTH DAILY 30 tablet 5    nebivolol (BYSTOLIC) 5 MG tablet Take 5 mg by mouth daily      spironolactone (ALDACTONE) 25 MG tablet Take 0.5 tablets by mouth daily      sacubitril-valsartan (ENTRESTO) 24-26 MG per tablet Take 1 tablet by mouth every 12 hours      apixaban (ELIQUIS) 5 MG TABS tablet Take by mouth 2 times daily      amiodarone (CORDARONE) 200 MG tablet Take 200 mg by mouth daily      rosuvastatin (CRESTOR) 10 MG tablet Take 10 mg by mouth daily          Review of Systems   Constitutional: Negative for chills, fatigue and fever. HENT: Negative for congestion, ear pain and sinus pressure. Eyes: Negative for discharge and visual disturbance. Respiratory: Negative for cough, shortness of breath and wheezing. Cardiovascular: Negative for chest pain, palpitations and leg swelling. ARCHER   Gastrointestinal: Negative for abdominal pain, nausea and vomiting. Endocrine: Negative for cold intolerance and heat intolerance. Genitourinary: Positive for difficulty urinating. Negative for dysuria, frequency and urgency. Musculoskeletal: Positive for arthralgias, back pain and gait problem. Skin: Negative for pallor and rash. Allergic/Immunologic: Negative for food allergies and immunocompromised state. Neurological: Negative for dizziness, numbness and headaches. Hematological: Negative for adenopathy. Does not bruise/bleed easily. Psychiatric/Behavioral: Negative for agitation and sleep disturbance. The patient is not nervous/anxious.         Past Medical History:   Diagnosis Date    Atrial fibrillation (HCC)     [facility]congest[facility] heart failure) ([facility] [address]     Hyperlipidemia     Hypertension     Kidney stone      Past Surgical History:   Procedure Laterality Date    CARDIAC CATHETERIZATION      CARDIOVERSION  2019    Dr. Suleiman Parsons Right     mass removed    PACEMAKER INSERTION      PROSTATE SURGERY      THYROID SURGERY      goiter removed    UPPER GASTROINTESTINAL ENDOSCOPY  10/2019    Ben      No family history on file. Social History     Tobacco Use   Smoking Status Former Smoker    Packs/day: 1.00    Years: 35.00    Pack years: 35.00    Types: Cigarettes    Quit date: 1973    Years since quittin.2   Smokeless Tobacco Never Used       OBJECTIVE:   Wt Readings from Last 3 Encounters:   04/15/21 (!) 302 lb 12.8 oz (137.3 kg)   21 297 lb 9.6 oz (135 kg)   12/10/20 300 lb (136.1 kg)     BP Readings from Last 3 Encounters:   04/15/21 102/64   21 108/70   12/10/20 106/70       /64   Pulse 86   Temp 97.7 °F (36.5 °C)   Resp 18   Wt (!) 302 lb 12.8 oz (137.3 kg)   SpO2 94%   BMI 43.45 kg/m²      Physical Exam  Vitals signs and nursing note reviewed. Constitutional:       Appearance: Normal appearance. He is well-developed. He is obese. HENT:      Head: Normocephalic and atraumatic. Right Ear: External ear normal.      Left Ear: External ear normal.      Nose: Nose normal.   Eyes:      Conjunctiva/sclera: Conjunctivae normal.      Pupils: Pupils are equal, round, and reactive to light. Neck:      Musculoskeletal: Neck supple. No neck rigidity or muscular tenderness. Thyroid: No thyromegaly. Vascular: No JVD. Cardiovascular:      Rate and Rhythm: Normal rate and regular rhythm. Heart sounds: Normal heart sounds. No murmur. No friction rub. Pulmonary:      Effort: Pulmonary effort is normal.      Breath sounds: Normal breath sounds. No wheezing or rales.    Abdominal:      General: Bowel sounds are normal. Palpations: Abdomen is soft. Tenderness: There is no abdominal tenderness. There is no rebound. Hernia: A hernia is present. Hernia is present in the umbilical area. Musculoskeletal:         General: No tenderness. Lumbar back: He exhibits decreased range of motion. He exhibits no tenderness and no spasm. Right lower leg: No edema. Left lower leg: No edema. Skin:     Findings: No erythema or rash. Comments: Two large lipomas at and above the level of right scapula   Neurological:      General: No focal deficit present. Mental Status: He is alert and oriented to person, place, and time. Psychiatric:         Behavior: Behavior normal.         Judgment: Judgment normal.         Lab Results   Component Value Date     01/12/2021    K 4.6 01/12/2021    K 4.8 08/25/2019     01/12/2021    CO2 32 01/12/2021    GLUCOSE 100 01/12/2021    BUN 22 01/12/2021    CREATININE 1.4 01/12/2021    CALCIUM 9.3 01/12/2021    PROT 5.7 01/12/2021    LABALBU 3.9 01/12/2021    BILITOT 0.5 01/12/2021    ALT 10 01/12/2021    AST 16 01/12/2021       Hemoglobin A1C (%)   Date Value   03/14/2015 5.5     Microscopic Examination (no units)   Date Value   08/24/2019 YES     LDL Calculated (mg/dL)   Date Value   01/12/2021 85         Lab Results   Component Value Date    WBC 4.8 01/12/2021    NEUTROABS 3.2 01/12/2021    HGB 13.9 01/12/2021    HCT 44.1 01/12/2021    MCV 95.0 01/12/2021     01/12/2021       Lab Results   Component Value Date    TSH 0.50 01/12/2021       ASSESSMENT/PLAN:     1. Chronic systolic heart failure (HCC)  Will cont current meds. Will have patient to monitor daily wt, edema, any worsening of ARCHER and SOA. Low Na diet and fluid restriction. Monitor renal function closely. Long discussion with patient to be very compliant with taking meds. 2. Essential hypertension  BP is stable. I have advised him on low-sodium diet, exercise and weight control.   I am going to continue current medication. Will monitor his renal function every few months, have advised him to check blood pressure frequently and to keep a record of this. 3. Atrial fibrillation, unspecified type ([address]  Sinus rhythm on exam. Will cont on meds. I had a long discussion with the patient regarding the risk/benefit from anticoagulation. Patient is aware of that. 4. Elevated PSA  Been going on for some time. He does not want to see urology for this unless his PSA rises significantly. He reported that he had biopsies in the past and they were unremarkable. Continue to monitor. 5. Chronic renal failure, stage 3 (moderate), unspecified whether stage 3a or 3b CKD  Baseline. I have advised him to avoid any nephrotoxic agents. I am going to monitor renal function periodically. I will make sure that the blood pressure and other medical problems are under good control. Will refer to nephrology if the renal function should begin to deteriorat. Creatinine will be checked today. Last Creatinine is   Lab Results   Component Value Date    CREATININE 1.4 (H) 01/12/2021     Patient takes Prednisone PRN for arthritis. MMSE next visit for further evaluation of his memory issues. Orders Placed This Encounter   Medications    ferrous sulfate (FEROSUL) 325 (65 Fe) MG tablet     Sig: TAKE ONE TABLET BY MOUTH TWO TIMES A DAY WITH MEALS     Dispense:  60 tablet     Refill:  0    folic acid (FOLVITE) 1 MG tablet     Sig: Take 1 tablet by mouth daily     Dispense:  30 tablet     Refill:  3      Written by Pam Newton, acting as a scribe for Dr. Mayra Crowley on 4/15/2021 at 2:47 PM.     I, Dr. Lesa Muniz, personally performed the services described in the documentation as scribed by Merlinda Squire, CMA, in my presence and it is both accurate and complete.

## 2021-04-15 NOTE — PATIENT INSTRUCTIONS
· Keep a list of your medicines with you. List all of the prescription medicines, nonprescription medicines, supplements, natural remedies, and vitamins that you take. Tell your healthcare providers who treat you about all of the products you are taking. Your provider can provide you with a form to keep track of them. Just ask. · Follow the directions that come with your medicine, including information about food or alcohol. Make sure you know how and when to take your medicine. Do not take more or less than you are supposed to take. · Keep all medicines out of the reach of children. · Store medicines according to the directions on the label. · Monitor yourself. Learn to know how your body reacts to your new medicine and keep track of how it makes you feel before attempting (If your provider has allowed you to do so) to drive or go to work. · Seek emergency medical attention if you think you have used too much of this medicine. An overdose of any prescription medicine can be fatal. Overdose symptoms may include extreme drowsiness, muscle weakness, confusion, cold and clammy skin, pinpoint pupils, shallow breathing, slow heart rate, fainting, or coma. · Don't share prescription medicines with others, even when they seem to have the same symptoms. What may be good for you may be harmful to others. · If you are no longer taking a prescribed medication and you have pills left please take your pills out of their original containers. Mix crushed pills with an undesirable substance, such as cat litter or used coffee grounds. Put the mixture into a disposable container with a lid, such as an empty margarine tub, or into a sealable bag. Cover up or remove any of your personal information on the empty containers by covering it with black permanent marker or duct tape. Place the sealed container with the mixture, and the empty drug containers, in the trash.    · If you use a medication that is in the form of a patch, [employer]              [phone]   [facility]     [phone]   [facility] and [address] (may visit once monthly      8-4:30)              [phone]    [facility] of [address], food and utility assistance ([place])    Energy Assistance  [place]     [phone]     [address]   [phone]    [address]       [address] with applying for insurance coverage with Medicaid and Medicare including part D  Help with medication cost, eyeglasses or exams, hearing aides  [place]    [address] [phone] [facility]     [phone] Aidan MERCER   (Coordinating and Assisting the [address] [place], [place] [address]irene)  Help with durable medical equipment and assistive technology   [place] [phone]  [place]     [phone]    Domestic Violence Shelters   [place] Domestic Violence Hotline [phone]  [facility] in [place] but services [place] and [place] [phone]  Lucy Houser in [facility] but services [place] [phone]    [facility]   Emergency Shelter and [employer] [phone] [address] [phone] of [facility] [employer]  [address] [place] [address] [address] for [facility] [phone]    National Suicide Prevention Lifeline [phone]     Available 24 hours daily in [place] and [place]

## 2021-04-16 LAB
A/G RATIO: 2.1 (ref 0.8–2)
ALBUMIN SERPL-MCNC: 3.9 G/DL (ref 3.4–4.8)
ALP BLD-CCNC: 72 U/L (ref 25–100)
ALT SERPL-CCNC: 17 U/L (ref 4–36)
ANION GAP SERPL CALCULATED.3IONS-SCNC: 9 MMOL/L (ref 3–16)
AST SERPL-CCNC: 20 U/L (ref 8–33)
BASOPHILS ABSOLUTE: 0.1 K/UL (ref 0–0.1)
BASOPHILS RELATIVE PERCENT: 0.9 %
BILIRUB SERPL-MCNC: 0.4 MG/DL (ref 0.3–1.2)
BUN BLDV-MCNC: 28 MG/DL (ref 6–20)
CALCIUM SERPL-MCNC: 9.2 MG/DL (ref 8.5–10.5)
CHLORIDE BLD-SCNC: 104 MMOL/L (ref 98–107)
CO2: 32 MMOL/L (ref 20–30)
CREAT SERPL-MCNC: 1.6 MG/DL (ref 0.4–1.2)
EOSINOPHILS ABSOLUTE: 0.1 K/UL (ref 0–0.4)
EOSINOPHILS RELATIVE PERCENT: 1 %
GFR AFRICAN AMERICAN: 51
GFR NON-AFRICAN AMERICAN: 42
GLOBULIN: 1.9 G/DL
GLUCOSE BLD-MCNC: 84 MG/DL (ref 74–106)
HCT VFR BLD CALC: 43.2 % (ref 40–54)
HEMOGLOBIN: 13.4 G/DL (ref 13–18)
IMMATURE GRANULOCYTES #: 0 K/UL
IMMATURE GRANULOCYTES %: 0.3 % (ref 0–5)
LYMPHOCYTES ABSOLUTE: 1.3 K/UL (ref 1.5–4)
LYMPHOCYTES RELATIVE PERCENT: 22.9 %
MCH RBC QN AUTO: 29.8 PG (ref 27–32)
MCHC RBC AUTO-ENTMCNC: 31 G/DL (ref 31–35)
MCV RBC AUTO: 96 FL (ref 80–100)
MONOCYTES ABSOLUTE: 0.4 K/UL (ref 0.2–0.8)
MONOCYTES RELATIVE PERCENT: 7.6 %
NEUTROPHILS ABSOLUTE: 3.9 K/UL (ref 2–7.5)
NEUTROPHILS RELATIVE PERCENT: 67.3 %
PDW BLD-RTO: 14.1 % (ref 11–16)
PLATELET # BLD: 202 K/UL (ref 150–400)
PMV BLD AUTO: 12 FL (ref 6–10)
POTASSIUM SERPL-SCNC: 4.7 MMOL/L (ref 3.4–5.1)
RBC # BLD: 4.5 M/UL (ref 4.5–6)
SODIUM BLD-SCNC: 145 MMOL/L (ref 136–145)
TOTAL PROTEIN: 5.8 G/DL (ref 6.4–8.3)
WBC # BLD: 5.8 K/UL (ref 4–11)

## 2021-04-23 DIAGNOSIS — I48.20 CHRONIC ATRIAL FIBRILLATION (HCC): Primary | ICD-10-CM

## 2021-04-23 RX ORDER — APIXABAN 5 MG/1
TABLET, FILM COATED ORAL
Qty: 180 TABLET | Refills: 1 | Status: SHIPPED | OUTPATIENT
Start: 2021-04-23 | End: 2021-11-15

## 2021-05-19 DIAGNOSIS — M54.50 CHRONIC MIDLINE LOW BACK PAIN WITHOUT SCIATICA: ICD-10-CM

## 2021-05-19 DIAGNOSIS — G89.29 CHRONIC MIDLINE LOW BACK PAIN WITHOUT SCIATICA: ICD-10-CM

## 2021-05-19 DIAGNOSIS — M25.552 BILATERAL HIP PAIN: ICD-10-CM

## 2021-05-19 DIAGNOSIS — M25.551 BILATERAL HIP PAIN: ICD-10-CM

## 2021-05-19 RX ORDER — PREDNISONE 10 MG/1
TABLET ORAL
Qty: 15 TABLET | Refills: 0 | OUTPATIENT
Start: 2021-05-19

## 2021-05-19 RX ORDER — HYDROCODONE BITARTRATE AND ACETAMINOPHEN 5; 325 MG/1; MG/1
TABLET ORAL
Qty: 30 TABLET | Refills: 0 | Status: SHIPPED | OUTPATIENT
Start: 2021-05-19 | End: 2021-08-18

## 2021-05-19 RX ORDER — PHENYLEPHRINE/DM/ACETAMINOP/GG 5-325-200
TABLET ORAL
Qty: 90 TABLET | Refills: 1 | Status: SHIPPED | OUTPATIENT
Start: 2021-05-19 | End: 2021-11-15

## 2021-06-18 RX ORDER — FUROSEMIDE 40 MG/1
40 TABLET ORAL DAILY
Qty: 30 TABLET | Refills: 1 | Status: SHIPPED | OUTPATIENT
Start: 2021-06-18 | End: 2021-08-18

## 2021-06-18 RX ORDER — ROSUVASTATIN CALCIUM 10 MG/1
10 TABLET, COATED ORAL DAILY
Qty: 90 TABLET | Refills: 1 | OUTPATIENT
Start: 2021-06-18

## 2021-07-13 RX ORDER — FOLIC ACID 1 MG/1
1 TABLET ORAL DAILY
Qty: 30 TABLET | Refills: 3 | Status: SHIPPED | OUTPATIENT
Start: 2021-07-13 | End: 2021-10-29

## 2021-07-16 RX ORDER — ROSUVASTATIN CALCIUM 10 MG/1
10 TABLET, COATED ORAL DAILY
Qty: 90 TABLET | Refills: 1 | OUTPATIENT
Start: 2021-07-16

## 2021-07-19 RX ORDER — ROSUVASTATIN CALCIUM 10 MG/1
10 TABLET, COATED ORAL DAILY
Qty: 90 TABLET | Refills: 1 | OUTPATIENT
Start: 2021-07-19

## 2021-07-19 RX ORDER — ROSUVASTATIN CALCIUM 10 MG/1
10 TABLET, COATED ORAL DAILY
Qty: 90 TABLET | Refills: 1 | Status: SHIPPED | OUTPATIENT
Start: 2021-07-19 | End: 2021-09-02

## 2021-08-17 DIAGNOSIS — I10 ESSENTIAL HYPERTENSION: Primary | ICD-10-CM

## 2021-08-17 DIAGNOSIS — M25.551 BILATERAL HIP PAIN: ICD-10-CM

## 2021-08-17 DIAGNOSIS — G89.29 CHRONIC MIDLINE LOW BACK PAIN WITHOUT SCIATICA: ICD-10-CM

## 2021-08-17 DIAGNOSIS — M25.552 BILATERAL HIP PAIN: ICD-10-CM

## 2021-08-17 DIAGNOSIS — M54.50 CHRONIC MIDLINE LOW BACK PAIN WITHOUT SCIATICA: ICD-10-CM

## 2021-08-17 RX ORDER — NEBIVOLOL HYDROCHLORIDE 10 MG/1
10 TABLET ORAL DAILY
Qty: 90 TABLET | Refills: 3 | Status: SHIPPED | OUTPATIENT
Start: 2021-08-17 | End: 2022-08-19

## 2021-08-18 RX ORDER — FUROSEMIDE 40 MG/1
40 TABLET ORAL DAILY
Qty: 30 TABLET | Refills: 0 | Status: SHIPPED | OUTPATIENT
Start: 2021-08-18 | End: 2021-11-04

## 2021-08-18 RX ORDER — HYDROCODONE BITARTRATE AND ACETAMINOPHEN 5; 325 MG/1; MG/1
TABLET ORAL
Qty: 30 TABLET | Refills: 0 | Status: SHIPPED | OUTPATIENT
Start: 2021-08-18 | End: 2021-10-29

## 2021-08-18 RX ORDER — PANTOPRAZOLE SODIUM 40 MG/1
TABLET, DELAYED RELEASE ORAL
Qty: 30 TABLET | Refills: 0 | Status: SHIPPED | OUTPATIENT
Start: 2021-08-18 | End: 2021-09-29

## 2021-08-30 RX ORDER — SACUBITRIL AND VALSARTAN 24; 26 MG/1; MG/1
1 TABLET, FILM COATED ORAL 2 TIMES DAILY
Qty: 60 TABLET | Refills: 5 | Status: SHIPPED | OUTPATIENT
Start: 2021-08-30 | End: 2022-01-25

## 2021-09-02 RX ORDER — ROSUVASTATIN CALCIUM 10 MG/1
10 TABLET, COATED ORAL DAILY
Qty: 90 TABLET | Refills: 3 | Status: SHIPPED | OUTPATIENT
Start: 2021-09-02 | End: 2022-11-14

## 2021-10-29 RX ORDER — AMIODARONE HYDROCHLORIDE 200 MG/1
TABLET ORAL
Qty: 90 TABLET | Refills: 3 | Status: SHIPPED | OUTPATIENT
Start: 2021-10-29 | End: 2021-11-23 | Stop reason: HOSPADM

## 2021-11-04 RX ORDER — FUROSEMIDE 40 MG/1
40 TABLET ORAL DAILY
Qty: 30 TABLET | Refills: 0 | Status: SHIPPED | OUTPATIENT
Start: 2021-11-04 | End: 2021-11-22 | Stop reason: SDUPTHER

## 2021-11-15 DIAGNOSIS — I48.20 CHRONIC ATRIAL FIBRILLATION (HCC): ICD-10-CM

## 2021-11-15 RX ORDER — APIXABAN 5 MG/1
TABLET, FILM COATED ORAL
Qty: 180 TABLET | Refills: 1 | Status: ON HOLD | OUTPATIENT
Start: 2021-11-15 | End: 2021-11-23 | Stop reason: SDUPTHER

## 2021-11-15 RX ORDER — PHENYLEPHRINE/DM/ACETAMINOP/GG 5-325-200
TABLET ORAL
Qty: 90 TABLET | Refills: 1 | Status: SHIPPED | OUTPATIENT
Start: 2021-11-15 | End: 2022-02-03

## 2021-11-17 ENCOUNTER — HOSPITAL ENCOUNTER (OUTPATIENT)
Facility: HOSPITAL | Age: 81
Discharge: HOME | End: 2021-11-17
Payer: MEDICARE

## 2021-11-17 ENCOUNTER — OFFICE VISIT (OUTPATIENT)
Dept: CARDIOLOGY | Facility: CLINIC | Age: 81
End: 2021-11-17

## 2021-11-17 VITALS
WEIGHT: 309 LBS | HEIGHT: 70 IN | SYSTOLIC BLOOD PRESSURE: 102 MMHG | DIASTOLIC BLOOD PRESSURE: 70 MMHG | BODY MASS INDEX: 44.24 KG/M2 | HEART RATE: 91 BPM | OXYGEN SATURATION: 93 %

## 2021-11-17 DIAGNOSIS — I10 ESSENTIAL HYPERTENSION: ICD-10-CM

## 2021-11-17 DIAGNOSIS — J42 CHRONIC BRONCHITIS, UNSPECIFIED CHRONIC BRONCHITIS TYPE (HCC): ICD-10-CM

## 2021-11-17 DIAGNOSIS — N18.31 CHRONIC RENAL FAILURE, STAGE 3A (HCC): ICD-10-CM

## 2021-11-17 DIAGNOSIS — I50.22 CHRONIC SYSTOLIC CONGESTIVE HEART FAILURE (HCC): ICD-10-CM

## 2021-11-17 DIAGNOSIS — I49.5 TACHY-BRADY SYNDROME (HCC): ICD-10-CM

## 2021-11-17 DIAGNOSIS — I48.20 CHRONIC ATRIAL FIBRILLATION (HCC): Primary | ICD-10-CM

## 2021-11-17 DIAGNOSIS — Z95.0 CARDIAC PACEMAKER IN SITU: ICD-10-CM

## 2021-11-17 PROCEDURE — 93000 ELECTROCARDIOGRAM COMPLETE: CPT | Performed by: INTERNAL MEDICINE

## 2021-11-17 PROCEDURE — 93005 ELECTROCARDIOGRAM TRACING: CPT

## 2021-11-17 PROCEDURE — 93288 INTERROG EVL PM/LDLS PM IP: CPT | Performed by: INTERNAL MEDICINE

## 2021-11-17 PROCEDURE — 99214 OFFICE O/P EST MOD 30 MIN: CPT | Performed by: INTERNAL MEDICINE

## 2021-11-17 NOTE — PROGRESS NOTES
Subjective:     Encounter Date:11/17/2021      Patient ID: Bobby Dodd is a 81 y.o. male.    Chief Complaint: Tachycardia-bradycardia syndrome  HPI  This is an 81-year-old male patient who presents to cardiology clinic for routine follow-up and pacemaker interrogation.  The patient has a normally functioning [employer] dual-chamber rate responsive pacemaker with a basal rate set at 70.  The patient has been in atrial fibrillation since April of this year.  He has had no high heart rate events.  He is paced 99% of the time in the right ventricle.  Right atrial and right ventricular lead interrogation shows normal function based on sensing, pacing threshold and lead impedance.  His generator is at end-of-life.  The patient reports doing well since his last visit.  He has had no active cardiovascular issues, symptoms or hospitalizations.  He reports compliance with his medications with no perceived side effects.  He has had no bleeding complications related to anticoagulation therapy.  He has had no signs or symptoms to suggest stroke, TIA or other cardioembolic events.  The following portions of the patient's history were reviewed and updated as appropriate: allergies, current medications, past family history, past medical history, past social history, past surgical history and problem  Review of Systems   Constitutional: Negative for chills, diaphoresis, fever, malaise/fatigue, weight gain and weight loss.   HENT: Negative for ear discharge, hearing loss, hoarse voice and nosebleeds.    Eyes: Negative for discharge, double vision, pain and photophobia.   Cardiovascular: Negative for chest pain, claudication, cyanosis, dyspnea on exertion, irregular heartbeat, leg swelling, near-syncope, orthopnea, palpitations, paroxysmal nocturnal dyspnea and syncope.   Respiratory: Negative for cough, hemoptysis, shortness of breath, sputum production and wheezing.    Endocrine: Negative for cold intolerance, heat  intolerance, polydipsia, polyphagia and polyuria.   Hematologic/Lymphatic: Negative for adenopathy and bleeding problem. Does not bruise/bleed easily.   Skin: Negative for color change, flushing, itching and rash.   Musculoskeletal: Negative for muscle cramps, muscle weakness, myalgias and stiffness.   Gastrointestinal: Negative for abdominal pain, diarrhea, hematemesis, hematochezia, nausea and vomiting.   Genitourinary: Negative for dysuria, frequency and nocturia.   Neurological: Negative for focal weakness, loss of balance, numbness, paresthesias and seizures.   Psychiatric/Behavioral: Negative for altered mental status, hallucinations and suicidal ideas.   Allergic/Immunologic: Negative for HIV exposure, hives and persistent infections.           Current Outpatient Medications:   •  amiodarone (PACERONE) 200 MG tablet, TAKE ONE TABLET BY MOUTH EVERY DAY, Disp: 90 tablet, Rfl: 3  •  Bystolic 10 MG tablet, TAKE 1 TABLET BY MOUTH DAILY., Disp: 90 tablet, Rfl: 3  •  Cyanocobalamin 2500 MCG sublingual tablet, Place 2,500 mcg under the tongue., Disp: , Rfl:   •  DICLOFENAC PO, Take 75 mg by mouth., Disp: , Rfl:   •  Eliquis 5 MG tablet tablet, TAKE ONE TABLET BY MOUTH EVERY 12 HOURS, Disp: 180 tablet, Rfl: 1  •  Entresto 24-26 MG tablet, TAKE 1 TABLET BY MOUTH 2 (TWO) TIMES A DAY., Disp: 60 tablet, Rfl: 5  •  ferrous sulfate 324 (65 Fe) MG tablet delayed-release EC tablet, Take 324 mg by mouth 2 (Two) Times a Day With Meals., Disp: , Rfl:   •  folic acid (FOLVITE) 1 MG tablet, Take 1 mg by mouth Daily., Disp: , Rfl:   •  furosemide (LASIX) 40 MG tablet, Take 1 tablet by mouth Daily., Disp: 90 tablet, Rfl: 4  •  HYDROcodone-acetaminophen (NORCO) 5-325 MG per tablet, Take 1 tablet by mouth., Disp: , Rfl:   •  pantoprazole (PROTONIX) 40 MG EC tablet, Take 40 mg by mouth Daily., Disp: , Rfl:   •  rosuvastatin (CRESTOR) 10 MG tablet, TAKE 1 TABLET BY MOUTH DAILY., Disp: 90 tablet, Rfl: 3  •  spironolactone (ALDACTONE) 25  "MG tablet, TAKE 1/2 TABLET BY MOUTH EVERY DAY, Disp: 45 tablet, Rfl: 3    Objective:   Vitals and nursing note reviewed.   Constitutional:       Appearance: Healthy appearance. Not in distress.   Neck:      Vascular: No JVR. JVD normal.   Pulmonary:      Effort: Pulmonary effort is normal.      Breath sounds: Normal breath sounds. No wheezing. No rhonchi. No rales.   Chest:      Chest wall: Not tender to palpatation.   Cardiovascular:      PMI at left midclavicular line. Normal rate. Regular rhythm. Normal S1. Normal S2.      Murmurs: There is no murmur.      No gallop. No click. No rub.   Pulses:     Intact distal pulses.   Edema:     Peripheral edema absent.   Abdominal:      General: Bowel sounds are normal.      Palpations: Abdomen is soft.      Tenderness: There is no abdominal tenderness.   Musculoskeletal: Normal range of motion.         General: No tenderness. Skin:     General: Skin is warm and dry.   Neurological:      General: No focal deficit present.      Mental Status: Alert and oriented to person, place and time.       Blood pressure 102/70, pulse 91, height 177.8 cm (70\"), weight (!) 140 kg (309 lb), SpO2 93 %.   Lab Review:     Assessment:       1. Chronic atrial fibrillation (HCC)  Asymptomatic.  Rate control and anticoagulation strategy.    2. Chronic systolic congestive heart failure (HCC)  Failure with reduced ejection fraction.  Stage C.  New York Heart Association functional class I symptoms.  Euvolemic.  Tolerating guideline directed medical therapy well.    3. Essential hypertension  Acceptable blood pressure control.    4. Tachy-mercedes syndrome (HCC)  Normal pacemaker function.  Pacemaker generator is at end-of-life.    5. Chronic bronchitis, unspecified chronic bronchitis type (HCC)  Stable symptoms.    6. Chronic renal failure, stage 3a (HCC)  Stable renal function.      ECG 12 Lead    Date/Time: 11/17/2021 1:40 PM  Performed by: José Miguel Martel MD  Authorized by: José Miguel Martel MD "   Comparison: compared with previous ECG   Similar to previous ECG  Rhythm: atrial fibrillation  Pacing: ventricular paced rhythm  Clinical impression: abnormal EKG            Plan:     Advance Care Planning   ACP discussion was held with the patient during this visit. Patient has an advance directive (not in EMR), copy requested.    The patient will be referred for relatively urgent generator change out.  No changes to his medications have been made at today's visit.

## 2021-11-18 DIAGNOSIS — Z95.0 CARDIAC PACEMAKER IN SITU: Primary | ICD-10-CM

## 2021-11-22 ENCOUNTER — TELEPHONE (OUTPATIENT)
Dept: CARDIOLOGY | Facility: CLINIC | Age: 81
End: 2021-11-22

## 2021-11-22 ENCOUNTER — OFFICE VISIT (OUTPATIENT)
Dept: PRIMARY CARE CLINIC | Age: 81
End: 2021-11-22
Payer: MEDICARE

## 2021-11-22 VITALS
OXYGEN SATURATION: 97 % | HEART RATE: 90 BPM | SYSTOLIC BLOOD PRESSURE: 130 MMHG | TEMPERATURE: 97.3 F | RESPIRATION RATE: 18 BRPM | BODY MASS INDEX: 43.85 KG/M2 | WEIGHT: 305.6 LBS | DIASTOLIC BLOOD PRESSURE: 62 MMHG

## 2021-11-22 DIAGNOSIS — R06.02 SOB (SHORTNESS OF BREATH): ICD-10-CM

## 2021-11-22 DIAGNOSIS — R97.20 ELEVATED PSA: ICD-10-CM

## 2021-11-22 DIAGNOSIS — I50.9 CONGESTIVE HEART FAILURE, UNSPECIFIED HF CHRONICITY, UNSPECIFIED HEART FAILURE TYPE (HCC): Primary | ICD-10-CM

## 2021-11-22 DIAGNOSIS — N18.30 CHRONIC RENAL FAILURE, STAGE 3 (MODERATE), UNSPECIFIED WHETHER STAGE 3A OR 3B CKD (HCC): ICD-10-CM

## 2021-11-22 DIAGNOSIS — I10 ESSENTIAL HYPERTENSION: ICD-10-CM

## 2021-11-22 DIAGNOSIS — I50.22 CHRONIC SYSTOLIC HEART FAILURE (HCC): Primary | ICD-10-CM

## 2021-11-22 DIAGNOSIS — I48.91 ATRIAL FIBRILLATION, UNSPECIFIED TYPE (HCC): ICD-10-CM

## 2021-11-22 PROCEDURE — 99213 OFFICE O/P EST LOW 20 MIN: CPT | Performed by: INTERNAL MEDICINE

## 2021-11-22 RX ORDER — NEBIVOLOL HYDROCHLORIDE 10 MG/1
1 TABLET ORAL DAILY
Status: ON HOLD | COMMUNITY
Start: 2021-10-25 | End: 2022-10-14 | Stop reason: HOSPADM

## 2021-11-22 RX ORDER — FERROUS SULFATE 325(65) MG
TABLET ORAL
Qty: 60 TABLET | Refills: 3 | Status: SHIPPED | OUTPATIENT
Start: 2021-11-22 | End: 2022-04-14

## 2021-11-22 RX ORDER — FUROSEMIDE 40 MG/1
40 TABLET ORAL DAILY
Qty: 30 TABLET | Refills: 3 | Status: SHIPPED | OUTPATIENT
Start: 2021-11-22 | End: 2022-04-13

## 2021-11-22 RX ORDER — FOLIC ACID 1 MG/1
1 TABLET ORAL DAILY
Qty: 30 TABLET | Refills: 3 | Status: SHIPPED | OUTPATIENT
Start: 2021-11-22 | End: 2022-07-06

## 2021-11-22 ASSESSMENT — ENCOUNTER SYMPTOMS
NAUSEA: 0
EYE DISCHARGE: 0
VOMITING: 0
SHORTNESS OF BREATH: 0
COUGH: 0
SINUS PRESSURE: 0
ABDOMINAL PAIN: 0
BACK PAIN: 1
WHEEZING: 0

## 2021-11-22 NOTE — PROGRESS NOTES
SUBJECTIVE:    Patient ID: Georges Gurrola is a [de-identified] y.o.male. Chief Complaint   Patient presents with    Congestive Heart Failure    Atrial Fibrillation    Hypertension         HPI:  Patient with history of CHF. He has been compliant with taking his medications and monitoring his fluid intake/sodium intake. His weight is up 3 pounds, compared to last visit. Denies any swelling. Denies chest pain or palpitation. Patient has had chronic renal failure for the past few years. He continues to have good urinary output. He is not taking anything, but what was prescribed for him. Patient with history of A-fib. He is taking Eliquis without any side effects. He denies any easy bruising or bleeding. Denies chest pain or palpitation. Patient with history of elevated PSA. He is not currently seeing Urology. BP is stable    Patient's medications, allergies, past medical, surgical, social and family histories were reviewed and updated as appropriate in electronic medical record.         Outpatient Medications Marked as Taking for the 11/22/21 encounter (Office Visit) with Kristal Richey MD   Medication Sig Dispense Refill    BYSTOLIC 10 MG tablet Take 1 tablet by mouth daily      ferrous sulfate (FEROSUL) 325 (65 Fe) MG tablet TAKE ONE TABLET BY MOUTH TWO TIMES A DAY WITH MEALS 60 tablet 3    folic acid (FOLVITE) 1 MG tablet Take 1 tablet by mouth daily 30 tablet 3    furosemide (LASIX) 40 MG tablet Take 1 tablet by mouth daily 30 tablet 3    GNP B-12 2500 MCG SUBL DISSOLVE 1 TABLET UNDER TONGUE EVERY DAY 90 tablet 1    HYDROcodone-acetaminophen (NORCO) 5-325 MG per tablet TAKE ONE TABLET BY MOUTH TWO TIMES A DAY AS NEEDED FOR PAIN 30 tablet 0    pantoprazole (PROTONIX) 40 MG tablet TAKE ONE TABLET BY MOUTH EVERY MORNING BEFORE BREAKFAST 30 tablet 3    spironolactone (ALDACTONE) 25 MG tablet Take 0.5 tablets by mouth daily      sacubitril-valsartan (ENTRESTO) 24-26 MG per tablet Take 1 tablet by mouth every 12 hours      apixaban (ELIQUIS) 5 MG TABS tablet Take by mouth 2 times daily      amiodarone (CORDARONE) 200 MG tablet Take 200 mg by mouth daily      rosuvastatin (CRESTOR) 10 MG tablet Take 10 mg by mouth daily          Review of Systems   Constitutional: Negative for chills, fatigue and fever. HENT: Negative for congestion, ear pain and sinus pressure. Eyes: Negative for discharge and visual disturbance. Respiratory: Negative for cough, shortness of breath and wheezing. Cardiovascular: Negative for chest pain and palpitations. ARCHER   Gastrointestinal: Negative for abdominal pain, nausea and vomiting. Endocrine: Negative for cold intolerance and heat intolerance. Genitourinary: Negative for dysuria, frequency and urgency. Musculoskeletal: Positive for arthralgias, back pain and gait problem. Skin: Negative for pallor and rash. Allergic/Immunologic: Negative for food allergies and immunocompromised state. Neurological: Negative for dizziness, numbness and headaches. Hematological: Negative for adenopathy. Does not bruise/bleed easily. Psychiatric/Behavioral: Negative for agitation and sleep disturbance. The patient is not nervous/anxious. Past Medical History:   Diagnosis Date    Atrial fibrillation ([address]     CHF (congestive heart failure) (HCC)     Hyperlipidemia     Hypertension     Kidney stone      Past Surgical History:   Procedure Laterality Date    CARDIAC CATHETERIZATION      CARDIOVERSION  06/2019    Dr. Monica Cole Right     mass removed    PACEMAKER INSERTION  2012    PROSTATE SURGERY      THYROID SURGERY      goiter removed    UPPER GASTROINTESTINAL ENDOSCOPY  10/2019    Ben     History reviewed. No pertinent family history.    Social History     Tobacco Use   Smoking Status Former Smoker    Packs/day: 1.00    Years: 35.00    Pack years: 35.00    Types: Cigarettes    Quit date: - CBC Auto Differential; Future  - Comprehensive Metabolic Panel; Future  - TSH without Reflex; Future  - Vitamin B12; Future    4. Elevated PSA  Patient reported previous biopsy was benign. Has not been to urologist for few years. He elected to continue to monitor his PSA for now. Proceed with checking PSA. Further recommendation based on test result. Lab Results   Component Value Date    PSA 9.73 01/12/2021    PSA 10.39 08/05/2020     - PSA, Prostatic Specific Antigen; Future    5. Chronic renal failure, stage 3 (moderate), unspecified whether stage 3a or 3b CKD (HCC)  Baseline. I have advised him to avoid any nephrotoxic agents. I am going to monitor renal function periodically. I will make sure that the blood pressure and other medical problems are under good control. Will refer to nephrology if the renal function should begin to deteriorate. Creatinine will be checked today. Last Creatinine is   Lab Results   Component Value Date    CREATININE 1.6 (H) 04/15/2021       - CBC Auto Differential; Future  - Comprehensive Metabolic Panel; Future  - TSH without Reflex; Future  - Vitamin B12; Future    MMSE was done and came back 25/30 but patient refused to proceed with recall test which equal to 5 points. We will continue to monitor for now. Continue N95 and folic acid replacement.     Orders Placed This Encounter   Medications    ferrous sulfate (FEROSUL) 325 (65 Fe) MG tablet     Sig: TAKE ONE TABLET BY MOUTH TWO TIMES A DAY WITH MEALS     Dispense:  60 tablet     Refill:  3    folic acid (FOLVITE) 1 MG tablet     Sig: Take 1 tablet by mouth daily     Dispense:  30 tablet     Refill:  3    furosemide (LASIX) 40 MG tablet     Sig: Take 1 tablet by mouth daily     Dispense:  30 tablet     Refill:  3      Miguel Angel SMITH MA am scribing for and in the presence of Bulmaro Knott MD on this date of 11/22/21 at 12:24 PM    I, Dr. Bulmaro Knott, personally performed the services described in the documentation as scribed by Monie Yañez MA, in my presence and it is both accurate and complete.

## 2021-11-23 ENCOUNTER — HOSPITAL ENCOUNTER (OUTPATIENT)
Facility: HOSPITAL | Age: 81
Setting detail: HOSPITAL OUTPATIENT SURGERY
Discharge: HOME | End: 2021-11-23
Attending: INTERNAL MEDICINE | Admitting: INTERNAL MEDICINE

## 2021-11-23 ENCOUNTER — PREP FOR SURGERY (OUTPATIENT)
Dept: OTHER | Facility: HOSPITAL | Age: 81
End: 2021-11-23

## 2021-11-23 VITALS
DIASTOLIC BLOOD PRESSURE: 95 MMHG | HEART RATE: 60 BPM | TEMPERATURE: 97.5 F | HEIGHT: 70 IN | RESPIRATION RATE: 18 BRPM | WEIGHT: 304.6 LBS | BODY MASS INDEX: 43.61 KG/M2 | SYSTOLIC BLOOD PRESSURE: 116 MMHG | OXYGEN SATURATION: 97 %

## 2021-11-23 DIAGNOSIS — Z45.010 PACEMAKER AT END OF BATTERY LIFE: ICD-10-CM

## 2021-11-23 DIAGNOSIS — I49.5 TACHY-BRADY SYNDROME (HCC): Primary | ICD-10-CM

## 2021-11-23 DIAGNOSIS — I49.5 TACHY-BRADY SYNDROME (HCC): ICD-10-CM

## 2021-11-23 DIAGNOSIS — I48.20 CHRONIC ATRIAL FIBRILLATION (HCC): ICD-10-CM

## 2021-11-23 DIAGNOSIS — Z45.010 PACEMAKER AT END OF BATTERY LIFE: Primary | ICD-10-CM

## 2021-11-23 PROBLEM — I44.2 COMPLETE HEART BLOCK: Status: ACTIVE | Noted: 2021-11-23

## 2021-11-23 LAB
ANION GAP SERPL CALCULATED.3IONS-SCNC: 12 MMOL/L (ref 5–15)
BUN SERPL-MCNC: 27 MG/DL (ref 8–23)
BUN/CREAT SERPL: 17.2 (ref 7–25)
CALCIUM SPEC-SCNC: 9.2 MG/DL (ref 8.6–10.5)
CHLORIDE SERPL-SCNC: 102 MMOL/L (ref 98–107)
CO2 SERPL-SCNC: 26 MMOL/L (ref 22–29)
CREAT SERPL-MCNC: 1.57 MG/DL (ref 0.76–1.27)
DEPRECATED RDW RBC AUTO: 46.6 FL (ref 37–54)
ERYTHROCYTE [DISTWIDTH] IN BLOOD BY AUTOMATED COUNT: 13.5 % (ref 12.3–15.4)
GFR SERPL CREATININE-BSD FRML MDRD: 43 ML/MIN/1.73
GLUCOSE SERPL-MCNC: 100 MG/DL (ref 65–99)
HBA1C MFR BLD: 5.5 % (ref 4.8–5.6)
HCT VFR BLD AUTO: 41.3 % (ref 37.5–51)
HGB BLD-MCNC: 13.4 G/DL (ref 13–17.7)
MCH RBC QN AUTO: 30.4 PG (ref 26.6–33)
MCHC RBC AUTO-ENTMCNC: 32.4 G/DL (ref 31.5–35.7)
MCV RBC AUTO: 93.7 FL (ref 79–97)
PLATELET # BLD AUTO: 224 10*3/MM3 (ref 140–450)
PMV BLD AUTO: 10.3 FL (ref 6–12)
POTASSIUM SERPL-SCNC: 4.2 MMOL/L (ref 3.5–5.2)
RBC # BLD AUTO: 4.41 10*6/MM3 (ref 4.14–5.8)
SODIUM SERPL-SCNC: 140 MMOL/L (ref 136–145)
WBC NRBC COR # BLD: 5.77 10*3/MM3 (ref 3.4–10.8)

## 2021-11-23 PROCEDURE — 80048 BASIC METABOLIC PNL TOTAL CA: CPT | Performed by: INTERNAL MEDICINE

## 2021-11-23 PROCEDURE — 25010000002 FENTANYL CITRATE (PF) 50 MCG/ML SOLUTION: Performed by: INTERNAL MEDICINE

## 2021-11-23 PROCEDURE — 85027 COMPLETE CBC AUTOMATED: CPT | Performed by: INTERNAL MEDICINE

## 2021-11-23 PROCEDURE — C1785 PMKR, DUAL, RATE-RESP: HCPCS | Performed by: INTERNAL MEDICINE

## 2021-11-23 PROCEDURE — 83036 HEMOGLOBIN GLYCOSYLATED A1C: CPT | Performed by: NURSE PRACTITIONER

## 2021-11-23 PROCEDURE — 0 LIDOCAINE 1 % SOLUTION: Performed by: INTERNAL MEDICINE

## 2021-11-23 PROCEDURE — 99152 MOD SED SAME PHYS/QHP 5/>YRS: CPT | Performed by: INTERNAL MEDICINE

## 2021-11-23 PROCEDURE — S0260 H&P FOR SURGERY: HCPCS | Performed by: INTERNAL MEDICINE

## 2021-11-23 PROCEDURE — 0 CEFAZOLIN IN DEXTROSE 2-4 GM/100ML-% SOLUTION: Performed by: NURSE PRACTITIONER

## 2021-11-23 PROCEDURE — 25010000002 MIDAZOLAM PER 1 MG: Performed by: INTERNAL MEDICINE

## 2021-11-23 PROCEDURE — 33228 REMV&REPLC PM GEN DUAL LEAD: CPT | Performed by: INTERNAL MEDICINE

## 2021-11-23 DEVICE — GEN PM ASSURITY MRI DR RF PM2272: Type: IMPLANTABLE DEVICE | Status: FUNCTIONAL

## 2021-11-23 RX ORDER — SODIUM CHLORIDE 9 MG/ML
1 INJECTION, SOLUTION INTRAVENOUS CONTINUOUS
Status: CANCELLED | OUTPATIENT
Start: 2021-11-23 | End: 2021-11-23

## 2021-11-23 RX ORDER — SODIUM CHLORIDE 0.9 % (FLUSH) 0.9 %
3 SYRINGE (ML) INJECTION EVERY 12 HOURS SCHEDULED
Status: CANCELLED | OUTPATIENT
Start: 2021-11-23

## 2021-11-23 RX ORDER — SODIUM CHLORIDE 0.9 % (FLUSH) 0.9 %
10 SYRINGE (ML) INJECTION AS NEEDED
Status: DISCONTINUED | OUTPATIENT
Start: 2021-11-23 | End: 2021-11-23 | Stop reason: HOSPADM

## 2021-11-23 RX ORDER — ACETAMINOPHEN 325 MG/1
650 TABLET ORAL EVERY 4 HOURS PRN
Status: CANCELLED | OUTPATIENT
Start: 2021-11-23

## 2021-11-23 RX ORDER — SODIUM CHLORIDE 0.9 % (FLUSH) 0.9 %
3 SYRINGE (ML) INJECTION EVERY 12 HOURS SCHEDULED
Status: DISCONTINUED | OUTPATIENT
Start: 2021-11-23 | End: 2021-11-23 | Stop reason: HOSPADM

## 2021-11-23 RX ORDER — CEFAZOLIN SODIUM 2 G/100ML
2 INJECTION, SOLUTION INTRAVENOUS ONCE
Status: CANCELLED | OUTPATIENT
Start: 2021-11-23 | End: 2021-11-23

## 2021-11-23 RX ORDER — SODIUM CHLORIDE 0.9 % (FLUSH) 0.9 %
10 SYRINGE (ML) INJECTION AS NEEDED
Status: CANCELLED | OUTPATIENT
Start: 2021-11-23

## 2021-11-23 RX ORDER — LIDOCAINE HYDROCHLORIDE 10 MG/ML
INJECTION, SOLUTION INFILTRATION; PERINEURAL AS NEEDED
Status: DISCONTINUED | OUTPATIENT
Start: 2021-11-23 | End: 2021-11-23 | Stop reason: HOSPADM

## 2021-11-23 RX ORDER — NITROGLYCERIN 0.4 MG/1
0.4 TABLET SUBLINGUAL
Status: CANCELLED | OUTPATIENT
Start: 2021-11-23

## 2021-11-23 RX ORDER — SODIUM CHLORIDE 9 MG/ML
1 INJECTION, SOLUTION INTRAVENOUS CONTINUOUS
Status: DISCONTINUED | OUTPATIENT
Start: 2021-11-23 | End: 2021-11-23 | Stop reason: HOSPADM

## 2021-11-23 RX ORDER — CEFAZOLIN SODIUM 2 G/100ML
2 INJECTION, SOLUTION INTRAVENOUS ONCE
Status: COMPLETED | OUTPATIENT
Start: 2021-11-23 | End: 2021-11-23

## 2021-11-23 RX ORDER — SODIUM CHLORIDE 9 MG/ML
INJECTION, SOLUTION INTRAVENOUS CONTINUOUS PRN
Status: DISCONTINUED | OUTPATIENT
Start: 2021-11-23 | End: 2021-11-23 | Stop reason: HOSPADM

## 2021-11-23 RX ORDER — NITROGLYCERIN 0.4 MG/1
0.4 TABLET SUBLINGUAL
Status: DISCONTINUED | OUTPATIENT
Start: 2021-11-23 | End: 2021-11-23 | Stop reason: HOSPADM

## 2021-11-23 RX ORDER — MIDAZOLAM HYDROCHLORIDE 1 MG/ML
INJECTION INTRAMUSCULAR; INTRAVENOUS AS NEEDED
Status: DISCONTINUED | OUTPATIENT
Start: 2021-11-23 | End: 2021-11-23 | Stop reason: HOSPADM

## 2021-11-23 RX ORDER — FENTANYL CITRATE 50 UG/ML
INJECTION, SOLUTION INTRAMUSCULAR; INTRAVENOUS AS NEEDED
Status: DISCONTINUED | OUTPATIENT
Start: 2021-11-23 | End: 2021-11-23 | Stop reason: HOSPADM

## 2021-11-23 RX ORDER — ACETAMINOPHEN 325 MG/1
650 TABLET ORAL EVERY 4 HOURS PRN
Status: DISCONTINUED | OUTPATIENT
Start: 2021-11-23 | End: 2021-11-23 | Stop reason: HOSPADM

## 2021-11-23 RX ADMIN — SODIUM CHLORIDE 1 ML/KG/HR: 9 INJECTION, SOLUTION INTRAVENOUS at 15:51

## 2021-11-23 RX ADMIN — CEFAZOLIN SODIUM 2 G: 2 INJECTION, SOLUTION INTRAVENOUS at 16:20

## 2021-11-23 NOTE — H&P
Cardiology H&P:     Bobby Dodd  1940  [phone]  There is no work phone number on file.    11/23/21    DATE OF ADMISSION: 11/23/2021  [facility] Derrick Bañuelos MD  [address] [place] [address] / [place] [id number]  Referring Provider: Tho Richards MD     CC: PM generator Change    Problem List:  1. Persistent atrial fibrillation:              A. Diagnosed during preop cardiovascular evaluation in 2003              B. Initially treated with flecainide with external cardioversion to normal sinus rhythm              C. Recurrent atrial fibrillation in 2006, s/p external cardioversion to normal sinus rhythm              D. Left and right heart catheterization 5/29/2009: EF 60%, no significant coronary artery disease, normal right heart catheterization              E. Initiation of sotalol with ECV to NSR 04/2013              F. Echocardiogram 10/28/13: EF 50%, grade 1 diastolic dysfunction, no significant valvular disease              G. Echocardiogram 8/1/2017: EF 20%, severe biatrial enlargement, no significant valvular disease              H. 24-hour Holter monitor 8/7/17: Predominantly atrial fibrillation, average heart rate 106 bpm, min 74 bpm, maximum 169 bpm, rare PVCs              I. Lexiscan stress test 9/20/17: EF 47%, positive stress myocardial perfusion image with a large severe inferoapical scar with no reversibility              J. Initiated on amiodarone 10/2017              I. ECV 6/18.  Recurrent Afib 10/18, increased SOB.   2. Tachy mercedes syndrome:              A.  Significant bradycardia noted after sotalol and ECV to normal sinus rhythm 04/2013, remained with symptomatic sinus bradycardia off sotalol and underwent dual-chamber permanent      pacemaker ([employer]) 4/19/2013  3. Chronic systolic congestive heart failure              A.  Echocardiogram 8/1/2017: EF 20%, severe biatrial enlargement, no significant valvular disease              B.  Lexiscan stress test 9/20/17: EF  47%, positive stress myocardial perfusion image with a large severe inferoapical scar with no reversibility   C.  Echocardiogram 2019: EF 35%  4. Essential hypertension  5. Hyperlipidemia  6. Obstructive sleep apnea  7. Morbid obesity  8. CKD   9.  Surgical history:              A.  Removal of benign lung mass, 2004              B.  Prostate surgery, 2008              C.  Hernia repair, 2007              D.  Cholecystectomy, 2007              E.  Right knee arthroscopy              F.  Goiter removal, 2001              G.  Dual-chamber permanent pacemaker implant, 2013      History of Present Illness:   Bobby Dodd is a pleasant 81-year-old white male with history of persistent atrial fibrillation, tachybradycardia syndrome with dual-chamber [employer] pacemaker implanted in 2013, chronic systolic congestive heart failure, hypertension, dyslipidemia, obstructive sleep apnea, and morbid obesity who presents today for generator change of his dual-chamber pacemaker.  He was found at office visit with Dr. Martel on 11/17/2021 2 have a battery near end-of-life.  It reached DEVIN in August 2021.  From a cardiovascular standpoint, the patient has been feeling well without chest pain shortness of breath palpitations or syncope.  According to his device interrogation he has been in persistent atrial fibrillation since at least November 2020 if not longer.  He remains on amiodarone.  He is on Eliquis for anticoagulation and held it last night and this morning in preparation for the procedure today.  He denies any stroke symptoms or bleeding issues.  He denies any recent illnesses, fevers, chills.  No skin infections or rashes.      No Known Allergies    Prior to Admission Medications     Prescriptions Last Dose Informant Patient Reported? Taking?    amiodarone (PACERONE) 200 MG tablet 11/22/2021  No Yes    TAKE ONE TABLET BY MOUTH EVERY DAY    Bystolic 10 MG tablet 11/22/2021  No Yes    TAKE 1 TABLET BY MOUTH DAILY.     Cyanocobalamin 2500 MCG sublingual tablet 2021  Yes Yes    Place 2,500 mcg under the tongue.    Eliquis 5 MG tablet tablet 2021  No Yes    TAKE ONE TABLET BY MOUTH EVERY 12 HOURS    Entresto 24-26 MG tablet 2021  No Yes    TAKE 1 TABLET BY MOUTH 2 (TWO) TIMES A DAY.    ferrous sulfate 324 (65 Fe) MG tablet delayed-release EC tablet 2021  Yes Yes    Take 324 mg by mouth 2 (Two) Times a Day With Meals.    folic acid (FOLVITE) 1 MG tablet 2021 Pharmacy Yes Yes    Take 1 mg by mouth Daily.    furosemide (LASIX) 40 MG tablet 2021 Pharmacy No Yes    Take 1 tablet by mouth Daily.    HYDROcodone-acetaminophen (NORCO) 5-325 MG per tablet Past Week  Yes Yes    Take 1 tablet by mouth.    pantoprazole (PROTONIX) 40 MG EC tablet 2021 Pharmacy Yes Yes    Take 40 mg by mouth Daily.    rosuvastatin (CRESTOR) 10 MG tablet 2021  No Yes    TAKE 1 TABLET BY MOUTH DAILY.    spironolactone (ALDACTONE) 25 MG tablet 2021  No Yes    TAKE 1/2 TABLET BY MOUTH EVERY DAY            Current Facility-Administered Medications:   •  acetaminophen (TYLENOL) tablet 650 mg, 650 mg, Oral, Q4H PRN, Daija Olvera APRN  •  ceFAZolin in dextrose (ANCEF) IVPB solution 2 g, 2 g, Intravenous, Once, Daija Olvera APRN  •  nitroglycerin (NITROSTAT) SL tablet 0.4 mg, 0.4 mg, Sublingual, Q5 Min PRN, Daija Olvera APRN  •  sodium chloride 0.9 % flush 10 mL, 10 mL, Intravenous, PRN, Daija Olvera APRN  •  sodium chloride 0.9 % flush 3 mL, 3 mL, Intravenous, Q12H, Daija Olvera APRN  •  sodium chloride 0.9 % infusion, 1 mL/kg/hr (Order-Specific), Intravenous, Continuous, Daija Olvera APRN    Social History     Socioeconomic History   • Marital status:    Tobacco Use   • Smoking status: Former Smoker     Packs/day: 1.00     Years: 30.00     Pack years: 30.00     Types: Cigarettes     Quit date:      Years since quittin.9   • Smokeless tobacco:  "Never Used   Substance and Sexual Activity   • Alcohol use: No   • Drug use: No   • Sexual activity: Defer       Family History   Problem Relation Age of Onset   • Arthritis Mother    • Hypertension Mother    • Heart attack Father    • Arthritis Sister        REVIEW OF SYSTEMS:   CONSTITUTIONAL:         No weight loss, fever, chills, weakness or fatigue.   HEENT:                            No visual loss, blurred vision, double vision, yellow sclerae.                                             No hearing loss, congestion, sore throat.   SKIN:                                No rashes, urticaria, ulcers, sores.     RESPIRATORY:               No shortness of breath, hemoptysis, cough, sputum.   GI:                                     No anorexia, nausea, vomiting, diarrhea. No abdominal pain, melena.   :                                   No burning on urination, hematuria or increased frequency.  ENDOCRINE:                   No diaphoresis, cold or heat intolerance. No polyuria or polydipsia.   NEURO:                            No headache, dizziness, syncope, paralysis, ataxia, or parasthesias.                                            No change in bowel or bladder control. No history of CVA/TIA  MUSCULOSKELETAL:    No muscle, back pain, joint pain or stiffness.   HEMATOLOGY:               No anemia, bleeding, bruising. No history of DVT/PE.  PSYCH:                            No history of depression, anxiety    Vitals:    11/23/21 1256 11/23/21 1258   BP: 118/80 126/83   BP Location: Right arm Left arm   Pulse:  90   Temp:  97.5 °F (36.4 °C)   SpO2:  95%   Weight:  (!) 138 kg (304 lb 9.6 oz)   Height:  177.8 cm (70\")         Vital Sign Min/Max for last 24 hours  Temp  Min: 97.5 °F (36.4 °C)  Max: 97.5 °F (36.4 °C)   BP  Min: 118/80  Max: 126/83   Pulse  Min: 90  Max: 90   No data recorded   SpO2  Min: 95 %  Max: 95 %   No data recorded    No intake or output data in the 24 hours ending 11/23/21 1358    "       Physical Exam:  GEN: Well nourished, Well- developed  No acute distress.  Obese  HEENT: Normocephalic, Atraumatic, PERRLA, moist mucous membranes  NECK: supple, NO JVD, no thyromegaly, no lymphadenopathy  CARDIAC: S1S2  RRR no murmur, gallop, rub  LUNGS: Clear to ausculation, normal respiratory effort  ABDOMEN: Soft, nontender, obese, normal bowel sounds  EXTREMITIES:No gross deformities,  No clubbing, cyanosis, or edema  SKIN: Warm, dry  NEURO: No focal deficits  PSYCHIATRIC: Normal affect and mood      I personally viewed and interpreted the patient's EKG/Telemetry/lab data    Data:   Results from last 7 days   Lab Units 11/23/21  1256   WBC 10*3/mm3 5.77   HEMOGLOBIN g/dL 13.4   HEMATOCRIT % 41.3   PLATELETS 10*3/mm3 224     Results from last 7 days   Lab Units 11/23/21  1256   SODIUM mmol/L 140   POTASSIUM mmol/L 4.2   CHLORIDE mmol/L 102   CO2 mmol/L 26.0   BUN mg/dL 27*   CREATININE mg/dL 1.57*   GLUCOSE mg/dL 100*                                  No intake or output data in the 24 hours ending 11/23/21 1358      Telemetry: V paced at 90 bpm       Assessment and Plan:   1.  Tachybradycardia syndrome:  -Patient with previously implanted dual-chamber permanent pacemaker ([employer]) in 2013 now at end-of-life.  Patient will undergo generator change out today with Dr. Richards. The risks, benefits, and alternatives of the procedure have been reviewed and the patient wishes to proceed.     2.  Persistent atrial fibrillation:  -For likely permanent in nature, device interrogation reveals atrial fibrillation ongoing since November 2020.  The patient is overall asymptomatic from his atrial fibrillation.  He is currently on amiodarone, and we would recommend him to discontinue that medication at this time.  -On Eliquis for anticoagulation, held for procedure today.  Will restart after procedure.    3.  Chronic systolic congestive heart failure:  -Last EF on echo 6/3/2019: 35%  -On Bystolic and Entresto, class I  chronic symptoms, euvolemic.     Renal insufficiency:  Creatinine 1.57 today compared with creatinine from April 2021 appears to be stable    4.  Hypertension:  -Well-controlled on current medications        Electronically signed by VALENTINA Vargas, 11/23/21, 1:32 PM EST.

## 2021-11-23 NOTE — OP NOTE
Cardiac Electrophysiology Procedure Note      [facility]    PROCEDURE: PACEMAKER GENERATOR REPLACEMENT //     OPERATION PERFORMED:     1. Explantation of [employer] dual-chamber permanent pacemaker model 2210 pulse generator with serial number [id number].  2. Testing of retained pacing leads:        A. Atrial lead [employer] model 2088 TC, 52 cm, CAU [id number].        B. Right ventricular lead [employer] model 2088 TC,  58 cm, CEA [id number].    3. Implantation of [employer] dual-chamber permanent pacemaker model PM 2272 pulse generator with serial number [id number].    ATTENDING SURGEON: Damian Doshi DO    MODERATE SEDATION FOR PROCEDURE:    Moderate sedation was given during this procedure.    I supervised and directed Ayah Grace RN to administer this sedation.  This staff member also monitored the patient's hemodynamic and respiratory status and response to these medications.  Please see the full detailed procedure report generated by the electrophysiology laboratory staff.  The patient tolerated moderate sedation well.  There were no complications regarding sedation.  The total dose of fentanyl was 100 mcg and the total dose of midazolam was 340 mg mg.  The total dose of Brevital was 40 mg.  First sedation was administered at 1620 and continued through 1648.    ESTIMATED BLOOD LOSS: less than 20cc    COMPLICATIONS: None    TIME OUT: Time out was completed with verification of the correct patient identity, procedure to be performed, procedure site and implanted equipment.    INDICATION FOR PROCEDURE:  Briefly,Bobby Dodd is a 81 y.o. male with a history of complete heart block who was initially implanted with a dual chamber pacemaker on April 19 of 2013.  The patient was recently seen in our device clinic at which time the patient's device was discovered to be at the elective replacement interval.  The patient was deemed appropriate for a generator replacement.    The  patient was able to give written informed consent after revisiting the key portions of the risk versus benefit profile of the procedure.  This discussion was framed by our lengthy conversations  (please see our detailed notes).  Patient verbalized strong understanding of this discussion and a strong desire to proceed with the procedure.  Please note that this detailed informed consent process utilized mutual and shared decision making process between all parties involved, principally the physician and patient, but also potentially with input from the patient's selected family and friends.    PROCEDURE AND FINDINGS:  The patient was brought to the electrophysiology suite in a post absorptive state.  Informed consent was obtained prior to the procedure and confirmed.  Intravenous prophylactic antibiotics were administered and confirmed to be completely infused prior to the start of the procedure.  After the site of implantation was prepped and draped in the usual sterile fashion and after adequate anesthesia was given, the skin was infiltrated with 1% lidocaine and 0.5% bupivicaine 50/50 mixture.  The skin was incised with a #10 scalpel.  Blunt and electrosurgical dissection was carried out to the pulse generator pocket.  The leads were carefully isolated with blunt and electrosurgical dissection.  Careful attention was paid not to damage the leads.  The pulse generator was explanted and the pocket was copiously irrigated with antibiotic containing normal saline and subsequently observed.  Once adequate hemostasis was confirmed within the pocket, the leads were detached from the pulse generator.  The leads were tested for adequate sensing, impedances and pacing thresholds.  The lead tips for all leads were cleaned and dried thoroughly.  The leads were attached to the appropriate ports on the new pulse generator.  Tug testing was performed on all connections.  The device and leads were placed within the pocket such  that the coiled redundant leads were posterior to the pulse generator.  Once again, the device was tested for adequate sensing, impedances and pacing thresholds.  The pulse generator was then sutured to the fascia in a medial location within the pocket using non absorbable suture.  The pocket was closed with two layers of suture using 2-0 then 3-0 Vicryl, followed by a layer of surgical adhesive and finally a sterile occlusive dressing.                     MEASURED DEVICE DATA:    Atrial lead                   sensing:                 Atrial fibrillation    RV lead                   sensing:                       Greater than 12 mV                   pacing impedance:    480 ohms                   Threshold:                  0.5 volts at 0.5 ms                                                        PROGRAMMED PARAMETERS:    Mode:                                 VVIR  Lower Rate:                       60 pulses per minute  Upper Sensor Rate:          130 pulses per minute      CONCLUSION:  Successful pacemaker generator change.      Patient is to follow up with our clinic in approximately one week and then myself in 3 months.    No lovenox or heparin at any dose is to be given.    FOR THE PATIENT    Please do not lift more than 10 pounds or abduct the shoulder joint / or raise the arm above the shoulder for 6 weeks after device was implanted (this does not apply to subcutaneous ICDs).    Avoid activities that involve heavy lifting or rough contact that could result in blows to your implant site and to allow your incision time to heal.    No shower or getting device incision wet for 2 days post-operatively.    Keep wound exposed to air unless otherwise instructed, the surgical glue is the bandage.    No creams, lotions, or powders to incision.    Please avoid allowing bra strap or suspenders to lay over incision until completely healed.    Avoid hot tubs or pools until incision completely healed.    No driving for 24  hours post-operatively after device implant.    Call your doctor if you have any swelling, redness or discharge around your incision, notice anything unusual or unexpected, or you develop a fever that does not go away in two or three days.    Call your doctor if you hear any beeping sounds / vibratory alerts from your device as this indicates your device needs to be checked immediately.    Carry your Medical Device ID Card with you at all times.  Please call our office () with any questions about the device or the wounds.            Damian Doshi DO, FACC, [facility]  Cardiac Electrophysiologist  [facility] / [facility]

## 2022-01-03 RX ORDER — PANTOPRAZOLE SODIUM 40 MG/1
TABLET, DELAYED RELEASE ORAL
Qty: 30 TABLET | Refills: 3 | Status: SHIPPED | OUTPATIENT
Start: 2022-01-03 | End: 2022-05-16

## 2022-01-25 RX ORDER — SACUBITRIL AND VALSARTAN 24; 26 MG/1; MG/1
1 TABLET, FILM COATED ORAL 2 TIMES DAILY
Qty: 60 TABLET | Refills: 5 | Status: SHIPPED | OUTPATIENT
Start: 2022-01-25 | End: 2022-07-29

## 2022-02-03 RX ORDER — VITAMIN B COMPLEX
TABLET ORAL
Qty: 90 TABLET | Refills: 1 | Status: SHIPPED | OUTPATIENT
Start: 2022-02-03 | End: 2022-07-08

## 2022-02-28 DIAGNOSIS — I49.5 TACHY-BRADY SYNDROME: ICD-10-CM

## 2022-02-28 DIAGNOSIS — I48.20 CHRONIC ATRIAL FIBRILLATION: ICD-10-CM

## 2022-02-28 DIAGNOSIS — I48.0 PAROXYSMAL ATRIAL FIBRILLATION: ICD-10-CM

## 2022-02-28 DIAGNOSIS — Z95.0 CARDIAC PACEMAKER IN SITU: ICD-10-CM

## 2022-02-28 DIAGNOSIS — I10 ESSENTIAL HYPERTENSION: ICD-10-CM

## 2022-02-28 DIAGNOSIS — I50.22 CHRONIC SYSTOLIC CONGESTIVE HEART FAILURE: ICD-10-CM

## 2022-02-28 DIAGNOSIS — E66.01 MORBID (SEVERE) OBESITY DUE TO EXCESS CALORIES: ICD-10-CM

## 2022-02-28 RX ORDER — FOLIC ACID 1 MG/1
TABLET ORAL
Qty: 30 TABLET | Refills: 3 | OUTPATIENT
Start: 2022-02-28

## 2022-02-28 RX ORDER — SPIRONOLACTONE 25 MG/1
TABLET ORAL
Qty: 45 TABLET | Refills: 3 | Status: SHIPPED | OUTPATIENT
Start: 2022-02-28 | End: 2022-11-14

## 2022-02-28 RX ORDER — FUROSEMIDE 40 MG/1
TABLET ORAL
Qty: 30 TABLET | Refills: 3 | OUTPATIENT
Start: 2022-02-28

## 2022-03-10 DIAGNOSIS — G89.29 CHRONIC MIDLINE LOW BACK PAIN WITHOUT SCIATICA: ICD-10-CM

## 2022-03-10 DIAGNOSIS — M25.552 BILATERAL HIP PAIN: ICD-10-CM

## 2022-03-10 DIAGNOSIS — M54.50 CHRONIC MIDLINE LOW BACK PAIN WITHOUT SCIATICA: ICD-10-CM

## 2022-03-10 DIAGNOSIS — M25.551 BILATERAL HIP PAIN: ICD-10-CM

## 2022-03-11 RX ORDER — HYDROCODONE BITARTRATE AND ACETAMINOPHEN 5; 325 MG/1; MG/1
TABLET ORAL
Qty: 30 TABLET | Refills: 0 | Status: SHIPPED | OUTPATIENT
Start: 2022-03-11 | End: 2022-04-10

## 2022-03-28 ENCOUNTER — TELEPHONE (OUTPATIENT)
Dept: PRIMARY CARE CLINIC | Age: 82
End: 2022-03-28

## 2022-04-13 RX ORDER — FUROSEMIDE 40 MG/1
TABLET ORAL
Qty: 30 TABLET | Refills: 3 | Status: SHIPPED | OUTPATIENT
Start: 2022-04-13 | End: 2022-07-08

## 2022-04-14 RX ORDER — FERROUS SULFATE 325(65) MG
TABLET ORAL
Qty: 60 TABLET | Refills: 3 | Status: SHIPPED | OUTPATIENT
Start: 2022-04-14 | End: 2022-10-10 | Stop reason: SDUPTHER

## 2022-04-19 ENCOUNTER — HOSPITAL ENCOUNTER (OUTPATIENT)
Facility: HOSPITAL | Age: 82
Discharge: HOME | End: 2022-04-19
Payer: MEDICARE

## 2022-04-19 ENCOUNTER — OFFICE VISIT (OUTPATIENT)
Dept: PRIMARY CARE CLINIC | Age: 82
End: 2022-04-19
Payer: MEDICARE

## 2022-04-19 VITALS
OXYGEN SATURATION: 98 % | WEIGHT: 315 LBS | HEIGHT: 70 IN | HEART RATE: 68 BPM | RESPIRATION RATE: 18 BRPM | TEMPERATURE: 97.6 F | SYSTOLIC BLOOD PRESSURE: 118 MMHG | BODY MASS INDEX: 45.1 KG/M2 | DIASTOLIC BLOOD PRESSURE: 62 MMHG

## 2022-04-19 DIAGNOSIS — I10 ESSENTIAL HYPERTENSION: ICD-10-CM

## 2022-04-19 DIAGNOSIS — R97.20 ELEVATED PSA: ICD-10-CM

## 2022-04-19 DIAGNOSIS — I50.22 CHRONIC SYSTOLIC HEART FAILURE (HCC): Primary | ICD-10-CM

## 2022-04-19 DIAGNOSIS — I48.91 ATRIAL FIBRILLATION, UNSPECIFIED TYPE (HCC): ICD-10-CM

## 2022-04-19 DIAGNOSIS — N18.30 CHRONIC RENAL FAILURE, STAGE 3 (MODERATE), UNSPECIFIED WHETHER STAGE 3A OR 3B CKD (HCC): ICD-10-CM

## 2022-04-19 DIAGNOSIS — I50.22 CHRONIC SYSTOLIC HEART FAILURE (HCC): ICD-10-CM

## 2022-04-19 LAB
A/G RATIO: 1.9 (ref 0.8–2)
ALBUMIN SERPL-MCNC: 3.7 G/DL (ref 3.4–4.8)
ALP BLD-CCNC: 71 U/L (ref 25–100)
ALT SERPL-CCNC: 14 U/L (ref 4–36)
ANION GAP SERPL CALCULATED.3IONS-SCNC: 11 MMOL/L (ref 3–16)
AST SERPL-CCNC: 17 U/L (ref 8–33)
BASOPHILS ABSOLUTE: 0.1 K/UL (ref 0–0.1)
BASOPHILS RELATIVE PERCENT: 0.9 %
BILIRUB SERPL-MCNC: 0.5 MG/DL (ref 0.3–1.2)
BUN BLDV-MCNC: 29 MG/DL (ref 6–20)
CALCIUM SERPL-MCNC: 9.2 MG/DL (ref 8.5–10.5)
CHLORIDE BLD-SCNC: 104 MMOL/L (ref 98–107)
CO2: 29 MMOL/L (ref 20–30)
CREAT SERPL-MCNC: 1.7 MG/DL (ref 0.4–1.2)
EOSINOPHILS ABSOLUTE: 0.1 K/UL (ref 0–0.4)
EOSINOPHILS RELATIVE PERCENT: 1 %
GFR AFRICAN AMERICAN: 47
GFR NON-AFRICAN AMERICAN: 39
GLOBULIN: 1.9 G/DL
GLUCOSE BLD-MCNC: 88 MG/DL (ref 74–106)
HCT VFR BLD CALC: 40.8 % (ref 40–54)
HEMOGLOBIN: 12.9 G/DL (ref 13–18)
IMMATURE GRANULOCYTES #: 0 K/UL
IMMATURE GRANULOCYTES %: 0.3 % (ref 0–5)
LYMPHOCYTES ABSOLUTE: 1.2 K/UL (ref 1.5–4)
LYMPHOCYTES RELATIVE PERCENT: 20.1 %
MCH RBC QN AUTO: 30 PG (ref 27–32)
MCHC RBC AUTO-ENTMCNC: 31.6 G/DL (ref 31–35)
MCV RBC AUTO: 94.9 FL (ref 80–100)
MONOCYTES ABSOLUTE: 0.4 K/UL (ref 0.2–0.8)
MONOCYTES RELATIVE PERCENT: 6.5 %
NEUTROPHILS ABSOLUTE: 4.2 K/UL (ref 2–7.5)
NEUTROPHILS RELATIVE PERCENT: 71.2 %
PDW BLD-RTO: 14.1 % (ref 11–16)
PLATELET # BLD: 212 K/UL (ref 150–400)
PMV BLD AUTO: 11.2 FL (ref 6–10)
POTASSIUM SERPL-SCNC: 4.8 MMOL/L (ref 3.4–5.1)
PROSTATE SPECIFIC ANTIGEN: 8.26 NG/ML (ref 0–4)
RBC # BLD: 4.3 M/UL (ref 4.5–6)
SODIUM BLD-SCNC: 144 MMOL/L (ref 136–145)
TOTAL PROTEIN: 5.6 G/DL (ref 6.4–8.3)
TSH SERPL DL<=0.05 MIU/L-ACNC: 1.04 UIU/ML (ref 0.27–4.2)
VITAMIN B-12: >2000 PG/ML (ref 211–911)
WBC # BLD: 5.9 K/UL (ref 4–11)

## 2022-04-19 PROCEDURE — 82607 VITAMIN B-12: CPT

## 2022-04-19 PROCEDURE — 99214 OFFICE O/P EST MOD 30 MIN: CPT | Performed by: INTERNAL MEDICINE

## 2022-04-19 PROCEDURE — 85025 COMPLETE CBC W/AUTO DIFF WBC: CPT

## 2022-04-19 PROCEDURE — 80053 COMPREHEN METABOLIC PANEL: CPT

## 2022-04-19 PROCEDURE — 84153 ASSAY OF PSA TOTAL: CPT

## 2022-04-19 PROCEDURE — 84443 ASSAY THYROID STIM HORMONE: CPT

## 2022-04-19 RX ORDER — HYDROCODONE BITARTRATE AND ACETAMINOPHEN 5; 325 MG/1; MG/1
TABLET ORAL
COMMUNITY
Start: 2022-04-15 | End: 2022-06-02

## 2022-04-19 RX ORDER — METOLAZONE 2.5 MG/1
2.5 TABLET ORAL DAILY
Qty: 30 TABLET | Refills: 3 | Status: SHIPPED | OUTPATIENT
Start: 2022-04-19 | End: 2022-04-19

## 2022-04-19 RX ORDER — TAMSULOSIN HYDROCHLORIDE 0.4 MG/1
0.4 CAPSULE ORAL DAILY
Qty: 90 CAPSULE | Refills: 0 | Status: SHIPPED | OUTPATIENT
Start: 2022-04-19 | End: 2022-06-07

## 2022-04-19 ASSESSMENT — PATIENT HEALTH QUESTIONNAIRE - PHQ9
SUM OF ALL RESPONSES TO PHQ QUESTIONS 1-9: 0
SUM OF ALL RESPONSES TO PHQ QUESTIONS 1-9: 0
SUM OF ALL RESPONSES TO PHQ9 QUESTIONS 1 & 2: 0
1. LITTLE INTEREST OR PLEASURE IN DOING THINGS: 0
2. FEELING DOWN, DEPRESSED OR HOPELESS: 0
SUM OF ALL RESPONSES TO PHQ QUESTIONS 1-9: 0
SUM OF ALL RESPONSES TO PHQ QUESTIONS 1-9: 0

## 2022-04-19 ASSESSMENT — ENCOUNTER SYMPTOMS
VOMITING: 0
SHORTNESS OF BREATH: 0
BACK PAIN: 1
SINUS PRESSURE: 0
COUGH: 0
WHEEZING: 0
EYE DISCHARGE: 0
NAUSEA: 0
ABDOMINAL PAIN: 0

## 2022-04-19 NOTE — PROGRESS NOTES
SUBJECTIVE:    Patient ID: Lady High is a [de-identified] y.o.male. Chief Complaint   Patient presents with    Congestive Heart Failure    Atrial Fibrillation    Hypertension         HPI:  He has some shortness of breath and dyspnea on exertion that is at baseline. No orthopnea. Swelling in the lower extremities   worse compared to before. He has been compliant with taking medications, without side effects from it. He has been following a low-sodium and monitoring fluid intake/daily weight. Weight is up 11 pounds, compared to last visit. His blood pressure is stable at this time. No chest pain or palpitation. He has been compliant with f/u with cardiology. Pacemaker changed since last visit. Patient has a hx of CHF. Patient with hx of Afib. He has been compliant with taking meds w/o SE. He is taking Eliquis. Patient denies any side effect including easy bruising, bleeding. ..ect. No CP or palpitation. Patient's medications, allergies, past medical, surgical, social and family histories were reviewed and updated as appropriate in electronic medical record.         Outpatient Medications Marked as Taking for the 4/19/22 encounter (Office Visit) with Sondra Stein MD   Medication Sig Dispense Refill    HYDROcodone-acetaminophen ([address]) 5-325 MG per tablet TAKE ONE TABLET BY MOUTH TWO TIMES A DAY AS NEEDED FOR PAIN      ferrous sulfate (FEROSUL) 325 (65 Fe) MG tablet TAKE ONE TABLET BY MOUTH TWO TIMES A DAY 60 tablet 3    furosemide (LASIX) 40 MG tablet TAKE ONE TABLET BY MOUTH EVERY DAY 30 tablet 3    Cyanocobalamin 2500 MCG SUBL DISSOLVE 1 TABLET UNDER TONGUE EVERY DAY 90 tablet 1    pantoprazole (PROTONIX) 40 MG tablet TAKE ONE TABLET BY MOUTH EVERY MORNING BEFORE BREAKFAST 30 tablet 3    BYSTOLIC 10 MG tablet Take 1 tablet by mouth daily      folic acid (FOLVITE) 1 MG tablet Take 1 tablet by mouth daily 30 tablet 3    spironolactone (ALDACTONE) 25 MG tablet Take 0.5 tablets by mouth daily      sacubitril-valsartan (ENTRESTO) 24-26 MG per tablet Take 1 tablet by mouth every 12 hours      apixaban (ELIQUIS) 5 MG TABS tablet Take by mouth 2 times daily      rosuvastatin (CRESTOR) 10 MG tablet Take 10 mg by mouth daily          Review of Systems   Constitutional: Negative for chills, fatigue and fever. HENT: Negative for congestion, ear pain and sinus pressure. Eyes: Negative for discharge and visual disturbance. Respiratory: Negative for cough, shortness of breath and wheezing. Cardiovascular: Positive for leg swelling. Negative for chest pain and palpitations. ARCHER   Gastrointestinal: Negative for abdominal pain, nausea and vomiting. Endocrine: Negative for cold intolerance and heat intolerance. Genitourinary: Positive for frequency. Negative for dysuria and urgency. Nocturia   Musculoskeletal: Positive for arthralgias, back pain and gait problem. Skin: Negative for pallor and rash. Allergic/Immunologic: Negative for food allergies and immunocompromised state. Neurological: Negative for dizziness, numbness and headaches. Hematological: Negative for adenopathy. Does not bruise/bleed easily. Psychiatric/Behavioral: Negative for agitation and sleep disturbance. The patient is not nervous/anxious. Past Medical History:   Diagnosis Date    Atrial fibrillation ([address]     CHF (congestive heart failure) (HCC)     Hyperlipidemia     Hypertension     Kidney stone      Past Surgical History:   Procedure Laterality Date    CARDIAC CATHETERIZATION      CARDIOVERSION  06/2019    Dr. Yuliya Duncan Right     mass removed    PACEMAKER INSERTION  2012    PROSTATE SURGERY      THYROID SURGERY      goiter removed    UPPER GASTROINTESTINAL ENDOSCOPY  10/2019    [facility]     No family history on file.    Social History     Tobacco Use   Smoking Status Former Smoker    Packs/day: 1.00    Years: 35.00    Pack years: 35.00    Types: Cigarettes    Quit date: 1973    Years since quittin.2   Smokeless Tobacco Never Used       OBJECTIVE:   Wt Readings from Last 3 Encounters:   22 (!) 316 lb 12.8 oz (143.7 kg)   21 (!) 305 lb 9.6 oz (138.6 kg)   04/15/21 (!) 302 lb 12.8 oz (137.3 kg)     BP Readings from Last 3 Encounters:   22 118/62   21 130/62   04/15/21 102/64       /62   Pulse 68   Temp 97.6 °F (36.4 °C)   Resp 18   Ht 5' 10\" (1.778 m)   Wt (!) 316 lb 12.8 oz (143.7 kg)   SpO2 98%   BMI 45.46 kg/m²      Physical Exam  Vitals and nursing note reviewed. Constitutional:       Appearance: Normal appearance. He is well-developed. He is obese. HENT:      Head: Normocephalic and atraumatic. Right Ear: External ear normal.      Left Ear: External ear normal.      Nose: Nose normal.      Mouth/Throat:      Mouth: Mucous membranes are moist.      Pharynx: Oropharynx is clear. Eyes:      Conjunctiva/sclera: Conjunctivae normal.      Pupils: Pupils are equal, round, and reactive to light. Neck:      Thyroid: No thyromegaly. Vascular: No JVD. Cardiovascular:      Rate and Rhythm: Normal rate and regular rhythm. Heart sounds: Normal heart sounds. No murmur heard. No friction rub. Comments: Pacemaker  Pulmonary:      Effort: Pulmonary effort is normal.      Breath sounds: Normal breath sounds. No wheezing or rales. Abdominal:      General: Bowel sounds are normal.      Palpations: Abdomen is soft. Tenderness: There is no abdominal tenderness. There is no rebound. Musculoskeletal:         General: No tenderness. Cervical back: Neck supple. No rigidity. No muscular tenderness. Right lower le+ Edema present. Left lower le+ Edema present. Skin:     Findings: No erythema or rash. Neurological:      General: No focal deficit present. Mental Status: He is alert and oriented to person, place, and time.    Psychiatric: Behavior: Behavior normal.         Judgment: Judgment normal.         Lab Results   Component Value Date     04/15/2021    K 4.7 04/15/2021    K 4.8 08/25/2019     04/15/2021    CO2 32 04/15/2021    GLUCOSE 84 04/15/2021    BUN 28 04/15/2021    CREATININE 1.6 04/15/2021    CALCIUM 9.2 04/15/2021    PROT 5.8 04/15/2021    LABALBU 3.9 04/15/2021    BILITOT 0.4 04/15/2021    ALT 17 04/15/2021    AST 20 04/15/2021       Hemoglobin A1C (%)   Date Value   03/14/2015 5.5     Microscopic Examination (no units)   Date Value   08/24/2019 YES     LDL Calculated (mg/dL)   Date Value   01/12/2021 85         Lab Results   Component Value Date    WBC 5.8 04/15/2021    NEUTROABS 3.9 04/15/2021    HGB 13.4 04/15/2021    HCT 43.2 04/15/2021    MCV 96.0 04/15/2021     04/15/2021       Lab Results   Component Value Date    TSH 0.50 01/12/2021     Lab Results   Component Value Date    PSA 9.73 (H) 01/12/2021    PSA 10.39 (H) 08/05/2020    PSA 9.73 (H) 12/10/2019         ASSESSMENT/PLAN:     1. Chronic systolic heart failure (HCC)  Will cont current meds. Will have patient to monitor daily wt, edema, any worsening of ARCHER and SOA. Low Na diet and fluid restriction. Monitor renal function closely. Long discussion with patient to be very compliant with taking meds. Will need to titrate diuretics given his weight gain/edema. Add Flomax since seems to have BPH type symptoms. 2. Essential hypertension  BP is stable. I have advised him on low-sodium diet, exercise and weight control. I am going to continue current medication. Will monitor his renal function every few months, have advised him to check blood pressure frequently and to keep a record of this. 3. Atrial fibrillation, unspecified type ([address]  Rate controlled. Sinus rhythm on exam. Will cont on meds. I had a long discussion with the patient regarding the risk/benefit from anticoagulation. Patient is aware of that.     4. Elevated PSA  Check PSA on a yearly basis. HUYEN every year if agreable. He refused HUYEN. Continue to refuse to follow-up with urology. 5. Chronic renal failure, stage 3 (moderate), unspecified whether stage 3a or 3b CKD (HCC)  Baseline. I have advised him to avoid any nephrotoxic agents. I am going to monitor renal function periodically. I will make sure that the blood pressure and other medical problems are under good control. Will refer to nephrology if the renal function should begin to deteriorate. Creatinine will be checked today. Last Creatinine is   Lab Results   Component Value Date    CREATININE 1.6 (H) 04/15/2021         Orders Placed This Encounter   Medications    DISCONTD: metOLazone (ZAROXOLYN) 2.5 MG tablet     Sig: Take 1 tablet by mouth daily     Dispense:  30 tablet     Refill:  3    tamsulosin (FLOMAX) 0.4 MG capsule     Sig: Take 1 capsule by mouth daily     Dispense:  90 capsule     Refill:  0      IAlexandro MA am scribing for and in the presence of Amaya Munoz MD on this date of 04/19/22 at 2:37 PM    I, Dr. Amaya Munoz, personally performed the services described in the documentation as scribed by Alexandro Reynoso MA, in my presence and it is both accurate and complete.

## 2022-04-19 NOTE — PROGRESS NOTES
Chief Complaint   Patient presents with    Congestive Heart Failure    Atrial Fibrillation    Hypertension       Have you seen any other physician or provider since your last visit yes - had pacemaker replaced    Have you had any other diagnostic tests since your last visit? yes - pace maker replaced at [facility]     Have you changed or stopped any medications since your last visit?  yes - belem stopped amiodarone

## 2022-04-28 ENCOUNTER — TELEPHONE (OUTPATIENT)
Dept: PRIMARY CARE CLINIC | Age: 82
End: 2022-04-28

## 2022-04-28 NOTE — TELEPHONE ENCOUNTER
Pt finally called back he said swelling is down and he is able to void more when he goes but states he thinks the tamsulosin is making him lightheaded/ dizzy.  States he has had a few dizzy spells since he started it

## 2022-04-28 NOTE — TELEPHONE ENCOUNTER
He can monitor for now but make sure he is careful and slowly standing up. He can use a cane or a walker for the next few days. Obviously if persistent or worsening then we need to do something different.

## 2022-05-02 ENCOUNTER — OFFICE VISIT (OUTPATIENT)
Dept: PRIMARY CARE CLINIC | Age: 82
End: 2022-05-02
Payer: MEDICARE

## 2022-05-02 VITALS
TEMPERATURE: 97.8 F | OXYGEN SATURATION: 97 % | HEART RATE: 71 BPM | DIASTOLIC BLOOD PRESSURE: 64 MMHG | HEIGHT: 70 IN | BODY MASS INDEX: 44.24 KG/M2 | SYSTOLIC BLOOD PRESSURE: 93 MMHG | WEIGHT: 309 LBS

## 2022-05-02 DIAGNOSIS — I95.2 HYPOTENSION DUE TO MEDICATION: Primary | ICD-10-CM

## 2022-05-02 PROCEDURE — 99213 OFFICE O/P EST LOW 20 MIN: CPT | Performed by: NURSE PRACTITIONER

## 2022-05-02 RX ORDER — METOLAZONE 2.5 MG/1
TABLET ORAL
COMMUNITY
Start: 2022-04-19 | End: 2022-06-07

## 2022-05-02 NOTE — PROGRESS NOTES
Patient has had some low blood pressure reading at home. He states it was around 60/40 at one time. He has had some medication changes lately also.

## 2022-05-02 NOTE — PROGRESS NOTES
This is a [de-identified] y.o. male who presents with   Chief Complaint   Patient presents with   [place] Child Dizziness       SUBJECTIVE:     Dr Valencia Vee int see patient  Will hold metolazone,  Flomax to every other day . Patient   Doing well ,  But was having dizzyness,  Low BP                                                                                                                                                                                                                                                                                                                                                                    Current Outpatient Medications   Medication Sig Dispense Refill    metOLazone (ZAROXOLYN) 2.5 MG tablet TAKE 1 TABLET BY MOUTH DAILY      HYDROcodone-acetaminophen (NORCO) 5-325 MG per tablet TAKE ONE TABLET BY MOUTH TWO TIMES A DAY AS NEEDED FOR PAIN      tamsulosin (FLOMAX) 0.4 MG capsule Take 1 capsule by mouth daily 90 capsule 0    ferrous sulfate (FEROSUL) 325 (65 Fe) MG tablet TAKE ONE TABLET BY MOUTH TWO TIMES A DAY 60 tablet 3    furosemide (LASIX) 40 MG tablet TAKE ONE TABLET BY MOUTH EVERY DAY 30 tablet 3    Cyanocobalamin 2500 MCG SUBL DISSOLVE 1 TABLET UNDER TONGUE EVERY DAY 90 tablet 1    pantoprazole (PROTONIX) 40 MG tablet TAKE ONE TABLET BY MOUTH EVERY MORNING BEFORE BREAKFAST 30 tablet 3    BYSTOLIC 10 MG tablet Take 1 tablet by mouth daily      folic acid (FOLVITE) 1 MG tablet Take 1 tablet by mouth daily 30 tablet 3    spironolactone (ALDACTONE) 25 MG tablet Take 0.5 tablets by mouth daily      sacubitril-valsartan (ENTRESTO) 24-26 MG per tablet Take 1 tablet by mouth every 12 hours      apixaban (ELIQUIS) 5 MG TABS tablet Take by mouth 2 times daily      rosuvastatin (CRESTOR) 10 MG tablet Take 10 mg by mouth daily       No current facility-administered medications for this visit. No Known Allergies    Review of Systems   Constitutional: Positive for fatigue. Cardiovascular: Negative for leg swelling.         Much less edema then last week when extra meds started.,    Musculoskeletal: Negative. Skin: Negative. Neurological: Positive for dizziness. OBJECTIVE:     BP 93/64   Pulse 71   Temp 97.8 °F (36.6 °C)   Ht 5' 10\" (1.778 m)   Wt (!) 309 lb (140.2 kg)   SpO2 97%   BMI 44.34 kg/m²      Physical Exam  Constitutional:       General: He is not in acute distress. Appearance: He is obese. He is not ill-appearing, toxic-appearing or diaphoretic. Cardiovascular:      Rate and Rhythm: Normal rate and regular rhythm. Pulmonary:      Effort: Pulmonary effort is normal.   Abdominal:      General: There is no distension. Palpations: Abdomen is soft. Tenderness: There is no abdominal tenderness. Skin:     General: Skin is warm and dry. Neurological:      Mental Status: He is alert. No orders of the defined types were placed in this encounter. No orders of the defined types were placed in this encounter. ASSESSMENT/PLAN  1. Hypotension , related to medication changes. Dr Traore Plan into room to see patient and medication  adjusted,   And written instruction given on how to use  Medication . follow up on one week . No follow-ups on file.

## 2022-05-16 RX ORDER — PANTOPRAZOLE SODIUM 40 MG/1
TABLET, DELAYED RELEASE ORAL
Qty: 30 TABLET | Refills: 3 | Status: SHIPPED | OUTPATIENT
Start: 2022-05-16 | End: 2022-08-16

## 2022-05-31 DIAGNOSIS — G89.29 CHRONIC MIDLINE LOW BACK PAIN WITHOUT SCIATICA: Primary | ICD-10-CM

## 2022-05-31 DIAGNOSIS — M54.50 CHRONIC MIDLINE LOW BACK PAIN WITHOUT SCIATICA: Primary | ICD-10-CM

## 2022-06-02 RX ORDER — HYDROCODONE BITARTRATE AND ACETAMINOPHEN 5; 325 MG/1; MG/1
TABLET ORAL
Qty: 30 TABLET | Refills: 0 | Status: SHIPPED | OUTPATIENT
Start: 2022-06-02 | End: 2022-07-29

## 2022-06-07 ENCOUNTER — OFFICE VISIT (OUTPATIENT)
Dept: PRIMARY CARE CLINIC | Age: 82
End: 2022-06-07
Payer: MEDICARE

## 2022-06-07 VITALS
WEIGHT: 313 LBS | RESPIRATION RATE: 18 BRPM | BODY MASS INDEX: 44.91 KG/M2 | DIASTOLIC BLOOD PRESSURE: 50 MMHG | SYSTOLIC BLOOD PRESSURE: 96 MMHG | OXYGEN SATURATION: 95 % | HEART RATE: 66 BPM

## 2022-06-07 DIAGNOSIS — I48.91 ATRIAL FIBRILLATION, UNSPECIFIED TYPE (HCC): ICD-10-CM

## 2022-06-07 DIAGNOSIS — I50.22 CHRONIC SYSTOLIC HEART FAILURE (HCC): Primary | ICD-10-CM

## 2022-06-07 DIAGNOSIS — I10 ESSENTIAL HYPERTENSION: ICD-10-CM

## 2022-06-07 DIAGNOSIS — R97.20 ELEVATED PSA: ICD-10-CM

## 2022-06-07 DIAGNOSIS — N18.32 CHRONIC RENAL FAILURE, STAGE 3B (HCC): ICD-10-CM

## 2022-06-07 PROCEDURE — 99214 OFFICE O/P EST MOD 30 MIN: CPT | Performed by: INTERNAL MEDICINE

## 2022-06-07 PROCEDURE — 1123F ACP DISCUSS/DSCN MKR DOCD: CPT | Performed by: INTERNAL MEDICINE

## 2022-06-07 RX ORDER — FINASTERIDE 5 MG/1
5 TABLET, FILM COATED ORAL DAILY
Qty: 30 TABLET | Refills: 0 | Status: SHIPPED | OUTPATIENT
Start: 2022-06-07 | End: 2022-08-16

## 2022-06-07 SDOH — ECONOMIC STABILITY: FOOD INSECURITY: WITHIN THE PAST 12 MONTHS, YOU WORRIED THAT YOUR FOOD WOULD RUN OUT BEFORE YOU GOT MONEY TO BUY MORE.: NEVER TRUE

## 2022-06-07 SDOH — ECONOMIC STABILITY: FOOD INSECURITY: WITHIN THE PAST 12 MONTHS, THE FOOD YOU BOUGHT JUST DIDN'T LAST AND YOU DIDN'T HAVE MONEY TO GET MORE.: NEVER TRUE

## 2022-06-07 ASSESSMENT — ENCOUNTER SYMPTOMS
COUGH: 0
VOMITING: 0
SHORTNESS OF BREATH: 0
NAUSEA: 0
SINUS PRESSURE: 0
WHEEZING: 0
EYE DISCHARGE: 0
ABDOMINAL PAIN: 0

## 2022-06-07 ASSESSMENT — SOCIAL DETERMINANTS OF HEALTH (SDOH): HOW HARD IS IT FOR YOU TO PAY FOR THE VERY BASICS LIKE FOOD, HOUSING, MEDICAL CARE, AND HEATING?: NOT HARD AT ALL

## 2022-06-07 NOTE — PROGRESS NOTES
Chief Complaint   Patient presents with    Congestive Heart Failure    Atrial Fibrillation    Hypotension       Have you seen any other physician or provider since your last visit yes - vamshi same day dizziness hypotension    Have you had any other diagnostic tests since your last visit? no    Have you changed or stopped any medications since your last visit?  Doesn't have metolazone or flomax

## 2022-06-07 NOTE — PROGRESS NOTES
SUBJECTIVE:    Patient ID: Claudene Ohs is a [de-identified] y.o.male. Chief Complaint   Patient presents with    Congestive Heart Failure    Atrial Fibrillation    Hypotension         HPI:  Patient has a hx of CHF. He has some shortness of breath and dyspnea on exertion that is at baseline. No orthopnea. +swelling in the lower extremities about the same compared to before. He has been compliant with taking medications, without side effects from it. He has been following a low-sodium and monitoring fluid intake/daily weight. Weight is increasing steadily, compared to last visit. His blood pressure is boderline low at this time. No chest pain or palpitation. Patient was diagnosed with Afib several years ago. He has been compliant with taking meds w/o SE. He is taking Eliquis as well for anticoagulation. Patient denies any side effect including easy bruising, bleeding. ..ect. No CP or palpitation. Patient's medications, allergies, past medical, surgical, social and family histories were reviewed and updated as appropriate in electronic medical record.         Outpatient Medications Marked as Taking for the 6/7/22 encounter (Office Visit) with Chi Mena MD   Medication Sig Dispense Refill    HYDROcodone-acetaminophen ([address]) 5-325 MG per tablet TAKE ONE TABLET BY MOUTH TWO TIMES A DAY AS NEEDED FOR PAIN 30 tablet 0    pantoprazole (PROTONIX) 40 MG tablet TAKE ONE TABLET BY MOUTH EVERY MORNING BEFORE BREAKFAST 30 tablet 3    ferrous sulfate (FEROSUL) 325 (65 Fe) MG tablet TAKE ONE TABLET BY MOUTH TWO TIMES A DAY 60 tablet 3    furosemide (LASIX) 40 MG tablet TAKE ONE TABLET BY MOUTH EVERY DAY 30 tablet 3    Cyanocobalamin 2500 MCG SUBL DISSOLVE 1 TABLET UNDER TONGUE EVERY DAY 90 tablet 1    BYSTOLIC 10 MG tablet Take 1 tablet by mouth daily      folic acid (FOLVITE) 1 MG tablet Take 1 tablet by mouth daily 30 tablet 3    spironolactone (ALDACTONE) 25 MG tablet Take 0.5 tablets by mouth daily      sacubitril-valsartan (ENTRESTO) 24-26 MG per tablet Take 1 tablet by mouth every 12 hours      apixaban (ELIQUIS) 5 MG TABS tablet Take by mouth 2 times daily      rosuvastatin (CRESTOR) 10 MG tablet Take 10 mg by mouth daily          Review of Systems   Constitutional: Positive for fatigue. Negative for chills and fever. HENT: Negative for congestion, ear pain and sinus pressure. Eyes: Negative for discharge and visual disturbance. Respiratory: Negative for cough, shortness of breath and wheezing. Cardiovascular: Positive for leg swelling. Negative for chest pain and palpitations. ARCHER   Gastrointestinal: Negative for abdominal pain, nausea and vomiting. Endocrine: Negative for cold intolerance and heat intolerance. Genitourinary: Negative for dysuria, frequency and urgency. Nocturia and weak flow   Musculoskeletal: Positive for arthralgias and back pain. Skin: Negative for pallor and rash. Allergic/Immunologic: Negative for food allergies and immunocompromised state. Neurological: Negative for dizziness, numbness and headaches. Hematological: Negative for adenopathy. Does not bruise/bleed easily. Psychiatric/Behavioral: Negative for agitation and sleep disturbance. The patient is not nervous/anxious. Past Medical History:   Diagnosis Date    Atrial fibrillation ([address]     CHF (congestive heart failure) (HCC)     Hyperlipidemia     Hypertension     Kidney stone      Past Surgical History:   Procedure Laterality Date    CARDIAC CATHETERIZATION      CARDIOVERSION  06/2019    Dr. Bhardwaj Half Right     mass removed    PACEMAKER INSERTION  2012    PROSTATE SURGERY      THYROID SURGERY      goiter removed    UPPER GASTROINTESTINAL ENDOSCOPY  10/2019    [facility]     History reviewed. No pertinent family history.    Social History     Tobacco Use   Smoking Status Former Smoker    Packs/day: 1.00    Years: 35.00 Judgment: Judgment normal.         Lab Results   Component Value Date     04/19/2022    K 4.8 04/19/2022    K 4.8 08/25/2019     04/19/2022    CO2 29 04/19/2022    GLUCOSE 88 04/19/2022    BUN 29 04/19/2022    CREATININE 1.7 04/19/2022    CALCIUM 9.2 04/19/2022    PROT 5.6 04/19/2022    LABALBU 3.7 04/19/2022    BILITOT 0.5 04/19/2022    ALT 14 04/19/2022    AST 17 04/19/2022       Hemoglobin A1C (%)   Date Value   03/14/2015 5.5     Microscopic Examination (no units)   Date Value   08/24/2019 YES     LDL Calculated (mg/dL)   Date Value   01/12/2021 85         Lab Results   Component Value Date    WBC 5.9 04/19/2022    NEUTROABS 4.2 04/19/2022    HGB 12.9 04/19/2022    HCT 40.8 04/19/2022    MCV 94.9 04/19/2022     04/19/2022       Lab Results   Component Value Date    TSH 1.04 04/19/2022         ASSESSMENT/PLAN:     1. Chronic systolic heart failure (HCC)  Will cont current meds. Will have patient to monitor daily wt, edema, any worsening of ARCHER and SOA. Low Na diet and fluid restriction. Monitor renal function closely. Long discussion with patient to be very compliant with taking meds. - Comprehensive Metabolic Panel; Future  - CBC with Auto Differential; Future  - Magnesium; Future    2. Essential hypertension  BP is low. I have advised him on low-sodium diet, exercise and weight control. I am going to decrease Bystolic in half. Will monitor his renal function every few months, have advised him to check blood pressure frequently and to keep a record of this. - Comprehensive Metabolic Panel; Future  - CBC with Auto Differential; Future  - Magnesium; Future    3. Atrial fibrillation, unspecified type ([address]  Sinus rhythm on exam. Will cont on meds. I had a long discussion with the patient regarding the risk/benefit from anticoagulation. Patient is aware of that. - Comprehensive Metabolic Panel; Future  - CBC with Auto Differential; Future  - Magnesium; Future    4.  Elevated PSA  Monitor PSA periodically. Follow-up with urology. Patient will be started on finasteride to help with BPH symptoms. 5. Chronic renal failure, stage 3b (HCC)  Slightly worsening. I have advised him to avoid any nephrotoxic agents. I am going to monitor renal function periodically. I will make sure that the blood pressure and other medical problems are under good control. Will refer to nephrology if the renal function should begin to deteriorate. Creatinine will be checked in few weeks. Last Creatinine is   Lab Results   Component Value Date    CREATININE 1.7 (H) 04/19/2022     - Comprehensive Metabolic Panel; Future  - CBC with Auto Differential; Future  - Magnesium;  Future      Orders Placed This Encounter   Medications    finasteride (PROSCAR) 5 MG tablet     Sig: Take 1 tablet by mouth daily     Dispense:  30 tablet     Refill:  0

## 2022-06-15 ENCOUNTER — OFFICE VISIT (OUTPATIENT)
Dept: CARDIOLOGY | Facility: CLINIC | Age: 82
End: 2022-06-15

## 2022-06-15 VITALS
SYSTOLIC BLOOD PRESSURE: 104 MMHG | HEIGHT: 70 IN | WEIGHT: 304 LBS | DIASTOLIC BLOOD PRESSURE: 62 MMHG | TEMPERATURE: 97 F | RESPIRATION RATE: 16 BRPM | HEART RATE: 68 BPM | OXYGEN SATURATION: 97 % | BODY MASS INDEX: 43.52 KG/M2

## 2022-06-15 DIAGNOSIS — E66.01 MORBID OBESITY: ICD-10-CM

## 2022-06-15 DIAGNOSIS — I44.2 COMPLETE HEART BLOCK: ICD-10-CM

## 2022-06-15 DIAGNOSIS — I50.22 CHRONIC SYSTOLIC CONGESTIVE HEART FAILURE: ICD-10-CM

## 2022-06-15 DIAGNOSIS — I10 ESSENTIAL HYPERTENSION: ICD-10-CM

## 2022-06-15 DIAGNOSIS — N18.31 CHRONIC RENAL FAILURE, STAGE 3A: ICD-10-CM

## 2022-06-15 DIAGNOSIS — I48.20 CHRONIC ATRIAL FIBRILLATION: Primary | ICD-10-CM

## 2022-06-15 DIAGNOSIS — Z95.0 CARDIAC PACEMAKER IN SITU: ICD-10-CM

## 2022-06-15 PROBLEM — J42 CHRONIC BRONCHITIS (HCC): Status: RESOLVED | Noted: 2021-11-17 | Resolved: 2022-06-15

## 2022-06-15 PROCEDURE — 99213 OFFICE O/P EST LOW 20 MIN: CPT | Performed by: INTERNAL MEDICINE

## 2022-06-15 PROCEDURE — 93288 INTERROG EVL PM/LDLS PM IP: CPT | Performed by: INTERNAL MEDICINE

## 2022-06-15 RX ORDER — FINASTERIDE 5 MG/1
1 TABLET, FILM COATED ORAL DAILY
COMMUNITY
Start: 2022-06-07 | End: 2023-03-17 | Stop reason: HOSPADM

## 2022-06-15 RX ORDER — CEPHALEXIN 500 MG/1
500 CAPSULE ORAL 4 TIMES DAILY
COMMUNITY
Start: 2022-03-10 | End: 2023-03-17 | Stop reason: HOSPADM

## 2022-06-15 RX ORDER — PANTOPRAZOLE SODIUM 40 MG/1
1 TABLET, DELAYED RELEASE ORAL
COMMUNITY
Start: 2022-05-16 | End: 2023-03-17 | Stop reason: HOSPADM

## 2022-06-15 NOTE — PROGRESS NOTES
Subjective:     Encounter Date:06/15/2022      Patient ID: Bobby Dodd is a 81 y.o. male.    Chief Complaint: Atrial fibrillation  HPI  This is an 81-year-old male patient who presents to cardiology clinic for routine follow-up and pacemaker interrogation.  He has a normally functioning [employer] model number [id number].  Right ventricular lead interrogation shows normal function based on sensing, pacing threshold and lead impedance.  Underlying rhythm is atrial fibrillation in the 60s.  He is paced in the right ventricle 80% of the time.  He has had no high heart rate events.  He has had 7 episodes of lead noise which were not reproducible.  His baseline dyspnea has remained unchanged.  Baseline edema is unchanged.  He remains a reformed smoker.  No bleeding complications related to anticoagulation therapy with Eliquis.  No signs or symptoms to suggest stroke, TIA or other cardioembolic event.  The following portions of the patient's history were reviewed and updated as appropriate: allergies, current medications, past family history, past medical history, past social history, past surgical history and problem  Review of Systems   Constitutional: Negative for chills, diaphoresis, fever, malaise/fatigue, weight gain and weight loss.   HENT: Negative for ear discharge, hearing loss, hoarse voice and nosebleeds.    Eyes: Negative for discharge, double vision, pain and photophobia.   Cardiovascular: Positive for dyspnea on exertion and leg swelling. Negative for chest pain, claudication, cyanosis, irregular heartbeat, near-syncope, orthopnea, palpitations, paroxysmal nocturnal dyspnea and syncope.   Respiratory: Negative for cough, hemoptysis, shortness of breath, sputum production and wheezing.    Endocrine: Negative for cold intolerance, heat intolerance, polydipsia, polyphagia and polyuria.   Hematologic/Lymphatic: Negative for adenopathy and bleeding problem. Does not bruise/bleed easily.   Skin:  Negative for color change, flushing, itching and rash.   Musculoskeletal: Negative for muscle cramps, muscle weakness, myalgias and stiffness.   Gastrointestinal: Negative for abdominal pain, diarrhea, hematemesis, hematochezia, nausea and vomiting.   Genitourinary: Negative for dysuria, frequency and nocturia.   Neurological: Negative for focal weakness, loss of balance, numbness, paresthesias and seizures.   Psychiatric/Behavioral: Negative for altered mental status, hallucinations and suicidal ideas.   Allergic/Immunologic: Negative for HIV exposure, hives and persistent infections.           Current Outpatient Medications:   •  apixaban (Eliquis) 5 MG tablet tablet, Take 1 tablet by mouth Every 12 (Twelve) Hours., Disp: 180 tablet, Rfl: 1  •  Bystolic 10 MG tablet, TAKE 1 TABLET BY MOUTH DAILY., Disp: 90 tablet, Rfl: 3  •  Cyanocobalamin 2500 MCG sublingual tablet, Place 2,500 mcg under the tongue., Disp: , Rfl:   •  Entresto 24-26 MG tablet, TAKE 1 TABLET BY MOUTH 2 (TWO) TIMES A DAY., Disp: 60 tablet, Rfl: 5  •  ferrous sulfate 324 (65 Fe) MG tablet delayed-release EC tablet, Take 324 mg by mouth 2 (Two) Times a Day With Meals., Disp: , Rfl:   •  finasteride (PROSCAR) 5 MG tablet, Take 1 tablet by mouth Daily., Disp: , Rfl:   •  folic acid (FOLVITE) 1 MG tablet, Take 1 mg by mouth Daily., Disp: , Rfl:   •  furosemide (LASIX) 40 MG tablet, Take 1 tablet by mouth Daily., Disp: 90 tablet, Rfl: 4  •  HYDROcodone-acetaminophen (NORCO) 5-325 MG per tablet, Take 1 tablet by mouth., Disp: , Rfl:   •  pantoprazole (PROTONIX) 40 MG EC tablet, Take 40 mg by mouth Daily., Disp: , Rfl:   •  pantoprazole (PROTONIX) 40 MG EC tablet, Take 1 tablet by mouth Every Morning Before Breakfast., Disp: , Rfl:   •  rosuvastatin (CRESTOR) 10 MG tablet, TAKE 1 TABLET BY MOUTH DAILY., Disp: 90 tablet, Rfl: 3  •  spironolactone (ALDACTONE) 25 MG tablet, TAKE 1/2 TABLET BY MOUTH EVERY DAY, Disp: 45 tablet, Rfl: 3  •  cephalexin (KEFLEX)  "500 MG capsule, Take 500 mg by mouth 4 (Four) Times a Day., Disp: , Rfl:     Objective:   Vitals and nursing note reviewed.   Constitutional:       Appearance: Healthy appearance. Not in distress.   Neck:      Vascular: No JVR. JVD normal.   Pulmonary:      Effort: Pulmonary effort is normal.      Breath sounds: Normal breath sounds. No wheezing. No rhonchi. No rales.   Chest:      Chest wall: Not tender to palpatation.   Cardiovascular:      PMI at left midclavicular line. Normal rate. Regular rhythm. Normal S1. Normal S2.      Murmurs: There is no murmur.      No gallop. No click. No rub.   Pulses:     Intact distal pulses.   Edema:     Peripheral edema present.     Pretibial: bilateral 1+ non-pitting edema of the pretibial area.     Ankle: bilateral 1+ non-pitting edema of the ankle.     Feet: bilateral 1+ non-pitting edema of the feet.  Abdominal:      General: Bowel sounds are normal.      Palpations: Abdomen is soft.      Tenderness: There is no abdominal tenderness.   Musculoskeletal: Normal range of motion.         General: No tenderness. Skin:     General: Skin is warm and dry.   Neurological:      General: No focal deficit present.      Mental Status: Alert and oriented to person, place and time.       Blood pressure 104/62, pulse 68, temperature 97 °F (36.1 °C), resp. rate 16, height 177.8 cm (70\"), weight (!) 138 kg (304 lb), SpO2 97 %.   Lab Review:     Assessment:       1. Chronic atrial fibrillation (HCC)  Rate control and anticoagulation strategy.  Asymptomatic.    2. Chronic systolic congestive heart failure (HCC)  Heart failure with reduced ejection fraction.  Stage C.  New York Heart Association functional class I symptoms.  Euvolemic.    3. Complete heart block (HCC)  Normal pacemaker function.    4. Essential hypertension  Acceptable blood pressure control.    5. Morbid obesity (HCC)  Body mass index is greater than 43.  The obesity pattern is central.  This is due to excess calorie intake.  " There is evidence of multiple comorbidities.    6. Chronic renal failure, stage 3a (HCC)  Stable    Procedures    Plan:     Advance Care Planning   ACP discussion was held with the patient during this visit. Patient has an advance directive (not in EMR), copy requested.     No changes in medication.  No cardiovascular testing warranted.  The patient has been cautioned regarding the essential need for diet exercise and weight management.

## 2022-06-20 ENCOUNTER — TELEPHONE (OUTPATIENT)
Dept: PRIMARY CARE CLINIC | Age: 82
End: 2022-06-20

## 2022-06-20 NOTE — TELEPHONE ENCOUNTER
Elevate legs, increase fluid pill, decrease fluid intake, support stocking and possibly come in to be seen in the next day or 2

## 2022-06-20 NOTE — TELEPHONE ENCOUNTER
Patient said he is having more swelling than he had the other day and has several blisters come up, what should he do?

## 2022-06-22 ENCOUNTER — TELEPHONE (OUTPATIENT)
Dept: PRIMARY CARE CLINIC | Age: 82
End: 2022-06-22

## 2022-06-22 DIAGNOSIS — R97.20 ELEVATED PSA: Primary | ICD-10-CM

## 2022-06-22 NOTE — TELEPHONE ENCOUNTER
Patient called said he needs appointment with Dr Sarah Marr, Urologist in [place], had one but had to cancel,  And his referral had ran out per patient. ,

## 2022-06-22 NOTE — TELEPHONE ENCOUNTER
Pt informed to go to ED if trouble voiding gets worse and not able to at all. Working on getting in to urology pretty quick he was notified and vu.

## 2022-06-22 NOTE — TELEPHONE ENCOUNTER
If he is having quite a bit trouble voiding etc. then his swelling is not getting get much better and may need a quicker appointment with urology

## 2022-06-24 ENCOUNTER — HOSPITAL ENCOUNTER (EMERGENCY)
Facility: HOSPITAL | Age: 82
Discharge: HOME | End: 2022-06-24
Attending: EMERGENCY MEDICINE | Admitting: EMERGENCY MEDICINE

## 2022-06-24 ENCOUNTER — APPOINTMENT (OUTPATIENT)
Dept: CT IMAGING | Facility: HOSPITAL | Age: 82
End: 2022-06-24

## 2022-06-24 VITALS
RESPIRATION RATE: 18 BRPM | WEIGHT: 307.4 LBS | TEMPERATURE: 97.8 F | BODY MASS INDEX: 44.01 KG/M2 | HEART RATE: 73 BPM | SYSTOLIC BLOOD PRESSURE: 94 MMHG | OXYGEN SATURATION: 96 % | HEIGHT: 70 IN | DIASTOLIC BLOOD PRESSURE: 64 MMHG

## 2022-06-24 DIAGNOSIS — N40.0 ENLARGED PROSTATE: ICD-10-CM

## 2022-06-24 DIAGNOSIS — R39.198 DIFFICULTY URINATING: Primary | ICD-10-CM

## 2022-06-24 DIAGNOSIS — E86.0 DEHYDRATION: ICD-10-CM

## 2022-06-24 LAB
ALBUMIN SERPL-MCNC: 3.7 G/DL (ref 3.5–5.2)
ALBUMIN/GLOB SERPL: 1.7 G/DL
ALP SERPL-CCNC: 68 U/L (ref 39–117)
ALT SERPL W P-5'-P-CCNC: 12 U/L (ref 1–41)
ANION GAP SERPL CALCULATED.3IONS-SCNC: 10.7 MMOL/L (ref 5–15)
AST SERPL-CCNC: 18 U/L (ref 1–40)
BASOPHILS # BLD AUTO: 0.07 10*3/MM3 (ref 0–0.2)
BASOPHILS NFR BLD AUTO: 1.2 % (ref 0–1.5)
BILIRUB SERPL-MCNC: 0.7 MG/DL (ref 0–1.2)
BILIRUB UR QL STRIP: NEGATIVE
BUN SERPL-MCNC: 20 MG/DL (ref 8–23)
BUN/CREAT SERPL: 11.3 (ref 7–25)
CALCIUM SPEC-SCNC: 9.1 MG/DL (ref 8.6–10.5)
CHLORIDE SERPL-SCNC: 101 MMOL/L (ref 98–107)
CLARITY UR: CLEAR
CO2 SERPL-SCNC: 27.3 MMOL/L (ref 22–29)
COLOR UR: YELLOW
CREAT SERPL-MCNC: 1.77 MG/DL (ref 0.76–1.27)
DEPRECATED RDW RBC AUTO: 47.7 FL (ref 37–54)
EGFRCR SERPLBLD CKD-EPI 2021: 38.3 ML/MIN/1.73
EOSINOPHIL # BLD AUTO: 0.06 10*3/MM3 (ref 0–0.4)
EOSINOPHIL NFR BLD AUTO: 1 % (ref 0.3–6.2)
ERYTHROCYTE [DISTWIDTH] IN BLOOD BY AUTOMATED COUNT: 13.9 % (ref 12.3–15.4)
GLOBULIN UR ELPH-MCNC: 2.2 GM/DL
GLUCOSE SERPL-MCNC: 94 MG/DL (ref 65–99)
GLUCOSE UR STRIP-MCNC: NEGATIVE MG/DL
HCT VFR BLD AUTO: 37.4 % (ref 37.5–51)
HGB BLD-MCNC: 12.3 G/DL (ref 13–17.7)
HGB UR QL STRIP.AUTO: NEGATIVE
IMM GRANULOCYTES # BLD AUTO: 0.01 10*3/MM3 (ref 0–0.05)
IMM GRANULOCYTES NFR BLD AUTO: 0.2 % (ref 0–0.5)
KETONES UR QL STRIP: NEGATIVE
LEUKOCYTE ESTERASE UR QL STRIP.AUTO: NEGATIVE
LYMPHOCYTES # BLD AUTO: 1.26 10*3/MM3 (ref 0.7–3.1)
LYMPHOCYTES NFR BLD AUTO: 21.2 % (ref 19.6–45.3)
MCH RBC QN AUTO: 30.7 PG (ref 26.6–33)
MCHC RBC AUTO-ENTMCNC: 32.9 G/DL (ref 31.5–35.7)
MCV RBC AUTO: 93.3 FL (ref 79–97)
MONOCYTES # BLD AUTO: 0.52 10*3/MM3 (ref 0.1–0.9)
MONOCYTES NFR BLD AUTO: 8.8 % (ref 5–12)
NEUTROPHILS NFR BLD AUTO: 4.01 10*3/MM3 (ref 1.7–7)
NEUTROPHILS NFR BLD AUTO: 67.6 % (ref 42.7–76)
NITRITE UR QL STRIP: NEGATIVE
NRBC BLD AUTO-RTO: 0 /100 WBC (ref 0–0.2)
PH UR STRIP.AUTO: 7.5 [PH] (ref 5–8)
PLATELET # BLD AUTO: 205 10*3/MM3 (ref 140–450)
PMV BLD AUTO: 10.6 FL (ref 6–12)
POTASSIUM SERPL-SCNC: 4.4 MMOL/L (ref 3.5–5.2)
PROT SERPL-MCNC: 5.9 G/DL (ref 6–8.5)
PROT UR QL STRIP: NEGATIVE
RBC # BLD AUTO: 4.01 10*6/MM3 (ref 4.14–5.8)
SODIUM SERPL-SCNC: 139 MMOL/L (ref 136–145)
SP GR UR STRIP: 1.01 (ref 1–1.03)
UROBILINOGEN UR QL STRIP: NORMAL
WBC NRBC COR # BLD: 5.93 10*3/MM3 (ref 3.4–10.8)

## 2022-06-24 PROCEDURE — 81003 URINALYSIS AUTO W/O SCOPE: CPT | Performed by: PHYSICIAN ASSISTANT

## 2022-06-24 PROCEDURE — 99283 EMERGENCY DEPT VISIT LOW MDM: CPT

## 2022-06-24 PROCEDURE — 85025 COMPLETE CBC W/AUTO DIFF WBC: CPT | Performed by: PHYSICIAN ASSISTANT

## 2022-06-24 PROCEDURE — 51798 US URINE CAPACITY MEASURE: CPT

## 2022-06-24 PROCEDURE — 80053 COMPREHEN METABOLIC PANEL: CPT | Performed by: PHYSICIAN ASSISTANT

## 2022-06-24 PROCEDURE — 74176 CT ABD & PELVIS W/O CONTRAST: CPT

## 2022-06-24 RX ORDER — SODIUM CHLORIDE 0.9 % (FLUSH) 0.9 %
10 SYRINGE (ML) INJECTION AS NEEDED
Status: DISCONTINUED | OUTPATIENT
Start: 2022-06-24 | End: 2022-06-24 | Stop reason: HOSPADM

## 2022-06-24 RX ADMIN — SODIUM CHLORIDE 500 ML: 9 INJECTION, SOLUTION INTRAVENOUS at 11:21

## 2022-06-24 RX ADMIN — SODIUM CHLORIDE 500 ML: 9 INJECTION, SOLUTION INTRAVENOUS at 11:30

## 2022-06-24 NOTE — DISCHARGE INSTRUCTIONS
Your urine had no signs of infection today.  They saw an enlarged gallbladder but no signs of kidney stone on CT scan.  Continue to take your finasteride to help with urinary symptoms.  You will need to follow-up with our urologist Dr. Mayes may contact their office for an appointment.  Try to follow-up closely with your primary care provider.  You did have an episode where your blood pressure was low today which is likely related to your decreased fluid intake and possibly recent diuretic use.  Try to keep a log of your blood pressure so he can follow-up with your primary care provider as early as possible.  Return here to the ER for any change, worsening symptoms, or any additional concerns including but not limited to persistently low blood pressures, dizziness, syncope, chest pain, shortness of breath, abdominal pain, inability to urinate, testicular pain or swelling, intractable vomiting, fever greater than 100.4.

## 2022-06-24 NOTE — ED PROVIDER NOTES
"Subjective   Patient is a 81-year-old male with history of atrial fibrillation on anticoagulation, bilateral renal cyst, CHF, CAD, diverticulosis, enlarged prostate, GERD, hyperlipidemia, hypertension, iron deficiency anemia, kidney stones, urinary retention, obstructive sleep apnea, palpitations and pacemaker presenting to the ER for evaluation of difficulty urinating.  Patient states for the past 5 days he feels as if he has had increased urgency and frequency and when he tries to urinate it \"dribbles\".  He states there is some discomfort upon trying to urinate that he describes as a pressure but denies any specific dysuria.  He states that his primary care provider had placed a referral to urology but patient had not been able to get an appointment yet.  He states he had been taking his water pill recently because he had some increased swelling in his legs.  He states that it is better but now he is having these urinary issues. He has been on a fluid restrictions from his PCP as well.  He denies any fever, chills, nausea, emesis, chest pain, flank or back pain, cough, shortness of breath, dizziness, syncope, testicular pain or swelling.  He has had procedures on his prostate in the past.          Review of Systems   Constitutional: Negative for chills and fever.   HENT: Negative.    Eyes: Negative.    Respiratory: Negative.    Cardiovascular: Negative.    Gastrointestinal: Negative.    Genitourinary: Positive for difficulty urinating, frequency and urgency. Negative for flank pain, penile swelling, scrotal swelling and testicular pain.   Musculoskeletal: Negative.    Skin: Negative.    Neurological: Negative.    Psychiatric/Behavioral: Negative.        Past Medical History:   Diagnosis Date   • A-fib (Formerly [facility])    • Acute renal failure (ARF) (Formerly [facility]) 08/24/2019    Hospital admission   • Arrhythmia    • Arthritis    • Bilateral renal cysts    • Cellulitis    • CHF (congestive heart failure) (Formerly [facility])    • Coronary artery " disease    • Diarrhea 08/24/2019   • Diverticulosis 2019    Mild, noted on imaging    • Dysphagia    • Edema    • Elevated prostate specific antigen (PSA)    • Enlarged prostate    • GERD (gastroesophageal reflux disease)    • History of chest pain    • History of smoking    • History of stress test    • [place] (hard of hearing)     Mild-No hearing aids    • Hyperlipidemia    • Hypertension    • Hypogonadism in male    • Hypotension 08/24/2019    Upon hospital admission    • Iron deficiency anemia 2019   • Joint pain, elbow    • Kidney stones    • Knee pain    • Low back pain    • Obstructive sleep apnea     non-compliant with mask   • Pacemaker    • Palpitations    • Rheumatic fever    • SOB (shortness of breath)    • Trochanteric bursitis    • Urinary retention        No Known Allergies    Past Surgical History:   Procedure Laterality Date   • CARDIAC ELECTROPHYSIOLOGY PROCEDURE N/A 6/4/2018    Procedure: Internal Cardioversion;  Surgeon: Tho Richards MD;  Location:  [facility];  Service: Cardiology   • CARDIAC ELECTROPHYSIOLOGY PROCEDURE N/A 11/23/2021    Procedure: PPM generator change - dual;  Surgeon: Damian Doshi DO;  Location:  [facility];  Service: Cardiology;  Laterality: N/A;   • CARDIOVERSION      per DR. RICHARDS   • CHOLECYSTECTOMY     • COLONOSCOPY     • CYSTOSCOPY TRANSURETHRAL RESECTION OF PROSTATE     • ENDOSCOPY N/A 10/1/2019    Procedure: ESOPHAGOGASTRODUODENOSCOPY WITH BIOPSIES;  Surgeon: Jose Alfredo Brewer MD;  Location:  [facility];  Service: Gastroenterology   • EYE SURGERY Right     Retinal tear repair    • HERNIA REPAIR     • KNEE ARTHROSCOPY      RIGHT   • LUNG REMOVAL, PARTIAL Right    • OTHER SURGICAL HISTORY      GOITER REMOVED 2001.   • PACEMAKER IMPLANTATION      [facility]       Family History   Problem Relation Age of Onset   • Arthritis Mother    • Hypertension Mother    • Heart attack Father    • Arthritis Sister        Social History  "    Socioeconomic History   • Marital status:    Tobacco Use   • Smoking status: Former Smoker     Packs/day: 1.00     Years: 30.00     Pack years: 30.00     Types: Cigarettes     Quit date:      Years since quittin.5   • Smokeless tobacco: Never Used   Substance and Sexual Activity   • Alcohol use: No   • Drug use: No   • Sexual activity: Defer           Objective   Physical Exam  Vitals and nursing note reviewed.     BP 94/64   Pulse 73   Temp 97.8 °F (36.6 °C) (Oral)   Resp 18   Ht 177.8 cm (70\")   Wt (!) 139 kg (307 lb 6.4 oz)   SpO2 96%   BMI 44.11 kg/m²     GEN: No acute distress, sitting upright in stretcher.  Awake and alert.  Does not appear septic or toxic.  Head: Normocephalic, atraumatic  Eyes: EOM intact  ENT: Mask in place per protocol   Cardiovascular: Regular rate  Lungs: Clear to auscultation bilaterally  Abdomen: Soft, nontender, nondistended, no peritoneal signs without guarding or rigidity  Extremities: 1+ pitting edema bilaterally, distal pulses intact, full range of motion  Neuro: GCS 15  Psych: Mood and affect are appropriate    Procedures           ED Course  ED Course as of 22   1004 RN states bladder scan revealed 0 on both occasions. [LA]   1009 WBC: 5.93 [LA]   1009 Hemoglobin(!): 12.3 [LA]   1009 Platelets: 205 [LA]   1016 Color, UA: Yellow [LA]   1016 Appearance, UA: Clear [LA]   1016 pH, UA: 7.5 [LA]   1016 Specific Gravity, UA: 1.010 [LA]   1016 Glucose: Negative [LA]   1016 Ketones, UA: Negative [LA]   1016 Bilirubin, UA: Negative [LA]   1016 Blood, UA: Negative [LA]   1016 Protein, UA: Negative [LA]   1016 Leukocytes, UA: Negative [LA]   1016 Nitrite, UA: Negative [LA]   1016 Urobilinogen, UA: 0.2 E.U./dL [LA]   1023 Glucose: 94 [LA]   1023 BUN: 20 [LA]   1023 Creatinine(!): 1.77  Mild elevation from baseline. [LA]   1023 Sodium: 139 [LA]   1023 Potassium: 4.4 [LA]   1023 Chloride: 101 [LA]   1023 CO2: 27.3 [LA]   1023 Calcium: " 9.1 [LA]   1023 Total Protein(!): 5.9 [LA]   1023 Albumin: 3.70 [LA]   1023 ALT (SGPT): 12 [LA]   1023 AST (SGOT): 18 [LA]   1023 Alkaline Phosphatase: 68 [LA]   1023 Total Bilirubin: 0.7 [LA]   1023 Globulin: 2.2 [LA]   1023 A/G Ratio: 1.7 [LA]   1024 BUN/Creatinine Ratio: 11.3 [LA]   1024 Anion Gap: 10.7 [LA]   1024 eGFR(!): 38.3 [LA]   1024 RN states patient was able to urinate enough to fill up sample cup of urine. [LA]   1103 Discussed with Dr. Garcia. Will have patient continue to use his finasteride, does not appear to be having urinary retention for work-up, do not believe catheter needs to be placed at this time.  We will have him follow-up with urology. [LA]   1116 Went to reassess the patient.  He states he feels fine.  I noticed that his blood pressure was low, he states it has been running low at times and he does check it at home. He denies any dizziness or other symptoms.  We will give him a very small fluid bolus, given that he has been fluid overloaded recently and has been taking his fluid pills. [LA]   1243 107/68 is last BP.  Patient is resting comfortably and in no distress.  Believe he can be discharged from follow-up at this time. [LA]      ED Course User Index  [LA] Laura Roper PA-C                                           MDM  Number of Diagnoses or Management Options  Dehydration  Difficulty urinating  Enlarged prostate  Diagnosis management comments: On arrival, patient is stable.  His normotensive, afebrile, no acute distress.  Differential could include cystitis, pyelonephritis, urinary retention, mass or neoplasm, enlarged prostate, and other concerns.  Will obtain basic labs, urinalysis, bladder scan will obtain CT abdomen pelvis as well.  May need to insert Allen catheter if patient is having retention.    Patient was able to void on his own, urine had no blood or signs of infection.  CT revealed enlarged prostate but no other significant abnormalities per radiology read.   Patient's labs are stable.  He did have a mild elevation of his at 1.77 up from his baseline around 1.57.  Patient has been taking diuretics and restricting fluids due to some lower extremity edema.  He is resting comfortably and in no distress.  He did have an episode where his blood pressure dropped but he is not any pain or any other symptoms.  Discussed with Dr. Garcia, will give him fluids and blood pressure improved.  He states he has been having episodes of low blood pressure at home.  He felt better and wanted to go home at this time, believe this is appropriate.  We will have him continue the finasteride, follow-up with his primary care provider and Dr. Mayes as scheduled.  Discussed very strict return precautions to the ER.  He verbalized understanding and was in agreement with this plan of care.       Amount and/or Complexity of Data Reviewed  Clinical lab tests: reviewed and ordered  Tests in the radiology section of CPT®: reviewed and ordered  Discussion of test results with the performing providers: yes  Decide to obtain previous medical records or to obtain history from someone other than the patient: yes  Review and summarize past medical records: yes  Discuss the patient with other providers: yes    Risk of Complications, Morbidity, and/or Mortality  Presenting problems: moderate  Diagnostic procedures: moderate  Management options: low    Patient Progress  Patient progress: improved      Final diagnoses:   Difficulty urinating   Enlarged prostate   Dehydration       ED Disposition  ED Disposition     ED Disposition   Discharge    Condition   Stable    Comment   --             Derrick Gaytan MD  [address] [facility] [address] [phone]    Schedule an appointment as soon as possible for a visit       Rakan Mayes MD  [address] [facility] [address]  [facility] [address] [phone]    Schedule an appointment as soon as possible for a visit            Medication List      No changes were  made to your prescriptions during this visit.          Laura Roper PA-C  06/24/22 [id number]

## 2022-06-26 ASSESSMENT — ENCOUNTER SYMPTOMS: BACK PAIN: 1

## 2022-06-30 ENCOUNTER — OFFICE VISIT (OUTPATIENT)
Dept: UROLOGY | Facility: CLINIC | Age: 82
End: 2022-06-30

## 2022-06-30 VITALS
DIASTOLIC BLOOD PRESSURE: 74 MMHG | TEMPERATURE: 97.5 F | WEIGHT: 307 LBS | HEIGHT: 70 IN | HEART RATE: 81 BPM | BODY MASS INDEX: 43.95 KG/M2 | OXYGEN SATURATION: 93 % | SYSTOLIC BLOOD PRESSURE: 112 MMHG

## 2022-06-30 DIAGNOSIS — R39.9 LOWER URINARY TRACT SYMPTOMS (LUTS): ICD-10-CM

## 2022-06-30 DIAGNOSIS — N40.0 ENLARGED PROSTATE: Primary | ICD-10-CM

## 2022-06-30 DIAGNOSIS — N28.1 RENAL CYST: ICD-10-CM

## 2022-06-30 LAB
BILIRUB BLD-MCNC: NEGATIVE MG/DL
CLARITY, POC: CLEAR
COLOR UR: YELLOW
EXPIRATION DATE: ABNORMAL
GLUCOSE UR STRIP-MCNC: NEGATIVE MG/DL
KETONES UR QL: NEGATIVE
LEUKOCYTE EST, POC: NEGATIVE
Lab: ABNORMAL
NITRITE UR-MCNC: NEGATIVE MG/ML
PH UR: 6.5 [PH] (ref 5–8)
PROT UR STRIP-MCNC: ABNORMAL MG/DL
RBC # UR STRIP: NEGATIVE /UL
SP GR UR: 1.01 (ref 1–1.03)
UROBILINOGEN UR QL: NORMAL

## 2022-06-30 PROCEDURE — 81003 URINALYSIS AUTO W/O SCOPE: CPT | Performed by: UROLOGY

## 2022-06-30 PROCEDURE — 99204 OFFICE O/P NEW MOD 45 MIN: CPT | Performed by: UROLOGY

## 2022-07-03 ENCOUNTER — APPOINTMENT (OUTPATIENT)
Dept: GENERAL RADIOLOGY | Facility: HOSPITAL | Age: 82
End: 2022-07-03
Payer: MEDICARE

## 2022-07-03 ENCOUNTER — HOSPITAL ENCOUNTER (EMERGENCY)
Facility: HOSPITAL | Age: 82
Discharge: HOME | End: 2022-07-03
Attending: EMERGENCY MEDICINE
Payer: MEDICARE

## 2022-07-03 VITALS
WEIGHT: 307 LBS | BODY MASS INDEX: 43.95 KG/M2 | RESPIRATION RATE: 15 BRPM | SYSTOLIC BLOOD PRESSURE: 107 MMHG | HEIGHT: 70 IN | DIASTOLIC BLOOD PRESSURE: 60 MMHG | OXYGEN SATURATION: 93 % | HEART RATE: 82 BPM | TEMPERATURE: 99.6 F

## 2022-07-03 DIAGNOSIS — J06.9 UPPER RESPIRATORY TRACT INFECTION DUE TO COVID-19 VIRUS: Primary | ICD-10-CM

## 2022-07-03 DIAGNOSIS — U07.1 UPPER RESPIRATORY TRACT INFECTION DUE TO COVID-19 VIRUS: Primary | ICD-10-CM

## 2022-07-03 LAB
A/G RATIO: 1.5 (ref 0.8–2)
ALBUMIN SERPL-MCNC: 3.7 G/DL (ref 3.4–4.8)
ALP BLD-CCNC: 68 U/L (ref 25–100)
ALT SERPL-CCNC: 13 U/L (ref 4–36)
ANION GAP SERPL CALCULATED.3IONS-SCNC: 10 MMOL/L (ref 3–16)
AST SERPL-CCNC: 25 U/L (ref 8–33)
BASOPHILS ABSOLUTE: 0 K/UL (ref 0–0.1)
BASOPHILS RELATIVE PERCENT: 0.4 %
BILIRUB SERPL-MCNC: 0.4 MG/DL (ref 0.3–1.2)
BUN BLDV-MCNC: 19 MG/DL (ref 6–20)
CALCIUM SERPL-MCNC: 8.7 MG/DL (ref 8.5–10.5)
CHLORIDE BLD-SCNC: 96 MMOL/L (ref 98–107)
CO2: 25 MMOL/L (ref 20–30)
CREAT SERPL-MCNC: 1.7 MG/DL (ref 0.4–1.2)
EOSINOPHILS ABSOLUTE: 0 K/UL (ref 0–0.4)
EOSINOPHILS RELATIVE PERCENT: 0 %
GFR AFRICAN AMERICAN: 47
GFR NON-AFRICAN AMERICAN: 39
GLOBULIN: 2.5 G/DL
GLUCOSE BLD-MCNC: 99 MG/DL (ref 74–106)
HCT VFR BLD CALC: 39.4 % (ref 40–54)
HEMOGLOBIN: 12.5 G/DL (ref 13–18)
IMMATURE GRANULOCYTES #: 0 K/UL
IMMATURE GRANULOCYTES %: 0.2 % (ref 0–5)
LYMPHOCYTES ABSOLUTE: 0.6 K/UL (ref 1.5–4)
LYMPHOCYTES RELATIVE PERCENT: 11.4 %
MCH RBC QN AUTO: 29.9 PG (ref 27–32)
MCHC RBC AUTO-ENTMCNC: 31.7 G/DL (ref 31–35)
MCV RBC AUTO: 94.3 FL (ref 80–100)
MONOCYTES ABSOLUTE: 0.4 K/UL (ref 0.2–0.8)
MONOCYTES RELATIVE PERCENT: 8.1 %
NEUTROPHILS ABSOLUTE: 3.9 K/UL (ref 2–7.5)
NEUTROPHILS RELATIVE PERCENT: 79.9 %
PDW BLD-RTO: 14.1 % (ref 11–16)
PLATELET # BLD: 130 K/UL (ref 150–400)
PMV BLD AUTO: 10.9 FL (ref 6–10)
POTASSIUM REFLEX MAGNESIUM: 4.1 MMOL/L (ref 3.4–5.1)
RBC # BLD: 4.18 M/UL (ref 4.5–6)
S PYO AG THROAT QL: NEGATIVE
SARS-COV-2, NAAT: DETECTED
SODIUM BLD-SCNC: 131 MMOL/L (ref 136–145)
TOTAL PROTEIN: 6.2 G/DL (ref 6.4–8.3)
WBC # BLD: 4.8 K/UL (ref 4–11)

## 2022-07-03 PROCEDURE — 87880 STREP A ASSAY W/OPTIC: CPT

## 2022-07-03 PROCEDURE — 71045 X-RAY EXAM CHEST 1 VIEW: CPT

## 2022-07-03 PROCEDURE — 87635 SARS-COV-2 COVID-19 AMP PRB: CPT

## 2022-07-03 PROCEDURE — 99285 EMERGENCY DEPT VISIT HI MDM: CPT

## 2022-07-03 PROCEDURE — 80053 COMPREHEN METABOLIC PANEL: CPT

## 2022-07-03 PROCEDURE — 36415 COLL VENOUS BLD VENIPUNCTURE: CPT

## 2022-07-03 PROCEDURE — 85025 COMPLETE CBC W/AUTO DIFF WBC: CPT

## 2022-07-03 PROCEDURE — 93005 ELECTROCARDIOGRAM TRACING: CPT

## 2022-07-03 PROCEDURE — 6370000000 HC RX 637 (ALT 250 FOR IP): Performed by: EMERGENCY MEDICINE

## 2022-07-03 RX ORDER — SODIUM CHLORIDE 9 MG/ML
1000 INJECTION, SOLUTION INTRAVENOUS CONTINUOUS
Status: DISCONTINUED | OUTPATIENT
Start: 2022-07-03 | End: 2022-07-03

## 2022-07-03 RX ADMIN — BENZOCAINE AND MENTHOL 1 LOZENGE: 15; 3.6 LOZENGE ORAL at 13:12

## 2022-07-03 ASSESSMENT — PAIN DESCRIPTION - LOCATION: LOCATION: THROAT

## 2022-07-03 ASSESSMENT — PAIN DESCRIPTION - DESCRIPTORS: DESCRIPTORS: SORE

## 2022-07-03 ASSESSMENT — PAIN SCALES - GENERAL: PAINLEVEL_OUTOF10: 8

## 2022-07-03 NOTE — ED NOTES
Discharge instructions reviewed with pt and family, understanding verbalized, no further needs or concerns at this time. Pt taken to pov via wheelchair.

## 2022-07-03 NOTE — ED NOTES
Discharge instructions and medications reviewed with verbalized understanding from patient. Pt had no further questions or concerns.         Luisito Thomas RN  07/03/22 [id number]

## 2022-07-03 NOTE — ED PROVIDER NOTES
David Esposito [facility] ENCOUNTER      Pt Name: Gabby Bustamante  MRN: [id number]  YOB: 1940  Date of evaluation: 7/3/2022  Provider: Greg Gutierrez MD    CHIEF COMPLAINT       Chief Complaint   Patient presents with    Pharyngitis    Fever    Cough    Headache    Dizziness         HISTORY OF PRESENT ILLNESS  (Location/Symptom, Timing/Onset, Context/Setting, Quality, Duration, Modifying Factors, Severity.)   Gabby Bustamante is a [de-identified] y.o. male who presents to the emergency department and of \"strep throat\" he has had a sore throat for the last 3 days he saw MD for some bladder problem was prescribed some pills which she does not know the name of he took 1 developed a really bad taste in his mouth and has not taken anymore since he has had a low-grade temp of 100.2 he has not taken any meds for fever this morning and he is afebrile he has some head congestion worse with swallowing he has had a cough productive of clear sputum denies any shortness of breath denies chest pain. Nursing notes were reviewed. REVIEW OFSYSTEMS    (2-9 systems for level 4, 10 or more for level 5)   ROS:  General:  + fevers, no chills, no weakness  Cardiovascular:  No chest pain, no palpitations  Respiratory:  No shortness of breath, + cough, no wheezing  Gastrointestinal:  No pain, no nausea, no vomiting, no diarrhea  Musculoskeletal:  No muscle pain, no joint pain  Skin:  No rash, no easy bruising  Neurologic:  No speech problems, no headache, no extremity weakness  Psychiatric:  No anxiety  Genitourinary:  No dysuria, no hematuria    Except as noted above the remainder of the review of systems was reviewed and negative.        PAST MEDICAL HISTORY     Past Medical History:   Diagnosis Date    [facility] [address] [address]     CHF (congestive heart failure) [address] [address]     Hyperlipidemia     Hypertension     Kidney stone          SURGICAL HISTORY       Past Surgical History: Procedure Laterality Date    CARDIAC CATHETERIZATION      CARDIOVERSION  2019    Dr. Fredi Ibrahim Right     mass removed    PACEMAKER INSERTION      PROSTATE SURGERY      THYROID SURGERY      goiter removed    UPPER GASTROINTESTINAL ENDOSCOPY  10/2019    Ben         CURRENT MEDICATIONS       Previous Medications    APIXABAN (ELIQUIS) 5 MG TABS TABLET    Take by mouth 2 times daily    BYSTOLIC 10 MG TABLET    Take 1 tablet by mouth daily    CYANOCOBALAMIN 2500 MCG SUBL    DISSOLVE 1 TABLET UNDER TONGUE EVERY DAY    FERROUS SULFATE (FEROSUL) 325 (65 FE) MG TABLET    TAKE ONE TABLET BY MOUTH TWO TIMES A DAY    FINASTERIDE (PROSCAR) 5 MG TABLET    Take 1 tablet by mouth daily    FOLIC ACID (FOLVITE) 1 MG TABLET    Take 1 tablet by mouth daily    FUROSEMIDE (LASIX) 40 MG TABLET    TAKE ONE TABLET BY MOUTH EVERY DAY    PANTOPRAZOLE (PROTONIX) 40 MG TABLET    TAKE ONE TABLET BY MOUTH EVERY MORNING BEFORE BREAKFAST    ROSUVASTATIN (CRESTOR) 10 MG TABLET    Take 10 mg by mouth daily    SACUBITRIL-VALSARTAN (ENTRESTO) 24-26 MG PER TABLET    Take 1 tablet by mouth every 12 hours    SPIRONOLACTONE (ALDACTONE) 25 MG TABLET    Take 0.5 tablets by mouth daily       ALLERGIES     Patient has no known allergies. FAMILY HISTORY     History reviewed. No pertinent family history.        SOCIAL HISTORY       Social History     Socioeconomic History    Marital status:      Spouse name: None    Number of children: None    Years of education: None    Highest education level: None   Occupational History    None   Tobacco Use    Smoking status: Former Smoker     Packs/day: 1.00     Years: 35.00     Pack years: 35.00     Types: Cigarettes     Quit date: 1973     Years since quittin.4    Smokeless tobacco: Never Used   Vaping Use    Vaping Use: Never used   Substance and Sexual Activity    Alcohol use: No    Drug use: No    Sexual activity: None   Other Topics Concern    None   Social History Narrative    None     Social Determinants of Health     Financial Resource Strain: Low Risk     Difficulty of Paying Living Expenses: Not hard at all   Food Insecurity: No Food Insecurity    Worried About Running Out of Food in the Last Year: Never true    Gisselle of Food in the Last Year: Never true   Transportation Needs:     Lack of Transportation (Medical): Not on file    Lack of Transportation (Non-Medical): Not on file   Physical Activity:     Days of Exercise per Week: Not on file    Minutes of Exercise per Session: Not on file   Stress:     Feeling of Stress : Not on file   Social Connections:     Frequency of Communication with Friends and Family: Not on file    Frequency of Social Gatherings with Friends and Family: Not on file    Attends Judaism Services: Not on file    Active Member of [address] [place] [address] or Organizations: Not on file    Attends Club or Organization Meetings: Not on file    Marital Status: Not on file   Intimate Partner Violence:     Fear of Current or Ex-Partner: Not on file    Emotionally Abused: Not on file    Physically Abused: Not on file    Sexually Abused: Not on file   Housing Stability:     Unable to Pay for Housing in the Last Year: Not on file    Number of [place] in the Last Year: Not on file    Unstable Housing in the Last Year: Not on file         PHYSICAL EXAM    (up to 7 for level 4, 8 or more for level 5)     ED Triage Vitals [07/03/22 1239]   BP Temp Temp Source Heart Rate Resp SpO2 Height Weight   116/61 99.6 °F (37.6 °C) Oral 76 18 96 % 5' 10\" (1.778 m) (!) 307 lb (139.3 kg)       Physical Exam  General :Patient is awake, alert, oriented, in no acute distress, nontoxic appearing  HEENT: Pupils are equally round and reactive to light, EOMI, conjunctivae clear. Oral mucosa is moist, no exudate.  Uvula is midline  Neck: Neck is supple, full range of motion, trachea midline  Cardiac: Heart irregular rate, rhythm, no murmurs, rubs, or gallops  Lungs: Lungs are clear to auscultation, there is no wheezing, rhonchi, or rales. There is no use of accessory muscles. Chest wall: There is no tenderness to palpation over the chest wall or over ribs  Abdomen: Abdomen is soft, nontender, nondistended. There is no firm or pulsatile masses, no rebound rigidity or guarding. Musculoskeletal: 5 out of 5 strength in all 4 extremities. No focal muscle deficits are appreciated 4+ edema of the calves and feet  Neuro: Motor intact, sensory intact, level of consciousness is normal,   Dermatology: Skin is warm and dry  Psych: Mentation is grossly normal, cognition is grossly normal. Affect is appropriate. DIAGNOSTIC RESULTS     EKG: All EKG's are interpreted by the Emergency Department Physician who either signs or Co-signs this chart in the [address] a cardiologist.    The EKG interpreted by me shows A. fib rate of 73 with occasional paced beats    RADIOLOGY:   Non-plain film images such as CT, Ultrasound and MRI are read by the radiologist. Plain radiographic images are visualized and preliminarily interpreted by the emergency physician with the below findings:      ? Radiologist's Report Reviewed:  XR CHEST PORTABLE   Final Result   Stable chest, cardiomegaly.             ED BEDSIDE ULTRASOUND:   Performed by ED Physician - none    LABS:    I have reviewed and interpreted all of the currently available lab results from this visit (ifapplicable):  Results for orders placed or performed during the hospital encounter of 07/03/22   Strep Screen Group A Throat    Specimen: Throat   Result Value Ref Range    Rapid Strep A Screen Negative Negative   COVID-19, Rapid    Specimen: Nasopharyngeal Swab   Result Value Ref Range    SARS-CoV-2, NAAT DETECTED (AA) Not Detected   CBC with Auto Differential   Result Value Ref Range    WBC 4.8 4.0 - 11.0 K/uL    RBC 4.18 (L) 4.50 - 6.00 M/uL    Hemoglobin 12.5 (L) 13.0 - 18.0 g/dL Hematocrit 39.4 (L) 40.0 - 54.0 %    MCV 94.3 80.0 - 100.0 fL    MCH 29.9 27.0 - 32.0 pg    MCHC 31.7 31.0 - 35.0 g/dL    RDW 14.1 11.0 - 16.0 %    Platelets 108 (L) 638 - 400 K/uL    MPV 10.9 (H) 6.0 - 10.0 fL    Neutrophils % 79.9 %    Immature Granulocytes % 0.2 0.0 - 5.0 %    Lymphocytes % 11.4 %    Monocytes % 8.1 %    Eosinophils % 0.0 %    Basophils % 0.4 %    Neutrophils Absolute 3.9 2.0 - 7.5 K/uL    Immature Granulocytes # 0.0 K/uL    Lymphocytes Absolute 0.6 (L) 1.5 - 4.0 K/uL    Monocytes Absolute 0.4 0.2 - 0.8 K/uL    Eosinophils Absolute 0.0 0.0 - 0.4 K/uL    Basophils Absolute 0.0 0.0 - 0.1 K/uL   Comprehensive Metabolic Panel w/ Reflex to MG   Result Value Ref Range    Sodium 131 (L) 136 - 145 mmol/L    Potassium reflex Magnesium 4.1 3.4 - 5.1 mmol/L    Chloride 96 (L) 98 - 107 mmol/L    CO2 25 20 - 30 mmol/L    Anion Gap 10 3 - 16    Glucose 99 74 - 106 mg/dL    BUN 19 6 - 20 mg/dL    CREATININE 1.7 (H) 0.4 - 1.2 mg/dL    GFR Non-African American 39 (L) >59    GFR  47 (L) >59    Calcium 8.7 8.5 - 10.5 mg/dL    Total Protein 6.2 (L) 6.4 - 8.3 g/dL    Albumin 3.7 3.4 - 4.8 g/dL    Albumin/Globulin Ratio 1.5 0.8 - 2.0    Total Bilirubin 0.4 0.3 - 1.2 mg/dL    Alkaline Phosphatase 68 25 - 100 U/L    ALT 13 4 - 36 U/L    AST 25 8 - 33 U/L    Globulin 2.5 Not Established g/dL        All other labs were within normal range or not returned as of this dictation.     EMERGENCY DEPARTMENT COURSE and DIFFERENTIAL DIAGNOSIS/MDM:   Vitals:    Vitals:    07/03/22 1239   BP: 116/61   Pulse: 76   Resp: 18   Temp: 99.6 °F (37.6 °C)   TempSrc: Oral   SpO2: 96%   Weight: (!) 307 lb (139.3 kg)   Height: 5' 10\" (1.778 m)       MEDICATIONS ADMINISTERED IN ED:  Medications   benzocaine-menthol (CEPACOL SORE THROAT) lozenge 1 lozenge (1 lozenge Oral Given 7/3/22 1312)       Has been stable his strep is negative he is COVID-19 positive chest x-ray is clear blood work looks good given the patient's age and medical conditions he is at high risk so I will go ahead and start him on [facility]. We are and have him follow-up with his family physician in 4 to 5 days. The patient will follow-up with their PCP in 1-2 days for reevaluation. If the patient or family members have anyfurther concerns or any worsening symptoms they will return to the ED for reevaluation. CONSULTS:  None    PROCEDURES:  Procedures    CRITICAL CARE TIME    Total Critical Care time was 0 minutes, excluding separately reportable procedures. There was a high probability of clinically significant/life threatening deterioration in the patient's condition which required my urgent intervention. FINAL IMPRESSION      1. Upper respiratory tract infection due to COVID-19 virus New Problem         DISPOSITION/PLAN   DISPOSITION    Stable discharge to home    PATIENT REFERRED TO:  Fawn Noel MD  [address] [facility] [address] [phone]    Schedule an appointment as soon as possible for a visit in 3 days        DISCHARGE MEDICATIONS:  New Prescriptions    No medications on file       Comment: Please note this report has been produced using speech recognition software and may contain errorsrelated to that system including errors in grammar, punctuation, and spelling, as well as words and phrases that may be inappropriate. If there are any questions or concerns please feel free to contact the dictating providerfor clarification.     Chong Duncan MD  Attending Emergency Physician              Chong Duncan MD  07/03/22 3071

## 2022-07-03 NOTE — ED TRIAGE NOTES
Pt to ED with complaints of fever, headache, sore throat, cough and dizziness. Pt reports symptoms started on Thursday night.

## 2022-07-06 DIAGNOSIS — I48.20 CHRONIC ATRIAL FIBRILLATION: ICD-10-CM

## 2022-07-06 RX ORDER — APIXABAN 5 MG/1
TABLET, FILM COATED ORAL
Qty: 60 TABLET | Refills: 11 | Status: SHIPPED | OUTPATIENT
Start: 2022-07-06 | End: 2023-02-08 | Stop reason: DRUGHIGH

## 2022-07-06 RX ORDER — FOLIC ACID 1 MG/1
1 TABLET ORAL DAILY
Qty: 30 TABLET | Refills: 3 | Status: SHIPPED | OUTPATIENT
Start: 2022-07-06

## 2022-07-08 RX ORDER — FUROSEMIDE 40 MG/1
TABLET ORAL
Qty: 30 TABLET | Refills: 3 | Status: SHIPPED | OUTPATIENT
Start: 2022-07-08 | End: 2022-07-29

## 2022-07-29 DIAGNOSIS — G89.29 CHRONIC MIDLINE LOW BACK PAIN WITHOUT SCIATICA: ICD-10-CM

## 2022-07-29 DIAGNOSIS — M54.50 CHRONIC MIDLINE LOW BACK PAIN WITHOUT SCIATICA: ICD-10-CM

## 2022-07-29 RX ORDER — HYDROCODONE BITARTRATE AND ACETAMINOPHEN 5; 325 MG/1; MG/1
TABLET ORAL
Qty: 30 TABLET | Refills: 0 | Status: SHIPPED | OUTPATIENT
Start: 2022-07-29 | End: 2022-09-08

## 2022-07-29 RX ORDER — SACUBITRIL AND VALSARTAN 24; 26 MG/1; MG/1
1 TABLET, FILM COATED ORAL 2 TIMES DAILY
Qty: 60 TABLET | Refills: 11 | Status: SHIPPED | OUTPATIENT
Start: 2022-07-29 | End: 2022-09-15 | Stop reason: SDUPTHER

## 2022-07-29 RX ORDER — FUROSEMIDE 40 MG/1
TABLET ORAL
Qty: 30 TABLET | Refills: 3 | Status: ON HOLD | OUTPATIENT
Start: 2022-07-29 | End: 2022-10-14 | Stop reason: HOSPADM

## 2022-08-16 ENCOUNTER — OFFICE VISIT (OUTPATIENT)
Dept: PRIMARY CARE CLINIC | Age: 82
End: 2022-08-16
Payer: MEDICARE

## 2022-08-16 VITALS
TEMPERATURE: 98 F | OXYGEN SATURATION: 98 % | DIASTOLIC BLOOD PRESSURE: 56 MMHG | BODY MASS INDEX: 44.09 KG/M2 | WEIGHT: 308 LBS | HEART RATE: 75 BPM | HEIGHT: 70 IN | RESPIRATION RATE: 16 BRPM | SYSTOLIC BLOOD PRESSURE: 91 MMHG

## 2022-08-16 DIAGNOSIS — Z23 NEED FOR PROPHYLACTIC VACCINATION AGAINST STREPTOCOCCUS PNEUMONIAE (PNEUMOCOCCUS): ICD-10-CM

## 2022-08-16 DIAGNOSIS — N18.32 CHRONIC RENAL FAILURE, STAGE 3B (HCC): ICD-10-CM

## 2022-08-16 DIAGNOSIS — K76.9 LIVER LESION: ICD-10-CM

## 2022-08-16 DIAGNOSIS — I10 ESSENTIAL HYPERTENSION: ICD-10-CM

## 2022-08-16 DIAGNOSIS — G89.29 CHRONIC MIDLINE LOW BACK PAIN WITHOUT SCIATICA: ICD-10-CM

## 2022-08-16 DIAGNOSIS — M54.50 CHRONIC MIDLINE LOW BACK PAIN WITHOUT SCIATICA: ICD-10-CM

## 2022-08-16 DIAGNOSIS — I50.22 CHRONIC SYSTOLIC HEART FAILURE (HCC): Primary | ICD-10-CM

## 2022-08-16 DIAGNOSIS — I48.91 ATRIAL FIBRILLATION, UNSPECIFIED TYPE (HCC): ICD-10-CM

## 2022-08-16 DIAGNOSIS — D64.9 ANEMIA, UNSPECIFIED TYPE: ICD-10-CM

## 2022-08-16 PROCEDURE — 1123F ACP DISCUSS/DSCN MKR DOCD: CPT | Performed by: INTERNAL MEDICINE

## 2022-08-16 PROCEDURE — 90471 IMMUNIZATION ADMIN: CPT | Performed by: INTERNAL MEDICINE

## 2022-08-16 PROCEDURE — 99214 OFFICE O/P EST MOD 30 MIN: CPT | Performed by: INTERNAL MEDICINE

## 2022-08-16 PROCEDURE — 90677 PCV20 VACCINE IM: CPT | Performed by: INTERNAL MEDICINE

## 2022-08-16 RX ORDER — TIZANIDINE 2 MG/1
2 TABLET ORAL 2 TIMES DAILY PRN
Qty: 30 TABLET | Refills: 1 | Status: SHIPPED | OUTPATIENT
Start: 2022-08-16 | End: 2022-10-10 | Stop reason: SDUPTHER

## 2022-08-16 RX ORDER — PANTOPRAZOLE SODIUM 40 MG/1
TABLET, DELAYED RELEASE ORAL
Qty: 30 TABLET | Refills: 3 | Status: SHIPPED | OUTPATIENT
Start: 2022-08-16

## 2022-08-16 ASSESSMENT — ENCOUNTER SYMPTOMS
COUGH: 0
NAUSEA: 0
SHORTNESS OF BREATH: 0
ABDOMINAL PAIN: 0
SINUS PRESSURE: 0
BACK PAIN: 1
VOMITING: 0
EYE DISCHARGE: 0
WHEEZING: 0

## 2022-08-16 NOTE — PATIENT INSTRUCTIONS
patches by folding them in half so that the sticky sides meet, and then flushing them down a toilet. They should not be placed in the household trash where children or pets can find them. If you have any questions, ask your provider or pharmacist for more information. Be sure to keep all appointments for provider visits or tests.

## 2022-08-16 NOTE — PROGRESS NOTES
Chief Complaint   Patient presents with    Medication Refill       Have you seen any other physician or provider since your last visit yes - ER, Dr. Yves Ortiz    Have you had any other diagnostic tests since your last visit? yes -  CT abd and pelvis  Ultrasound, labs, ER    Have you changed or stopped any medications since your last visit? no     Pt wants to discuss medication Finasteride 5 MG.

## 2022-08-16 NOTE — PROGRESS NOTES
SUBJECTIVE:    Patient ID: Joy Weaver is a [de-identified] y.o.male. Chief Complaint   Patient presents with    Medication Refill         HPI:  Patient has a hx of CHF. He has some shortness of breath and dyspnea on exertion that is at baseline. No orthopnea. Positive swelling in the lower extremities about the same compared to before. He has been compliant with taking medications, without side effects from it. He has been following a low-sodium and monitoring fluid intake/daily weight. Weight is down 5 pounds, compared to last visit. No chest pain or palpitation. Patient has had hypertension for several years. He has been compliant with taking medications, without side effects from it. He has been following a low-sodium, is active and never exercises. Weight is decreasing steadily, compared to last visit. His blood pressure is stable at this time. Patient was diagnosed with Afib several  years ago. He has been compliant with taking meds w/o SE. He is taking Eliquis. Patient denies any side effect including easy bruising, bleeding. ..ect. No CP or palpitation. Patient's medications, allergies, past medical, surgical, social and family histories were reviewed and updated as appropriate in electronic medical record.         Outpatient Medications Marked as Taking for the 8/16/22 encounter (Office Visit) with Ann Diana MD   Medication Sig Dispense Refill    tiZANidine (ZANAFLEX) 2 MG tablet Take 1 tablet by mouth 2 times daily as needed (pain and spasm) 30 tablet 1    HYDROcodone-acetaminophen (NORCO) 5-325 MG per tablet TAKE ONE TABLET BY MOUTH TWO TIMES A DAY AS NEEDED FOR PAIN 30 tablet 0    furosemide (LASIX) 40 MG tablet TAKE ONE TABLET BY MOUTH EVERY DAY 30 tablet 3    Cyanocobalamin (B-12) 2500 MCG SUBL DISSOLVE 1 TABLET UNDER TONGUE EVERY DAY 90 tablet 1    folic acid (FOLVITE) 1 MG tablet TAKE 1 TABLET BY MOUTH DAILY 30 tablet 3    pantoprazole (PROTONIX) 40 MG tablet TAKE ONE TABLET BY MOUTH EVERY MORNING BEFORE BREAKFAST 30 tablet 3    ferrous sulfate (FEROSUL) 325 (65 Fe) MG tablet TAKE ONE TABLET BY MOUTH TWO TIMES A DAY 60 tablet 3    BYSTOLIC 10 MG tablet Take 1 tablet by mouth daily      spironolactone (ALDACTONE) 25 MG tablet Take 0.5 tablets by mouth daily      sacubitril-valsartan (ENTRESTO) 24-26 MG per tablet Take 1 tablet by mouth every 12 hours      apixaban (ELIQUIS) 5 MG TABS tablet Take by mouth 2 times daily      rosuvastatin (CRESTOR) 10 MG tablet Take 10 mg by mouth daily          Review of Systems   Constitutional:  Negative for chills, fatigue and fever. HENT:  Negative for congestion, ear pain and sinus pressure. Eyes:  Negative for discharge and visual disturbance. Respiratory:  Negative for cough, shortness of breath and wheezing. Cardiovascular:  Positive for leg swelling. Negative for chest pain and palpitations. ARCHER   Gastrointestinal:  Negative for abdominal pain, nausea and vomiting. Endocrine: Negative for cold intolerance and heat intolerance. Genitourinary:  Negative for dysuria, frequency and urgency. Nocturia and weak flow    Musculoskeletal:  Positive for arthralgias, back pain and gait problem. Skin:  Negative for pallor and rash. Allergic/Immunologic: Negative for food allergies and immunocompromised state. Neurological:  Negative for dizziness, numbness and headaches. Hematological:  Negative for adenopathy. Does not bruise/bleed easily. Psychiatric/Behavioral:  Negative for agitation and sleep disturbance. The patient is not nervous/anxious.       Past Medical History:   Diagnosis Date    Atrial fibrillation (HCC)     CHF (congestive heart failure) ([facility] [address]     Hyperlipidemia     Hypertension     Kidney stone      Past Surgical History:   Procedure Laterality Date    CARDIAC CATHETERIZATION      CARDIOVERSION  06/2019    Dr. Kendra Thrasher Right     mass removed    PACEMAKER INSERTION     PROSTATE SURGERY      THYROID SURGERY      goiter removed    UPPER GASTROINTESTINAL ENDOSCOPY  10/2019    Ben     No family history on file. Social History     Tobacco Use   Smoking Status Former    Packs/day: 1.00    Years: 35.00    Pack years: 35.00    Types: Cigarettes    Quit date: 1973    Years since quittin.5   Smokeless Tobacco Never       OBJECTIVE:   Wt Readings from Last 3 Encounters:   22 (!) 308 lb (139.7 kg)   22 (!) 307 lb (139.3 kg)   22 (!) 313 lb (142 kg)     BP Readings from Last 3 Encounters:   22 (!) 91/56   22 107/60   22 (!) 96/50       BP (!) 91/56 (Site: Right Upper Arm, Position: Sitting, Cuff Size: Medium Adult)   Pulse 75   Temp 98 °F (36.7 °C) (Temporal)   Resp 16   Ht 5' 10\" (1.778 m)   Wt (!) 308 lb (139.7 kg)   SpO2 98%   BMI 44.19 kg/m²      Physical Exam  Vitals and nursing note reviewed. Constitutional:       Appearance: Normal appearance. He is well-developed. He is obese. HENT:      Head: Normocephalic and atraumatic. Right Ear: External ear normal.      Left Ear: External ear normal.      Nose: Nose normal.      Mouth/Throat:      Mouth: Mucous membranes are moist.      Pharynx: Oropharynx is clear. Eyes:      Conjunctiva/sclera: Conjunctivae normal.      Pupils: Pupils are equal, round, and reactive to light. Neck:      Thyroid: No thyromegaly. Vascular: No JVD. Cardiovascular:      Rate and Rhythm: Normal rate and regular rhythm. Heart sounds: Normal heart sounds. No murmur heard. No friction rub. Pulmonary:      Effort: Pulmonary effort is normal.      Breath sounds: Normal breath sounds. No wheezing or rales. Abdominal:      General: Bowel sounds are normal.      Palpations: Abdomen is soft. Tenderness: There is no abdominal tenderness. There is no rebound. Musculoskeletal:         General: No tenderness. Cervical back: Neck supple. No rigidity.  No muscular tenderness. Lumbar back: Decreased range of motion. Right lower le+ Edema present. Left lower le+ Edema present. Skin:     Findings: No erythema or rash. Neurological:      General: No focal deficit present. Mental Status: He is alert and oriented to person, place, and time. Psychiatric:         Behavior: Behavior normal.         Judgment: Judgment normal.       Lab Results   Component Value Date/Time     2022 01:20 PM    K 4.1 2022 01:20 PM    CL 96 2022 01:20 PM    CO2 25 2022 01:20 PM    GLUCOSE 99 2022 01:20 PM    BUN 19 2022 01:20 PM    CREATININE 1.7 2022 01:20 PM    CALCIUM 8.7 2022 01:20 PM    PROT 6.2 2022 01:20 PM    LABALBU 3.7 2022 01:20 PM    BILITOT 0.4 2022 01:20 PM    ALT 13 2022 01:20 PM    AST 25 2022 01:20 PM       Hemoglobin A1C (%)   Date Value   2015 5.5     Microscopic Examination (no units)   Date Value   2019 YES     LDL Calculated (mg/dL)   Date Value   2021 85         Lab Results   Component Value Date/Time    WBC 4.8 2022 01:20 PM    NEUTROABS 3.9 2022 01:20 PM    HGB 12.5 2022 01:20 PM    HCT 39.4 2022 01:20 PM    MCV 94.3 2022 01:20 PM     2022 01:20 PM       Lab Results   Component Value Date    TSH 1.04 2022     Lab Results   Component Value Date    PSA 8.26 (H) 2022    PSA 9.73 (H) 2021    PSA 10.39 (H) 2020       ASSESSMENT/PLAN:     1. Chronic systolic heart failure (HCC)  Will cont current meds. Will have patient to monitor daily wt, edema, any worsening of ARCHER and SOA. Low Na diet and fluid restriction. Monitor renal function closely. Long discussion with patient to be very compliant with taking meds. - CBC with Auto Differential; Future  - Comprehensive Metabolic Panel; Future  - Magnesium; Future    2. Essential hypertension  BP is on the low was normal limit.  I have advised him on low-sodium diet, exercise and weight control. I am going to continue current medication. Will monitor his renal function every few months, have advised him to check blood pressure frequently and to keep a record of this. - CBC with Auto Differential; Future  - Comprehensive Metabolic Panel; Future  - Magnesium; Future    3. Atrial fibrillation, unspecified type ([address]  Sinus rhythm on exam. Will cont on meds. I had a long discussion with the patient regarding the risk/benefit from anticoagulation. Patient is aware of that. - CBC with Auto Differential; Future  - Comprehensive Metabolic Panel; Future  - Magnesium; Future    4. Chronic renal failure, stage 3b (HCC)  Baseline. I have advised him to avoid any nephrotoxic agents. I am going to monitor renal function periodically. I will make sure that the blood pressure and other medical problems are under good control. Will refer to nephrology if the renal function should begin to deteriorate. Creatinine will be checked in few months. Last Creatinine is   Lab Results   Component Value Date    CREATININE 1.7 (H) 07/03/2022     - CBC with Auto Differential; Future  - Comprehensive Metabolic Panel; Future  - Magnesium; Future    5. Anemia, unspecified type   will monitor his Hb level periodically. Further recommendation based on test results. - CBC with Auto Differential; Future    6. Liver lesion  Patient was noted to have small cysts in the liver that could not be characterized without contrast.  I did review some old records for the patient. He did have CT scan of the liver in 2019 showing several liver cyst.  Discussed the nephrotoxic complication from using contrast specially with his underlying renal failure. Discussed possibly proceeding with MRI but patient wanted to wait since lesions were present on prior CT scan few years ago. 7. Chronic midline low back pain without sciatica  Worsening pain. Will proceed with xray.  Further recommendation based on test results. - XR LUMBAR SPINE (MIN 4 VIEWS); Future    8. Need for prophylactic vaccination against Streptococcus pneumoniae (pneumococcus)  Vaccine was given per request/rec after he was informed about possible SE/reaction to it. - Pneumococcal Conjugate PCV20, PF (Prevnar 20)      Orders Placed This Encounter   Medications    tiZANidine (ZANAFLEX) 2 MG tablet     Sig: Take 1 tablet by mouth 2 times daily as needed (pain and spasm)     Dispense:  30 tablet     Refill:  1        I, Mann Dai MA am scribing for and in the presence of Ralph Ramírez MD on this date of 08/16/22 at 10:00 AM    IDr. Ralph, personally performed the services described in the documentation as scribed by Mann Dai MA, in my presence and it is both accurate and complete.

## 2022-08-16 NOTE — PROGRESS NOTES
Pneumococcal Information Sheet, \"Prevnar 13/Pneumovax 23\" given to Quin. Patient/Legal guardian of Quin verbalized understanding. Patient Responses    Have you had a pneumonia shot before? Yes  If so, when?  8/5/2020  Have you ever had an allergic reaction to a vaccine? No  Do you feel ill or have a fever today? No  Are you currently taking an antibiotic? No    Pneumococcal vaccine given per order. Please see immunization tab. Risks and benefits explained. Current VIS given.     Immunizations Administered       Name Date Dose Route    Pneumococcal conjugate PCV20, PF (Prevnar 20) 8/16/2022 0.5 mL Intramuscular    Site: Deltoid- Left    Lot: [id number]    NDC: 0005-2000-10

## 2022-08-19 DIAGNOSIS — I10 ESSENTIAL HYPERTENSION: ICD-10-CM

## 2022-08-19 RX ORDER — NEBIVOLOL 10 MG/1
TABLET ORAL
Qty: 90 TABLET | Refills: 3 | Status: SHIPPED | OUTPATIENT
Start: 2022-08-19 | End: 2023-03-17 | Stop reason: HOSPADM

## 2022-08-26 RX ORDER — TIZANIDINE 2 MG/1
2 TABLET ORAL 2 TIMES DAILY PRN
Qty: 30 TABLET | Refills: 1 | OUTPATIENT
Start: 2022-08-26

## 2022-09-08 DIAGNOSIS — G89.29 CHRONIC MIDLINE LOW BACK PAIN WITHOUT SCIATICA: ICD-10-CM

## 2022-09-08 DIAGNOSIS — M54.50 CHRONIC MIDLINE LOW BACK PAIN WITHOUT SCIATICA: ICD-10-CM

## 2022-09-08 RX ORDER — HYDROCODONE BITARTRATE AND ACETAMINOPHEN 5; 325 MG/1; MG/1
TABLET ORAL
Qty: 30 TABLET | Refills: 0 | Status: SHIPPED | OUTPATIENT
Start: 2022-09-08 | End: 2022-10-08

## 2022-09-13 ENCOUNTER — HOSPITAL ENCOUNTER (OUTPATIENT)
Facility: HOSPITAL | Age: 82
Discharge: HOME | End: 2022-09-13
Payer: MEDICARE

## 2022-09-13 DIAGNOSIS — I10 ESSENTIAL HYPERTENSION: ICD-10-CM

## 2022-09-13 DIAGNOSIS — D64.9 ANEMIA, UNSPECIFIED TYPE: ICD-10-CM

## 2022-09-13 DIAGNOSIS — I48.91 ATRIAL FIBRILLATION, UNSPECIFIED TYPE (HCC): ICD-10-CM

## 2022-09-13 DIAGNOSIS — N18.32 CHRONIC RENAL FAILURE, STAGE 3B (HCC): ICD-10-CM

## 2022-09-13 DIAGNOSIS — I50.22 CHRONIC SYSTOLIC HEART FAILURE (HCC): ICD-10-CM

## 2022-09-13 LAB
A/G RATIO: 2.4 (ref 0.8–2)
ALBUMIN SERPL-MCNC: 4 G/DL (ref 3.4–4.8)
ALP BLD-CCNC: 67 U/L (ref 25–100)
ALT SERPL-CCNC: 10 U/L (ref 4–36)
ANION GAP SERPL CALCULATED.3IONS-SCNC: 10 MMOL/L (ref 3–16)
AST SERPL-CCNC: 16 U/L (ref 8–33)
BASOPHILS ABSOLUTE: 0 K/UL (ref 0–0.1)
BASOPHILS RELATIVE PERCENT: 0.9 %
BILIRUB SERPL-MCNC: 0.6 MG/DL (ref 0.3–1.2)
BUN BLDV-MCNC: 26 MG/DL (ref 6–20)
CALCIUM SERPL-MCNC: 9.6 MG/DL (ref 8.5–10.5)
CHLORIDE BLD-SCNC: 104 MMOL/L (ref 98–107)
CO2: 30 MMOL/L (ref 20–30)
CREAT SERPL-MCNC: 1.6 MG/DL (ref 0.4–1.2)
EOSINOPHILS ABSOLUTE: 0.1 K/UL (ref 0–0.4)
EOSINOPHILS RELATIVE PERCENT: 1.1 %
GFR AFRICAN AMERICAN: 50
GFR NON-AFRICAN AMERICAN: 42
GLOBULIN: 1.7 G/DL
GLUCOSE BLD-MCNC: 99 MG/DL (ref 74–106)
HCT VFR BLD CALC: 36.5 % (ref 40–54)
HEMOGLOBIN: 11.4 G/DL (ref 13–18)
IMMATURE GRANULOCYTES #: 0 K/UL
IMMATURE GRANULOCYTES %: 0.2 % (ref 0–5)
LYMPHOCYTES ABSOLUTE: 1.1 K/UL (ref 1.5–4)
LYMPHOCYTES RELATIVE PERCENT: 23.7 %
MAGNESIUM: 1.9 MG/DL (ref 1.7–2.4)
MCH RBC QN AUTO: 29.6 PG (ref 27–32)
MCHC RBC AUTO-ENTMCNC: 31.2 G/DL (ref 31–35)
MCV RBC AUTO: 94.8 FL (ref 80–100)
MONOCYTES ABSOLUTE: 0.4 K/UL (ref 0.2–0.8)
MONOCYTES RELATIVE PERCENT: 8.9 %
NEUTROPHILS ABSOLUTE: 3 K/UL (ref 2–7.5)
NEUTROPHILS RELATIVE PERCENT: 65.2 %
PDW BLD-RTO: 15.1 % (ref 11–16)
PLATELET # BLD: 185 K/UL (ref 150–400)
PMV BLD AUTO: 11.2 FL (ref 6–10)
POTASSIUM SERPL-SCNC: 4.9 MMOL/L (ref 3.4–5.1)
RBC # BLD: 3.85 M/UL (ref 4.5–6)
SODIUM BLD-SCNC: 144 MMOL/L (ref 136–145)
TOTAL PROTEIN: 5.7 G/DL (ref 6.4–8.3)
WBC # BLD: 4.6 K/UL (ref 4–11)

## 2022-09-13 PROCEDURE — 80053 COMPREHEN METABOLIC PANEL: CPT

## 2022-09-13 PROCEDURE — 83735 ASSAY OF MAGNESIUM: CPT

## 2022-09-13 PROCEDURE — 85025 COMPLETE CBC W/AUTO DIFF WBC: CPT

## 2022-09-15 RX ORDER — SACUBITRIL AND VALSARTAN 24; 26 MG/1; MG/1
1 TABLET, FILM COATED ORAL 2 TIMES DAILY
Qty: 180 TABLET | Refills: 3 | Status: SHIPPED | OUTPATIENT
Start: 2022-09-15 | End: 2022-11-14

## 2022-10-10 ENCOUNTER — OFFICE VISIT (OUTPATIENT)
Dept: PRIMARY CARE CLINIC | Age: 82
End: 2022-10-10
Payer: MEDICARE

## 2022-10-10 VITALS
HEART RATE: 70 BPM | OXYGEN SATURATION: 96 % | BODY MASS INDEX: 43.67 KG/M2 | DIASTOLIC BLOOD PRESSURE: 58 MMHG | HEIGHT: 70 IN | SYSTOLIC BLOOD PRESSURE: 92 MMHG | TEMPERATURE: 98.3 F | WEIGHT: 305 LBS

## 2022-10-10 DIAGNOSIS — N18.32 CHRONIC RENAL FAILURE, STAGE 3B (HCC): ICD-10-CM

## 2022-10-10 DIAGNOSIS — D64.9 ANEMIA, UNSPECIFIED TYPE: ICD-10-CM

## 2022-10-10 DIAGNOSIS — I50.22 CHRONIC SYSTOLIC HEART FAILURE (HCC): Primary | ICD-10-CM

## 2022-10-10 DIAGNOSIS — I10 ESSENTIAL HYPERTENSION: ICD-10-CM

## 2022-10-10 DIAGNOSIS — I48.91 ATRIAL FIBRILLATION, UNSPECIFIED TYPE (HCC): ICD-10-CM

## 2022-10-10 PROCEDURE — 1123F ACP DISCUSS/DSCN MKR DOCD: CPT | Performed by: INTERNAL MEDICINE

## 2022-10-10 PROCEDURE — 99214 OFFICE O/P EST MOD 30 MIN: CPT | Performed by: INTERNAL MEDICINE

## 2022-10-10 RX ORDER — TIZANIDINE 2 MG/1
2 TABLET ORAL 2 TIMES DAILY PRN
Qty: 30 TABLET | Refills: 1 | Status: SHIPPED | OUTPATIENT
Start: 2022-10-10

## 2022-10-10 RX ORDER — FERROUS SULFATE 325(65) MG
TABLET ORAL
Qty: 60 TABLET | Refills: 3 | Status: SHIPPED | OUTPATIENT
Start: 2022-10-10

## 2022-10-10 ASSESSMENT — ENCOUNTER SYMPTOMS
BACK PAIN: 1
COUGH: 0
ABDOMINAL PAIN: 0
WHEEZING: 0
SINUS PRESSURE: 0
NAUSEA: 0
VOMITING: 0
EYE DISCHARGE: 0

## 2022-10-10 NOTE — PROGRESS NOTES
Chief Complaint   Patient presents with    Hypertension       Have you seen any other physician or provider since your last visit no    Have you had any other diagnostic tests since your last visit? no    Have you changed or stopped any medications since your last visit? no

## 2022-10-10 NOTE — PROGRESS NOTES
SUBJECTIVE:    Patient ID: Ravi Wilson is a [de-identified] y.o.male. Chief Complaint   Patient presents with    Hypertension         HPI:  Patient has hx of CHF. He has some shortness of breath and dyspnea on exertion that is at baseline. No orthopnea. Positive swelling in the lower extremities worse than before. He has been compliant with taking medications, without side effects from it. He has been following a low-sodium and monitoring fluid intake/daily weight. Weight is down 3 pounds, compared to last visit. His blood pressure is low at this time. No chest pain or palpitation. He has been compliant with f/u with cardiology. He reported feeling tired and his blood pressure remains on the low side. He reported some blistering and some skin changes in both legs that is getting slightly worse with intermittent oozing. Patient has had hypertension for several years. He has been compliant with taking medications, without side effects from it. He has been following a low-sodium, is active and never exercises. Weight is decreasing steadily, compared to last visit. His blood pressure is low at this time. Patient was diagnosed with Afib several years ago. He has been compliant with taking meds w/o SE. He is taking Eliquis for anticoagulation. Patient denies any side effect including easy bruising, bleeding. ..ect. No CP or palpitation. Patient's medications, allergies, past medical, surgical, social and family histories were reviewed and updated as appropriate in electronic medical record.         Outpatient Medications Marked as Taking for the 10/10/22 encounter (Office Visit) with Leah Dove MD   Medication Sig Dispense Refill    tiZANidine (ZANAFLEX) 2 MG tablet Take 1 tablet by mouth 2 times daily as needed (pain and spasm) 30 tablet 1    pantoprazole (PROTONIX) 40 MG tablet TAKE ONE TABLET BY MOUTH EVERY MORNING BEFORE BREAKFAST 30 tablet 3    furosemide (LASIX) 40 MG tablet TAKE ONE TABLET BY MOUTH EVERY DAY 30 tablet 3    Cyanocobalamin (B-12) 2500 MCG SUBL DISSOLVE 1 TABLET UNDER TONGUE EVERY DAY 90 tablet 1    folic acid (FOLVITE) 1 MG tablet TAKE 1 TABLET BY MOUTH DAILY 30 tablet 3    ferrous sulfate (FEROSUL) 325 (65 Fe) MG tablet TAKE ONE TABLET BY MOUTH TWO TIMES A DAY 60 tablet 3    BYSTOLIC 10 MG tablet Take 1 tablet by mouth daily      spironolactone (ALDACTONE) 25 MG tablet Take 0.5 tablets by mouth daily      sacubitril-valsartan (ENTRESTO) 24-26 MG per tablet Take 1 tablet by mouth every 12 hours      apixaban (ELIQUIS) 5 MG TABS tablet Take by mouth 2 times daily      rosuvastatin (CRESTOR) 10 MG tablet Take 10 mg by mouth daily          Review of Systems   Constitutional:  Positive for fatigue. Negative for chills and fever. HENT:  Negative for congestion, ear pain and sinus pressure. Eyes:  Negative for discharge and visual disturbance. Respiratory:  Positive for shortness of breath. Negative for cough and wheezing. Cardiovascular:  Positive for leg swelling. Negative for chest pain and palpitations. ARCHER   Gastrointestinal:  Negative for abdominal pain, nausea and vomiting. Endocrine: Negative for cold intolerance and heat intolerance. Genitourinary:  Negative for dysuria, frequency and urgency. Musculoskeletal:  Positive for arthralgias, back pain and gait problem. Skin:  Negative for pallor and rash. Allergic/Immunologic: Negative for food allergies and immunocompromised state. Neurological:  Negative for dizziness, numbness and headaches. Hematological:  Negative for adenopathy. Does not bruise/bleed easily. Psychiatric/Behavioral:  Negative for agitation and sleep disturbance. The patient is not nervous/anxious.       Past Medical History:   Diagnosis Date    Atrial fibrillation (HCC)     CHF (congestive heart failure) ([facility] [address]     Hyperlipidemia     Hypertension     Kidney stone      Past Surgical History:   Procedure Laterality Date    CARDIAC CATHETERIZATION CARDIOVERSION  2019    Dr. Mamta Anaya Right     mass removed    PACEMAKER INSERTION      PROSTATE SURGERY      THYROID SURGERY      goiter removed    UPPER GASTROINTESTINAL ENDOSCOPY  10/2019    [place]     No family history on file. Social History     Tobacco Use   Smoking Status Former    Packs/day: 1.00    Years: 35.00    Pack years: 35.00    Types: Cigarettes    Quit date: 1973    Years since quittin.7   Smokeless Tobacco Never       OBJECTIVE:   Wt Readings from Last 3 Encounters:   10/10/22 (!) 305 lb (138.3 kg)   22 (!) 308 lb (139.7 kg)   22 (!) 307 lb (139.3 kg)     BP Readings from Last 3 Encounters:   10/10/22 (!) 92/58   22 (!) 91/56   22 107/60       BP (!) 92/58   Pulse 70   Temp 98.3 °F (36.8 °C)   Ht 5' 10\" (1.778 m)   Wt (!) 305 lb (138.3 kg)   SpO2 96%   BMI 43.76 kg/m²      Physical Exam  Vitals and nursing note reviewed. Constitutional:       Appearance: Normal appearance. He is well-developed. He is obese. HENT:      Head: Normocephalic and atraumatic. Right Ear: External ear normal.      Left Ear: External ear normal.      Nose: Nose normal.      Mouth/Throat:      Mouth: Mucous membranes are moist.      Pharynx: Oropharynx is clear. Eyes:      Conjunctiva/sclera: Conjunctivae normal.      Pupils: Pupils are equal, round, and reactive to light. Neck:      Thyroid: No thyromegaly. Vascular: No JVD. Cardiovascular:      Rate and Rhythm: Normal rate and regular rhythm. Heart sounds: Normal heart sounds. No murmur heard. No friction rub. Pulmonary:      Effort: Pulmonary effort is normal.      Breath sounds: Normal breath sounds. No wheezing or rales. Abdominal:      General: Bowel sounds are normal.      Palpations: Abdomen is soft. Tenderness: There is no abdominal tenderness. There is no rebound. Musculoskeletal:         General: No tenderness. Cervical back: Neck supple. No rigidity. No muscular tenderness. Right lower leg: Edema (2+) present. Left lower leg: Edema (2+) present. Skin:     Findings: No erythema or rash. Neurological:      General: No focal deficit present. Mental Status: He is alert and oriented to person, place, and time. Psychiatric:         Behavior: Behavior normal.         Judgment: Judgment normal.             Lab Results   Component Value Date/Time     09/13/2022 10:39 AM    K 4.9 09/13/2022 10:39 AM    K 4.1 07/03/2022 01:20 PM     09/13/2022 10:39 AM    CO2 30 09/13/2022 10:39 AM    GLUCOSE 99 09/13/2022 10:39 AM    BUN 26 09/13/2022 10:39 AM    CREATININE 1.6 09/13/2022 10:39 AM    CALCIUM 9.6 09/13/2022 10:39 AM    PROT 5.7 09/13/2022 10:39 AM    LABALBU 4.0 09/13/2022 10:39 AM    BILITOT 0.6 09/13/2022 10:39 AM    ALT 10 09/13/2022 10:39 AM    AST 16 09/13/2022 10:39 AM       Hemoglobin A1C (%)   Date Value   03/14/2015 5.5     Microscopic Examination (no units)   Date Value   08/24/2019 YES     LDL Calculated (mg/dL)   Date Value   01/12/2021 85         Lab Results   Component Value Date/Time    WBC 4.6 09/13/2022 10:39 AM    NEUTROABS 3.0 09/13/2022 10:39 AM    HGB 11.4 09/13/2022 10:39 AM    HCT 36.5 09/13/2022 10:39 AM    MCV 94.8 09/13/2022 10:39 AM     09/13/2022 10:39 AM       Lab Results   Component Value Date    TSH 1.04 04/19/2022         ASSESSMENT/PLAN:     1. Chronic systolic heart failure (HCC)  Will cont current meds. Will have patient to monitor daily wt, edema, any worsening of ARCHER and SOA. Low Na diet and fluid restriction. Monitor renal function closely. Long discussion with patient to be very compliant with taking meds. 2. Essential hypertension  BP is low. I have advised him on low-sodium diet, exercise and weight control. I am going to continue current medication.  Will monitor his renal function every few months, have advised him to check blood pressure frequently and to keep a record of this. 3. Atrial fibrillation, unspecified type ([address]  Sinus rhythm on exam. Will cont on meds. I had a long discussion with the patient regarding the risk/benefit from anticoagulation. Patient is aware of that. 4. Anemia, unspecified type  Patient with a chronic and mild anemia. Need to have repeat blood work and repeat anemia work-up. Could be related to his chronic kidney disease. 5. Chronic renal failure, stage 3b (HCC)  Try to avoid a nephrotoxic agent. May need to check postvoid residual specially with his elevated PSA. Hypotension may not be helping with his renal perfusion. Unfortunately is somewhat difficult situation to be handled on an outpatient basis given his borderline hypotensive and the need to continue CHF type medications. Will benefit from admission and close monitoring. Cardiology consult. Close cardiac monitoring. Considering lowering medications versus using midodrine to help raising blood pressure so we might be able to more aggressively diurese to help with symptoms and proceed with skin care. Need to check post void residual specially with his history of elevated PSA. Patient seems to be agreeable to proceed with direct admission to help his symptoms.     Orders Placed This Encounter   Medications    Cyanocobalamin (B-12) 2500 MCG SUBL     Sig: DISSOLVE 1 TABLET UNDER TONGUE EVERY DAY     Dispense:  90 tablet     Refill:  1    tiZANidine (ZANAFLEX) 2 MG tablet     Sig: Take 1 tablet by mouth 2 times daily as needed (pain and spasm)     Dispense:  30 tablet     Refill:  1    ferrous sulfate (FEROSUL) 325 (65 Fe) MG tablet     Sig: TAKE ONE TABLET BY MOUTH TWO TIMES A DAY     Dispense:  60 tablet     Refill:  3          Aroldo SMITH MA am scribing for and in the presence of Bay Castaneda MD on this date of 10/10/22 at 10:48 AM    Dr. Bay SMITH, personally performed the services described in the documentation as scribed by Tyrell Jones MA, in my presence and it is both accurate and complete.

## 2022-10-11 ENCOUNTER — OUTSIDE FACILITY SERVICE (OUTPATIENT)
Dept: CARDIOLOGY | Facility: CLINIC | Age: 82
End: 2022-10-11

## 2022-10-11 ENCOUNTER — HOSPITAL ENCOUNTER (INPATIENT)
Facility: HOSPITAL | Age: 82
LOS: 3 days | Discharge: HOME | DRG: 291 | End: 2022-10-14
Attending: INTERNAL MEDICINE | Admitting: INTERNAL MEDICINE
Payer: MEDICARE

## 2022-10-11 DIAGNOSIS — I50.43 ACUTE ON CHRONIC COMBINED SYSTOLIC AND DIASTOLIC HEART FAILURE (HCC): ICD-10-CM

## 2022-10-11 DIAGNOSIS — I95.9 HYPOTENSION, UNSPECIFIED HYPOTENSION TYPE: ICD-10-CM

## 2022-10-11 DIAGNOSIS — N18.30 STAGE 3 CHRONIC KIDNEY DISEASE, UNSPECIFIED WHETHER STAGE 3A OR 3B CKD (HCC): Primary | ICD-10-CM

## 2022-10-11 DIAGNOSIS — I48.91 ATRIAL FIBRILLATION, UNSPECIFIED TYPE (HCC): ICD-10-CM

## 2022-10-11 PROBLEM — I50.9 HEART FAILURE (HCC): Status: ACTIVE | Noted: 2022-10-11

## 2022-10-11 PROBLEM — E66.01 MORBID OBESITY (HCC): Status: ACTIVE | Noted: 2022-10-11

## 2022-10-11 LAB
A/G RATIO: 1.4 (ref 0.8–2)
ALBUMIN SERPL-MCNC: 3.5 G/DL (ref 3.4–4.8)
ALP BLD-CCNC: 61 U/L (ref 25–100)
ALT SERPL-CCNC: 10 U/L (ref 4–36)
ANION GAP SERPL CALCULATED.3IONS-SCNC: 11 MMOL/L (ref 3–16)
AST SERPL-CCNC: 19 U/L (ref 8–33)
BASOPHILS ABSOLUTE: 0.1 K/UL (ref 0–0.1)
BASOPHILS RELATIVE PERCENT: 1.2 %
BILIRUB SERPL-MCNC: 0.6 MG/DL (ref 0.3–1.2)
BUN BLDV-MCNC: 29 MG/DL (ref 6–20)
CALCIUM SERPL-MCNC: 9.5 MG/DL (ref 8.5–10.5)
CHLORIDE BLD-SCNC: 102 MMOL/L (ref 98–107)
CO2: 25 MMOL/L (ref 20–30)
CREAT SERPL-MCNC: 1.7 MG/DL (ref 0.4–1.2)
EOSINOPHILS ABSOLUTE: 0.1 K/UL (ref 0–0.4)
EOSINOPHILS RELATIVE PERCENT: 1 %
FERRITIN: 53.3 NG/ML (ref 22–322)
GFR AFRICAN AMERICAN: 47
GFR NON-AFRICAN AMERICAN: 39
GLOBULIN: 2.5 G/DL
GLUCOSE BLD-MCNC: 97 MG/DL (ref 74–106)
HCT VFR BLD CALC: 35.2 % (ref 40–54)
HEMOGLOBIN: 11.1 G/DL (ref 13–18)
IMMATURE GRANULOCYTES #: 0 K/UL
IMMATURE GRANULOCYTES %: 0.2 % (ref 0–5)
IRON SATURATION: 20 % (ref 20–50)
IRON: 63 UG/DL (ref 59–158)
LV EF: 63 %
LVEF MODALITY: NORMAL
LYMPHOCYTES ABSOLUTE: 1.2 K/UL (ref 1.5–4)
LYMPHOCYTES RELATIVE PERCENT: 22.5 %
MAGNESIUM: 2 MG/DL (ref 1.7–2.4)
MCH RBC QN AUTO: 29.6 PG (ref 27–32)
MCHC RBC AUTO-ENTMCNC: 31.5 G/DL (ref 31–35)
MCV RBC AUTO: 93.9 FL (ref 80–100)
MONOCYTES ABSOLUTE: 0.4 K/UL (ref 0.2–0.8)
MONOCYTES RELATIVE PERCENT: 7.4 %
NEUTROPHILS ABSOLUTE: 3.5 K/UL (ref 2–7.5)
NEUTROPHILS RELATIVE PERCENT: 67.7 %
PDW BLD-RTO: 14.6 % (ref 11–16)
PLATELET # BLD: 164 K/UL (ref 150–400)
PMV BLD AUTO: 11.5 FL (ref 6–10)
POTASSIUM SERPL-SCNC: 4.3 MMOL/L (ref 3.4–5.1)
PRO-BNP: 4810 PG/ML (ref 0–1800)
PROSTATE SPECIFIC ANTIGEN: 5.1 NG/ML (ref 0–4)
RBC # BLD: 3.75 M/UL (ref 4.5–6)
SARS-COV-2, NAAT: NOT DETECTED
SODIUM BLD-SCNC: 138 MMOL/L (ref 136–145)
TOTAL IRON BINDING CAPACITY: 314 UG/DL (ref 250–450)
TOTAL PROTEIN: 6 G/DL (ref 6.4–8.3)
TSH SERPL DL<=0.05 MIU/L-ACNC: 1.18 UIU/ML (ref 0.27–4.2)
VITAMIN D 25-HYDROXY: 31.1 (ref 32–100)
WBC # BLD: 5.1 K/UL (ref 4–11)

## 2022-10-11 PROCEDURE — 1200000000 HC SEMI PRIVATE

## 2022-10-11 PROCEDURE — 97802 MEDICAL NUTRITION INDIV IN: CPT

## 2022-10-11 PROCEDURE — 82306 VITAMIN D 25 HYDROXY: CPT

## 2022-10-11 PROCEDURE — 84153 ASSAY OF PSA TOTAL: CPT

## 2022-10-11 PROCEDURE — 2580000003 HC RX 258: Performed by: PHYSICIAN ASSISTANT

## 2022-10-11 PROCEDURE — 80053 COMPREHEN METABOLIC PANEL: CPT

## 2022-10-11 PROCEDURE — 93306 TTE W/DOPPLER COMPLETE: CPT

## 2022-10-11 PROCEDURE — 2500000003 HC RX 250 WO HCPCS: Performed by: PHYSICIAN ASSISTANT

## 2022-10-11 PROCEDURE — 97161 PT EVAL LOW COMPLEX 20 MIN: CPT

## 2022-10-11 PROCEDURE — 6370000000 HC RX 637 (ALT 250 FOR IP): Performed by: INTERNAL MEDICINE

## 2022-10-11 PROCEDURE — 6370000000 HC RX 637 (ALT 250 FOR IP): Performed by: PHYSICIAN ASSISTANT

## 2022-10-11 PROCEDURE — 51798 US URINE CAPACITY MEASURE: CPT

## 2022-10-11 PROCEDURE — 85025 COMPLETE CBC W/AUTO DIFF WBC: CPT

## 2022-10-11 PROCEDURE — 83540 ASSAY OF IRON: CPT

## 2022-10-11 PROCEDURE — 97165 OT EVAL LOW COMPLEX 30 MIN: CPT

## 2022-10-11 PROCEDURE — 83735 ASSAY OF MAGNESIUM: CPT

## 2022-10-11 PROCEDURE — 83880 ASSAY OF NATRIURETIC PEPTIDE: CPT

## 2022-10-11 PROCEDURE — 83550 IRON BINDING TEST: CPT

## 2022-10-11 PROCEDURE — 36415 COLL VENOUS BLD VENIPUNCTURE: CPT

## 2022-10-11 PROCEDURE — 93308 TTE F-UP OR LMTD: CPT | Performed by: INTERNAL MEDICINE

## 2022-10-11 PROCEDURE — 84443 ASSAY THYROID STIM HORMONE: CPT

## 2022-10-11 PROCEDURE — 99222 1ST HOSP IP/OBS MODERATE 55: CPT | Performed by: INTERNAL MEDICINE

## 2022-10-11 PROCEDURE — 87635 SARS-COV-2 COVID-19 AMP PRB: CPT

## 2022-10-11 PROCEDURE — 82728 ASSAY OF FERRITIN: CPT

## 2022-10-11 RX ORDER — MIDODRINE HYDROCHLORIDE 5 MG/1
5 TABLET ORAL 2 TIMES DAILY WITH MEALS
Status: DISCONTINUED | OUTPATIENT
Start: 2022-10-11 | End: 2022-10-11

## 2022-10-11 RX ORDER — FERROUS SULFATE TAB EC 324 MG (65 MG FE EQUIVALENT) 324 (65 FE) MG
324 TABLET DELAYED RESPONSE ORAL
Status: DISCONTINUED | OUTPATIENT
Start: 2022-10-11 | End: 2022-10-14 | Stop reason: HOSPADM

## 2022-10-11 RX ORDER — MIDODRINE HYDROCHLORIDE 5 MG/1
10 TABLET ORAL
Status: DISCONTINUED | OUTPATIENT
Start: 2022-10-12 | End: 2022-10-14 | Stop reason: HOSPADM

## 2022-10-11 RX ORDER — ONDANSETRON 2 MG/ML
4 INJECTION INTRAMUSCULAR; INTRAVENOUS EVERY 6 HOURS PRN
Status: DISCONTINUED | OUTPATIENT
Start: 2022-10-11 | End: 2022-10-14 | Stop reason: HOSPADM

## 2022-10-11 RX ORDER — FINASTERIDE 5 MG/1
5 TABLET, FILM COATED ORAL DAILY
Status: DISCONTINUED | OUTPATIENT
Start: 2022-10-11 | End: 2022-10-14 | Stop reason: HOSPADM

## 2022-10-11 RX ORDER — PANTOPRAZOLE SODIUM 40 MG/1
40 TABLET, DELAYED RELEASE ORAL DAILY
Status: DISCONTINUED | OUTPATIENT
Start: 2022-10-11 | End: 2022-10-14 | Stop reason: HOSPADM

## 2022-10-11 RX ORDER — ONDANSETRON 4 MG/1
4 TABLET, ORALLY DISINTEGRATING ORAL EVERY 8 HOURS PRN
Status: DISCONTINUED | OUTPATIENT
Start: 2022-10-11 | End: 2022-10-14 | Stop reason: HOSPADM

## 2022-10-11 RX ORDER — ACETAMINOPHEN 325 MG/1
650 TABLET ORAL EVERY 6 HOURS PRN
Status: DISCONTINUED | OUTPATIENT
Start: 2022-10-11 | End: 2022-10-14 | Stop reason: HOSPADM

## 2022-10-11 RX ORDER — FERROUS SULFATE 325(65) MG
324 TABLET ORAL
Status: DISCONTINUED | OUTPATIENT
Start: 2022-10-11 | End: 2022-10-11 | Stop reason: ALTCHOICE

## 2022-10-11 RX ORDER — FOLIC ACID 1 MG/1
1000 TABLET ORAL DAILY
Status: DISCONTINUED | OUTPATIENT
Start: 2022-10-11 | End: 2022-10-14 | Stop reason: HOSPADM

## 2022-10-11 RX ORDER — ROSUVASTATIN CALCIUM 10 MG/1
10 TABLET, COATED ORAL DAILY
Status: DISCONTINUED | OUTPATIENT
Start: 2022-10-11 | End: 2022-10-14 | Stop reason: HOSPADM

## 2022-10-11 RX ORDER — POLYETHYLENE GLYCOL 3350 17 G/17G
17 POWDER, FOR SOLUTION ORAL DAILY PRN
Status: DISCONTINUED | OUTPATIENT
Start: 2022-10-11 | End: 2022-10-14 | Stop reason: HOSPADM

## 2022-10-11 RX ORDER — ACETAMINOPHEN 650 MG/1
650 SUPPOSITORY RECTAL EVERY 6 HOURS PRN
Status: DISCONTINUED | OUTPATIENT
Start: 2022-10-11 | End: 2022-10-14 | Stop reason: HOSPADM

## 2022-10-11 RX ADMIN — APIXABAN 2.5 MG: 2.5 TABLET, FILM COATED ORAL at 11:00

## 2022-10-11 RX ADMIN — SILVER SULFADIAZINE: 10 CREAM TOPICAL at 13:37

## 2022-10-11 RX ADMIN — ROSUVASTATIN CALCIUM 10 MG: 10 TABLET, FILM COATED ORAL at 11:00

## 2022-10-11 RX ADMIN — FINASTERIDE 5 MG: 5 TABLET, FILM COATED ORAL at 13:36

## 2022-10-11 RX ADMIN — SACUBITRIL AND VALSARTAN 1 TABLET: 24; 26 TABLET, FILM COATED ORAL at 11:00

## 2022-10-11 RX ADMIN — APIXABAN 2.5 MG: 2.5 TABLET, FILM COATED ORAL at 20:12

## 2022-10-11 RX ADMIN — MIDODRINE HYDROCHLORIDE 5 MG: 5 TABLET ORAL at 17:37

## 2022-10-11 RX ADMIN — PANTOPRAZOLE SODIUM 40 MG: 40 TABLET, DELAYED RELEASE ORAL at 11:00

## 2022-10-11 RX ADMIN — BUMETANIDE 3 MG: 0.25 INJECTION, SOLUTION INTRAMUSCULAR; INTRAVENOUS at 13:32

## 2022-10-11 RX ADMIN — Medication 324 MG: at 11:02

## 2022-10-11 RX ADMIN — FOLIC ACID 1000 MCG: 1 TABLET ORAL at 11:00

## 2022-10-11 RX ADMIN — FERROUS SULFATE TAB EC 324 MG (65 MG FE EQUIVALENT) 324 MG: 324 (65 FE) TABLET DELAYED RESPONSE at 11:00

## 2022-10-11 ASSESSMENT — ENCOUNTER SYMPTOMS: SHORTNESS OF BREATH: 1

## 2022-10-11 NOTE — PROGRESS NOTES
Pt's IV pump beeping, assessed the line and pt had pulled the IV out while getting back in bed. He was not aware that it was out. Pt had bleeding at the site. Cleaned him up and dressed the site. Catheter was intact and no complications.

## 2022-10-11 NOTE — PROGRESS NOTES
Per pharmacy had to override the [facility] for Bumex infusion. Med verified by myself and Seth Chávez RN.

## 2022-10-11 NOTE — PROGRESS NOTES
Nutrition Education    Educated on MNT for heart failure, fluid restriction. Learners: Patient and his daughter  Readiness: Acceptance  Method: Explanation, Demonstration, and Handout  Response: Verbalizes Understanding  Contact name and number provided.     Aquiles Villagomez RD

## 2022-10-11 NOTE — PROGRESS NOTES
Physical Therapy  Facility/Department: [facility]  Physical Therapy Initial Assessment    Name: Lauryn Brower  : 1940  MRN: [id number]  Date of Service: 10/11/2022    Discharge Recommendations:  Home independently    No AD recommended       Patient Diagnosis(es): There were no encounter diagnoses. Past Medical History:  has a past medical history of Atrial fibrillation ([address] CHF (congestive heart failure) ([address] Hyperlipidemia, Hypertension, and Kidney stone. Past Surgical History:  has a past surgical history that includes Pacemaker insertion (); Thyroid surgery; Prostate surgery; Lung surgery (Right); Cardiac catheterization; hernia repair; Cholecystectomy; Cardioversion (2019); and Upper gastrointestinal endoscopy (10/2019). Assessment   Assessment: Pt is an [de-identified]year old male that was referred to PT services upon admission with dx of heartfailure with RLE edema. Pt up in bed with daughter present. Pt oriented x4. Pt able to complete bed mobility sitting balance, standing balance BLE 5/5 gross strength ambulation with IV pole 200'+ without assistance. Pt is at maximal LOF. No skilled PT services warranted at this time. No equipment needs.   Treatment Diagnosis: Heartfailure ([facility] [address]  Therapy Prognosis: Excellent  Decision Making: Low Complexity  Requires PT Follow-Up: No  Activity Tolerance  Activity Tolerance: Patient tolerated evaluation without incident     Plan   Physcial Therapy Plan  General Plan: Discharge Eval only    Restrictions  Restrictions/Precautions  Restrictions/Precautions: General Precautions, Fall Risk, Other (comment) (2000 ML fluid restriction)  Required Braces or Orthoses?: No     Subjective   Pain: No c/o of pain  General  Chart Reviewed: Yes  Patient assessed for rehabilitation services?: Yes  Family / Caregiver Present: Yes  Referring Practitioner: ALMA Fisher  Diagnosis: Heart Failure ([facility] [address]  Follows Commands: Within Functional Limits  Subjective  Subjective: Pt states that he saw his Primary Care  yesterday d/t c./o of RLE swelling with sores.  had him admitted. Social/Functional History  Social/Functional History  Lives With: Alone  Type of Home: Mobile home  Home Layout: One level  Home Access: Ramped entrance  Bathroom Shower/Tub: Tub/Shower unit  Bathroom Toilet: Standard  Bathroom Accessibility: [address] accessible  Has the patient had two or more falls in the past year or any fall with injury in the past year?: No  ADL Assistance: Independent  Homemaking Assistance: Independent  Homemaking Responsibilities: Yes  Ambulation Assistance: Independent  Transfer Assistance: Independent  Active : Yes  Mode of Transportation: Car, Truck  Occupation: Retired  Vision/Hearing  Vision  Vision: Impaired  Vision Exceptions: Wears glasses for reading  Hearing  Hearing: Within functional limits    Cognition   Orientation  Overall Orientation Status: Within Functional Limits  Cognition  Overall Cognitive Status: WFL     Objective   Heart Rate: 69  BP: 102/62  BP Location: Right upper arm  BP Method: Manual  Patient Position: Sitting  MAP (Calculated): 75.33  Resp: 18  SpO2: 97 %     Observation/Palpation  Observation: Pt lying in bed, NAD, pleasant and cooperative, room air  Gross Assessment  AROM: Within functional limits  PROM: Within functional limits  Strength:  Within functional limits  Coordination: Within functional limits          Bed Mobility Training  Bed Mobility Training: No  Overall Level of Assistance: Independent  Rolling: Independent  Supine to Sit: Independent  Sit to Supine: Independent  Scooting: Independent  Balance  Sitting: Intact  Standing: Intact  Transfer Training  Transfer Training: Yes  Overall Level of Assistance: Independent  Gait  Overall Level of Assistance: Independent  Distance (ft): 200 Feet  Bed mobility  Rolling to Right: Modified independent  Supine to Sit: Modified independent  Scooting: Modified independent  Transfers  Sit to Stand: Independent  Stand to Sit: Independent  Ambulation  WB Status: Full WB  Ambulation  Surface: Level tile  Distance: 200'     Balance  Sitting - Static: Good  Sitting - Dynamic: Good  Standing - Static: Good  Standing - Dynamic: Good                  Education  Patient Education  Education Given To: Patient  Education Provided: Role of Therapy      Therapy Time   Individual Concurrent Group Co-treatment   Time In  5819         Time Out  1538         Minutes  20         This note serves as a DC summary in the event of pt discharge.     [place]ia Combs, PT

## 2022-10-11 NOTE — PROGRESS NOTES
Medication Reconciliation completed with fill history from [facility]. No changes were made. Patient is up to date on all medications.

## 2022-10-11 NOTE — FLOWSHEET NOTE
10/11/22 0915   Assessment   Charting Type Admission   Psychosocial   Psychosocial (WDL) WDL   Neurological   Neuro (WDL) WDL   Lexii Coma Scale   Eye Opening 4   Best Verbal Response 5   Best Motor Response 6   Lexii Coma Scale Score 15   HEENT (Head, Ears, Eyes, Nose, & Throat)   HEENT (WDL) WDL   Respiratory   Respiratory (WDL) WDL   Gastrointestinal   Abdominal (WDL) X   Abdomen Inspection Obese;Rounded   Last BM (including prior to admit) 10/09/22   RUQ Bowel Sounds Active   LUQ Bowel Sounds Active   RLQ Bowel Sounds Active   LLQ Bowel Sounds Active   Hernia Abdominal   Genitourinary   Genitourinary (WDL) WDL   Peripheral Vascular   Peripheral Vascular (WDL) X   Edema Right lower extremity; Left lower extremity   RUE Neurovascular Assessment   Capillary Refill Less than/Equal to 3 seconds   Color Pink   Temperature Warm   R Radial Pulse +2 (Moderate)   LUE Neurovascular Assessment   Capillary Refill Less than/Equal to 3 seconds   Color Pink   Temperature Warm   L Radial Pulse +2 (Moderate)   RLE Neurovascular Assessment   Capillary Refill Less than/Equal to 3 seconds   Color Yellow-Brown/Hemosiderin Staining   Temperature Warm   R Pedal Pulse +2   LLE Neurovascular Assessment   Capillary Refill Less than/Equal to 3 seconds   Color Yellow-Brown/Hemosiderin Staining   Temperature Warm   L Pedal Pulse +2   Skin Integumentary    Skin Integumentary (WDL) X   Skin Color Hyperpigmentation   Skin Condition/Temp Dry;Flaky; Swollen/edematous   Skin Integrity Blister;Vascular discoloration or hemochromatosis/staining   Location BLE   Wound Prevention/Protection Method Yes   Skin Fold Management No   Dressing Present  Changed   Location of Wound Prevention Leg  (right)   Musculoskeletal   Musculoskeletal (WDL) WDL   Care Plan - Chronic Conditions and Co-Morbidity   Care Plan - Patient's Chronic Conditions and Co-Morbidity Symptoms are Monitored and Maintained or Improved Monitor and assess patient's chronic conditions

## 2022-10-11 NOTE — PLAN OF CARE
Problem: Discharge Planning  Goal: Discharge to home or other facility with appropriate resources  Outcome: Progressing  Flowsheets  Taken 10/11/2022 0915 by Christopher Sandoval RN  Discharge to home or other facility with appropriate resources:   Identify barriers to discharge with patient and caregiver   Identify discharge learning needs (meds, wound care, etc)  Taken 10/11/2022 0819 by Anastacia Higuera RN  Discharge to home or other facility with appropriate resources:   Identify barriers to discharge with patient and caregiver   Identify discharge learning needs (meds, wound care, etc)   Refer to discharge planning if patient needs post-hospital services based on physician order or complex needs related to functional status, cognitive ability or social support system   Arrange for needed discharge resources and transportation as appropriate   Arrange for interpreters to assist at discharge as needed     Problem: Safety - Adult  Goal: Free from fall injury  Outcome: Progressing     Problem: ABCDS Injury Assessment  Goal: Absence of physical injury  Outcome: Progressing     Problem: Chronic Conditions and Co-morbidities  Goal: Patient's chronic conditions and co-morbidity symptoms are monitored and maintained or improved  Outcome: Progressing  Flowsheets (Taken 10/11/2022 0915)  Care Plan - Patient's Chronic Conditions and Co-Morbidity Symptoms are Monitored and Maintained or Improved: Monitor and assess patient's chronic conditions and comorbid symptoms for stability, deterioration, or improvement     Problem: Skin/Tissue Integrity  Goal: Absence of new skin breakdown  Description: 1. Monitor for areas of redness and/or skin breakdown  2. Assess vascular access sites hourly  3. Every 4-6 hours minimum:  Change oxygen saturation probe site  4. Every 4-6 hours:  If on nasal continuous positive airway pressure, respiratory therapy assess nares and determine need for appliance change or resting period.   Outcome: Progressing

## 2022-10-11 NOTE — ACP (ADVANCE CARE PLANNING)
Advance Care Planning     General Advance Care Planning (ACP) Conversation    Date of Conversation: 10/11/2022  Conducted with: Patient with Decision Making Capacity per nursing on admit    Healthcare Decision Maker:    Primary Decision Maker: Fernando Lilibeth - [phone]    Secondary Decision Maker: Kel Ponce - [phone]  Click here to complete Healthcare Decision Makers including selection of the Healthcare Decision Maker Relationship (ie \"Primary\"). Today we documented Decision Maker(s) consistent with Legal Next of Kin hierarchy.     Content/Action Overview:  Has NO ACP documents/care preferences - information provided, considering goals and options  Reviewed DNR/DNI and patient confirms current DNR status - completed forms on file (place new order if needed)      Length of Voluntary ACP Conversation in minutes:  <16 minutes (Non-Billable)    Darryn Carr RN

## 2022-10-11 NOTE — CONSULTS
Comprehensive Nutrition Assessment    Type and Reason for Visit:  Initial, Consult, Patient Education (CHF needs diet education)    Nutrition Recommendations/Plan:   Continue current diet as medically appropriate and tolerated. Consider SLP eval d/t hx of dysphagia. Establish and encourage PO intake as appropriate. Consider MVI with minerals daily. RD to follow pt and available PRN. Nutrition Assessment:    Pt admitted with CHF. Pt is at low risk for ongoing nutritiona compromise r/t CHF diagnosis. Nutrition Related Findings:    Obesity, edema BLE. PMH includes CHF, HTN, HLD, folate deficiency, dysphagia. Medications: ferrous sulfate, folic acid, protonix, crestor, bumex drip. BUN 29, Cr 1.7, GFR 39 Wound Type: None (Blister/hyperpigmentation BLE)       Current Nutrition Intake & Therapies:    Average Meal Intake: Unable to assess (No intake data available at this time)  Average Supplements Intake: None Ordered  ADULT DIET; Regular; Low Sodium (2 gm); 2000 ml    Anthropometric Measures:  Height: 5' 10\" (177.8 cm)  Ideal Body Weight (IBW): 166 lbs (75 kg)    Admission Body Weight: 299 lb (135.6 kg)  Current Body Weight: 299 lb (135.6 kg), 180.1 % IBW.  Weight Source: Not Specified  Current BMI (kg/m2): 42.9        Weight Adjustment For: No Adjustment                 BMI Categories: Obese Class 3 (BMI 40.0 or greater)    Estimated Daily Nutrient Needs:  Energy Requirements Based On: Kcal/kg  Weight Used for Energy Requirements: Other (Comment) (weight method not specified)  Energy (kcal/day): 7560-1340 (13-15 kcal/kg)  Weight Used for Protein Requirements: Ideal  Protein (g/day): 151-166 (2-2.2 g/kg IBW)  Method Used for Fluid Requirements: Other (Comment) (fld restriction per MD)  Fluid (ml/day): 9040-2386    Nutrition Diagnosis:   Overweight/Obese related to food and nutrition related knowledge deficit, cardiac dysfunction as evidenced by BMI, localized or generalized fluid accumulation, other (comment) (BMI 43)    Nutrition Interventions:   Food and/or Nutrient Delivery: Continue Current Diet, Vitamin Supplement, Mineral Supplement  Nutrition Education/Counseling: Education needed  Coordination of Nutrition Care: Continue to monitor while inpatient  Plan of Care discussed with: Pt    Goals:     Goals: Meet at least 75% of estimated needs       Nutrition Monitoring and Evaluation:   Behavioral-Environmental Outcomes: Knowledge or Skill, Readiness for Change  Food/Nutrient Intake Outcomes: Food and Nutrient Intake, Vitamin/Mineral Intake  Physical Signs/Symptoms Outcomes: Biochemical Data, Fluid Status or Edema, Weight    Discharge Planning:     Too soon to determine     Luis Miguel Navarro RD

## 2022-10-11 NOTE — PROGRESS NOTES
Occupational Therapy  Facility/Department: [facility]  Occupational Therapy Initial Assessment    Name: Mandi Romero  : 1940  MRN: [id number]  Date of Service: 10/11/2022    Discharge Recommendations:     OT Equipment Recommendations  Equipment Needed: No       Patient Diagnosis(es): There were no encounter diagnoses. Past Medical History:  has a past medical history of Atrial fibrillation ([address] CHF (congestive heart failure) ([facility] [address] Hyperlipidemia, Hypertension, and Kidney stone. Past Surgical History:  has a past surgical history that includes Pacemaker insertion (); Thyroid surgery; Prostate surgery; Lung surgery (Right); Cardiac catheterization; hernia repair; Cholecystectomy; Cardioversion (2019); and Upper gastrointestinal endoscopy (10/2019). Assessment   Assessment: This [de-identified]year old male was referred to OT services upon admission. Pt independent at baseline. Pt oriented x4. Independent with bed mobility and ambulation. Pt demo independence with LB dressing and reports independence with toileting. Pt is high functional level. No skilled OT services warranted at this time. No equipment needs. Prognosis: Good  Decision Making: Low Complexity  No Skilled OT: At baseline function; Safe to return home; Independent with ADL's  REQUIRES OT FOLLOW-UP: No  Activity Tolerance  Activity Tolerance: Patient Tolerated treatment well        Plan         Restrictions  Restrictions/Precautions  Restrictions/Precautions: General Precautions, Fall Risk  Required Braces or Orthoses?: No    Subjective   General  Chart Reviewed: Yes  Patient assessed for rehabilitation services?: Yes  Family / Caregiver Present: No  Referring Practitioner: ALMA Bui  Diagnosis: CHF  Subjective  Subjective: Pt states he was sent over from Dr. Alon Somers. Agreeable to OT services.      Social/Functional History  Social/Functional History  Lives With: Alone  Type of Home: Mobile home  Home Layout: One level  Home Access: Ramped entrance  Bathroom Shower/Tub: Tub/Shower unit  Bathroom Toilet: Standard  Bathroom Accessibility: [facility] accessible  Has the patient had two or more falls in the past year or any fall with injury in the past year?: No  ADL Assistance: Independent  Homemaking Assistance: Independent  Homemaking Responsibilities: Yes  Ambulation Assistance: Independent  Transfer Assistance: Independent  Active : Yes       Objective   Heart Rate: 65  BP: (!) 98/58  MAP (Calculated): 71.33  Resp: 18  SpO2: 95 %          Observation/Palpation  Observation: Pt lying in bed, NAD, pleasant and cooperative, room air     Bed Mobility Training  Bed Mobility Training: No  Overall Level of Assistance: Independent  Rolling: Independent  Supine to Sit: Independent  Sit to Supine: Independent  Scooting: Independent  Balance  Sitting: Intact  Standing: Intact  Transfer Training  Transfer Training: No  Gait  Overall Level of Assistance: Independent  Distance (ft): 50 Feet     AROM: Within functional limits  PROM: Within functional limits  Strength: Within functional limits  Coordination: Within functional limits  Tone: Normal  Sensation: Intact  ADL  LE Dressing: Independent  Toileting Skilled Clinical Factors: reports independence with task. Bed mobility  Rolling to Right: Modified independent  Supine to Sit: Modified independent  Scooting: Modified independent  Transfers  Stand Pivot Transfers: Independent  Sit to stand: Independent  Vision  Vision: Impaired  Vision Exceptions: Wears glasses for reading  Hearing  Hearing: Within functional limits  Cognition  Overall Cognitive Status: WFL  Orientation  Overall Orientation Status: Within Functional Limits        Therapy Time   Individual Concurrent Group Co-treatment   Time In 0924         Time Out 0934         Minutes 10             This note serves as a DC summary in the event of pt discharge.       Tash West OTR/L

## 2022-10-11 NOTE — CARE COORDINATION
Lives With: Alone  Type of Home: Mobile home  Home Layout: One level  Home Access: Ramped entrance  Bathroom Shower/Tub: [address]  Bathroom Toilet: Standard  Bathroom Accessibility: [facility] accessible  Has the patient had two or more falls in the past year or any fall with injury in the past year?: No  ADL Assistance: Independent  Homemaking Assistance: Independent  Homemaking Responsibilities: Yes  Ambulation Assistance: Independent  Transfer Assistance: Independent  Active : Yes     Lives alone. I at baseline. Cleared for home alone per therapy.

## 2022-10-11 NOTE — H&P
History and Physical    Patient:  Isabella Montelongo    CHIEF COMPLAINT:    Leg swelling with blisters and hypotension     HISTORY OF PRESENT ILLNESS:   The patient is a [de-identified] y.o. male with PMH of atrial fibrillation, combined HF, HTN, obesity, HLD who was directly admitted by PCP due to lower extremity edema with blistering and hypotension. Due to low BP patient needed to have closer monitoring while diuresing. Patient reports legs have become more swollen over last few weeks to the point that there are some blisters draining on his right calf. He has been taking fluid pills. States his blood pressure has been low for awhile and doesn't think he should be taking pressure medications. Denies orthopnea or SOA at rest. Admits to chronic ARCHER. Denies chest pain. BP 98/56 on arrival.     Past Medical History:      Diagnosis Date    Atrial fibrillation (HCC)     CHF (congestive heart failure) ([facility] [address]     Hyperlipidemia     Hypertension     Kidney stone        Past Surgical History:      Procedure Laterality Date    CARDIAC CATHETERIZATION      CARDIOVERSION  06/2019    Dr. Rajat Lee Right     mass removed    PACEMAKER INSERTION  2012    PROSTATE SURGERY      THYROID SURGERY      goiter removed    UPPER GASTROINTESTINAL ENDOSCOPY  10/2019    Ben       Medications Prior to Admission:    Prior to Admission medications    Medication Sig Start Date End Date Taking?  Authorizing Provider   Cyanocobalamin (B-12) 2500 MCG SUBL DISSOLVE 1 TABLET UNDER TONGUE EVERY DAY 10/10/22   Miranda Gonzalez MD   tiZANidine (ZANAFLEX) 2 MG tablet Take 1 tablet by mouth 2 times daily as needed (pain and spasm) 10/10/22   Miranda Gonzalez MD   ferrous sulfate (FEROSUL) 325 (65 Fe) MG tablet TAKE ONE TABLET BY MOUTH TWO TIMES A DAY 10/10/22   Miranda Gonzalez MD   pantoprazole (PROTONIX) 40 MG tablet TAKE ONE TABLET BY MOUTH EVERY MORNING BEFORE BREAKFAST 8/16/22   Miranda Gonzalez MD   furosemide (LASIX) 40 MG tablet TAKE ONE TABLET BY MOUTH EVERY DAY 7/29/22   Tracy Laws MD   folic acid (FOLVITE) 1 MG tablet TAKE 1 TABLET BY MOUTH DAILY 7/6/22   Tracy Laws MD   BYSTOLIC 10 MG tablet Take 1 tablet by mouth daily 10/25/21   Historical Provider, MD   spironolactone (ALDACTONE) 25 MG tablet Take 0.5 tablets by mouth daily 4/17/20   Historical Provider, MD   sacubitril-valsartan (ENTRESTO) 24-26 MG per tablet Take 1 tablet by mouth every 12 hours    Javier Castellon MD   apixaban (ELIQUIS) 5 MG TABS tablet Take by mouth 2 times daily    Historical Provider, MD   rosuvastatin (CRESTOR) 10 MG tablet Take 10 mg by mouth daily    Historical Provider, MD       Allergies:  Patient has no known allergies. Social History:   TOBACCO:   reports that he quit smoking about 49 years ago. His smoking use included cigarettes. He has a 35.00 pack-year smoking history. He has never used smokeless tobacco.  ETOH:   reports no history of alcohol use. OCCUPATION:  None     Family History:   History reviewed. No pertinent family history. Review of system  Constitutional:  Denies fever or chills   Eyes:  Denies eye pain or redness  HENT:  Denies nasal congestion or sore throat   Respiratory:  Denies cough or shortness of breath   Cardiovascular:  Denies chest pain or orthopnea. Positive for edema   GI:  Denies abdominal pain, nausea, vomiting, bloody stools or diarrhea   :  Denies dysuria or frequency  Musculoskeletal:  Denies acute neck pain or body aches  Integument:  Denies rash or itching  Neurologic:  Denies headache, dizziness, numbness, tingling or unilateral weakness  Psychiatric:  Denies acute depression or acute anxiety      Vital Signs  Temp: 98.6 °F (37 °C)  Heart Rate: 65  Resp: 18  BP: 104/60  SpO2: 95 %          vital signs reviewed in electronic chart.     Physical exam  Constitutional:  Well developed, well nourished, obese, no acute distress  Eyes:  PERRL, no scleral icterus, conjunctiva normal   HENT: Atraumatic, external ears normal, nose normal, oropharynx moist, no pharyngeal exudates. Neck- supple, no JVD, no lymphadenopathy  Respiratory:  No respiratory distress on RA, diminished bilateral bases, no wheezing, rales or rhonchi detected  Cardiovascular:  irregularly irregular, no murmurs, no gallops, no rubs   GI:  Soft, obese, nondistended, normal bowel sounds, nontender, no voluntary guarding  Musculoskeletal:  No cyanosis or obvious acute deformity. Moving all extremities. +2 edema BLE. Integument:  Warm and dry. Superficial wounds to Right calf. Neurologic:  Alert & oriented x 3, no apparent focal deficits noted   Psychiatric:  Speech and behavior appropriate         Lab Results   Component Value Date     10/11/2022    K 4.3 10/11/2022     10/11/2022    CO2 25 10/11/2022    BUN 29 (H) 10/11/2022    CREATININE 1.7 (H) 10/11/2022    GLUCOSE 97 10/11/2022    CALCIUM 9.5 10/11/2022    PROT 6.0 (L) 10/11/2022    LABALBU 3.5 10/11/2022    BILITOT 0.6 10/11/2022    ALKPHOS 61 10/11/2022    AST 19 10/11/2022    ALT 10 10/11/2022    LABGLOM 39 (L) 10/11/2022    GFRAA 47 (L) 10/11/2022    AGRATIO 1.4 10/11/2022    GLOB 2.5 10/11/2022           Lab Results   Component Value Date    WBC 5.1 10/11/2022    HGB 11.1 (L) 10/11/2022    HCT 35.2 (L) 10/11/2022    MCV 93.9 10/11/2022     10/11/2022     No results found for: BNP        Assessment and Plan     Active Hospital Problems    Diagnosis Date Noted    Acute on chronic combined systolic and diastolic heart failure (HCC) [I50.43]  - ECHO 2019 EF 35%, grade III diastolic dysfunction  - Follows with Dr Frederic David   - Presents due to lower extremity edema with blistering. O2 sats stable on RA. Systolic BP 18Z-950K therefore directly admitted for diuresis and BP monitoring   - BNP 4810  - repeat echo ordered and pending   - hold home entresto, bystolic, aldactone, lasix   - add low dose midodrine 5 mg BID with hypotension and need for diuresis.    - diurese with bumex drip for 3 mg total on 10/11  - 2L fluid restriction with low Na  - daily weight and strict I&Os   - topical treatment with silvadene to RLE superficial wounds  - plan to discuss case with patient's cardiologist Dr. Selma Quan  10/11/2022    Morbid obesity ([facility] [address] [X80.26]  - complicates all aspects of care   - BMI 43   - encourage weight loss 10/11/2022    CKD (chronic kidney disease) stage 3, GFR 30-59 ml/min ([facility]) [N18.30]  - Cr around baseline 1.7  - monitor   - avoid nephrotoxic agent as able  10/11/2022    Elevated PSA [R97.20]  - history of elevated psa. Following with urology. - PVR 70- added finasteride 12/11/2020    Atrial fibrillation ([facility]) [I48.91]  - chronic   - continue eliquis and telemetry   - bystolic on hold  15/45/2566    CATHIE (obstructive sleep apnea) [G47.33] 09/11/2019    Hypotension [I95.9]  - PCP reports low BP for quite some time - systolic 24P-569L during hospital course   - hold entresto, bystolic, aldactone, home lasix   - add midodrine 5 mg bid   - diuresing as above with bumex gtt  08/25/2019     Patient was seen and examined with Dr. Zoe Cervantes. After reviewing patient data and diagnostic testing the plan of care was established in conjunction with Dr. Zoe Cervantes.     ALMA Thornton certifies per CMS regulation for 42 CFR [phone]), that the patient may reasonably be expected to be discharged or transferred to a hospital within 96 hours after admission to [address] [place] [address]    Electronically signed by ALMA Thornton on 10/11/2022 at 12:14 PM

## 2022-10-12 LAB
A/G RATIO: 1.7 (ref 0.8–2)
ALBUMIN SERPL-MCNC: 3.5 G/DL (ref 3.4–4.8)
ALP BLD-CCNC: 58 U/L (ref 25–100)
ALT SERPL-CCNC: 9 U/L (ref 4–36)
ANION GAP SERPL CALCULATED.3IONS-SCNC: 9 MMOL/L (ref 3–16)
AST SERPL-CCNC: 15 U/L (ref 8–33)
BILIRUB SERPL-MCNC: 0.6 MG/DL (ref 0.3–1.2)
BUN BLDV-MCNC: 28 MG/DL (ref 6–20)
CALCIUM SERPL-MCNC: 9 MG/DL (ref 8.5–10.5)
CHLORIDE BLD-SCNC: 102 MMOL/L (ref 98–107)
CO2: 29 MMOL/L (ref 20–30)
CREAT SERPL-MCNC: 1.7 MG/DL (ref 0.4–1.2)
GFR AFRICAN AMERICAN: 47
GFR NON-AFRICAN AMERICAN: 39
GLOBULIN: 2.1 G/DL
GLUCOSE BLD-MCNC: 100 MG/DL (ref 74–106)
HCT VFR BLD CALC: 33 % (ref 40–54)
HEMOGLOBIN: 10.4 G/DL (ref 13–18)
MCH RBC QN AUTO: 29.8 PG (ref 27–32)
MCHC RBC AUTO-ENTMCNC: 31.5 G/DL (ref 31–35)
MCV RBC AUTO: 94.6 FL (ref 80–100)
PDW BLD-RTO: 14.5 % (ref 11–16)
PLATELET # BLD: 153 K/UL (ref 150–400)
PMV BLD AUTO: 10.5 FL (ref 6–10)
POTASSIUM SERPL-SCNC: 4.1 MMOL/L (ref 3.4–5.1)
RBC # BLD: 3.49 M/UL (ref 4.5–6)
SODIUM BLD-SCNC: 140 MMOL/L (ref 136–145)
TOTAL PROTEIN: 5.6 G/DL (ref 6.4–8.3)
WBC # BLD: 4.6 K/UL (ref 4–11)

## 2022-10-12 PROCEDURE — 2580000003 HC RX 258: Performed by: PHYSICIAN ASSISTANT

## 2022-10-12 PROCEDURE — 1200000000 HC SEMI PRIVATE

## 2022-10-12 PROCEDURE — 99232 SBSQ HOSP IP/OBS MODERATE 35: CPT | Performed by: INTERNAL MEDICINE

## 2022-10-12 PROCEDURE — 2500000003 HC RX 250 WO HCPCS: Performed by: PHYSICIAN ASSISTANT

## 2022-10-12 PROCEDURE — 85027 COMPLETE CBC AUTOMATED: CPT

## 2022-10-12 PROCEDURE — 80053 COMPREHEN METABOLIC PANEL: CPT

## 2022-10-12 PROCEDURE — 6370000000 HC RX 637 (ALT 250 FOR IP): Performed by: INTERNAL MEDICINE

## 2022-10-12 PROCEDURE — 36415 COLL VENOUS BLD VENIPUNCTURE: CPT

## 2022-10-12 PROCEDURE — 6370000000 HC RX 637 (ALT 250 FOR IP): Performed by: PHYSICIAN ASSISTANT

## 2022-10-12 RX ADMIN — APIXABAN 2.5 MG: 2.5 TABLET, FILM COATED ORAL at 08:13

## 2022-10-12 RX ADMIN — FOLIC ACID 1000 MCG: 1 TABLET ORAL at 08:13

## 2022-10-12 RX ADMIN — POLYETHYLENE GLYCOL 3350 17 G: 17 POWDER, FOR SOLUTION ORAL at 17:22

## 2022-10-12 RX ADMIN — MIDODRINE HYDROCHLORIDE 10 MG: 5 TABLET ORAL at 08:13

## 2022-10-12 RX ADMIN — SILVER SULFADIAZINE: 10 CREAM TOPICAL at 08:12

## 2022-10-12 RX ADMIN — ROSUVASTATIN CALCIUM 10 MG: 10 TABLET, FILM COATED ORAL at 08:12

## 2022-10-12 RX ADMIN — MIDODRINE HYDROCHLORIDE 10 MG: 5 TABLET ORAL at 17:09

## 2022-10-12 RX ADMIN — APIXABAN 2.5 MG: 2.5 TABLET, FILM COATED ORAL at 20:32

## 2022-10-12 RX ADMIN — FERROUS SULFATE TAB EC 324 MG (65 MG FE EQUIVALENT) 324 MG: 324 (65 FE) TABLET DELAYED RESPONSE at 08:13

## 2022-10-12 RX ADMIN — BUMETANIDE 3 MG: 0.25 INJECTION INTRAMUSCULAR; INTRAVENOUS at 14:00

## 2022-10-12 RX ADMIN — FINASTERIDE 5 MG: 5 TABLET, FILM COATED ORAL at 08:12

## 2022-10-12 RX ADMIN — MIDODRINE HYDROCHLORIDE 10 MG: 5 TABLET ORAL at 12:28

## 2022-10-12 RX ADMIN — PANTOPRAZOLE SODIUM 40 MG: 40 TABLET, DELAYED RELEASE ORAL at 08:12

## 2022-10-12 NOTE — FLOWSHEET NOTE
10/11/22 2013   Assessment   Charting Type Shift assessment   Psychosocial   Psychosocial (WDL) WDL   Neurological   Neuro (WDL) WDL   Lexii Coma Scale   Eye Opening 4   Best Verbal Response 5   Best Motor Response 6   Lexii Coma Scale Score 15   HEENT (Head, Ears, Eyes, Nose, & Throat)   HEENT (WDL) X   Teeth Missing teeth   Respiratory   Respiratory (WDL) WDL   Cardiac Monitor   Telemetry Box Number    Telemetry Monitor Alarm Parameters    Gastrointestinal   Abdominal (WDL) X   Abdomen Inspection Obese;Rounded   Last BM (including prior to admit) 10/09/22   RUQ Bowel Sounds Active   LUQ Bowel Sounds Active   RLQ Bowel Sounds Active   LLQ Bowel Sounds Active   Hernia Abdominal   Genitourinary   Genitourinary (WDL) WDL   Urine Assessment   Urine Color Yellow/straw   Urine Appearance Clear   Urine Odor Malodorous   Peripheral Vascular   Peripheral Vascular (WDL) X   Edema Right lower extremity; Left lower extremity   RUE Neurovascular Assessment   Capillary Refill Less than/Equal to 3 seconds   Color Pink   Temperature Warm   R Radial Pulse +2 (Moderate)   LUE Neurovascular Assessment   Capillary Refill Less than/Equal to 3 seconds   Color Pink   Temperature Warm   L Radial Pulse +2 (Moderate)   RLE Neurovascular Assessment   Capillary Refill Less than/Equal to 3 seconds   Color Yellow-Brown/Hemosiderin Staining   Temperature Warm   R Pedal Pulse +2   LLE Neurovascular Assessment   Capillary Refill Less than/Equal to 3 seconds   Color Yellow-Brown/Hemosiderin Staining   Temperature Warm   L Pedal Pulse +2   Skin Integumentary    Skin Integumentary (WDL) X   Skin Color Hyperpigmentation   Skin Condition/Temp Flaky;Dry;Swollen/edematous   Skin Integrity Vascular discoloration or hemochromatosis/staining   Location BLE   Wound Prevention/Protection Method Yes   Dressing Present  Intact, not due to be changed   Location of Wound Prevention Leg  (right)   Musculoskeletal   Musculoskeletal (WDL) WDL No

## 2022-10-12 NOTE — PROGRESS NOTES
Progress Note      Subjective:   Chief complaint: lower extremity swelling     Interval History:   Sitting up in bed today. On room air. Swelling improved some from yesterday. BP stable with systolic 34R-283X. Post void residual 73. Review of systems:   Constitutional:  Denies fever or chills   Eyes:  Denies eye pain or redness  HENT:  Denies nasal congestion or sore throat   Respiratory:  Denies cough or shortness of breath   Cardiovascular:  Denies chest pain or orthopnea. Positive for edema   GI:  Denies abdominal pain, nausea, vomiting, bloody stools or diarrhea   :  Denies dysuria or frequency  Musculoskeletal:  Denies acute neck pain or body aches  Integument:  Denies rash or itching  Neurologic:  Denies headache, dizziness, numbness, tingling or unilateral weakness  Psychiatric:  Denies acute depression or acute anxiety       Past medical history, surgical history, family history and social history reviewed and unchanged compared to H&P earlier this admission. Medications:   Scheduled Meds:   apixaban  2.5 mg Oral BID    folic acid  4,251 mcg Oral Daily    pantoprazole  40 mg Oral Daily    rosuvastatin  10 mg Oral Daily    [Held by provider] sacubitril-valsartan  1 tablet Oral BID    ferrous sulfate  324 mg Oral Daily with breakfast    silver sulfADIAZINE   Topical Daily    finasteride  5 mg Oral Daily    midodrine  10 mg Oral TID WC     Continuous Infusions:   bumetanide 0.1 mg/mL infusion         Objective:     Vital Signs  Temp: 97.7 °F (36.5 °C)  Heart Rate: 67  Resp: 18  BP: 100/62  SpO2: 92 %  O2 Device: None (Room air)       Vital signs reviewed in electronic charts. Physical exam  Constitutional:  Well developed, well nourished, obese, no acute distress  Eyes:  PERRL, no scleral icterus, conjunctiva normal   HENT:  Atraumatic, external ears normal, nose normal, oropharynx moist, no pharyngeal exudates.  Neck- supple, no JVD, no lymphadenopathy  Respiratory:  No respiratory distress on RA, diminished bilateral bases, no wheezing, rales or rhonchi detected  Cardiovascular:  irregularly irregular, no murmurs, no gallops, no rubs   GI:  Soft, obese, nondistended, normal bowel sounds, nontender, no voluntary guarding  Musculoskeletal:  No cyanosis or obvious acute deformity. Moving all extremities. +1-2 edema BLE. Integument:  Warm and dry. Superficial wounds to Right calf. Neurologic:  Alert & oriented x 3, no apparent focal deficits noted   Psychiatric:  Speech and behavior appropriate     Results:     Lab Results   Component Value Date    WBC 4.6 10/12/2022    HGB 10.4 (L) 10/12/2022    HCT 33.0 (L) 10/12/2022    MCV 94.6 10/12/2022     10/12/2022       Lab Results   Component Value Date/Time     10/12/2022 05:22 AM    K 4.1 10/12/2022 05:22 AM    K 4.1 07/03/2022 01:20 PM     10/12/2022 05:22 AM    CO2 29 10/12/2022 05:22 AM    BUN 28 10/12/2022 05:22 AM    CREATININE 1.7 10/12/2022 05:22 AM    GLUCOSE 100 10/12/2022 05:22 AM    CALCIUM 9.0 10/12/2022 05:22 AM        Assessment and Plan:      Acute on chronic combined systolic and diastolic heart failure ([facility] [address] [I50.43]  - ECHO 2019 EF 35%, grade III diastolic dysfunction   - Presents due to lower extremity edema with blistering. O2 sats stable on RA. Systolic BP 03G-909K therefore directly admitted for diuresis and BP monitoring   - BNP 4810  - repeat echo ordered and pending   - hold home entresto, bystolic, aldactone, lasix   - added low dose midodrine 5 mg BID with hypotension and need for diuresis.  Increse to 10 mg TID on 10/12.   - diurese with bumex drip for 3 mg total on 10/11 and 10/12  - 2L fluid restriction with low Na  - daily weight and strict I&Os   - topical treatment with silvadene to RLE superficial wounds  - cardiology consult when available    10/11/2022    Morbid obesity ([facility] [address] [T13.82]  - complicates all aspects of care   - BMI 43   - encourage weight loss   10/11/2022    CKD (chronic kidney disease) stage 3, GFR 30-59 ml/min ([facility]) [N18.30]  - Cr around baseline 1.7  - monitor   - avoid nephrotoxic agent as able    10/11/2022    Elevated PSA [R97.20]  - history of elevated psa. Following with urology. - PVR 70- added finasteride   12/11/2020    Atrial fibrillation ([facility] [address] [I48.91]  - chronic   - continue eliquis and telemetry   - bystolic on hold    69/72/4943    CATHIE (obstructive sleep apnea) [G47.33]   09/11/2019    Hypotension [I95.9]  - PCP reports low BP for quite some time - systolic 87V-387E during hospital course   - hold entresto, bystolic, aldactone, home lasix - patient states he has been taking these meds at home regularly   - add midodrine 5 mg bid on 10/11 and increase to 10 mg tid 10/12  - cardiology consult when available   - diuresing as above with bumex gtt  08/25/2019     Patient was seen and examined with Dr. Braxton Avalos. After reviewing patient data and diagnostic testing the plan of care was established in conjunction with Dr. Braxton Avalos.     Electronically signed by ALMA Kay on 10/12/2022 at 11:34 AM

## 2022-10-12 NOTE — PLAN OF CARE
Problem: Discharge Planning  Goal: Discharge to home or other facility with appropriate resources  Outcome: Progressing  Flowsheets (Taken 10/12/2022 0813)  Discharge to home or other facility with appropriate resources:   Identify barriers to discharge with patient and caregiver   Identify discharge learning needs (meds, wound care, etc)     Problem: Safety - Adult  Goal: Free from fall injury  Outcome: Progressing  Flowsheets (Taken 10/12/2022 1305)  Free From Fall Injury: Instruct family/caregiver on patient safety     Problem: ABCDS Injury Assessment  Goal: Absence of physical injury  Outcome: Progressing  Flowsheets (Taken 10/12/2022 1305)  Absence of Physical Injury: Implement safety measures based on patient assessment     Problem: Chronic Conditions and Co-morbidities  Goal: Patient's chronic conditions and co-morbidity symptoms are monitored and maintained or improved  Outcome: Progressing  Flowsheets (Taken 10/12/2022 0813)  Care Plan - Patient's Chronic Conditions and Co-Morbidity Symptoms are Monitored and Maintained or Improved: Monitor and assess patient's chronic conditions and comorbid symptoms for stability, deterioration, or improvement     Problem: Skin/Tissue Integrity  Goal: Absence of new skin breakdown  Description: 1. Monitor for areas of redness and/or skin breakdown  2. Assess vascular access sites hourly  3. Every 4-6 hours minimum:  Change oxygen saturation probe site  4. Every 4-6 hours:  If on nasal continuous positive airway pressure, respiratory therapy assess nares and determine need for appliance change or resting period.   Outcome: Progressing

## 2022-10-12 NOTE — FLOWSHEET NOTE
10/12/22 0813   Assessment   Charting Type Shift assessment   Psychosocial   Psychosocial (WDL) WDL   Neurological   Neuro (WDL) WDL   Lexii Coma Scale   Eye Opening 4   Best Verbal Response 5   Best Motor Response 6   Lexii Coma Scale Score 15   HEENT (Head, Ears, Eyes, Nose, & Throat)   HEENT (WDL) X   Teeth Missing teeth   Respiratory   Respiratory (WDL) WDL   Cardiac Monitor   Telemetry Box Number    Telemetry Monitor Alarm Parameters    Gastrointestinal   Abdominal (WDL) X   Abdomen Inspection Obese;Rounded   Last BM (including prior to admit) 10/12/22   RUQ Bowel Sounds Active   LUQ Bowel Sounds Active   RLQ Bowel Sounds Active   LLQ Bowel Sounds Active   Hernia Abdominal   Genitourinary   Genitourinary (WDL) WDL   Urine Assessment   Urinary Status Voiding   Urine Color Yellow/straw   Urine Appearance Clear   Urine Odor Malodorous   Peripheral Vascular   Peripheral Vascular (WDL) X   Edema Right lower extremity; Left lower extremity   RLE Edema Weeping;Pitting   LLE Edema Pitting   RUE Neurovascular Assessment   Capillary Refill Less than/Equal to 3 seconds   Color Pink   Temperature Warm   R Radial Pulse +2 (Moderate)   LUE Neurovascular Assessment   Capillary Refill Less than/Equal to 3 seconds   Color Pink   Temperature Warm   L Radial Pulse +2 (Moderate)   RLE Neurovascular Assessment   Capillary Refill Less than/Equal to 3 seconds   Color Yellow-Brown/Hemosiderin Staining   Temperature Warm   R Pedal Pulse +2   LLE Neurovascular Assessment   Capillary Refill Less than/Equal to 3 seconds   Color Yellow-Brown/Hemosiderin Staining   Temperature Warm   L Pedal Pulse +2   Skin Integumentary    Skin Integumentary (WDL) X   Skin Color Hyperpigmentation   Skin Condition/Temp Flaky; Swollen/edematous;Dry   Skin Integrity Vascular discoloration or hemochromatosis/staining   Location BLE   Wound Prevention/Protection Method Yes   Dressing Present  Changed   Location of Wound Prevention Leg   Care Plan - Skin/Tissue Integrity Goals   Skin Integrity Remains Intact Monitor for areas of redness and/or skin breakdown   Musculoskeletal   Musculoskeletal (WDL) WDL   Care Plan - Chronic Conditions and Co-Morbidity   Care Plan - Patient's Chronic Conditions and Co-Morbidity Symptoms are Monitored and Maintained or Improved Monitor and assess patient's chronic conditions and comorbid symptoms for stability, deterioration, or improvement   Care Plan - Discharge Planning Goals   Discharge to home or other facility with appropriate resources Identify barriers to discharge with patient and caregiver; Identify discharge learning needs (meds, wound care, etc)   Pt is alert and oriented. Pt is resting in bed and appears to have no acute distress. Pt currently on room air. Pt currently on telemetry monitoring. Pt's lung sounds are clear. Pt encouraged to cough and deep breathe. Pt call bell and bedside table within reach.

## 2022-10-12 NOTE — PLAN OF CARE
Problem: Discharge Planning  Goal: Discharge to home or other facility with appropriate resources  10/11/2022 2018 by Kanika Avelar RN  Outcome: Progressing  10/11/2022 0958 by Brenda Danielle RN  Outcome: Progressing  Flowsheets  Taken 10/11/2022 0915 by Brenda Danielle RN  Discharge to home or other facility with appropriate resources:   Identify barriers to discharge with patient and caregiver   Identify discharge learning needs (meds, wound care, etc)  Taken 10/11/2022 0819 by Mary Kay Sherwood RN  Discharge to home or other facility with appropriate resources:   Identify barriers to discharge with patient and caregiver   Identify discharge learning needs (meds, wound care, etc)   Refer to discharge planning if patient needs post-hospital services based on physician order or complex needs related to functional status, cognitive ability or social support system   Arrange for needed discharge resources and transportation as appropriate   Arrange for interpreters to assist at discharge as needed     Problem: Safety - Adult  Goal: Free from fall injury  10/11/2022 2018 by Kanika Avelar RN  Outcome: Progressing  10/11/2022 0958 by Brenda Danielle RN  Outcome: Progressing     Problem: ABCDS Injury Assessment  Goal: Absence of physical injury  10/11/2022 2018 by Kanika Avelar RN  Outcome: Progressing  10/11/2022 0958 by Brenda Danielle RN  Outcome: Progressing     Problem: Chronic Conditions and Co-morbidities  Goal: Patient's chronic conditions and co-morbidity symptoms are monitored and maintained or improved  10/11/2022 2018 by Kanika Avelar RN  Outcome: Progressing  10/11/2022 0958 by Brenda Danielle RN  Outcome: Progressing  Flowsheets (Taken 10/11/2022 0915)  Care Plan - Patient's Chronic Conditions and Co-Morbidity Symptoms are Monitored and Maintained or Improved: Monitor and assess patient's chronic conditions and comorbid symptoms for stability, deterioration, or improvement     Problem: Skin/Tissue Integrity  Goal: Absence of new skin breakdown  Description: 1. Monitor for areas of redness and/or skin breakdown  2. Assess vascular access sites hourly  3. Every 4-6 hours minimum:  Change oxygen saturation probe site  4. Every 4-6 hours:  If on nasal continuous positive airway pressure, respiratory therapy assess nares and determine need for appliance change or resting period.   10/11/2022 2018 by Ranjeet Barragan RN  Outcome: Progressing  10/11/2022 0958 by Tiarra Zelaya RN  Outcome: Progressing

## 2022-10-13 ENCOUNTER — TELEPHONE (OUTPATIENT)
Dept: CARDIOLOGY | Facility: CLINIC | Age: 82
End: 2022-10-13

## 2022-10-13 LAB
ANION GAP SERPL CALCULATED.3IONS-SCNC: 9 MMOL/L (ref 3–16)
BUN BLDV-MCNC: 28 MG/DL (ref 6–20)
CALCIUM SERPL-MCNC: 9.2 MG/DL (ref 8.5–10.5)
CHLORIDE BLD-SCNC: 100 MMOL/L (ref 98–107)
CO2: 30 MMOL/L (ref 20–30)
CREAT SERPL-MCNC: 1.8 MG/DL (ref 0.4–1.2)
EKG ATRIAL RATE: 55 BPM
EKG DIAGNOSIS: NORMAL
EKG Q-T INTERVAL: 562 MS
EKG QRS DURATION: 182 MS
EKG QTC CALCULATION (BAZETT): 570 MS
EKG R AXIS: -76 DEGREES
EKG T AXIS: 74 DEGREES
EKG VENTRICULAR RATE: 62 BPM
GFR AFRICAN AMERICAN: 44
GFR NON-AFRICAN AMERICAN: 36
GLUCOSE BLD-MCNC: 97 MG/DL (ref 74–106)
HCT VFR BLD CALC: 34 % (ref 40–54)
HEMOGLOBIN: 10.7 G/DL (ref 13–18)
MCH RBC QN AUTO: 29.3 PG (ref 27–32)
MCHC RBC AUTO-ENTMCNC: 31.5 G/DL (ref 31–35)
MCV RBC AUTO: 93.2 FL (ref 80–100)
PDW BLD-RTO: 14.5 % (ref 11–16)
PLATELET # BLD: 171 K/UL (ref 150–400)
PMV BLD AUTO: 11 FL (ref 6–10)
POTASSIUM SERPL-SCNC: 4.3 MMOL/L (ref 3.4–5.1)
RBC # BLD: 3.65 M/UL (ref 4.5–6)
SODIUM BLD-SCNC: 139 MMOL/L (ref 136–145)
WBC # BLD: 5.1 K/UL (ref 4–11)

## 2022-10-13 PROCEDURE — 1200000000 HC SEMI PRIVATE

## 2022-10-13 PROCEDURE — 6370000000 HC RX 637 (ALT 250 FOR IP): Performed by: INTERNAL MEDICINE

## 2022-10-13 PROCEDURE — 2500000003 HC RX 250 WO HCPCS: Performed by: PHYSICIAN ASSISTANT

## 2022-10-13 PROCEDURE — 85027 COMPLETE CBC AUTOMATED: CPT

## 2022-10-13 PROCEDURE — 36415 COLL VENOUS BLD VENIPUNCTURE: CPT

## 2022-10-13 PROCEDURE — 6370000000 HC RX 637 (ALT 250 FOR IP): Performed by: PHYSICIAN ASSISTANT

## 2022-10-13 PROCEDURE — 99232 SBSQ HOSP IP/OBS MODERATE 35: CPT | Performed by: INTERNAL MEDICINE

## 2022-10-13 PROCEDURE — 2580000003 HC RX 258: Performed by: PHYSICIAN ASSISTANT

## 2022-10-13 PROCEDURE — 80048 BASIC METABOLIC PNL TOTAL CA: CPT

## 2022-10-13 RX ORDER — SPIRONOLACTONE 25 MG/1
12.5 TABLET ORAL DAILY
Status: DISCONTINUED | OUTPATIENT
Start: 2022-10-13 | End: 2022-10-14 | Stop reason: HOSPADM

## 2022-10-13 RX ORDER — BISOPROLOL FUMARATE 5 MG/1
10 TABLET ORAL DAILY
Status: DISCONTINUED | OUTPATIENT
Start: 2022-10-13 | End: 2022-10-14 | Stop reason: HOSPADM

## 2022-10-13 RX ORDER — FUROSEMIDE 40 MG/1
40 TABLET ORAL DAILY
Status: DISCONTINUED | OUTPATIENT
Start: 2022-10-13 | End: 2022-10-14 | Stop reason: HOSPADM

## 2022-10-13 RX ADMIN — BUMETANIDE 4 MG: 0.25 INJECTION, SOLUTION INTRAMUSCULAR; INTRAVENOUS at 13:57

## 2022-10-13 RX ADMIN — ROSUVASTATIN CALCIUM 10 MG: 10 TABLET, FILM COATED ORAL at 08:20

## 2022-10-13 RX ADMIN — MIDODRINE HYDROCHLORIDE 10 MG: 5 TABLET ORAL at 05:30

## 2022-10-13 RX ADMIN — FERROUS SULFATE TAB EC 324 MG (65 MG FE EQUIVALENT) 324 MG: 324 (65 FE) TABLET DELAYED RESPONSE at 08:19

## 2022-10-13 RX ADMIN — SILVER SULFADIAZINE: 10 CREAM TOPICAL at 08:21

## 2022-10-13 RX ADMIN — MIDODRINE HYDROCHLORIDE 10 MG: 5 TABLET ORAL at 17:15

## 2022-10-13 RX ADMIN — MIDODRINE HYDROCHLORIDE 10 MG: 5 TABLET ORAL at 13:56

## 2022-10-13 RX ADMIN — FOLIC ACID 1000 MCG: 1 TABLET ORAL at 08:19

## 2022-10-13 RX ADMIN — PANTOPRAZOLE SODIUM 40 MG: 40 TABLET, DELAYED RELEASE ORAL at 08:20

## 2022-10-13 RX ADMIN — APIXABAN 2.5 MG: 2.5 TABLET, FILM COATED ORAL at 08:20

## 2022-10-13 RX ADMIN — APIXABAN 2.5 MG: 2.5 TABLET, FILM COATED ORAL at 20:16

## 2022-10-13 NOTE — PROGRESS NOTES
Reported pt BP of 80/49 to on call, Obie Kim, orders to give morning dose of midodrine now. See MAR.

## 2022-10-13 NOTE — CONSULTS
Cardiology Consultation    Patient: Alysia Santiago  1940  [phone]  Edwin Jones MD  [unfilled]  10/11/2022    DATE OF CONSULTATION:10/13/65889:23 PM    IDENTIFICATION:A [de-identified] y.o. male     REASON FOR CONSULTATION: Bilateral lower extremity edema    ASSESSMENT/Plan:  Heart failure preserved ejection fraction, acute on chronic exacerbation: History of LV systolic function with subsequent recovery on medical therapy. Presented mainly with peripheral volume overload leading to significant bilateral lower extremity edema and blistering. Morbid obesity, CKD, anemia and suspected underlying venous reflux disease are also likely contributing to his presentation significantly.  -Continue Bumex infusion while trending renal function  -Agree with scheduled midodrine to maintain adequate afterload during diuresis, might be reasonable to discharge on 2.5 mg p.o. 3 times daily, will follow-up need for continued therapy as an outpatient.  -Discussed weighing himself on a regular basis at home and doubling up on his diuretic dose when he gains more than 3 pounds above his baseline.  -Discussed limiting sodium intake.  -Unna boots and wound care  -May require outpatient venous reflux study  -Will arrange for outpatient follow-up.   -With ongoing hypotension in recovery of LV systolic function, agree with discontinuing Entresto and spironolactone in particular since patient has self discontinued anyways  -Outpatient oral Bumex at discharge and may require as needed metolazone      History of present illness:  [de-identified]year-old morbidly obese gentleman with a history of permanent atrial fibrillation, heart failure with reduced ejection fraction status post recovery of LV systolic function, tachybradycardia syndrome with permanent pacing, chronic venous insufficiency, CKD with baseline creatinine 1.7-1.8, anemia of chronic disease and diastolic dysfunction presenting with worsening bilateral lower extremity edema and blistering. He has had longstanding issue of borderline hypotension and has actually taken himself off his antihypertensives in order to avoid fatigue and lightheadedness. Aggressive diuresis has been hampered by borderline hypotension. Midodrine has been initiated to allow for ongoing diuresis using Bumex drip. Currently sitting on side of bed. Denies chest discomfort or limiting dyspnea exertion. No palpitations. Telemetry reveals A. fib with underlying ventricular demand pacing. October 2022 TTE   Ejection fraction is visually estimated to be 60-65 %. There is mild concentric left ventricular hypertrophy. Elevated left atrial filling pressure. The left atrium is mild to moderate dilated. The right atrium is moderately dilated. Mild tricuspid regurgitation. Systolic pulmonary artery pressure (SPAP) estimated at 35 mmHg (RA pressure   3 mmHg), consistent with borderline pulmonary hypertension. Problem List:  1.  Persistent atrial fibrillation:              A. Diagnosed during preop cardiovascular evaluation in 2003              B. Initially treated with flecainide with external cardioversion to normal sinus rhythm              C. Recurrent atrial fibrillation in 2006, s/p external cardioversion to normal sinus rhythm              D. Left and right heart catheterization 5/29/2009: EF 60%, no significant coronary artery disease, normal right heart catheterization              E. Initiation of sotalol with ECV to NSR 04/2013              F. Echocardiogram 10/28/13: EF 40%, grade 1 diastolic dysfunction, no significant valvular disease              G. Echocardiogram 8/1/2017: EF 20%, severe biatrial enlargement, no significant valvular disease              H. 24-hour Holter monitor 8/7/17: Predominantly atrial fibrillation, average heart rate 106 bpm, min 74 bpm, maximum 169 bpm, rare PVCs              I. Lexiscan stress test 9/20/17: EF 47%, positive stress myocardial perfusion image with a large severe inferoapical scar with no reversibility              J. Initiated on amiodarone 10/2017              I. ECV 6/18. Recurrent Afib 10/18, increased SOB. 2. Tachy elif syndrome:              A.  Significant bradycardia noted after sotalol and ECV to normal sinus rhythm 04/2013, remained with symptomatic sinus bradycardia off sotalol and underwent dual-chamber permanent      pacemaker ([employer]) 4/19/2013  3. Chronic systolic congestive heart failure              A.  Echocardiogram 8/1/2017: EF 20%, severe biatrial enlargement, no significant valvular disease              B.  Lexiscan stress test 9/20/17: EF 47%, positive stress myocardial perfusion image with a large severe inferoapical scar with no reversibility              C.  Echocardiogram 2019: EF 35%  4. Essential hypertension  5. Hyperlipidemia  6. Obstructive sleep apnea  7. Morbid obesity  8. CKD   9. Surgical history:              A.  Removal of benign lung mass, 2004              B.  Prostate surgery, 2008              C.  Hernia repair, 2007              D.  Cholecystectomy, 2007              E.  Right knee arthroscopy              F.  Goiter removal, 2001              G.  Dual-chamber permanent pacemaker implant, 2013    OBJECTIVE:  Reviewed in EHR  I/O last 3 completed shifts: In: 721 [P.O.:687]  Out: 2100 [Urine:2100]  I/O this shift: In: 26 [P.O.:474]  Out: 1050 [Urine:1050]      PHYSICAL EXAMINATION:    General appearance: Alert, cooperative, NAD, appears stated age  Head:   Normocephalic, atraumatic  Eyes:   Conjunctiva and cornea clear, EOM intact bilaterally  Ears:    External pinna normal  Nose:   Normal nares, septum midline, no drainage  Throat:   Lips normal, mucosa and tongue normal  Neck:      Supple, no JANA, trachea midline, no thyromegaly  Lungs:   Clear throughout bilaterally, normal effort, normal to palpation.   Chest wall:  Non tender, no deformity  CV:    Regular rate and rhythm, no murmur, rubs, or gallups, normal headache, focal weakness or sensory changes   Psychiatric:  Denies depression or anxiety        SOCIAL HX  Social History     Socioeconomic History    Marital status:      Spouse name: Not on file    Number of children: Not on file    Years of education: Not on file    Highest education level: Not on file   Occupational History    Not on file   Tobacco Use    Smoking status: Former     Packs/day: 1.00     Years: 35.00     Pack years: 35.00     Types: Cigarettes     Quit date: 1973     Years since quittin.7    Smokeless tobacco: Never   Vaping Use    Vaping Use: Never used   Substance and Sexual Activity    Alcohol use: No    Drug use: No    Sexual activity: Not on file   Other Topics Concern    Not on file   Social History Narrative    Not on file     Social Determinants of Health     Financial Resource Strain: Low Risk     Difficulty of Paying Living Expenses: Not hard at all   Food Insecurity: No Food Insecurity    Worried About Running Out of Food in the Last Year: Never true    Ran Out of Food in the Last Year: Never true   Transportation Needs: Not on file   Physical Activity: Not on file   Stress: Not on file   Social Connections: Not on file   Intimate Partner Violence: Not on file   Housing Stability: Not on file       FAMILY HX  History reviewed. No pertinent family history. Principal Problem:    Acute on chronic combined systolic and diastolic heart failure (HCC)  Active Problems: Morbid obesity (HCC)    CKD (chronic kidney disease) stage 3, GFR 30-59 ml/min (HCC)    Hypotension    CATHIE (obstructive sleep apnea)    Atrial fibrillation (HCC)    Elevated PSA  Resolved Problems:    * No resolved hospital problems.  Prachi Sheriff MD  3:23 PM 10/13/2022

## 2022-10-13 NOTE — TELEPHONE ENCOUNTER
Caller: Bobby Dodd    Relationship: Self    Best call back number: [phone]    What is the best time to reach you: ANYTIME    Who are you requesting to speak with (clinical staff, provider,  specific staff member): DR MAYNARD NURSE    What was the call regarding: PT HAS A QUESTION ABOUT A MEDICATION CHANGE - PT HAS BEEN HAVING LOWER BP     Do you require a callback: YES PLEASE

## 2022-10-13 NOTE — FLOWSHEET NOTE
10/13/22 0825   Assessment   Charting Type Shift assessment   Psychosocial   Psychosocial (WDL) WDL   Neurological   Neuro (WDL) WDL   Lexii Coma Scale   Eye Opening 4   Best Verbal Response 5   Best Motor Response 6   Lexii Coma Scale Score 15   HEENT (Head, Ears, Eyes, Nose, & Throat)   HEENT (WDL) X   Teeth Missing teeth   Respiratory   Respiratory (WDL) WDL   Cardiac Monitor   Telemetry Box Number    Telemetry Monitor Alarm Parameters    Gastrointestinal   Abdominal (WDL) X   Abdomen Inspection Obese;Rounded   RUQ Bowel Sounds Active   LUQ Bowel Sounds Active   RLQ Bowel Sounds Active   LLQ Bowel Sounds Active   Hernia Abdominal   Genitourinary   Genitourinary (WDL) WDL   Urine Assessment   Urine Color Yellow/straw   Urine Appearance Clear   Urine Odor Malodorous   Peripheral Vascular   Peripheral Vascular (WDL) X   Edema Right lower extremity; Left lower extremity   RLE Edema Weeping;Pitting   LLE Edema Pitting   RUE Neurovascular Assessment   Capillary Refill Less than/Equal to 3 seconds   Color Pink   Temperature Warm   R Radial Pulse +2 (Moderate)   LUE Neurovascular Assessment   Capillary Refill Less than/Equal to 3 seconds   Color Pink   Temperature Warm   L Radial Pulse +2 (Moderate)   RLE Neurovascular Assessment   Capillary Refill Less than/Equal to 3 seconds   Color Yellow-Brown/Hemosiderin Staining   Temperature Warm   R Pedal Pulse +2   LLE Neurovascular Assessment   Capillary Refill Less than/Equal to 3 seconds   Color Yellow-Brown/Hemosiderin Staining   Temperature Warm   L Pedal Pulse +2   Skin Integumentary    Skin Integumentary (WDL) X   Skin Color Hyperpigmentation   Skin Condition/Temp Flaky; Swollen/edematous   Skin Integrity Vascular discoloration or hemochromatosis/staining   Location BLE   Wound Prevention/Protection Method Yes   Skin Fold Management No   Location of Wound Prevention Leg   Musculoskeletal   Musculoskeletal (WDL) WDL

## 2022-10-13 NOTE — PROGRESS NOTES
Progress Note      Subjective:   Chief complaint: leg swelling and wounds     Interval History:   Sitting up on edge of bed. No distress. States swelling about the same as yesterday. Only lost 1 lb. Systolic BP mostly 12A-274O. Review of systems:   Constitutional:  Denies fever or chills   Eyes:  Denies eye pain or redness  HENT:  Denies nasal congestion or sore throat   Respiratory:  Denies cough or shortness of breath   Cardiovascular:  Denies chest pain or orthopnea. Positive for edema   GI:  Denies abdominal pain, nausea, vomiting, bloody stools or diarrhea   :  Denies dysuria or frequency  Musculoskeletal:  Denies acute neck pain or body aches  Integument:  Denies rash or itching  Neurologic:  Denies headache, dizziness, numbness, tingling or unilateral weakness  Psychiatric:  Denies acute depression or acute anxiety. Past medical history, surgical history, family history and social history reviewed and unchanged compared to H&P earlier this admission. Medications:   Scheduled Meds:   [Held by provider] bisoprolol  10 mg Oral Daily    [Held by provider] furosemide  40 mg Oral Daily    [Held by provider] spironolactone  12.5 mg Oral Daily    apixaban  2.5 mg Oral BID    folic acid  0,643 mcg Oral Daily    pantoprazole  40 mg Oral Daily    rosuvastatin  10 mg Oral Daily    [Held by provider] sacubitril-valsartan  1 tablet Oral BID    ferrous sulfate  324 mg Oral Daily with breakfast    silver sulfADIAZINE   Topical Daily    finasteride  5 mg Oral Daily    midodrine  10 mg Oral TID WC     Continuous Infusions:   bumetanide 0.1 mg/mL infusion         Objective:     Vital Signs  Temp: 98.1 °F (36.7 °C)  Heart Rate: 77  Resp: 18  BP: 110/75  SpO2: 93 %  O2 Device: None (Room air)       Vital signs reviewed in electronic charts.     Physical exam  Constitutional:  Well developed, well nourished, obese, no acute distress  Eyes:  PERRL, no scleral icterus, conjunctiva normal   HENT:  Atraumatic, external ears normal, nose normal, oropharynx moist, no pharyngeal exudates. Neck- supple, no JVD, no lymphadenopathy  Respiratory:  No respiratory distress on RA, diminished bilateral bases, no wheezing, rales or rhonchi detected  Cardiovascular:  irregularly irregular, no murmurs, no gallops, no rubs   GI:  Soft, obese, nondistended, normal bowel sounds, nontender, no voluntary guarding  Musculoskeletal:  No cyanosis or obvious acute deformity. Moving all extremities. +2 edema BLE. Integument:  Warm and dry. Superficial wounds to Right calf and small blister left calf. Neurologic:  Alert & oriented x 3, no apparent focal deficits noted   Psychiatric:  Speech and behavior appropriate     Results:     Lab Results   Component Value Date    WBC 5.1 10/13/2022    HGB 10.7 (L) 10/13/2022    HCT 34.0 (L) 10/13/2022    MCV 93.2 10/13/2022     10/13/2022       Lab Results   Component Value Date/Time     10/13/2022 05:25 AM    K 4.3 10/13/2022 05:25 AM    K 4.1 07/03/2022 01:20 PM     10/13/2022 05:25 AM    CO2 30 10/13/2022 05:25 AM    BUN 28 10/13/2022 05:25 AM    CREATININE 1.8 10/13/2022 05:25 AM    GLUCOSE 97 10/13/2022 05:25 AM    CALCIUM 9.2 10/13/2022 05:25 AM        Assessment and Plan:      Acute on chronic combined systolic and diastolic heart failure ([facility] [address] [I50.43]  - ECHO 2019 EF 35%, grade III diastolic dysfunction   - Presents due to lower extremity edema with blistering. O2 sats stable on RA. Systolic BP 53V-453A therefore directly admitted for diuresis and BP monitoring   - BNP 4810  - repeat echo ordered and pending   - hold home entresto, bystolic, aldactone, lasix   - added low dose midodrine 5 mg BID with hypotension and need for diuresis.  Increse to 10 mg TID on 10/12.   - diurese with bumex drip for 3 mg total on 10/11 and 10/12, bumex drip 4 mg 10/13  - 2L fluid restriction with low Na  - daily weight and strict I&Os   - topical treatment with silvadene to RLE superficial wounds  - consider unnaboots   - cardiology consult     10/11/2022    Morbid obesity ([address] [Z37.72]  - complicates all aspects of care   - BMI 43   - encourage weight loss    10/11/2022    CKD (chronic kidney disease) stage 3, GFR 30-59 ml/min ([facility]) [N18.30]  - Cr around baseline 1.7  - monitor   - avoid nephrotoxic agent as able     10/11/2022    Elevated PSA [R97.20]  - history of elevated psa. Following with urology. - PVR 70- added finasteride    12/11/2020    Atrial fibrillation (HCC) [I48.91]  - chronic   - continue eliquis and telemetry   - bystolic on hold     91/90/6539    CATHIE (obstructive sleep apnea) [G47.33]   - does not use cpap at home    09/11/2019    Hypotension [I95.9]  - PCP reports low BP for quite some time - systolic 79U-626E during hospital course   - hold entresto, bystolic, aldactone, home lasix - patient states he has been taking these meds at home regularly   - add midodrine 5 mg bid on 10/11 and increase to 10 mg tid 10/12  - cardiology consult when available   - diuresing as above with bumex gtt       Patient was seen and examined with Dr. Jaron Kapadia. After reviewing patient data and diagnostic testing the plan of care was established in conjunction with Dr. Jaron Kapadia.       Electronically signed by ALMA Carr on 10/13/2022 at 1:43 PM

## 2022-10-13 NOTE — TELEPHONE ENCOUNTER
Pt states that he is currently admitted to [facility] [place] for Low BP. Pt was asking which medication was it that Dr Martel had taken him off of 3-5 years ago. Pt was told it may have been Digoxin but I was not certain and Pt states that it could have been another provider that had taken him off of the medication he was asking about.

## 2022-10-14 ENCOUNTER — OUTSIDE FACILITY SERVICE (OUTPATIENT)
Dept: CARDIOLOGY | Facility: CLINIC | Age: 82
End: 2022-10-14

## 2022-10-14 VITALS
BODY MASS INDEX: 41.84 KG/M2 | HEIGHT: 70 IN | DIASTOLIC BLOOD PRESSURE: 76 MMHG | TEMPERATURE: 97.9 F | HEART RATE: 86 BPM | WEIGHT: 292.3 LBS | RESPIRATION RATE: 14 BRPM | OXYGEN SATURATION: 94 % | SYSTOLIC BLOOD PRESSURE: 126 MMHG

## 2022-10-14 LAB
ANION GAP SERPL CALCULATED.3IONS-SCNC: 11 MMOL/L (ref 3–16)
BASOPHILS ABSOLUTE: 0.1 K/UL (ref 0–0.1)
BASOPHILS RELATIVE PERCENT: 1.2 %
BUN BLDV-MCNC: 29 MG/DL (ref 6–20)
CALCIUM SERPL-MCNC: 9.5 MG/DL (ref 8.5–10.5)
CHLORIDE BLD-SCNC: 96 MMOL/L (ref 98–107)
CO2: 28 MMOL/L (ref 20–30)
CREAT SERPL-MCNC: 1.8 MG/DL (ref 0.4–1.2)
EOSINOPHILS ABSOLUTE: 0.1 K/UL (ref 0–0.4)
EOSINOPHILS RELATIVE PERCENT: 2.3 %
GFR AFRICAN AMERICAN: 44
GFR NON-AFRICAN AMERICAN: 36
GLUCOSE BLD-MCNC: 98 MG/DL (ref 74–106)
HCT VFR BLD CALC: 34.1 % (ref 40–54)
HEMOGLOBIN: 10.7 G/DL (ref 13–18)
IMMATURE GRANULOCYTES #: 0 K/UL
IMMATURE GRANULOCYTES %: 0.4 % (ref 0–5)
LYMPHOCYTES ABSOLUTE: 1.2 K/UL (ref 1.5–4)
LYMPHOCYTES RELATIVE PERCENT: 23.4 %
MAGNESIUM: 1.9 MG/DL (ref 1.7–2.4)
MCH RBC QN AUTO: 29 PG (ref 27–32)
MCHC RBC AUTO-ENTMCNC: 31.4 G/DL (ref 31–35)
MCV RBC AUTO: 92.4 FL (ref 80–100)
MONOCYTES ABSOLUTE: 0.5 K/UL (ref 0.2–0.8)
MONOCYTES RELATIVE PERCENT: 10.1 %
NEUTROPHILS ABSOLUTE: 3.2 K/UL (ref 2–7.5)
NEUTROPHILS RELATIVE PERCENT: 62.6 %
PDW BLD-RTO: 14.5 % (ref 11–16)
PLATELET # BLD: 170 K/UL (ref 150–400)
PMV BLD AUTO: 11.3 FL (ref 6–10)
POTASSIUM SERPL-SCNC: 3.7 MMOL/L (ref 3.4–5.1)
RBC # BLD: 3.69 M/UL (ref 4.5–6)
SODIUM BLD-SCNC: 135 MMOL/L (ref 136–145)
WBC # BLD: 5.1 K/UL (ref 4–11)

## 2022-10-14 PROCEDURE — 6360000002 HC RX W HCPCS: Performed by: PHYSICIAN ASSISTANT

## 2022-10-14 PROCEDURE — 80048 BASIC METABOLIC PNL TOTAL CA: CPT

## 2022-10-14 PROCEDURE — 36415 COLL VENOUS BLD VENIPUNCTURE: CPT

## 2022-10-14 PROCEDURE — 99222 1ST HOSP IP/OBS MODERATE 55: CPT | Performed by: INTERNAL MEDICINE

## 2022-10-14 PROCEDURE — 2500000003 HC RX 250 WO HCPCS: Performed by: PHYSICIAN ASSISTANT

## 2022-10-14 PROCEDURE — 99238 HOSP IP/OBS DSCHRG MGMT 30/<: CPT | Performed by: PHYSICIAN ASSISTANT

## 2022-10-14 PROCEDURE — 83735 ASSAY OF MAGNESIUM: CPT

## 2022-10-14 PROCEDURE — 93005 ELECTROCARDIOGRAM TRACING: CPT

## 2022-10-14 PROCEDURE — 6370000000 HC RX 637 (ALT 250 FOR IP): Performed by: PHYSICIAN ASSISTANT

## 2022-10-14 PROCEDURE — 2580000003 HC RX 258: Performed by: PHYSICIAN ASSISTANT

## 2022-10-14 PROCEDURE — 6370000000 HC RX 637 (ALT 250 FOR IP): Performed by: INTERNAL MEDICINE

## 2022-10-14 PROCEDURE — 29580 STRAPPING UNNA BOOT: CPT

## 2022-10-14 PROCEDURE — 85025 COMPLETE CBC W/AUTO DIFF WBC: CPT

## 2022-10-14 RX ORDER — MAGNESIUM SULFATE 1 G/100ML
1000 INJECTION INTRAVENOUS ONCE
Status: COMPLETED | OUTPATIENT
Start: 2022-10-14 | End: 2022-10-14

## 2022-10-14 RX ORDER — MIDODRINE HYDROCHLORIDE 5 MG/1
5 TABLET ORAL 3 TIMES DAILY
Qty: 90 TABLET | Refills: 0 | Status: SHIPPED | OUTPATIENT
Start: 2022-10-14

## 2022-10-14 RX ORDER — POTASSIUM CHLORIDE 750 MG/1
40 CAPSULE, EXTENDED RELEASE ORAL ONCE
Status: COMPLETED | OUTPATIENT
Start: 2022-10-14 | End: 2022-10-14

## 2022-10-14 RX ORDER — FINASTERIDE 5 MG/1
5 TABLET, FILM COATED ORAL DAILY
Qty: 30 TABLET | Refills: 0 | Status: SHIPPED | OUTPATIENT
Start: 2022-10-15

## 2022-10-14 RX ORDER — BUMETANIDE 1 MG/1
1 TABLET ORAL DAILY
Qty: 60 TABLET | Refills: 0 | Status: SHIPPED | OUTPATIENT
Start: 2022-10-14

## 2022-10-14 RX ADMIN — BUMETANIDE 4 MG: 0.25 INJECTION, SOLUTION INTRAMUSCULAR; INTRAVENOUS at 11:25

## 2022-10-14 RX ADMIN — MAGNESIUM SULFATE 1000 MG: 1 INJECTION INTRAVENOUS at 09:59

## 2022-10-14 RX ADMIN — APIXABAN 2.5 MG: 2.5 TABLET, FILM COATED ORAL at 08:05

## 2022-10-14 RX ADMIN — FERROUS SULFATE TAB EC 324 MG (65 MG FE EQUIVALENT) 324 MG: 324 (65 FE) TABLET DELAYED RESPONSE at 08:05

## 2022-10-14 RX ADMIN — ROSUVASTATIN CALCIUM 10 MG: 10 TABLET, FILM COATED ORAL at 08:05

## 2022-10-14 RX ADMIN — PANTOPRAZOLE SODIUM 40 MG: 40 TABLET, DELAYED RELEASE ORAL at 08:05

## 2022-10-14 RX ADMIN — FINASTERIDE 5 MG: 5 TABLET, FILM COATED ORAL at 08:05

## 2022-10-14 RX ADMIN — SILVER SULFADIAZINE: 10 CREAM TOPICAL at 10:06

## 2022-10-14 RX ADMIN — POTASSIUM CHLORIDE 40 MEQ: 10 CAPSULE, COATED, EXTENDED RELEASE ORAL at 09:59

## 2022-10-14 RX ADMIN — MIDODRINE HYDROCHLORIDE 10 MG: 5 TABLET ORAL at 08:05

## 2022-10-14 RX ADMIN — MIDODRINE HYDROCHLORIDE 10 MG: 5 TABLET ORAL at 11:56

## 2022-10-14 RX ADMIN — FOLIC ACID 1000 MCG: 1 TABLET ORAL at 08:05

## 2022-10-14 NOTE — CARE COORDINATION
The Plan for Transition of Care is related to the following treatment goals: home independently    The Patient and/or patient representative was provided with a choice of provider and agrees with the discharge plan. [x] Yes [] No    Freedom of choice list was provided with basic dialogue that supports the patient's individualized plan of care/goals, treatment preferences and shares the quality data associated with the providers. [x] Yes [] No    Plan to DC to home today. No DC needs noted.

## 2022-10-14 NOTE — PLAN OF CARE
Problem: Discharge Planning  Goal: Discharge to home or other facility with appropriate resources  Outcome: Completed     Problem: Safety - Adult  Goal: Free from fall injury  Outcome: Completed     Problem: ABCDS Injury Assessment  Goal: Absence of physical injury  Outcome: Completed     Problem: Chronic Conditions and Co-morbidities  Goal: Patient's chronic conditions and co-morbidity symptoms are monitored and maintained or improved  Outcome: Completed     Problem: Skin/Tissue Integrity  Goal: Absence of new skin breakdown  Description: 1. Monitor for areas of redness and/or skin breakdown  2. Assess vascular access sites hourly  3. Every 4-6 hours minimum:  Change oxygen saturation probe site  4. Every 4-6 hours:  If on nasal continuous positive airway pressure, respiratory therapy assess nares and determine need for appliance change or resting period.   Outcome: Completed

## 2022-10-14 NOTE — DISCHARGE SUMMARY
Discharge Summary      Patient ID: Saravanan Sheth      Patient's PCP: Annie Romero MD    Admit Date: 10/11/2022     Discharge Date:   10/14/2022    Admitting Provider: Annie Romero MD    Discharging Provider: ALMA Marie     Reason for this admission:   Acute on chronic HF   Hypotension     Discharge Diagnoses: Active Hospital Problems    Diagnosis Date Noted    Acute on chronic combined systolic and diastolic heart failure (HCC) [I50.43] 10/11/2022    Morbid obesity ([facility] [E66.01] 10/11/2022    CKD (chronic kidney disease) stage 3, GFR 30-59 ml/min ([facility]) [N18.30] 10/11/2022    Elevated PSA [R97.20] 12/11/2020    Atrial fibrillation ([facility] [I48.91] 09/12/2019    CATHIE (obstructive sleep apnea) [G47.33] 09/11/2019    Hypotension [I95.9] 08/25/2019       Procedures:  No orders to display     Consults:   IP CONSULT TO DIETITIAN  IP CONSULT TO CARDIOLOGY  PT/OT    Briefly:   Mr. Nataliia Roberson is an [de-identified] yo male with PMH of CHF, atrial fibrillation, CKD, CATHIE, obesity who was admitted due to HF exacerbation and hypotension. Clinically stable for discharge today. See details of hospital course below. Hospital Course:         Acute on chronic heart failure ([facility] [I50.43]  - ECHO 2019 EF 35%, grade III diastolic dysfunction   - Presents due to lower extremity edema with blistering. O2 sats stable on RA. Systolic BP 92E-153Y therefore directly admitted for diuresis and BP monitoring   - BNP 4810  - ECHO Oct 2022 EF 60-65%, mild LVH, elevated left atrial filling pressure, mild to moderately dilated left atrium, right atrium moderately dilated, borderline pulmonary HTN (EF significantly improved from 2019)   - hold home entresto, bystolic, aldactone, lasix   - added low dose midodrine 5 mg BID with hypotension and need for diuresis.  Increse to 10 mg TID on 10/12.   - diurese with bumex drip for 3 mg total on 10/11 and 10/12, bumex drip 4 mg 10/13 and 10/14  - 2L fluid restriction with low Na  - daily weight and strict I&Os - topical treatment with silvadene to RLE superficial wounds  - Unnaboots ordered 10/14  - cardiology Dr. Serena Kaba consulted and agreed with midodrine and diuresis. Continue to hold entresto, bystolic, aldactone. - weight trended down to 292 prior to dc. He is stable on RA. With midodrine 10 TID /75 this AM.   - DC home with midodrine 5 mg TID (continue to wean as tolerated as op - defer to pcp and cards), bumex 1 mg daily with additional prn weight gain/swelling. Patient to monitor BP at home. - f/u for unnaboot exchange Monday with op PT -  notified of need for scheduling   - f/u with cardiology and PCP     10/11/2022    Morbid obesity ([address] [N78.26]  - complicates all aspects of care   - BMI 42  - encourage weight loss    10/11/2022    CKD (chronic kidney disease) stage 3, GFR 30-59 ml/min ([address] [N18.30]  - Cr around baseline 1.7  - monitor   - avoid nephrotoxic agent as able   - repeat labs in 2 weeks    10/11/2022    Elevated PSA [R97.20]  - history of elevated psa. Following with urology. - PVR 70- added finasteride    12/11/2020    Atrial fibrillation (HCC) [I48.91]  - chronic   - continue eliquis (2.5 bid with age [de-identified] and Cr 1.7-1.8) and telemetry   - bystolic on hold   - HR  66 on telemetry at time of this dictation    09/12/2019    CATHIE (obstructive sleep apnea) [G47.33]   - does not use cpap at home     09/11/2019    Hypotension [I95.9]  - PCP reports low BP for quite some time - systolic 68M-059J during hospital course   - hold entresto, bystolic, aldactone, home lasix - patient states he has been taking these meds at home regularly when discussed multiple times   - add midodrine 5 mg bid on 10/11 and increase to 10 mg tid 10/12   - diuresing as above with bumex gtt   - cardiology consulted as above. Recommend continuing midodrine at 2.5 tid on dc. However BP maximally 110/75 with 10 TID.  Will dc with midodrine 5 TID and can continue to wean as tolerated as outpatient - defer to pcp and cardiology. Vital Signs  Temp: 97.9 °F (36.6 °C)  Heart Rate: 86  Resp: 14  BP: 126/76 (manual)  SpO2: 94 %  O2 Device: None (Room air)       Vital signs reviewed in electronic chart. Physical exam  Constitutional:  Well developed, well nourished, obese, no acute distress  Eyes:  PERRL, no scleral icterus, conjunctiva normal   HENT:  Atraumatic, external ears normal, nose normal, oropharynx moist, no pharyngeal exudates. Neck- supple, no JVD, no lymphadenopathy  Respiratory:  No respiratory distress on RA, diminished bilateral bases, no wheezing, rales or rhonchi detected  Cardiovascular:  irregularly irregular, no murmurs, no gallops, no rubs   GI:  Soft, obese, nondistended, normal bowel sounds, nontender, no voluntary guarding  Musculoskeletal:  No cyanosis or obvious acute deformity. Moving all extremities. +2 edema BLE. Integument:  Warm and dry. Superficial wounds to Right calf and small blister left calf with dressing c/d/I. Neurologic:  Alert & oriented x 3, no apparent focal deficits noted   Psychiatric:  Speech and behavior appropriate       Disposition: home  Discharged Condition: Stable  Activity: activity as tolerated  Diet: cardiac diet and low fat, low cholesterol diet, less than 2 gram sodium daily, less than 2 liters fluid daily   Follow Up: Primary Care Physician in 2 weeks. Follow up with cardiology Dr. Sadia Irby in 3-4 weeks. Patient to proceed with the following lab (cbc, cmp, mag ) in 2 weeks  Check your blood pressure everyday and write down readings to take to PCP. Weigh yourself daily and if weight up by more than 3 lbs and/or you have worsening swelling take extra dose of bumex 1mg tablet. Labs:  For convenience and continuity at follow-up the following most recent labs are provided:    CBC:   Lab Results   Component Value Date/Time    WBC 5.1 10/14/2022 03:48 AM    HGB 10.7 10/14/2022 03:48 AM    HCT 34.1 10/14/2022 03:48 AM     10/14/2022 03:48 AM       RENAL: Lab Results   Component Value Date/Time     10/14/2022 03:48 AM    K 3.7 10/14/2022 03:48 AM    K 4.1 07/03/2022 01:20 PM    CL 96 10/14/2022 03:48 AM    CO2 28 10/14/2022 03:48 AM    BUN 29 10/14/2022 03:48 AM    CREATININE 1.8 10/14/2022 03:48 AM         Discharge Medications:      Current Discharge Medication List             Details   finasteride (PROSCAR) 5 MG tablet Take 1 tablet by mouth daily  Qty: 30 tablet, Refills: 0      midodrine (PROAMATINE) 5 MG tablet Take 1 tablet by mouth 3 times daily  Qty: 90 tablet, Refills: 0      bumetanide (BUMEX) 1 MG tablet Take 1 tablet by mouth daily And additional 1 tablet daily as needed for weight gain > 3 lbs and/or worsening swelling  Qty: 60 tablet, Refills: 0                Details   apixaban (ELIQUIS) 2.5 MG TABS tablet Take 1 tablet by mouth 2 times daily  Qty: 60 tablet, Refills: 1                Details   Cyanocobalamin (B-12) 2500 MCG SUBL DISSOLVE 1 TABLET UNDER TONGUE EVERY DAY  Qty: 90 tablet, Refills: 1      tiZANidine (ZANAFLEX) 2 MG tablet Take 1 tablet by mouth 2 times daily as needed (pain and spasm)  Qty: 30 tablet, Refills: 1      ferrous sulfate (FEROSUL) 325 (65 Fe) MG tablet TAKE ONE TABLET BY MOUTH TWO TIMES A DAY  Qty: 60 tablet, Refills: 3      pantoprazole (PROTONIX) 40 MG tablet TAKE ONE TABLET BY MOUTH EVERY MORNING BEFORE BREAKFAST  Qty: 30 tablet, Refills: 3      folic acid (FOLVITE) 1 MG tablet TAKE 1 TABLET BY MOUTH DAILY  Qty: 30 tablet, Refills: 3      rosuvastatin (CRESTOR) 10 MG tablet Take 10 mg by mouth daily           The case was discussed with Dr. Ludy Kumari by phone and plan of care reviewed        Signed:  Electronically signed by ALMA Cash on 10/14/2022 at 2:16 PM       Thank you Bay Castaneda MD for the opportunity to be involved in this patient's care. If you have any questions or concerns please feel free to contact me at [phone].

## 2022-10-14 NOTE — PROGRESS NOTES
PIV  and telemetry removed. Discharge home. Pt verbalized understanding of discharge instructions and is aware to  new prescriptions pharmacy. Accompanied to exit.

## 2022-10-14 NOTE — CARE COORDINATION
S: PT received orders for [employer] application. Patient agreeable. O: R LE cleaned and prepped for [employer] Measurements taken and are as follows:  R LE 10 cm-31 cm            20 cm- 39.5 cm  Unna boot applied to R LE.  A: Patient tolerated application of Unna boot well. P: Reassess/reapply Unna boot in 48-72 hours.     Laure Corona, PT

## 2022-10-14 NOTE — DISCHARGE INSTRUCTIONS
Disposition: home  Discharged Condition: Stable  Activity: activity as tolerated  Diet: cardiac diet and low fat, low cholesterol diet, less than 2 gram sodium daily, less than 2 liters fluid daily   Follow Up: Primary Care Physician in 2 weeks. Follow up with cardiology Dr. Serena Kaba in 3-4 weeks. Patient to proceed with the following lab (cbc, cmp, mag ) in 2 weeks  Check your blood pressure everyday and write down readings to take to PCP. Weight yourself daily and if weight up by more than 3 lbs and/or you have worsening swelling take extra dose of bumex 1mg tablet.

## 2022-10-17 ENCOUNTER — HOSPITAL ENCOUNTER (OUTPATIENT)
Dept: PHYSICAL THERAPY | Facility: HOSPITAL | Age: 82
Setting detail: THERAPIES SERIES
Discharge: HOME | End: 2022-10-17
Payer: MEDICARE

## 2022-10-17 LAB
EKG ATRIAL RATE: 77 BPM
EKG DIAGNOSIS: NORMAL
EKG Q-T INTERVAL: 450 MS
EKG QRS DURATION: 114 MS
EKG QTC CALCULATION (BAZETT): 502 MS
EKG R AXIS: -70 DEGREES
EKG T AXIS: 76 DEGREES
EKG VENTRICULAR RATE: 75 BPM

## 2022-10-17 PROCEDURE — 97161 PT EVAL LOW COMPLEX 20 MIN: CPT

## 2022-10-17 PROCEDURE — 29580 STRAPPING UNNA BOOT: CPT

## 2022-10-17 NOTE — PROGRESS NOTES
Physical Therapy: Initial Evaluation    Patient: Raghu Mcwilliams (87 y.o. male)   Examination Date:   Plan of Care Certification DHCSDX: to        :  1940 ;    Confirmed: Yes MRN: [id number]  CSN: [id number]   Insurance: Payor: Jovanni Gupta / Plan: Marta Ayalau ESSENTIAL/PLUS / Product Type: *No Product type* /   Insurance ID: [id number] - (Medicare Managed) Secondary Insurance (if applicable): MEDICO   Referring Physician: ALMA Victor PA-C   PCP: Mohamud Herr MD Visits to Date/Visits Approved:   /      No Show/Cancelled Appts:   /       Medical Diagnosis: Edema [R33.1] edema, systolic and diastolic heart failure  Treatment Diagnosis: LE edema, RLE wound     PERTINENT MEDICAL HISTORY   Patient Assessed for Rehabilitation Services: Yes       Medical History: Chart Reviewed: Yes    Past Medical History:   Diagnosis Date    Atrial fibrillation ([facility]     CHF (congestive heart failure) ([facility] [address]     Hyperlipidemia     Hypertension     Kidney stone      Surgical History:   Past Surgical History:   Procedure Laterality Date    CARDIAC CATHETERIZATION      CARDIOVERSION  2019    Dr. Narayan [place] Right     mass removed    PACEMAKER INSERTION      PROSTATE SURGERY      THYROID SURGERY      goiter removed    UPPER GASTROINTESTINAL ENDOSCOPY  10/2019    Ben     Allergies: Patient has no known allergies. SUBJECTIVE EXAMINATION     Family/Caregiver Present: No    Subjective History:   Pt presents due to edema in RLE. Pt as [facility] to [facility] last week and was hospitalized for 4 days. Pt had Unna boot applied to RLE on Friday, 10/14, and presents today to have Unna boot changed on an outpt basis.     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 OBJECTIVE EXAMINATION        Review of Systems:  Overall Orientation Status: Within Normal Limits    Observations:   Previous applied Unna boot was still present, this was removed and skin was cleansed. Mild R lower leg edema present. Round superficial wound at posterior R lower leg measuring 3.5 cm x 3.5 cm. This apparently was a blister that the skin had been removed prior. Girth measurements were obtained:      R  L  Figure 8: 61.9 cm  Not measured  10 cm:  28.9 cm  27.9 cm   20 cm:  40.0 cm  40.2 cm       Mobility:    Pt ambulates independently into PT clinic without need for an AD.      ROM:   Left AROM  Right AROM       [facility]           ASSESSMENT     Impression:Assessment: Pt presents with RLE edema and wound present at posterior R lower leg. He will benefit from skilled PT to address deficits through application of Unna boot and wound care management. Body Structures, Functions, Activity Limitations Requiring Skilled Therapeutic Intervention:  (impaired skin integrity, edema)    Statement of Medical Necessity: Physical Therapy is both indicated and medically necessary as outlined in the POC to increase the likelihood of meeting the functionally related goals stated below. Patient's Activity Tolerance: Patient tolerated evaluation without incident, Patient tolerated treatment well        Patient's rehabilitation potential/prognosis is considered to be: Excellent    Factors which may impact rehabilitation potential include:          GOALS     Patient Goal(s):    Short Term Goals Completed by 2 weeks Goal Status   RLE girth measurements to be symmetrical with LLE indicating decresed edema. Wound at RLE to be reduced by 50% or greater in size. Long Term Goals Completed by 4-6 weeks Goal Status   RLE wound to be completely healed. Pt to be transitioned to d/c to compression garments as needed.                 TREATMENT PLAN       Requires PT Follow-Up: Yes    Pt. actively involved in establishing Plan of Care and Goals: Yes  Patient/ Caregiver education and instruction:              Treatment may include any combination of the following: Home exercise program, Patient/Caregiver education & training, Wound care (Unna boot application)     Frequency / Duration:  Patient to be seen 2x/week for 4-6 weeks       Eval Complexity:    Decision Making: Low Complexity    PT Treatment Completed: Other Treatments  [id number] applied to RLE with good fit and tolerance noted       LE edema, wound care                   Therapist Signature: Arielle Wade, PT    Date: 76/31/0368     I certify that the above Therapy Services are being furnished while the patient is under my care. I agree with the treatment plan and certify that this therapy is necessary. [de-identified] Signature:  ___________________________   Date:_______                                                                   ALMA Beltre        Physician Comments: _______________________________________________    Please sign and return to [address] [place] [address]. Please fax to the location listed below.  [facility] YOU for this referral!    [address] [facility] PHYSICAL THERAPY  [address] [facility] [address]  Dept: [address] [place] [address] Drive: [phone]

## 2022-10-18 ENCOUNTER — CARE COORDINATION (OUTPATIENT)
Dept: CARE COORDINATION | Age: 82
End: 2022-10-18

## 2022-10-18 NOTE — CARE COORDINATION
[facility] Care Transitions Initial Follow Up Call    Call within 2 business days of discharge: Yes    Care Transition Nurse contacted the patient by telephone to perform post hospital discharge assessment. Verified name and  with patient as identifiers. Provided introduction to self, and explanation of the Care Transition Nurse role. Patient: Viktor Roldan Patient : 1940   MRN: [id number]  Reason for Admission: CHF  Discharge Date: 10/14/22 RARS: Readmission Risk Score: 12      Last Discharge [address]       Date Complaint Diagnosis Description Type Department Provider    10/11/22  Stage 3 chronic kidney disease, unspecified whether stage 3a or 3b CKD ([facility] . .. Admission (Discharged) Aysha Martinez MD            Was this an external facility discharge? No Discharge Facility: [facility]    Challenges to be reviewed by the provider   Additional needs identified to be addressed with provider: Yes  none               Method of communication with provider: chart routing. Mr Alvarez Chandra tells me today that he \"hasn't been checking his blood pressure and isn't going too\". He states that he isn't happy with the medication that he was given in the hospital that he says he told \"he couldn't take it that it hurts him\" to Dr Erica Arita. I asked if it was the midodrine for BP or Bumex in his IV meds. He just kept saying it was given to him and he wasn't happy. I noted that he cancelled his HFU appointment. Toy states that he does not want to see Dr Erica Arita for follow up. I offered that he could see another provider but he declines. I asked that he contact another provider asap because he has a lot to manage and needs a PCP. He verbalized understanding and thanked me for calling him. Care Transition Nurse reviewed discharge instructions with patient who verbalized understanding. The patient was given an opportunity to ask questions and does not have any further questions or concerns at this time.  Were discharge instructions available to patient? Yes. Reviewed appropriate site of care based on symptoms and resources available to patient including: PCP. The patient agrees to contact the PCP office for questions related to their healthcare. Advance Care Planning:   Does patient have an Advance Directive: not on file. Medication reconciliation was performed with  no , who verbalizes understanding of administration of home medications. Medications reviewed, 1111F entered: no    Was patient discharged with a pulse oximeter? no    Non-face-to-face services provided:  Obtained and reviewed discharge summary and/or continuity of care documents    Offered patient enrollment in the Remote Patient Monitoring (RPM) program for in-home monitoring: NA.    Care Transitions 24 Hour Call    Schedule Follow Up Appointment with PCP: Emigdio Gaston you have a copy of your discharge instructions?: Yes  Do you have all of your prescriptions and are they filled?: Yes  Have you been contacted by a [facility] Pharmacist?: Yes  Have you scheduled your follow up appointment?: No  Do you feel like you have everything you need to keep you well at home?: Yes  Care Transitions Interventions         Follow Up  Future Appointments   Date Time Provider Maximiliano Jones   10/20/2022  8:69 PM JORGE Rothman       Care Transition Nurse provided contact information. No further follow-up call indicated based on severity of symptoms and risk factors.   Plan for next call:  Christi Luevano RN

## 2022-10-18 NOTE — CARE COORDINATION
None of the medication that he was started on should cause any issues on that part.   He might be referring to midodrine and I do not think he was given that in the past.  Up to him but he still need to have follow-up at least in few weeks

## 2022-10-20 ENCOUNTER — APPOINTMENT (OUTPATIENT)
Dept: PHYSICAL THERAPY | Facility: HOSPITAL | Age: 82
End: 2022-10-20
Payer: MEDICARE

## 2022-10-21 ENCOUNTER — TELEPHONE (OUTPATIENT)
Dept: CARDIOLOGY | Facility: CLINIC | Age: 82
End: 2022-10-21

## 2022-10-21 NOTE — TELEPHONE ENCOUNTER
Caller: Bobby Dodd    Relationship: Self    Best call back number: [phone]    What is the best time to reach you: ANYTIME    Who are you requesting to speak with (clinical staff, provider,  specific staff member): CLINICAL    What was the call regarding: PT CALLING IN TO SPEAK WITH CLINICAL TEAM - PT REPORTS HE WANTS TO DISCUSS AN OLDER  MEDICATION CHANGE    Do you require a callback: YES PLEASE

## 2022-10-24 ENCOUNTER — TELEPHONE (OUTPATIENT)
Dept: CARDIOLOGY | Facility: CLINIC | Age: 82
End: 2022-10-24

## 2022-10-24 NOTE — TELEPHONE ENCOUNTER
Pt called stating that he has had blisters come up on his legs and when they placed a compression boot he states that the blister got bigger. Pt states that he does have swelling in his legs as well. Please advise

## 2022-11-04 ENCOUNTER — HOSPITAL ENCOUNTER (OUTPATIENT)
Facility: HOSPITAL | Age: 82
Discharge: HOME | End: 2022-11-04
Payer: MEDICARE

## 2022-11-04 LAB
A/G RATIO: 1.7 (ref 0.8–2)
ALBUMIN SERPL-MCNC: 3.5 G/DL (ref 3.4–4.8)
ALP BLD-CCNC: 72 U/L (ref 25–100)
ALT SERPL-CCNC: 13 U/L (ref 4–36)
ANION GAP SERPL CALCULATED.3IONS-SCNC: 9 MMOL/L (ref 3–16)
AST SERPL-CCNC: 18 U/L (ref 8–33)
BASOPHILS ABSOLUTE: 0.1 K/UL (ref 0–0.1)
BASOPHILS RELATIVE PERCENT: 1 %
BILIRUB SERPL-MCNC: 0.5 MG/DL (ref 0.3–1.2)
BUN BLDV-MCNC: 26 MG/DL (ref 6–20)
CALCIUM SERPL-MCNC: 9.1 MG/DL (ref 8.5–10.5)
CHLORIDE BLD-SCNC: 107 MMOL/L (ref 98–107)
CHOLESTEROL, TOTAL: 118 MG/DL (ref 0–200)
CO2: 26 MMOL/L (ref 20–30)
CREAT SERPL-MCNC: 1.5 MG/DL (ref 0.4–1.2)
EOSINOPHILS ABSOLUTE: 0.1 K/UL (ref 0–0.4)
EOSINOPHILS RELATIVE PERCENT: 1.2 %
FOLATE: >20 NG/ML
GFR SERPL CREATININE-BSD FRML MDRD: 46 ML/MIN/{1.73_M2}
GLOBULIN: 2.1 G/DL
GLUCOSE BLD-MCNC: 96 MG/DL (ref 74–106)
HBA1C MFR BLD: 4.9 %
HCT VFR BLD CALC: 36.1 % (ref 40–54)
HDLC SERPL-MCNC: 37 MG/DL (ref 40–60)
HEMOGLOBIN: 11.2 G/DL (ref 13–18)
IMMATURE GRANULOCYTES #: 0 K/UL
IMMATURE GRANULOCYTES %: 0.2 % (ref 0–5)
LDL CHOLESTEROL CALCULATED: 70 MG/DL
LYMPHOCYTES ABSOLUTE: 1 K/UL (ref 1.5–4)
LYMPHOCYTES RELATIVE PERCENT: 20.2 %
MCH RBC QN AUTO: 29.9 PG (ref 27–32)
MCHC RBC AUTO-ENTMCNC: 31 G/DL (ref 31–35)
MCV RBC AUTO: 96.5 FL (ref 80–100)
MONOCYTES ABSOLUTE: 0.3 K/UL (ref 0.2–0.8)
MONOCYTES RELATIVE PERCENT: 6.4 %
NEUTROPHILS ABSOLUTE: 3.4 K/UL (ref 2–7.5)
NEUTROPHILS RELATIVE PERCENT: 71 %
PDW BLD-RTO: 14.1 % (ref 11–16)
PLATELET # BLD: 185 K/UL (ref 150–400)
PMV BLD AUTO: 11.3 FL (ref 6–10)
POTASSIUM SERPL-SCNC: 6 MMOL/L (ref 3.4–5.1)
RBC # BLD: 3.74 M/UL (ref 4.5–6)
SODIUM BLD-SCNC: 142 MMOL/L (ref 136–145)
TOTAL PROTEIN: 5.6 G/DL (ref 6.4–8.3)
TRIGL SERPL-MCNC: 55 MG/DL (ref 0–249)
TSH SERPL DL<=0.05 MIU/L-ACNC: 0.81 UIU/ML (ref 0.27–4.2)
VITAMIN B-12: 664 PG/ML (ref 211–911)
VLDLC SERPL CALC-MCNC: 11 MG/DL
WBC # BLD: 4.8 K/UL (ref 4–11)

## 2022-11-04 PROCEDURE — 82746 ASSAY OF FOLIC ACID SERUM: CPT

## 2022-11-04 PROCEDURE — 84443 ASSAY THYROID STIM HORMONE: CPT

## 2022-11-04 PROCEDURE — 83036 HEMOGLOBIN GLYCOSYLATED A1C: CPT

## 2022-11-04 PROCEDURE — 36415 COLL VENOUS BLD VENIPUNCTURE: CPT

## 2022-11-04 PROCEDURE — 80061 LIPID PANEL: CPT

## 2022-11-04 PROCEDURE — 82607 VITAMIN B-12: CPT

## 2022-11-04 PROCEDURE — 80053 COMPREHEN METABOLIC PANEL: CPT

## 2022-11-04 PROCEDURE — 85025 COMPLETE CBC W/AUTO DIFF WBC: CPT

## 2022-11-14 DIAGNOSIS — I49.5 TACHY-BRADY SYNDROME: ICD-10-CM

## 2022-11-14 DIAGNOSIS — I50.22 CHRONIC SYSTOLIC CONGESTIVE HEART FAILURE: ICD-10-CM

## 2022-11-14 DIAGNOSIS — E66.01 MORBID (SEVERE) OBESITY DUE TO EXCESS CALORIES: ICD-10-CM

## 2022-11-14 DIAGNOSIS — I48.20 CHRONIC ATRIAL FIBRILLATION: ICD-10-CM

## 2022-11-14 DIAGNOSIS — I48.0 PAROXYSMAL ATRIAL FIBRILLATION: ICD-10-CM

## 2022-11-14 DIAGNOSIS — I10 ESSENTIAL HYPERTENSION: ICD-10-CM

## 2022-11-14 DIAGNOSIS — Z95.0 CARDIAC PACEMAKER IN SITU: ICD-10-CM

## 2022-11-14 RX ORDER — SPIRONOLACTONE 25 MG/1
TABLET ORAL
Qty: 45 TABLET | Refills: 2 | Status: SHIPPED | OUTPATIENT
Start: 2022-11-14 | End: 2023-03-17 | Stop reason: HOSPADM

## 2022-11-14 RX ORDER — SACUBITRIL AND VALSARTAN 24; 26 MG/1; MG/1
1 TABLET, FILM COATED ORAL 2 TIMES DAILY
Qty: 60 TABLET | Refills: 3 | Status: SHIPPED | OUTPATIENT
Start: 2022-11-14 | End: 2023-02-07

## 2022-11-14 RX ORDER — ROSUVASTATIN CALCIUM 10 MG/1
10 TABLET, COATED ORAL DAILY
Qty: 90 TABLET | Refills: 2 | Status: SHIPPED | OUTPATIENT
Start: 2022-11-14

## 2022-12-21 ENCOUNTER — PRIOR AUTHORIZATION (OUTPATIENT)
Dept: ENDOCRINOLOGY | Facility: CLINIC | Age: 82
End: 2022-12-21

## 2022-12-21 NOTE — TELEPHONE ENCOUNTER
ANUSHA NAIR (Hill: [id number])  Rx #: [id number]  Norditropin FlexPro 10MG/1.5ML pen-injectors     Form  [employer] Electronic PA Form (2017 NCPDP)  Created  5 days ago  Sent to Plan  2 days ago  Plan Response  2 days ago  Submit Clinical Questions  2 days ago  Determination  Favorable  17 hours ago  Message from Plan  CaseId:[id number];Status:Approved;Review Type:Prior Auth;Coverage Start Date:11/19/2022;Coverage End Date:12/20/2023;

## 2023-02-07 RX ORDER — SACUBITRIL AND VALSARTAN 24; 26 MG/1; MG/1
1 TABLET, FILM COATED ORAL 2 TIMES DAILY
Qty: 60 TABLET | Refills: 0 | Status: SHIPPED | OUTPATIENT
Start: 2023-02-07 | End: 2023-03-17 | Stop reason: HOSPADM

## 2023-02-08 ENCOUNTER — OFFICE VISIT (OUTPATIENT)
Dept: CARDIOLOGY | Facility: CLINIC | Age: 83
End: 2023-02-08
Payer: MEDICARE

## 2023-02-08 VITALS
OXYGEN SATURATION: 94 % | HEART RATE: 64 BPM | DIASTOLIC BLOOD PRESSURE: 48 MMHG | SYSTOLIC BLOOD PRESSURE: 80 MMHG | RESPIRATION RATE: 14 BRPM | BODY MASS INDEX: 43.95 KG/M2 | TEMPERATURE: 97.2 F | WEIGHT: 307 LBS | HEIGHT: 70 IN

## 2023-02-08 DIAGNOSIS — I48.21 PERMANENT ATRIAL FIBRILLATION: ICD-10-CM

## 2023-02-08 DIAGNOSIS — I10 ESSENTIAL HYPERTENSION: ICD-10-CM

## 2023-02-08 DIAGNOSIS — I44.2 COMPLETE HEART BLOCK: ICD-10-CM

## 2023-02-08 DIAGNOSIS — I25.10 CORONARY ARTERY DISEASE INVOLVING NATIVE CORONARY ARTERY OF NATIVE HEART WITHOUT ANGINA PECTORIS: ICD-10-CM

## 2023-02-08 DIAGNOSIS — I50.22 CHRONIC SYSTOLIC CONGESTIVE HEART FAILURE: ICD-10-CM

## 2023-02-08 DIAGNOSIS — Z95.0 CARDIAC PACEMAKER IN SITU: Primary | ICD-10-CM

## 2023-02-08 DIAGNOSIS — I87.8 CHRONIC VENOUS STASIS: ICD-10-CM

## 2023-02-08 DIAGNOSIS — N18.31 CHRONIC RENAL FAILURE, STAGE 3A: ICD-10-CM

## 2023-02-08 PROCEDURE — 93288 INTERROG EVL PM/LDLS PM IP: CPT | Performed by: INTERNAL MEDICINE

## 2023-02-08 PROCEDURE — 99214 OFFICE O/P EST MOD 30 MIN: CPT | Performed by: INTERNAL MEDICINE

## 2023-02-08 RX ORDER — FUROSEMIDE 20 MG/1
TABLET ORAL
Status: ON HOLD | COMMUNITY
Start: 2022-11-18 | End: 2023-03-15 | Stop reason: SDUPTHER

## 2023-02-08 NOTE — PROGRESS NOTES
Subjective:     Encounter Date:02/08/2023      Patient ID: Bobby Dodd is a 82 y.o. male.    Chief Complaint: Atrial fibrillation  HPI  This is an 82-year-old male patient who presents to cardiology clinic for routine follow-up and pacemaker interrogation.  The patient has a normally functioning [employer] dual chamber rate responsive pacemaker programmed to VVIR.  Model number: 2272 MRI.  He is paced 69% of the time the right ventricle.  Right ventricular lead interrogation shows normal function based on sensing, pacing threshold and lead impedance.  He has approximately 10 years of generator longevity.  He is in permanent atrial fibrillation but has had no high heart rate events.  Since the patient's last visit he has had no active cardiovascular issues, symptoms or hospitalizations.  He has had no bleeding complications related to anticoagulation therapy with Eliquis.  He remains a reformed smoker.  Back in November the patient developed severe venous ulcerations involving the bilateral medial malleolus.  He was referred to the wound  and this has improved/healed.  He reports ongoing dyspnea with activity unchanged from his baseline.  The following portions of the patient's history were reviewed and updated as appropriate: allergies, current medications, past family history, past medical history, past social history, past surgical history and problem  Review of Systems   Constitutional: Negative for chills, diaphoresis, fever, malaise/fatigue, weight gain and weight loss.   HENT: Negative for ear discharge, hearing loss, hoarse voice and nosebleeds.    Eyes: Negative for discharge, double vision, pain and photophobia.   Cardiovascular: Positive for dyspnea on exertion and leg swelling. Negative for chest pain, claudication, cyanosis, irregular heartbeat, near-syncope, orthopnea, palpitations, paroxysmal nocturnal dyspnea and syncope.   Respiratory: Negative for cough, hemoptysis,  sputum production and wheezing.    Endocrine: Negative for cold intolerance, heat intolerance, polydipsia, polyphagia and polyuria.   Hematologic/Lymphatic: Negative for adenopathy and bleeding problem. Does not bruise/bleed easily.   Skin: Negative for color change, flushing, itching and rash.   Musculoskeletal: Negative for muscle cramps, muscle weakness, myalgias and stiffness.   Gastrointestinal: Negative for abdominal pain, diarrhea, hematemesis, hematochezia, nausea and vomiting.   Genitourinary: Negative for dysuria, frequency and nocturia.   Neurological: Negative for focal weakness, loss of balance, numbness, paresthesias and seizures.   Psychiatric/Behavioral: Negative for altered mental status, hallucinations and suicidal ideas.   Allergic/Immunologic: Negative for HIV exposure, hives and persistent infections.           Current Outpatient Medications:   •  apixaban (ELIQUIS) 2.5 MG tablet tablet, Take 1 tablet by mouth 2 (Two) Times a Day., Disp: 180 tablet, Rfl: 4  •  cephalexin (KEFLEX) 500 MG capsule, Take 500 mg by mouth 4 (Four) Times a Day., Disp: , Rfl:   •  Cyanocobalamin 2500 MCG sublingual tablet, Place 2,500 mcg under the tongue., Disp: , Rfl:   •  Entresto 24-26 MG tablet, TAKE 1 TABLET BY MOUTH 2 (TWO) TIMES A DAY., Disp: 60 tablet, Rfl: 0  •  ferrous sulfate 324 (65 Fe) MG tablet delayed-release EC tablet, Take 324 mg by mouth 2 (Two) Times a Day With Meals., Disp: , Rfl:   •  finasteride (PROSCAR) 5 MG tablet, Take 1 tablet by mouth Daily., Disp: , Rfl:   •  folic acid (FOLVITE) 1 MG tablet, Take 1 mg by mouth Daily., Disp: , Rfl:   •  furosemide (LASIX) 20 MG tablet, , Disp: , Rfl:   •  furosemide (LASIX) 40 MG tablet, Take 1 tablet by mouth Daily., Disp: 90 tablet, Rfl: 4  •  HYDROcodone-acetaminophen (NORCO) 5-325 MG per tablet, Take 1 tablet by mouth., Disp: , Rfl:   •  Mirabegron ER (Myrbetriq) 25 MG tablet sustained-release 24 hour 24 hr tablet, Take 1 tablet by mouth Daily., Disp:  "30 tablet, Rfl: 11  •  nebivolol (BYSTOLIC) 10 MG tablet, TAKE 1 TABLET BY MOUTH DAILY., Disp: 90 tablet, Rfl: 3  •  pantoprazole (PROTONIX) 40 MG EC tablet, Take 40 mg by mouth Daily., Disp: , Rfl:   •  pantoprazole (PROTONIX) 40 MG EC tablet, Take 1 tablet by mouth Every Morning Before Breakfast., Disp: , Rfl:   •  rosuvastatin (CRESTOR) 10 MG tablet, TAKE 1 TABLET BY MOUTH DAILY., Disp: 90 tablet, Rfl: 2  •  spironolactone (ALDACTONE) 25 MG tablet, TAKE 1/2 TABLET BY MOUTH EVERY DAY, Disp: 45 tablet, Rfl: 2    Objective:   Vitals and nursing note reviewed.   Constitutional:       Appearance: Healthy appearance. Not in distress.   Neck:      Vascular: No JVR. JVD normal.   Pulmonary:      Effort: Pulmonary effort is normal.      Breath sounds: Normal breath sounds. No wheezing. No rhonchi. No rales.   Chest:      Chest wall: Not tender to palpatation.   Cardiovascular:      PMI at left midclavicular line. Normal rate. Regular rhythm. Normal S1. Normal S2.      Murmurs: There is no murmur.      No gallop. No click. No rub.   Pulses:     Intact distal pulses.   Edema:     Peripheral edema present.  Abdominal:      General: Bowel sounds are normal.      Palpations: Abdomen is soft.      Tenderness: There is no abdominal tenderness.   Musculoskeletal: Normal range of motion.         General: No tenderness. Skin:     General: Skin is warm and dry.      Comments: Typical chronic venous stasis dermopathy.   Neurological:      General: No focal deficit present.      Mental Status: Alert and oriented to person, place and time.       Blood pressure (!) 80/48, pulse 64, temperature 97.2 °F (36.2 °C), resp. rate 14, height 177.8 cm (70\"), weight (!) 139 kg (307 lb), SpO2 94 %.   Lab Review:     Assessment:       1. Cardiac pacemaker in situ  Normal pacemaker function.    2. Chronic systolic congestive heart failure (HCC)  Heart failure with reduced ejection fraction.  Stage C.  New York Heart Association functional class III " symptoms.  Central euvolemia with mild but improving peripheral edema.  LVEF: 35%.    3. Complete heart block (HCC)  Normal pacemaker function.    4. Essential hypertension  Separable blood pressure control.    5. Chronic renal failure, stage 3a (HCC)  Most recent serum creatinine 1.5 mg/dL.    6. Coronary artery disease involving native coronary artery of native heart without angina pectoris      7. Permanent atrial fibrillation (HCC)  Rate control and anticoagulation strategy.  No bleeding symptoms related to anticoagulation therapy with Eliquis.  Given his age greater than 80 years and serum creatinine of 1.5 mg/dL, Eliquis dosing should be decreased to 2.5 mg orally twice daily.  No signs or symptoms to suggest stroke, TIA or other cardioembolic event.    8.  Chronic venous stasis.  Venous stasis ulcerations have improved.  Edema is improving.    9.  Morbid obesity.  Body mass index is greater than 44.  The obesity pattern is central.  This is due to excess calorie intake.  There is evidence of multiple comorbidities.    Procedures    Plan:     Advance Care Planning   ACP discussion was held with the patient during this visit. Patient has an advance directive (not in EMR), copy requested.     I have recommended decreasing his Eliquis dosage to 2.5 mg orally twice daily.    No changes have been made to medications otherwise.  Unfortunately his blood pressure is too low for escalation of his guideline directed medical therapy.    No further cardiovascular testing is warranted.    Fluid and sodium restriction have been reemphasized.    I have reemphasized the essential need for diet exercise and weight management.

## 2023-03-08 RX ORDER — FUROSEMIDE 40 MG/1
TABLET ORAL
Qty: 30 TABLET | Refills: 3 | OUTPATIENT
Start: 2023-03-08

## 2023-03-09 ENCOUNTER — TELEPHONE (OUTPATIENT)
Dept: CARDIOLOGY | Facility: CLINIC | Age: 83
End: 2023-03-09
Payer: MEDICARE

## 2023-03-09 DIAGNOSIS — I50.22 CHRONIC SYSTOLIC CONGESTIVE HEART FAILURE: Primary | ICD-10-CM

## 2023-03-09 NOTE — TELEPHONE ENCOUNTER
Pt called stating that he has been havign swelling in his legs and up into his abdomen. Pt states no abnormal SOA but states that he has gained weight. Pt states that he believes it was where he was changed to Nebivolol 10mg to 5mh. Please advise   [employer] Preamble Text (Leave Blank If You Do Not Want): Undermining was performed with blunt dissection.

## 2023-03-13 NOTE — TELEPHONE ENCOUNTER
Known hx of HF, but also CKD.  STAT proBNP and BMP to be drawn today.  He lives in [place], so please offer to fax these lab orders to somewhere local, but make sure we get the results back.    Remind patient to RESTRICT sodium to 1500 mg and fluid to 1500 mL.    I will call him back with a plan when I get lab results.  He was hypotensive at his last cardiology clinic visit.  If he wants to decrease the nebivolol to 5 mg for the time being, he can.  But have him get the labs drawn today as well.  Thanks!

## 2023-03-14 ENCOUNTER — APPOINTMENT (OUTPATIENT)
Dept: GENERAL RADIOLOGY | Facility: HOSPITAL | Age: 83
DRG: 291 | End: 2023-03-14
Payer: MEDICARE

## 2023-03-14 ENCOUNTER — HOSPITAL ENCOUNTER (INPATIENT)
Facility: HOSPITAL | Age: 83
LOS: 3 days | Discharge: HOSPICE | DRG: 291 | End: 2023-03-17
Attending: EMERGENCY MEDICINE | Admitting: FAMILY MEDICINE
Payer: MEDICARE

## 2023-03-14 DIAGNOSIS — I50.9 ACUTE ON CHRONIC CONGESTIVE HEART FAILURE, UNSPECIFIED HEART FAILURE TYPE: Primary | ICD-10-CM

## 2023-03-14 DIAGNOSIS — R77.8 ELEVATED TROPONIN: ICD-10-CM

## 2023-03-14 DIAGNOSIS — I95.9 HYPOTENSION, UNSPECIFIED HYPOTENSION TYPE: ICD-10-CM

## 2023-03-14 LAB
ALBUMIN SERPL-MCNC: 3.4 G/DL (ref 3.5–5.2)
ALBUMIN/GLOB SERPL: 1.3 G/DL
ALP SERPL-CCNC: 76 U/L (ref 39–117)
ALT SERPL W P-5'-P-CCNC: 11 U/L (ref 1–41)
ANION GAP SERPL CALCULATED.3IONS-SCNC: 8.8 MMOL/L (ref 5–15)
AST SERPL-CCNC: 18 U/L (ref 1–40)
BASOPHILS # BLD AUTO: 0.07 10*3/MM3 (ref 0–0.2)
BASOPHILS NFR BLD AUTO: 1.2 % (ref 0–1.5)
BILIRUB SERPL-MCNC: 0.5 MG/DL (ref 0–1.2)
BUN SERPL-MCNC: 33 MG/DL (ref 8–23)
BUN/CREAT SERPL: 20.8 (ref 7–25)
CALCIUM SPEC-SCNC: 9.1 MG/DL (ref 8.6–10.5)
CHLORIDE SERPL-SCNC: 99 MMOL/L (ref 98–107)
CO2 SERPL-SCNC: 30.2 MMOL/L (ref 22–29)
CREAT SERPL-MCNC: 1.59 MG/DL (ref 0.76–1.27)
DEPRECATED RDW RBC AUTO: 52.8 FL (ref 37–54)
EGFRCR SERPLBLD CKD-EPI 2021: 43.3 ML/MIN/1.73
EOSINOPHIL # BLD AUTO: 0.06 10*3/MM3 (ref 0–0.4)
EOSINOPHIL NFR BLD AUTO: 1 % (ref 0.3–6.2)
ERYTHROCYTE [DISTWIDTH] IN BLOOD BY AUTOMATED COUNT: 15.3 % (ref 12.3–15.4)
FLUAV RNA RESP QL NAA+PROBE: NOT DETECTED
FLUBV RNA RESP QL NAA+PROBE: NOT DETECTED
GLOBULIN UR ELPH-MCNC: 2.7 GM/DL
GLUCOSE SERPL-MCNC: 94 MG/DL (ref 65–99)
HCT VFR BLD AUTO: 37.5 % (ref 37.5–51)
HGB BLD-MCNC: 11.7 G/DL (ref 13–17.7)
HOLD SPECIMEN: NORMAL
HOLD SPECIMEN: NORMAL
IMM GRANULOCYTES # BLD AUTO: 0.01 10*3/MM3 (ref 0–0.05)
IMM GRANULOCYTES NFR BLD AUTO: 0.2 % (ref 0–0.5)
LYMPHOCYTES # BLD AUTO: 1.2 10*3/MM3 (ref 0.7–3.1)
LYMPHOCYTES NFR BLD AUTO: 21 % (ref 19.6–45.3)
MCH RBC QN AUTO: 29.2 PG (ref 26.6–33)
MCHC RBC AUTO-ENTMCNC: 31.2 G/DL (ref 31.5–35.7)
MCV RBC AUTO: 93.5 FL (ref 79–97)
MONOCYTES # BLD AUTO: 0.41 10*3/MM3 (ref 0.1–0.9)
MONOCYTES NFR BLD AUTO: 7.2 % (ref 5–12)
NEUTROPHILS NFR BLD AUTO: 3.97 10*3/MM3 (ref 1.7–7)
NEUTROPHILS NFR BLD AUTO: 69.4 % (ref 42.7–76)
NRBC BLD AUTO-RTO: 0 /100 WBC (ref 0–0.2)
NT-PROBNP SERPL-MCNC: 6929 PG/ML (ref 0–1800)
PLATELET # BLD AUTO: 176 10*3/MM3 (ref 140–450)
PMV BLD AUTO: 11.2 FL (ref 6–12)
POTASSIUM SERPL-SCNC: 5.3 MMOL/L (ref 3.5–5.2)
PROT SERPL-MCNC: 6.1 G/DL (ref 6–8.5)
RBC # BLD AUTO: 4.01 10*6/MM3 (ref 4.14–5.8)
SARS-COV-2 RNA RESP QL NAA+PROBE: NOT DETECTED
SODIUM SERPL-SCNC: 138 MMOL/L (ref 136–145)
TROPONIN T SERPL HS-MCNC: 102 NG/L
WBC NRBC COR # BLD: 5.72 10*3/MM3 (ref 3.4–10.8)
WHOLE BLOOD HOLD COAG: NORMAL
WHOLE BLOOD HOLD SPECIMEN: NORMAL

## 2023-03-14 PROCEDURE — 25010000002 FUROSEMIDE PER 20 MG: Performed by: EMERGENCY MEDICINE

## 2023-03-14 PROCEDURE — 93005 ELECTROCARDIOGRAM TRACING: CPT | Performed by: EMERGENCY MEDICINE

## 2023-03-14 PROCEDURE — 80053 COMPREHEN METABOLIC PANEL: CPT | Performed by: EMERGENCY MEDICINE

## 2023-03-14 PROCEDURE — 87636 SARSCOV2 & INF A&B AMP PRB: CPT | Performed by: EMERGENCY MEDICINE

## 2023-03-14 PROCEDURE — 99223 1ST HOSP IP/OBS HIGH 75: CPT | Performed by: FAMILY MEDICINE

## 2023-03-14 PROCEDURE — 84484 ASSAY OF TROPONIN QUANT: CPT | Performed by: EMERGENCY MEDICINE

## 2023-03-14 PROCEDURE — 71045 X-RAY EXAM CHEST 1 VIEW: CPT

## 2023-03-14 PROCEDURE — 83880 ASSAY OF NATRIURETIC PEPTIDE: CPT | Performed by: EMERGENCY MEDICINE

## 2023-03-14 PROCEDURE — 85025 COMPLETE CBC W/AUTO DIFF WBC: CPT | Performed by: EMERGENCY MEDICINE

## 2023-03-14 PROCEDURE — 99285 EMERGENCY DEPT VISIT HI MDM: CPT

## 2023-03-14 RX ORDER — SODIUM CHLORIDE 0.9 % (FLUSH) 0.9 %
10 SYRINGE (ML) INJECTION AS NEEDED
Status: DISCONTINUED | OUTPATIENT
Start: 2023-03-14 | End: 2023-03-17 | Stop reason: HOSPADM

## 2023-03-14 RX ORDER — SODIUM CHLORIDE 0.9 % (FLUSH) 0.9 %
10 SYRINGE (ML) INJECTION AS NEEDED
Status: DISCONTINUED | OUTPATIENT
Start: 2023-03-14 | End: 2023-03-15 | Stop reason: SDUPTHER

## 2023-03-14 RX ADMIN — FUROSEMIDE 10 MG/HR: 100 INJECTION, SOLUTION INTRAMUSCULAR; INTRAVENOUS at 22:42

## 2023-03-14 NOTE — ED PROVIDER NOTES
HPI: Bobby Dodd is a 82 y.o. male who presents to the emergency department complaining of shortness of breath and swelling.  He states that for several months he has had increasing swelling.  The last several days or so has had worsening shortness of breath.  This is much worse when he exerts himself.  He has had some dry cough and PND.  No fevers, vomiting or other complaints.  Patient reports compliance with medications.      REVIEW OF SYSTEMS: All other systems reviewed and are negative     PAST MEDICAL HISTORY:   Past Medical History:   Diagnosis Date   • A-fib ([facility])    • Acute renal failure (ARF) ([facility]) 08/24/2019    Hospital admission   • Arrhythmia    • Arthritis    • Bilateral renal cysts    • Cellulitis    • CHF (congestive heart failure) ([facility])    • Coronary artery disease    • Diarrhea 08/24/2019   • Diverticulosis 2019    Mild, noted on imaging    • Dysphagia    • Edema    • Elevated prostate specific antigen (PSA)    • Enlarged prostate    • GERD (gastroesophageal reflux disease)    • History of chest pain    • History of smoking    • History of stress test    • Alutiiq (hard of hearing)     Mild-No hearing aids    • Hyperlipidemia    • Hypertension    • Hypogonadism in male    • Hypotension 08/24/2019    Upon hospital admission    • Iron deficiency anemia 2019   • Joint pain, elbow    • Kidney stones    • Knee pain    • Low back pain    • Obstructive sleep apnea     non-compliant with mask   • Pacemaker    • Palpitations    • Rheumatic fever    • SOB (shortness of breath)    • Trochanteric bursitis    • Urinary retention         FAMILY HISTORY:   Family History   Problem Relation Age of Onset   • Arthritis Mother    • Hypertension Mother    • Heart attack Father    • Arthritis Sister         SOCIAL HISTORY:   Social History     Socioeconomic History   • Marital status:    Tobacco Use   • Smoking status: Former     Packs/day: 1.00     Years: 30.00     Pack years: 30.00     Types: Cigarettes      Quit date:      Years since quittin.2   • Smokeless tobacco: Never   Substance and Sexual Activity   • Alcohol use: No   • Drug use: No   • Sexual activity: Defer        SURGICAL HISTORY:   Past Surgical History:   Procedure Laterality Date   • CARDIAC ELECTROPHYSIOLOGY PROCEDURE N/A 2018    Procedure: Internal Cardioversion;  Surgeon: Tho Richards MD;  Location:  [facility];  Service: Cardiology   • CARDIAC ELECTROPHYSIOLOGY PROCEDURE N/A 2021    Procedure: PPM generator change - dual;  Surgeon: Damian Doshi DO;  Location:  [facility];  Service: Cardiology;  Laterality: N/A;   • CARDIOVERSION      per DR. RICHARDS   • CHOLECYSTECTOMY     • COLONOSCOPY     • CYSTOSCOPY TRANSURETHRAL RESECTION OF PROSTATE     • ENDOSCOPY N/A 10/1/2019    Procedure: ESOPHAGOGASTRODUODENOSCOPY WITH BIOPSIES;  Surgeon: Jose Alfredo Brewer MD;  Location:  [facility];  Service: Gastroenterology   • EYE SURGERY Right     Retinal tear repair    • HERNIA REPAIR     • KNEE ARTHROSCOPY      RIGHT   • LUNG REMOVAL, PARTIAL Right    • OTHER SURGICAL HISTORY      GOITER REMOVED .   • PACEMAKER IMPLANTATION      [facility]        ALLERGIES: Patient has no known allergies.       PHYSICAL EXAM:   VITAL SIGNS:   Vitals:    23 2230   BP: 99/52   Pulse: 61   Resp:    Temp:    SpO2: 95%      CONSTITUTIONAL: Awake, well appearing, nontoxic   HENT: Atraumatic, normocephalic, oral mucosa moist, airway patent. Nares patent without drainage. External ears normal.   EYES: Conjunctivae clear, EOMI, PERRL   NECK: Trachea midline, nontender, supple   CARDIOVASCULAR: Normal heart rate, Normal rhythm.  PULMONARY/CHEST: Normal work of breathing.  Diminished throughout.  ABDOMINAL: Nondistended, soft, nontender, no rebound or guarding.  NEUROLOGIC: Nonfocal, moves all four extremities, no gross sensory or motor deficits.   EXTREMITIES: No clubbing, cyanosis.  Pitting edema to the upper thighs  bilaterally  SKIN: Warm, Dry.  Some chronic venous changes      ED COURSE / MEDICAL DECISION MAKING:     Bobby Dodd is a 82 y.o. male who presents to the emergency department for evaluation of shortness of breath and swelling.  Well-developed, well-nourished obese man in no distress with exam as above.  His vital signs are normal.  His oxygen saturation is borderline on room air at 92%.  His exam is notable for diminished lung sounds and pitting edema.  Will obtain labs, chest x-ray and EKG.  Disposition pending.    Differential diagnosis includes CHF, ACS, pneumonia, viral illness among other etiologies.    EKG interpreted by me: Ventricularly paced rhythm, normal rate, no obvious acute ST changes, this is an abnormal EKG    Lab work notable for elevated troponin and elevated BNP.  Kidney function appears to be baseline or slightly better.  Chest x-ray per my interpretation reveals pulmonary edema, bilateral pleural effusions.  Patient did have some transient hypotension here in the ER with systolics in the 70s.  Now he is maintaining in the 90s.  I discussed the case with Dr. Martel, he felt there was not much we could offer patient, no role for inotropes.  We will try Lasix drip.  He recommended palliative care consult.  Discussed with Dr. Cuellar for admission.  Patient and family updated on plan of care and are agreeable.    Final diagnoses:   Acute on chronic congestive heart failure, unspecified heart failure type (HCC)   Hypotension, unspecified hypotension type   Elevated troponin        Alex Lake MD  03/14/23 [id number]

## 2023-03-15 ENCOUNTER — APPOINTMENT (OUTPATIENT)
Dept: CARDIOLOGY | Facility: HOSPITAL | Age: 83
DRG: 291 | End: 2023-03-15
Payer: MEDICARE

## 2023-03-15 LAB
ANION GAP SERPL CALCULATED.3IONS-SCNC: 6.7 MMOL/L (ref 5–15)
BH CV ECHO MEAS - AO MAX PG: 5.3 MMHG
BH CV ECHO MEAS - AO MEAN PG: 3 MMHG
BH CV ECHO MEAS - AO ROOT DIAM: 3 CM
BH CV ECHO MEAS - AO V2 MAX: 115 CM/SEC
BH CV ECHO MEAS - AO V2 VTI: 22.4 CM
BH CV ECHO MEAS - AVA(I,D): 2.7 CM2
BH CV ECHO MEAS - EDV(CUBED): 93 ML
BH CV ECHO MEAS - EDV(MOD-SP2): 108 ML
BH CV ECHO MEAS - EDV(MOD-SP4): 125 ML
BH CV ECHO MEAS - EF(MOD-BP): 69.6 %
BH CV ECHO MEAS - EF(MOD-SP2): 75.1 %
BH CV ECHO MEAS - EF(MOD-SP4): 64.7 %
BH CV ECHO MEAS - ESV(CUBED): 38.3 ML
BH CV ECHO MEAS - ESV(MOD-SP2): 26.9 ML
BH CV ECHO MEAS - ESV(MOD-SP4): 44.1 ML
BH CV ECHO MEAS - FS: 25.6 %
BH CV ECHO MEAS - IVS/LVPW: 0.81 CM
BH CV ECHO MEAS - IVSD: 1.21 CM
BH CV ECHO MEAS - LA DIMENSION: 4.8 CM
BH CV ECHO MEAS - LAT PEAK E' VEL: 9.6 CM/SEC
BH CV ECHO MEAS - LV MASS(C)D: 239 GRAMS
BH CV ECHO MEAS - LV MAX PG: 4.5 MMHG
BH CV ECHO MEAS - LV MEAN PG: 2 MMHG
BH CV ECHO MEAS - LV V1 MAX: 106 CM/SEC
BH CV ECHO MEAS - LV V1 VTI: 19.7 CM
BH CV ECHO MEAS - LVIDD: 4.5 CM
BH CV ECHO MEAS - LVIDS: 3.4 CM
BH CV ECHO MEAS - LVOT AREA: 3 CM2
BH CV ECHO MEAS - LVOT DIAM: 1.97 CM
BH CV ECHO MEAS - LVPWD: 1.2 CM
BH CV ECHO MEAS - MED PEAK E' VEL: 6.6 CM/SEC
BH CV ECHO MEAS - MV DEC TIME: 0.13 MSEC
BH CV ECHO MEAS - MV E MAX VEL: 70.3 CM/SEC
BH CV ECHO MEAS - MV MAX PG: 2.4 MMHG
BH CV ECHO MEAS - MV MEAN PG: 1 MMHG
BH CV ECHO MEAS - MV V2 VTI: 21.9 CM
BH CV ECHO MEAS - MVA(VTI): 2.7 CM2
BH CV ECHO MEAS - PA ACC TIME: 0.12 SEC
BH CV ECHO MEAS - PA PR(ACCEL): 26.8 MMHG
BH CV ECHO MEAS - PA V2 MAX: 86.1 CM/SEC
BH CV ECHO MEAS - RAP SYSTOLE: 15 MMHG
BH CV ECHO MEAS - RV MAX PG: 1.61 MMHG
BH CV ECHO MEAS - RV V1 MAX: 63.5 CM/SEC
BH CV ECHO MEAS - RV V1 VTI: 11.3 CM
BH CV ECHO MEAS - RVSP: 29 MMHG
BH CV ECHO MEAS - SV(LVOT): 60 ML
BH CV ECHO MEAS - SV(MOD-SP2): 81.1 ML
BH CV ECHO MEAS - SV(MOD-SP4): 80.9 ML
BH CV ECHO MEAS - TAPSE (>1.6): 1.46 CM
BH CV ECHO MEAS - TR MAX PG: 14 MMHG
BH CV ECHO MEAS - TR MAX VEL: 185.5 CM/SEC
BH CV ECHO MEASUREMENTS AVERAGE E/E' RATIO: 8.68
BH CV XLRA - RV BASE: 3.1 CM
BH CV XLRA - RV LENGTH: 7.8 CM
BH CV XLRA - RV MID: 2.31 CM
BH CV XLRA - TDI S': 10.6 CM/SEC
BUN SERPL-MCNC: 33 MG/DL (ref 8–23)
BUN/CREAT SERPL: 18.8 (ref 7–25)
CALCIUM SPEC-SCNC: 9 MG/DL (ref 8.6–10.5)
CHLORIDE SERPL-SCNC: 101 MMOL/L (ref 98–107)
CO2 SERPL-SCNC: 31.3 MMOL/L (ref 22–29)
CREAT SERPL-MCNC: 1.76 MG/DL (ref 0.76–1.27)
DEPRECATED RDW RBC AUTO: 53 FL (ref 37–54)
EGFRCR SERPLBLD CKD-EPI 2021: 38.4 ML/MIN/1.73
ERYTHROCYTE [DISTWIDTH] IN BLOOD BY AUTOMATED COUNT: 15.3 % (ref 12.3–15.4)
GLUCOSE SERPL-MCNC: 93 MG/DL (ref 65–99)
HCT VFR BLD AUTO: 34.4 % (ref 37.5–51)
HGB BLD-MCNC: 10.7 G/DL (ref 13–17.7)
LEFT ATRIUM VOLUME INDEX: 62.9 ML/M2
LV EF 2D ECHO EST: 69 %
MAXIMAL PREDICTED HEART RATE: 139 BPM
MCH RBC QN AUTO: 29.3 PG (ref 26.6–33)
MCHC RBC AUTO-ENTMCNC: 31.1 G/DL (ref 31.5–35.7)
MCV RBC AUTO: 94.2 FL (ref 79–97)
PLATELET # BLD AUTO: 153 10*3/MM3 (ref 140–450)
PMV BLD AUTO: 11.1 FL (ref 6–12)
POTASSIUM SERPL-SCNC: 5.3 MMOL/L (ref 3.5–5.2)
RBC # BLD AUTO: 3.65 10*6/MM3 (ref 4.14–5.8)
SODIUM SERPL-SCNC: 139 MMOL/L (ref 136–145)
STRESS TARGET HR: 118 BPM
TROPONIN T SERPL HS-MCNC: 99 NG/L
WBC NRBC COR # BLD: 4.5 10*3/MM3 (ref 3.4–10.8)

## 2023-03-15 PROCEDURE — 97161 PT EVAL LOW COMPLEX 20 MIN: CPT

## 2023-03-15 PROCEDURE — 93306 TTE W/DOPPLER COMPLETE: CPT | Performed by: INTERNAL MEDICINE

## 2023-03-15 PROCEDURE — 99222 1ST HOSP IP/OBS MODERATE 55: CPT | Performed by: INTERNAL MEDICINE

## 2023-03-15 PROCEDURE — 99232 SBSQ HOSP IP/OBS MODERATE 35: CPT | Performed by: FAMILY MEDICINE

## 2023-03-15 PROCEDURE — 85027 COMPLETE CBC AUTOMATED: CPT | Performed by: FAMILY MEDICINE

## 2023-03-15 PROCEDURE — 25010000002 SULFUR HEXAFLUORIDE MICROSPH 60.7-25 MG RECONSTITUTED SUSPENSION: Performed by: FAMILY MEDICINE

## 2023-03-15 PROCEDURE — 25010000002 FUROSEMIDE PER 20 MG: Performed by: EMERGENCY MEDICINE

## 2023-03-15 PROCEDURE — 80048 BASIC METABOLIC PNL TOTAL CA: CPT | Performed by: FAMILY MEDICINE

## 2023-03-15 PROCEDURE — 93306 TTE W/DOPPLER COMPLETE: CPT

## 2023-03-15 PROCEDURE — 84484 ASSAY OF TROPONIN QUANT: CPT | Performed by: FAMILY MEDICINE

## 2023-03-15 PROCEDURE — 97165 OT EVAL LOW COMPLEX 30 MIN: CPT

## 2023-03-15 RX ORDER — ACETAMINOPHEN 650 MG/1
650 SUPPOSITORY RECTAL EVERY 4 HOURS PRN
Status: DISCONTINUED | OUTPATIENT
Start: 2023-03-15 | End: 2023-03-17 | Stop reason: HOSPADM

## 2023-03-15 RX ORDER — ROSUVASTATIN CALCIUM 5 MG/1
10 TABLET, COATED ORAL DAILY
Status: DISCONTINUED | OUTPATIENT
Start: 2023-03-15 | End: 2023-03-17 | Stop reason: HOSPADM

## 2023-03-15 RX ORDER — MIDODRINE HYDROCHLORIDE 5 MG/1
10 TABLET ORAL
Status: DISCONTINUED | OUTPATIENT
Start: 2023-03-15 | End: 2023-03-17 | Stop reason: HOSPADM

## 2023-03-15 RX ORDER — AMMONIUM LACTATE 12 G/100G
1 CREAM TOPICAL EVERY 12 HOURS
Status: DISCONTINUED | OUTPATIENT
Start: 2023-03-15 | End: 2023-03-17 | Stop reason: HOSPADM

## 2023-03-15 RX ORDER — PANTOPRAZOLE SODIUM 40 MG/1
40 TABLET, DELAYED RELEASE ORAL DAILY
Status: DISCONTINUED | OUTPATIENT
Start: 2023-03-15 | End: 2023-03-17 | Stop reason: HOSPADM

## 2023-03-15 RX ORDER — CHOLECALCIFEROL (VITAMIN D3) 125 MCG
5 CAPSULE ORAL NIGHTLY PRN
Status: DISCONTINUED | OUTPATIENT
Start: 2023-03-15 | End: 2023-03-17 | Stop reason: HOSPADM

## 2023-03-15 RX ORDER — POLYETHYLENE GLYCOL 3350 17 G/17G
17 POWDER, FOR SOLUTION ORAL DAILY PRN
Status: DISCONTINUED | OUTPATIENT
Start: 2023-03-15 | End: 2023-03-17 | Stop reason: HOSPADM

## 2023-03-15 RX ORDER — BISACODYL 5 MG/1
5 TABLET, DELAYED RELEASE ORAL DAILY PRN
Status: DISCONTINUED | OUTPATIENT
Start: 2023-03-15 | End: 2023-03-17 | Stop reason: HOSPADM

## 2023-03-15 RX ORDER — BISACODYL 10 MG
10 SUPPOSITORY, RECTAL RECTAL DAILY PRN
Status: DISCONTINUED | OUTPATIENT
Start: 2023-03-15 | End: 2023-03-17 | Stop reason: HOSPADM

## 2023-03-15 RX ORDER — SODIUM CHLORIDE 9 MG/ML
40 INJECTION, SOLUTION INTRAVENOUS AS NEEDED
Status: DISCONTINUED | OUTPATIENT
Start: 2023-03-15 | End: 2023-03-17 | Stop reason: HOSPADM

## 2023-03-15 RX ORDER — AMOXICILLIN 250 MG
2 CAPSULE ORAL 2 TIMES DAILY
Status: DISCONTINUED | OUTPATIENT
Start: 2023-03-15 | End: 2023-03-17 | Stop reason: HOSPADM

## 2023-03-15 RX ORDER — ACETAMINOPHEN 160 MG/5ML
650 SOLUTION ORAL EVERY 4 HOURS PRN
Status: DISCONTINUED | OUTPATIENT
Start: 2023-03-15 | End: 2023-03-17 | Stop reason: HOSPADM

## 2023-03-15 RX ORDER — SODIUM CHLORIDE 0.9 % (FLUSH) 0.9 %
3 SYRINGE (ML) INJECTION EVERY 12 HOURS SCHEDULED
Status: DISCONTINUED | OUTPATIENT
Start: 2023-03-15 | End: 2023-03-17 | Stop reason: HOSPADM

## 2023-03-15 RX ORDER — ONDANSETRON 2 MG/ML
4 INJECTION INTRAMUSCULAR; INTRAVENOUS EVERY 6 HOURS PRN
Status: DISCONTINUED | OUTPATIENT
Start: 2023-03-15 | End: 2023-03-17 | Stop reason: HOSPADM

## 2023-03-15 RX ORDER — MIDODRINE HYDROCHLORIDE 5 MG/1
5 TABLET ORAL
Status: DISCONTINUED | OUTPATIENT
Start: 2023-03-15 | End: 2023-03-15

## 2023-03-15 RX ORDER — SODIUM CHLORIDE 0.9 % (FLUSH) 0.9 %
3-10 SYRINGE (ML) INJECTION AS NEEDED
Status: DISCONTINUED | OUTPATIENT
Start: 2023-03-15 | End: 2023-03-17 | Stop reason: HOSPADM

## 2023-03-15 RX ORDER — NEBIVOLOL 10 MG/1
10 TABLET ORAL DAILY
Status: DISCONTINUED | OUTPATIENT
Start: 2023-03-15 | End: 2023-03-15

## 2023-03-15 RX ORDER — ACETAMINOPHEN 325 MG/1
650 TABLET ORAL EVERY 4 HOURS PRN
Status: DISCONTINUED | OUTPATIENT
Start: 2023-03-15 | End: 2023-03-17 | Stop reason: HOSPADM

## 2023-03-15 RX ADMIN — Medication 3 ML: at 20:39

## 2023-03-15 RX ADMIN — Medication 5 MG: at 01:09

## 2023-03-15 RX ADMIN — APIXABAN 2.5 MG: 2.5 TABLET, FILM COATED ORAL at 01:09

## 2023-03-15 RX ADMIN — ROSUVASTATIN CALCIUM 10 MG: 5 TABLET, COATED ORAL at 08:20

## 2023-03-15 RX ADMIN — APIXABAN 2.5 MG: 2.5 TABLET, FILM COATED ORAL at 20:38

## 2023-03-15 RX ADMIN — MIDODRINE HYDROCHLORIDE 5 MG: 5 TABLET ORAL at 11:15

## 2023-03-15 RX ADMIN — SENNOSIDES AND DOCUSATE SODIUM 2 TABLET: 50; 8.6 TABLET ORAL at 01:09

## 2023-03-15 RX ADMIN — APIXABAN 2.5 MG: 2.5 TABLET, FILM COATED ORAL at 08:20

## 2023-03-15 RX ADMIN — Medication 3 ML: at 01:09

## 2023-03-15 RX ADMIN — MIDODRINE HYDROCHLORIDE 10 MG: 5 TABLET ORAL at 16:47

## 2023-03-15 RX ADMIN — SACUBITRIL AND VALSARTAN 1 TABLET: 24; 26 TABLET, FILM COATED ORAL at 01:09

## 2023-03-15 RX ADMIN — Medication 1 APPLICATION: at 12:47

## 2023-03-15 RX ADMIN — PANTOPRAZOLE SODIUM 40 MG: 40 TABLET, DELAYED RELEASE ORAL at 05:43

## 2023-03-15 RX ADMIN — SULFUR HEXAFLUORIDE 6 ML: KIT at 09:16

## 2023-03-15 RX ADMIN — SACUBITRIL AND VALSARTAN 1 TABLET: 24; 26 TABLET, FILM COATED ORAL at 08:20

## 2023-03-15 RX ADMIN — Medication 3 ML: at 08:20

## 2023-03-15 RX ADMIN — FUROSEMIDE 10 MG/HR: 100 INJECTION, SOLUTION INTRAMUSCULAR; INTRAVENOUS at 08:52

## 2023-03-15 NOTE — H&P
[facility]      Name:  Bobby Dodd   Age:  82 y.o.  Sex:  male  :  1940  MRN:  [id number]   Visit Number:  [id number]  Admission Date:  3/14/2023  Date Of Service:  23  Primary Care Physician:  Melissa Perdomo APRN    Chief Complaint:     Shortness of breath    History Of Presenting Illness:      Patient is an 82 years old male with a past medical history of A-fib on Eliquis, systolic heart failure, CAD, hypertension, hyperlipidemia, obstructive sleep apnea, status post pacemaker who presented to the ER with a chief complaint of shortness of breath.  Patient with his daughter at bedside.  Patient reports increased swelling all over his body but more specifically in his bilateral lower extremities over the past several months.  Patient still having shortness of breath that has been progressively worsening over the past couple days, patient with worsening dyspnea with exertion.  Patient has been having increased dry cough that is nonproductive.  He denies fever, chills, chest pain, abdominal pain, nausea or vomiting.  Patient takes Lasix at home and follows up with Dr. Martel with cardiology.  Per patient daughter, patient has been declining to come to the ER for evaluation for a while now and he finally agreed to come and get checked out today..    On ER evaluation, patient was hemodynamically stable and was afebrile.  Patient had couple readings with soft blood pressure in the 80s over 40s while in the ER.  His work-up shows high-sensitivity troponin of 102, proBNP 6929, potassium 5.3, creatinine 1.59 (baseline creatinine of 1.5) BUN 33, hemoglobin 11.7 otherwise within acceptable range on his CBC and CMP.  COVID and flu negative.  Chest x-ray with bilateral pleural effusions and pulmonary edema per my read.  Case discussed with cardiology Dr. Martel, who recommended admission and starting patient on Lasix.  Patient started on Lasix drip.   Hospitalist consulted for admission.    Review Of Systems:    All systems were reviewed and negative except as mentioned in history of presenting illness, assessment and plan.    Past Medical History: Patient  has a past medical history of A-fib ([facility]), Acute renal failure (ARF) ([facility]) (08/24/2019), Arrhythmia, Arthritis, Bilateral renal cysts, Cellulitis, CHF (congestive heart failure) ([facility]), Coronary artery disease, Diarrhea (08/24/2019), Diverticulosis (2019), Dysphagia, Edema, Elevated prostate specific antigen (PSA), Enlarged prostate, GERD (gastroesophageal reflux disease), History of chest pain, History of smoking, History of stress test, [place] (hard of hearing), Hyperlipidemia, Hypertension, Hypogonadism in male, Hypotension (08/24/2019), Iron deficiency anemia (2019), Joint pain, elbow, Kidney stones, Knee pain, Low back pain, Obstructive sleep apnea, Pacemaker, Palpitations, Rheumatic fever, SOB (shortness of breath), Trochanteric bursitis, and Urinary retention.    Past Surgical History: Patient  has a past surgical history that includes Cholecystectomy; Hernia repair; Pacemaker Implantation; Cardioversion; Other surgical history; Knee arthroscopy; Cardiac electrophysiology procedure (N/A, 6/4/2018); Lung removal, partial (Right); Colonoscopy; Eye surgery (Right); Transurethral resection of prostate; Esophagogastroduodenoscopy (N/A, 10/1/2019); and Cardiac electrophysiology procedure (N/A, 11/23/2021).    Social History: Patient  reports that he quit smoking about 48 years ago. His smoking use included cigarettes. He has a 30.00 pack-year smoking history. He has never used smokeless tobacco. He reports that he does not drink alcohol and does not use drugs.    Family History:  Patient's family history has been reviewed and found to be noncontributory.     Allergies:      Patient has no known allergies.    Home Medications:    Prior to Admission Medications     Prescriptions Last Dose Informant Patient  Reported? Taking?    Entresto 24-26 MG tablet 3/14/2023  No Yes    TAKE 1 TABLET BY MOUTH 2 (TWO) TIMES A DAY.    furosemide (LASIX) 40 MG tablet 3/14/2023 Pharmacy No Yes    Take 1 tablet by mouth Daily.    HYDROcodone-acetaminophen (NORCO) 5-325 MG per tablet 3/14/2023  Yes Yes    Take 1 tablet by mouth.    nebivolol (BYSTOLIC) 10 MG tablet 3/14/2023  No Yes    TAKE 1 TABLET BY MOUTH DAILY.    rosuvastatin (CRESTOR) 10 MG tablet 3/14/2023  No Yes    TAKE 1 TABLET BY MOUTH DAILY.    spironolactone (ALDACTONE) 25 MG tablet 3/14/2023  No Yes    TAKE 1/2 TABLET BY MOUTH EVERY DAY    apixaban (ELIQUIS) 2.5 MG tablet tablet   No No    Take 1 tablet by mouth 2 (Two) Times a Day.    cephalexin (KEFLEX) 500 MG capsule   Yes No    Take 1 capsule by mouth 4 (Four) Times a Day.    Cyanocobalamin 2500 MCG sublingual tablet Unknown  Yes No    Place 2,500 mcg under the tongue.    ferrous sulfate 324 (65 Fe) MG tablet delayed-release EC tablet   Yes No    Take 324 mg by mouth 2 (Two) Times a Day With Meals.    finasteride (PROSCAR) 5 MG tablet Unknown  Yes No    Take 1 tablet by mouth Daily.    folic acid (FOLVITE) 1 MG tablet Unknown Pharmacy Yes No    Take 1 tablet by mouth Daily.    furosemide (LASIX) 20 MG tablet   Yes No    Mirabegron ER (Myrbetriq) 25 MG tablet sustained-release 24 hour 24 hr tablet Unknown  No No    Take 1 tablet by mouth Daily.    pantoprazole (PROTONIX) 40 MG EC tablet  Pharmacy Yes No    Take 1 tablet by mouth Daily.    pantoprazole (PROTONIX) 40 MG EC tablet Unknown  Yes No    Take 1 tablet by mouth Every Morning Before Breakfast.        ED Medications:    Medications   sodium chloride 0.9 % flush 10 mL (has no administration in time range)   sodium chloride 0.9 % flush 10 mL (has no administration in time range)   furosemide (LASIX) 100 mg in sodium chloride 0.9 % 100 mL infusion (10 mg/hr Intravenous New Bag 3/14/23 1310)     Vital Signs:  Temp:  [98 °F (36.7 °C)] 98 °F (36.7 °C)  Heart Rate:   "[61-67] 61  Resp:  [16-18] 18  BP: ()/(48-76) 99/52        03/14/23 1934   Weight: (!) 140 kg (309 lb)     Body mass index is 44.34 kg/m².    Physical Exam:     Most recent vital Signs: BP 99/52   Pulse 61   Temp 98 °F (36.7 °C) (Oral)   Resp 18   Ht 177.8 cm (70\")   Wt (!) 140 kg (309 lb)   SpO2 95%   BMI 44.34 kg/m²     Physical Exam  Vitals and nursing note reviewed.   Constitutional:       Appearance: He is obese.   HENT:      Head: Normocephalic and atraumatic.      Right Ear: External ear normal.      Left Ear: External ear normal.      Nose: Nose normal.      Mouth/Throat:      Mouth: Mucous membranes are moist.   Eyes:      Extraocular Movements: Extraocular movements intact.      Conjunctiva/sclera: Conjunctivae normal.      Pupils: Pupils are equal, round, and reactive to light.   Cardiovascular:      Rate and Rhythm: Normal rate and regular rhythm.      Pulses: Normal pulses.      Heart sounds: Normal heart sounds.   Pulmonary:      Effort: Prolonged expiration present.      Breath sounds: Decreased air movement present. Rhonchi present. No wheezing.   Abdominal:      General: Bowel sounds are normal. There is no distension.      Palpations: Abdomen is soft.      Tenderness: There is no abdominal tenderness.   Musculoskeletal:         General: Normal range of motion.      Cervical back: Normal range of motion and neck supple.      Right lower leg: Swelling present. No tenderness. 3+ Edema present.      Left lower leg: Swelling present. No tenderness. 3+ Edema present.   Skin:     General: Skin is warm and dry.      Findings: Erythema present.      Comments: Bilateral anterior lower extremities erythema with edema and chronic venous stasis.   Neurological:      General: No focal deficit present.      Mental Status: He is alert and oriented to person, place, and time. Mental status is at baseline.      Motor: No weakness.   Psychiatric:         Mood and Affect: Mood normal.         Behavior: " Behavior normal.         Thought Content: Thought content normal.         Laboratory data:    I have reviewed the labs done in the emergency room.    Results from last 7 days   Lab Units 03/14/23 1952   SODIUM mmol/L 138   POTASSIUM mmol/L 5.3*   CHLORIDE mmol/L 99   CO2 mmol/L 30.2*   BUN mg/dL 33*   CREATININE mg/dL 1.59*   CALCIUM mg/dL 9.1   BILIRUBIN mg/dL 0.5   ALK PHOS U/L 76   ALT (SGPT) U/L 11   AST (SGOT) U/L 18   GLUCOSE mg/dL 94     Results from last 7 days   Lab Units 03/14/23 1952   WBC 10*3/mm3 5.72   HEMOGLOBIN g/dL 11.7*   HEMATOCRIT % 37.5   PLATELETS 10*3/mm3 176         Results from last 7 days   Lab Units 03/14/23 1952   HSTROP T ng/L 102*     Results from last 7 days   Lab Units 03/14/23 1952   PROBNP pg/mL 6,929.0*                       Invalid input(s): USDES,  BLOODU, NITRITITE, BACT, EP    Pain Management Panel    There is no flowsheet data to display.         EKG:      Paced rhythm, heart rate 67, nonspecific ST/T wave changes.    Radiology:    No radiology results for the last 3 days    Assessment:    1. Acute on chronic systolic heart failure, POA  2. Elevated troponin, likely demand ischemia, POA  3. Chronic kidney disease stage III  4. Permanent A-fib on Eliquis  5. Complete heart block, s/p pacemaker  6. CAD  7. Hypertension  8. Hyperlipidemia  9. Obstructive sleep apnea  10. Status post right lung lobectomy    Plan:    Patient is admitted to telemetry for further evaluation and treatment.    Acute on chronic systolic heart failure.  -Strict ins and outs, low-sodium, fluid restriction  -Started on Lasix drip  -Previous 2D echo on file from 2019 with a EF of 35%.  -2D echo ordered.  -Cardiology consulted, appreciate their recommendations.  -Cardiology recommending palliative consult.    Elevated troponin  -Likely demand ischemia in the settings of CHF and CKD  -Patient denies any chest pain, low suspicion for ACS currently.  -Trending troponins    Chronic kidney disease  -Monitor  while on diuretics  -Avoid nephrotoxic drugs  -Holding spironolactone    Further orders as indicated per clinical course.  Continue home meds as warranted.    Confirmed with patient and his daughter at bedside about his wishes for CODE STATUS, patient declines intubation or CPR.  Patient with full understanding of his wishes and full decision-making capacity.  CODE STATUS was ordered as DNR/DNI per his wishes.    Risk Assessment: Moderate to high  DVT Prophylaxis: On Eliquis  Code Status: DNR/DNI  Diet: Cardiac, low-sodium    Advance Care Planning   ACP discussion was held with the patient during this visit. Patient does not have an advance directive, information provided.       Jorge Cuellar MD  03/14/23  23:12 EDT    Dictated utilizing Dragon dictation.

## 2023-03-15 NOTE — PLAN OF CARE
Goal Outcome Evaluation:   Blood pressure low today, Dr. Martel and [place] aware, Midodrine started and dosage adjusted. Lasix drip discontinued. Pt denies SOA at rest, slightly with activity. Worked with PT today. Up resting in chair with no complaints noted, encouraged to change positions regularly to relieve pressure.

## 2023-03-15 NOTE — PAYOR COMM NOTE
"TO:BC  FROM:MADDI CHAN, RN PHONE [phone]  IN CLINICALS REF# [id number]    Joanie Dodd (82 y.o. Male)     Date of Birth   1940    Social Security Number       Address   [address] [place] [address] [facility] [address]    Home Phone   [phone]    MRN   [id number]       Mormonism   None    Marital Status                               Admission Date   3/14/23    Admission Type   Emergency    Admitting Provider   Jorge Cuellar MD    Attending Provider   Karen Duncan DO    Department, Room/Bed   [facility] TELEMETRY 3, 328/1       Discharge Date       Discharge Disposition       Discharge Destination                               Attending Provider: Karen Duncan DO    Allergies: No Known Allergies    Isolation: None   Infection: COVID (rule out) (03/14/23)   Code Status: No CPR    Ht: 177.8 cm (70\")   Wt: 140 kg (309 lb 11.9 oz)    Admission Cmt: None   Principal Problem: Acute on chronic congestive heart failure, unspecified heart failure type (HCC) [I50.9]                 Active Insurance as of 3/14/2023     Primary Coverage     Payor Plan Insurance Group Employer/Plan Group    ANTHEM MEDICARE REPLACEMENT ANTHEM MEDICARE ADVANTAGE [id number]     Payor Plan Address Payor Plan Phone Number Payor Plan Fax Number Effective Dates    [address] [phone]  1/1/2017 - None Entered    [facility] [id number]       Subscriber Name Subscriber Birth Date Member ID       JOANIE DODD 1940 [id number]           Secondary Coverage     Payor Plan Insurance Group Employer/Plan Group    [employer]      Payor Plan Address Payor Plan Phone Number Payor Plan Fax Number Effective Dates    [address] [address] [phone]  1/1/2022 - None Entered    [facility] [id number]       Subscriber Name Subscriber Birth Date Member ID       JOANIE DODD 1940 [id number]                 Emergency Contacts     Contact " Person (Rel.) Home Phone Work Phone Mobile Phone    Kendall Basurto (Daughter) [phone] -- --               History & Physical      Jorge Cuellar MD at 23 2314            [facility]      Name:  Bobby Dodd   Age:  82 y.o.  Sex:  male  :  1940  MRN:  [id number]   Visit Number:  [id number]  Admission Date:  3/14/2023  Date Of Service:  23  Primary Care Physician:  Melissa Perdomo APRN    Chief Complaint:     Shortness of breath    History Of Presenting Illness:      Patient is an 82 years old male with a past medical history of A-fib on Eliquis, systolic heart failure, CAD, hypertension, hyperlipidemia, obstructive sleep apnea, status post pacemaker who presented to the ER with a chief complaint of shortness of breath.  Patient with his daughter at bedside.  Patient reports increased swelling all over his body but more specifically in his bilateral lower extremities over the past several months.  Patient still having shortness of breath that has been progressively worsening over the past couple days, patient with worsening dyspnea with exertion.  Patient has been having increased dry cough that is nonproductive.  He denies fever, chills, chest pain, abdominal pain, nausea or vomiting.  Patient takes Lasix at home and follows up with Dr. Martel with cardiology.  Per patient daughter, patient has been declining to come to the ER for evaluation for a while now and he finally agreed to come and get checked out today..    On ER evaluation, patient was hemodynamically stable and was afebrile.  Patient had couple readings with soft blood pressure in the 80s over 40s while in the ER.  His work-up shows high-sensitivity troponin of 102, proBNP 6929, potassium 5.3, creatinine 1.59 (baseline creatinine of 1.5) BUN 33, hemoglobin 11.7 otherwise within acceptable range on his CBC and CMP.  COVID and flu negative.  Chest x-ray with bilateral pleural  effusions and pulmonary edema per my read.  Case discussed with cardiology Dr. Martel, who recommended admission and starting patient on Lasix.  Patient started on Lasix drip.  Hospitalist consulted for admission.    Review Of Systems:    All systems were reviewed and negative except as mentioned in history of presenting illness, assessment and plan.    Past Medical History: Patient  has a past medical history of A-fib ([facility]), Acute renal failure (ARF) ([facility]) (08/24/2019), Arrhythmia, Arthritis, Bilateral renal cysts, Cellulitis, CHF (congestive heart failure) ([facility]), Coronary artery disease, Diarrhea (08/24/2019), Diverticulosis (2019), Dysphagia, Edema, Elevated prostate specific antigen (PSA), Enlarged prostate, GERD (gastroesophageal reflux disease), History of chest pain, History of smoking, History of stress test, [place] (hard of hearing), Hyperlipidemia, Hypertension, Hypogonadism in male, Hypotension (08/24/2019), Iron deficiency anemia (2019), Joint pain, elbow, Kidney stones, Knee pain, Low back pain, Obstructive sleep apnea, Pacemaker, Palpitations, Rheumatic fever, SOB (shortness of breath), Trochanteric bursitis, and Urinary retention.    Past Surgical History: Patient  has a past surgical history that includes Cholecystectomy; Hernia repair; Pacemaker Implantation; Cardioversion; Other surgical history; Knee arthroscopy; Cardiac electrophysiology procedure (N/A, 6/4/2018); Lung removal, partial (Right); Colonoscopy; Eye surgery (Right); Transurethral resection of prostate; Esophagogastroduodenoscopy (N/A, 10/1/2019); and Cardiac electrophysiology procedure (N/A, 11/23/2021).    Social History: Patient  reports that he quit smoking about 48 years ago. His smoking use included cigarettes. He has a 30.00 pack-year smoking history. He has never used smokeless tobacco. He reports that he does not drink alcohol and does not use drugs.    Family History:  Patient's family history has been reviewed and found to be  noncontributory.     Allergies:      Patient has no known allergies.    Home Medications:    Prior to Admission Medications     Prescriptions Last Dose Informant Patient Reported? Taking?    Entresto 24-26 MG tablet 3/14/2023  No Yes    TAKE 1 TABLET BY MOUTH 2 (TWO) TIMES A DAY.    furosemide (LASIX) 40 MG tablet 3/14/2023 Pharmacy No Yes    Take 1 tablet by mouth Daily.    HYDROcodone-acetaminophen (NORCO) 5-325 MG per tablet 3/14/2023  Yes Yes    Take 1 tablet by mouth.    nebivolol (BYSTOLIC) 10 MG tablet 3/14/2023  No Yes    TAKE 1 TABLET BY MOUTH DAILY.    rosuvastatin (CRESTOR) 10 MG tablet 3/14/2023  No Yes    TAKE 1 TABLET BY MOUTH DAILY.    spironolactone (ALDACTONE) 25 MG tablet 3/14/2023  No Yes    TAKE 1/2 TABLET BY MOUTH EVERY DAY    apixaban (ELIQUIS) 2.5 MG tablet tablet   No No    Take 1 tablet by mouth 2 (Two) Times a Day.    cephalexin (KEFLEX) 500 MG capsule   Yes No    Take 1 capsule by mouth 4 (Four) Times a Day.    Cyanocobalamin 2500 MCG sublingual tablet Unknown  Yes No    Place 2,500 mcg under the tongue.    ferrous sulfate 324 (65 Fe) MG tablet delayed-release EC tablet   Yes No    Take 324 mg by mouth 2 (Two) Times a Day With Meals.    finasteride (PROSCAR) 5 MG tablet Unknown  Yes No    Take 1 tablet by mouth Daily.    folic acid (FOLVITE) 1 MG tablet Unknown Pharmacy Yes No    Take 1 tablet by mouth Daily.    furosemide (LASIX) 20 MG tablet   Yes No    Mirabegron ER (Myrbetriq) 25 MG tablet sustained-release 24 hour 24 hr tablet Unknown  No No    Take 1 tablet by mouth Daily.    pantoprazole (PROTONIX) 40 MG EC tablet  Pharmacy Yes No    Take 1 tablet by mouth Daily.    pantoprazole (PROTONIX) 40 MG EC tablet Unknown  Yes No    Take 1 tablet by mouth Every Morning Before Breakfast.        ED Medications:    Medications   sodium chloride 0.9 % flush 10 mL (has no administration in time range)   sodium chloride 0.9 % flush 10 mL (has no administration in time range)   furosemide (LASIX)  "100 mg in sodium chloride 0.9 % 100 mL infusion (10 mg/hr Intravenous New Bag 3/14/23 0792)     Vital Signs:  Temp:  [98 °F (36.7 °C)] 98 °F (36.7 °C)  Heart Rate:  [61-67] 61  Resp:  [16-18] 18  BP: ()/(48-76) 99/52        03/14/23  1934   Weight: (!) 140 kg (309 lb)     Body mass index is 44.34 kg/m².    Physical Exam:     Most recent vital Signs: BP 99/52   Pulse 61   Temp 98 °F (36.7 °C) (Oral)   Resp 18   Ht 177.8 cm (70\")   Wt (!) 140 kg (309 lb)   SpO2 95%   BMI 44.34 kg/m²     Physical Exam  Vitals and nursing note reviewed.   Constitutional:       Appearance: He is obese.   HENT:      Head: Normocephalic and atraumatic.      Right Ear: External ear normal.      Left Ear: External ear normal.      Nose: Nose normal.      Mouth/Throat:      Mouth: Mucous membranes are moist.   Eyes:      Extraocular Movements: Extraocular movements intact.      Conjunctiva/sclera: Conjunctivae normal.      Pupils: Pupils are equal, round, and reactive to light.   Cardiovascular:      Rate and Rhythm: Normal rate and regular rhythm.      Pulses: Normal pulses.      Heart sounds: Normal heart sounds.   Pulmonary:      Effort: Prolonged expiration present.      Breath sounds: Decreased air movement present. Rhonchi present. No wheezing.   Abdominal:      General: Bowel sounds are normal. There is no distension.      Palpations: Abdomen is soft.      Tenderness: There is no abdominal tenderness.   Musculoskeletal:         General: Normal range of motion.      Cervical back: Normal range of motion and neck supple.      Right lower leg: Swelling present. No tenderness. 3+ Edema present.      Left lower leg: Swelling present. No tenderness. 3+ Edema present.   Skin:     General: Skin is warm and dry.      Findings: Erythema present.      Comments: Bilateral anterior lower extremities erythema with edema and chronic venous stasis.   Neurological:      General: No focal deficit present.      Mental Status: He is alert and " oriented to person, place, and time. Mental status is at baseline.      Motor: No weakness.   Psychiatric:         Mood and Affect: Mood normal.         Behavior: Behavior normal.         Thought Content: Thought content normal.         Laboratory data:    I have reviewed the labs done in the emergency room.    Results from last 7 days   Lab Units 03/14/23 1952   SODIUM mmol/L 138   POTASSIUM mmol/L 5.3*   CHLORIDE mmol/L 99   CO2 mmol/L 30.2*   BUN mg/dL 33*   CREATININE mg/dL 1.59*   CALCIUM mg/dL 9.1   BILIRUBIN mg/dL 0.5   ALK PHOS U/L 76   ALT (SGPT) U/L 11   AST (SGOT) U/L 18   GLUCOSE mg/dL 94     Results from last 7 days   Lab Units 03/14/23 1952   WBC 10*3/mm3 5.72   HEMOGLOBIN g/dL 11.7*   HEMATOCRIT % 37.5   PLATELETS 10*3/mm3 176         Results from last 7 days   Lab Units 03/14/23 1952   HSTROP T ng/L 102*     Results from last 7 days   Lab Units 03/14/23 1952   PROBNP pg/mL 6,929.0*                       Invalid input(s): USDES,  BLOODU, NITRITITE, BACT, EP    Pain Management Panel    There is no flowsheet data to display.         EKG:      Paced rhythm, heart rate 67, nonspecific ST/T wave changes.    Radiology:    No radiology results for the last 3 days    Assessment:    1. Acute on chronic systolic heart failure, POA  2. Elevated troponin, likely demand ischemia, POA  3. Chronic kidney disease stage III  4. Permanent A-fib on Eliquis  5. Complete heart block, s/p pacemaker  6. CAD  7. Hypertension  8. Hyperlipidemia  9. Obstructive sleep apnea  10. Status post right lung lobectomy    Plan:    Patient is admitted to telemetry for further evaluation and treatment.    Acute on chronic systolic heart failure.  -Strict ins and outs, low-sodium, fluid restriction  -Started on Lasix drip  -Previous 2D echo on file from 2019 with a EF of 35%.  -2D echo ordered.  -Cardiology consulted, appreciate their recommendations.  -Cardiology recommending palliative consult.    Elevated troponin  -Likely demand  ischemia in the settings of CHF and CKD  -Patient denies any chest pain, low suspicion for ACS currently.  -Trending troponins    Chronic kidney disease  -Monitor while on diuretics  -Avoid nephrotoxic drugs  -Holding spironolactone    Further orders as indicated per clinical course.  Continue home meds as warranted.    Confirmed with patient and his daughter at bedside about his wishes for CODE STATUS, patient declines intubation or CPR.  Patient with full understanding of his wishes and full decision-making capacity.  CODE STATUS was ordered as DNR/DNI per his wishes.    Risk Assessment: Moderate to high  DVT Prophylaxis: On Eliquis  Code Status: DNR/DNI  Diet: Cardiac, low-sodium    Advance Care Planning   ACP discussion was held with the patient during this visit. Patient does not have an advance directive, information provided.      Jorge Cuellar MD  03/14/23  23:12 EDT    Dictated utilizing Dragon dictation.    Electronically signed by Jorge Cuellar MD at 03/15/23 0047          Emergency Department Notes      Alex Lake MD at 03/14/23 1951            HPI: Bobby Dodd is a 82 y.o. male who presents to the emergency department complaining of shortness of breath and swelling.  He states that for several months he has had increasing swelling.  The last several days or so has had worsening shortness of breath.  This is much worse when he exerts himself.  He has had some dry cough and PND.  No fevers, vomiting or other complaints.  Patient reports compliance with medications.      REVIEW OF SYSTEMS: All other systems reviewed and are negative     PAST MEDICAL HISTORY:   Past Medical History:   Diagnosis Date   • A-fib ([facility])    • Acute renal failure (ARF) ([facility]) 08/24/2019    Hospital admission   • Arrhythmia    • Arthritis    • Bilateral renal cysts    • Cellulitis    • CHF (congestive heart failure) ([facility])    • Coronary artery disease    • Diarrhea 08/24/2019   • Diverticulosis 2019    Mild, noted  on imaging    • Dysphagia    • Edema    • Elevated prostate specific antigen (PSA)    • Enlarged prostate    • GERD (gastroesophageal reflux disease)    • History of chest pain    • History of smoking    • History of stress test    • [place] (hard of hearing)     Mild-No hearing aids    • Hyperlipidemia    • Hypertension    • Hypogonadism in male    • Hypotension 2019    Upon hospital admission    • Iron deficiency anemia 2019   • Joint pain, elbow    • Kidney stones    • Knee pain    • Low back pain    • Obstructive sleep apnea     non-compliant with mask   • Pacemaker    • Palpitations    • Rheumatic fever    • SOB (shortness of breath)    • Trochanteric bursitis    • Urinary retention         FAMILY HISTORY:   Family History   Problem Relation Age of Onset   • Arthritis Mother    • Hypertension Mother    • Heart attack Father    • Arthritis Sister         SOCIAL HISTORY:   Social History     Socioeconomic History   • Marital status:    Tobacco Use   • Smoking status: Former     Packs/day: 1.00     Years: 30.00     Pack years: 30.00     Types: Cigarettes     Quit date:      Years since quittin.2   • Smokeless tobacco: Never   Substance and Sexual Activity   • Alcohol use: No   • Drug use: No   • Sexual activity: Defer        SURGICAL HISTORY:   Past Surgical History:   Procedure Laterality Date   • CARDIAC ELECTROPHYSIOLOGY PROCEDURE N/A 2018    Procedure: Internal Cardioversion;  Surgeon: Tho Richards MD;  Location:  [facility];  Service: Cardiology   • CARDIAC ELECTROPHYSIOLOGY PROCEDURE N/A 2021    Procedure: PPM generator change - dual;  Surgeon: Damian Doshi DO;  Location:  [facility];  Service: Cardiology;  Laterality: N/A;   • CARDIOVERSION      per DR. RICHARDS   • CHOLECYSTECTOMY     • COLONOSCOPY     • CYSTOSCOPY TRANSURETHRAL RESECTION OF PROSTATE     • ENDOSCOPY N/A 10/1/2019    Procedure: ESOPHAGOGASTRODUODENOSCOPY WITH BIOPSIES;   Surgeon: Jose Alfredo Brewer MD;  Location: [facility] END[facility];  Service: Gastroenterology   • EYE SURGERY Right     Retinal tear repair    • HERNIA REPAIR     • KNEE ARTHROSCOPY      RIGHT   • LUNG REMOVAL, PARTIAL Right    • OTHER SURGICAL HISTORY      GOITER REMOVED 2001.   • PACEMAKER IMPLANTATION      [facility]        ALLERGIES: Patient has no known allergies.       PHYSICAL EXAM:   VITAL SIGNS:   Vitals:    03/14/23 2230   BP: 99/52   Pulse: 61   Resp:    Temp:    SpO2: 95%      CONSTITUTIONAL: Awake, well appearing, nontoxic   HENT: Atraumatic, normocephalic, oral mucosa moist, airway patent. Nares patent without drainage. External ears normal.   EYES: Conjunctivae clear, EOMI, PERRL   NECK: Trachea midline, nontender, supple   CARDIOVASCULAR: Normal heart rate, Normal rhythm.  PULMONARY/CHEST: Normal work of breathing.  Diminished throughout.  ABDOMINAL: Nondistended, soft, nontender, no rebound or guarding.  NEUROLOGIC: Nonfocal, moves all four extremities, no gross sensory or motor deficits.   EXTREMITIES: No clubbing, cyanosis.  Pitting edema to the upper thighs bilaterally  SKIN: Warm, Dry.  Some chronic venous changes      ED COURSE / MEDICAL DECISION MAKING:     Bobby Dodd is a 82 y.o. male who presents to the emergency department for evaluation of shortness of breath and swelling.  Well-developed, well-nourished obese man in no distress with exam as above.  His vital signs are normal.  His oxygen saturation is borderline on room air at 92%.  His exam is notable for diminished lung sounds and pitting edema.  Will obtain labs, chest x-ray and EKG.  Disposition pending.    Differential diagnosis includes CHF, ACS, pneumonia, viral illness among other etiologies.    EKG interpreted by me: Ventricularly paced rhythm, normal rate, no obvious acute ST changes, this is an abnormal EKG    Lab work notable for elevated troponin and elevated BNP.  Kidney function appears to be baseline or slightly better.  Chest  x-ray per my interpretation reveals pulmonary edema, bilateral pleural effusions.  Patient did have some transient hypotension here in the ER with systolics in the 70s.  Now he is maintaining in the 90s.  I discussed the case with Dr. Martel, he felt there was not much we could offer patient, no role for inotropes.  We will try Lasix drip.  He recommended palliative care consult.  Discussed with Dr. Cuellar for admission.  Patient and family updated on plan of care and are agreeable.    Final diagnoses:   Acute on chronic congestive heart failure, unspecified heart failure type (HCC)   Hypotension, unspecified hypotension type   Elevated troponin        Alex Lake MD  03/14/23 2316      Electronically signed by Alex Lake MD at 03/14/23 2316     Leora Nguyen at 03/14/23 2318        Patient will be going to [facility]. Theresa ETIENNE notified.     Electronically signed by Leora Nguyen at 03/14/23 2319       Vital Signs (last day)     Date/Time Temp Temp src Pulse Resp BP Patient Position SpO2    03/15/23 0449 -- -- -- -- 102/59 Lying --    03/15/23 0438 97.6 (36.4) Oral 71 18 75/55 Lying 96    03/15/23 0015 -- -- 63 -- -- Lying 98    03/15/23 0014 97.4 (36.3) Oral 92 18 78/49 Sitting 96    03/14/23 2230 -- -- 61 -- 99/52 -- 95    03/14/23 2200 -- -- 65 -- 83/48 -- 95    03/14/23 2145 -- -- 67 18 94/56 -- 94    03/14/23 2137 -- -- 64 -- -- -- 93    03/14/23 2136 -- -- 65 -- 85/48 -- --    03/14/23 2130 -- -- 62 -- 98/58 -- 95    03/14/23 2100 -- -- 64 -- 92/57 -- 93    03/14/23 2030 -- -- 61 -- 99/53 -- 94    03/14/23 2000 -- -- 67 -- 101/65 -- 94    03/14/23 1948 -- -- 64 -- 100/64 -- 94    03/14/23 1934 98 (36.7) Oral 67 16 111/76 Sitting 93            Current Facility-Administered Medications   Medication Dose Route Frequency Provider Last Rate Last Admin   • acetaminophen (TYLENOL) tablet 650 mg  650 mg Oral Q4H PRN Jorge Cuellar MD        Or   • acetaminophen (TYLENOL) 160 MG/5ML solution 650 mg   650 mg Oral Q4H PRN Jorge Cuellar MD        Or   • acetaminophen (TYLENOL) suppository 650 mg  650 mg Rectal Q4H PRN Jorge Cuellar MD       • apixaban (ELIQUIS) tablet 2.5 mg  2.5 mg Oral BID Jorge Cuellar MD   2.5 mg at 03/15/23 0109   • sennosides-docusate (PERICOLACE) 8.6-50 MG per tablet 2 tablet  2 tablet Oral BID Jorge Cuellar MD   2 tablet at 03/15/23 0109    And   • polyethylene glycol (MIRALAX) packet 17 g  17 g Oral Daily PRN Jorge Cuellar MD        And   • bisacodyl (DULCOLAX) EC tablet 5 mg  5 mg Oral Daily PRN Jorge Cuellar MD        And   • bisacodyl (DULCOLAX) suppository 10 mg  10 mg Rectal Daily PRN Jorge Cuellar MD       • furosemide (LASIX) 100 mg in sodium chloride 0.9 % 100 mL infusion  10 mg/hr Intravenous Continuous Alex Lake MD 10 mL/hr at 03/14/23 2242 10 mg/hr at 03/14/23 2242   • melatonin tablet 5 mg  5 mg Oral Nightly PRN Jorge Cuellar MD   5 mg at 03/15/23 0109   • nebivolol (BYSTOLIC) tablet 10 mg  10 mg Oral Daily Jorge Cuellar MD       • ondansetron (ZOFRAN) injection 4 mg  4 mg Intravenous Q6H PRN Jorge Cuellar MD       • pantoprazole (PROTONIX) EC tablet 40 mg  40 mg Oral Daily Jorge Cuellar MD   40 mg at 03/15/23 0543   • rosuvastatin (CRESTOR) tablet 10 mg  10 mg Oral Daily Jorge Cuellar MD       • sacubitril-valsartan (ENTRESTO) 24-26 MG tablet 1 tablet  1 tablet Oral BID Jorge Cuellar MD   1 tablet at 03/15/23 0109   • sodium chloride 0.9 % flush 10 mL  10 mL Intravenous PRN Alex Lake MD       • sodium chloride 0.9 % flush 10 mL  10 mL Intravenous PRN Emergency, Triage Protocol, MD       • sodium chloride 0.9 % flush 3 mL  3 mL Intravenous Q12H Jorge Cuellar MD   3 mL at 03/15/23 0109   • sodium chloride 0.9 % flush 3-10 mL  3-10 mL Intravenous PRN Jorge Cuellar MD       • sodium chloride 0.9 % infusion 40 mL  40 mL Intravenous PRN Jorge Cuellar MD           Lab Results (last 24 hours)     Procedure  Component Value Units Date/Time    Basic Metabolic Panel [[id number]]  (Abnormal) Collected: 03/15/23 0547    Specimen: Blood Updated: 03/15/23 0643     Glucose 93 mg/dL      BUN 33 mg/dL      Creatinine 1.76 mg/dL      Sodium 139 mmol/L      Potassium 5.3 mmol/L      Chloride 101 mmol/L      CO2 31.3 mmol/L      Calcium 9.0 mg/dL      BUN/Creatinine Ratio 18.8     Anion Gap 6.7 mmol/L      eGFR 38.4 mL/min/1.73     Narrative:      GFR Normal >60  Chronic Kidney Disease <60  Kidney Failure <15    The GFR formula is only valid for adults with stable renal function between ages 18 and 70.    CBC (No Diff) [[id number]]  (Abnormal) Collected: 03/15/23 0547    Specimen: Blood Updated: 03/15/23 0626     WBC 4.50 10*3/mm3      RBC 3.65 10*6/mm3      Hemoglobin 10.7 g/dL      Hematocrit 34.4 %      MCV 94.2 fL      MCH 29.3 pg      MCHC 31.1 g/dL      RDW 15.3 %      RDW-SD 53.0 fl      MPV 11.1 fL      Platelets 153 10*3/mm3     High Sensitivity Troponin T [[id number]]  (Abnormal) Collected: 03/15/23 0058    Specimen: Blood Updated: 03/15/23 0147     HS Troponin T 99 ng/L     Narrative:      High Sensitive Troponin T Reference Range:  <10.0 ng/L- Negative Female for AMI  <15.0 ng/L- Negative Male for AMI  >=10 - Abnormal Female indicating possible myocardial injury.  >=15 - Abnormal Male indicating possible myocardial injury.   Clinicians would have to utilize clinical acumen, EKG, Troponin, and serial changes to determine if it is an Acute Myocardial Infarction or myocardial injury due to an underlying chronic condition.         [place] Draw [[id number]] Collected: 03/14/23 1952    Specimen: Blood Updated: 03/14/23 2100    Narrative:      The following orders were created for panel order [facility] Draw.  Procedure                               Abnormality         Status                     ---------                               -----------         ------                     Green Top (Gel)[[id number]]                                   Final result               Lavender Top[[id number]]                                     Final result               Gold Top - SST[[id number]]                                   Final result               Light Blue Top[[id number]]                                   Final result                 Please view results for these tests on the individual orders.    Green Top (Gel) [[id number]] Collected: 03/14/23 1952    Specimen: Blood Updated: 03/14/23 2100     Extra Tube Hold for add-ons.     Comment: Auto resulted.       Gold Top - SST [[id number]] Collected: 03/14/23 1952    Specimen: Blood Updated: 03/14/23 2100     Extra Tube Hold for add-ons.     Comment: Auto resulted.       Light Blue Top [[id number]] Collected: 03/14/23 1952    Specimen: Blood Updated: 03/14/23 2100     Extra Tube Hold for add-ons.     Comment: Auto resulted       Lavender Top [[id number]] Collected: 03/14/23 1952    Specimen: Blood Updated: 03/14/23 2100     Extra Tube hold for add-on     Comment: Auto resulted       COVID-19 and FLU A/B PCR - Swab, Nasopharynx [[id number]]  (Normal) Collected: 03/14/23 2031    Specimen: Swab from Nasopharynx Updated: 03/14/23 2058     COVID19 Not Detected     Influenza A PCR Not Detected     Influenza B PCR Not Detected    Narrative:      Fact sheet for providers: https://www.fda.gov/media/909475/download    Fact sheet for patients: https://www.fda.gov/media/136006/download    Test performed by PCR.    Single High Sensitivity Troponin T [[id number]]  (Abnormal) Collected: 03/14/23 1952    Specimen: Blood Updated: 03/14/23 2033     HS Troponin T 102 ng/L     Narrative:      High Sensitive Troponin T Reference Range:  <10.0 ng/L- Negative Female for AMI  <15.0 ng/L- Negative Male for AMI  >=10 - Abnormal Female indicating possible myocardial injury.  >=15 - Abnormal Male indicating possible myocardial injury.   Clinicians would have to utilize clinical acumen, EKG, Troponin, and serial changes to determine if it is an  Acute Myocardial Infarction or myocardial injury due to an underlying chronic condition.         BNP [[id number]]  (Abnormal) Collected: 03/14/23 1952    Specimen: Blood Updated: 03/14/23 2023     proBNP 6,929.0 pg/mL     Narrative:      Among patients with dyspnea, NT-proBNP is highly sensitive for the detection of acute congestive heart failure. In addition NT-proBNP of <300 pg/ml effectively rules out acute congestive heart failure with 99% negative predictive value.    Results may be falsely decreased if patient taking Biotin.      Comprehensive Metabolic Panel [[id number]]  (Abnormal) Collected: 03/14/23 1952    Specimen: Blood Updated: 03/14/23 2017     Glucose 94 mg/dL      BUN 33 mg/dL      Creatinine 1.59 mg/dL      Sodium 138 mmol/L      Potassium 5.3 mmol/L      Comment: Slight hemolysis detected by analyzer. Results may be affected.        Chloride 99 mmol/L      CO2 30.2 mmol/L      Calcium 9.1 mg/dL      Total Protein 6.1 g/dL      Albumin 3.4 g/dL      ALT (SGPT) 11 U/L      AST (SGOT) 18 U/L      Alkaline Phosphatase 76 U/L      Total Bilirubin 0.5 mg/dL      Globulin 2.7 gm/dL      A/G Ratio 1.3 g/dL      BUN/Creatinine Ratio 20.8     Anion Gap 8.8 mmol/L      eGFR 43.3 mL/min/1.73     Narrative:      GFR Normal >60  Chronic Kidney Disease <60  Kidney Failure <15    The GFR formula is only valid for adults with stable renal function between ages 18 and 70.    CBC & Differential [[id number]]  (Abnormal) Collected: 03/14/23 1952    Specimen: Blood Updated: 03/14/23 2000    Narrative:      The following orders were created for panel order CBC & Differential.  Procedure                               Abnormality         Status                     ---------                               -----------         ------                     CBC Auto Differential[[id number]]        Abnormal            Final result                 Please view results for these tests on the individual orders.    CBC Auto Differential  [[id number]]  (Abnormal) Collected: 03/14/23 1952    Specimen: Blood Updated: 03/14/23 2000     WBC 5.72 10*3/mm3      RBC 4.01 10*6/mm3      Hemoglobin 11.7 g/dL      Hematocrit 37.5 %      MCV 93.5 fL      MCH 29.2 pg      MCHC 31.2 g/dL      RDW 15.3 %      RDW-SD 52.8 fl      MPV 11.2 fL      Platelets 176 10*3/mm3      Neutrophil % 69.4 %      Lymphocyte % 21.0 %      Monocyte % 7.2 %      Eosinophil % 1.0 %      Basophil % 1.2 %      Immature Grans % 0.2 %      Neutrophils, Absolute 3.97 10*3/mm3      Lymphocytes, Absolute 1.20 10*3/mm3      Monocytes, Absolute 0.41 10*3/mm3      Eosinophils, Absolute 0.06 10*3/mm3      Basophils, Absolute 0.07 10*3/mm3      Immature Grans, Absolute 0.01 10*3/mm3      nRBC 0.0 /100 WBC         Imaging Results (Last 24 Hours)     Procedure Component Value Units Date/Time    XR Chest 1 View [[id number]] Resulted: 03/14/23 2010     Updated: 03/14/23 2010        Physician Progress Notes (last 24 hours)  Notes from 03/14/23 0729 through 03/15/23 0729   No notes of this type exist for this encounter.

## 2023-03-15 NOTE — PLAN OF CARE
Goal Outcome Evaluation:              Outcome Evaluation: Pt admitted from ER with CHF.  Continues on Lasix IV.  VSS with B/P running low and MD aware.  Labs per orders.  Paced rhythm on telemetry.

## 2023-03-15 NOTE — CASE MANAGEMENT/SOCIAL WORK
Discharge Planning Assessment   [facility]     Patient Name: Bobby Dodd  MRN: [id number]  Today's Date: 3/15/2023    Admit Date: 3/14/2023    Plan: Pt is planning to return home upon DC where he resides alone. Pt describes being able to perform all of his ADLS indepenently. Pt does have an adult daughter who is able to assist him when needed. Pt does not have any onging needs at this time.Pt does not foresee needing HH or DME however verbalizes that he has no preference on a provider.   Discharge Needs Assessment    No documentation.                Discharge Plan     Row Name 03/15/23 1053       Plan    Plan Pt is planning to return home upon DC where he resides alone. Pt describes being able to perform all of his ADLS indepenently. Pt does have an adult daughter who is able to assist him when needed. Pt does not have any onging needs at this time.Pt does not foresee needing HH or DME however verbalizes that he has no preference on a provider.    Patient/Family in Agreement with Plan yes              Continued Care and Services - Admitted Since 3/14/2023    Coordination has not been started for this encounter.          Demographic Summary     Row Name 03/15/23 1048       General Information    Admission Type inpatient    Arrived From home    Required Notices Provided Important Message from Medicare    Referral Source emergency department    Reason for Consult discharge planning    Preferred Language English               Functional Status     Row Name 03/15/23 1050       Functional Status    Usual Activity Tolerance moderate    Current Activity Tolerance moderate       Physical Activity    On average, how many days per week do you engage in moderate to strenuous exercise (like a brisk walk)? 0 days    On average, how many minutes do you engage in exercise at this level? 0 min    Number of minutes of exercise per week 0       Assessment of Health Literacy    How often do you have someone help you read hospital  materials? Often    How often do you have problems learning about your medical condition because of difficulty understanding written information? Often    How often do you have a problem understanding what is told to you about your medical condition? Often    How confident are you filling out medical forms by yourself? A little bit    Health Literacy Fair       Functional Status, IADL    Medications independent    Meal Preparation independent    Housekeeping independent    Laundry independent    Shopping independent    IADL Comments Pt reports completing all of his ADLs independently.       Mental Status    General Appearance WDL WDL       Employment/    Employment Status retired    Current or Previous Occupation [employer] work    Employment/ Comments Retired from [employer] in [place]               Psychosocial     Row Name 03/15/23 1052       Values/Beliefs    Spiritual, Cultural Beliefs, Restoration Practices, Values that Affect Care no       Behavior WDL    Behavior WDL WDL       Emotion Mood WDL    Emotion/Mood/Affect WDL WDL       Mental Health    Little Interest or Pleasure in Doing Things 0-->not at all    Feeling Down, Depressed or Hopeless 0-->not at all    Do you feel stress - tense, restless, nervous, or anxious, or unable to sleep at night because your mind is troubled all the time - these days? Only a littl       Speech WDL    Speech WDL WDL       Perceptual State WDL    Perceptual State WDL WDL       Thought Process WDL    Thought Process WDL WDL       Intellectual Performance WDL    Intellectual Performance WDL WDL       Coping/Stress    Major Change/Loss/Stressor none    Patient Personal Strengths able to adapt;expressive of emotions;expressive of needs;positive attitude    Sources of Support adult child(ruthie)    Techniques to [place] with Loss/Stress/Change diversional activities    Reaction to Health Status accepting;adjusting    Understanding of Condition and Treatment adequate  understanding of medical condition;adequate understanding of treatment       Developmental Stage ([place]'s)    Developmental Stage Stage 8 (66 years-death/Late Adulthood) Integrity vs. Despair       C-SSRS (Recent)    Q1 Wished to be Dead (Past Month) no    Q2 Suicidal Thoughts (Past Month) no    Q6 Suicide Behavior (Lifetime) no       Violence Risk    Feels Like Hurting Others no    Previous Attempt to Harm Others no                Vanessa Gates

## 2023-03-15 NOTE — PLAN OF CARE
Goal Outcome Evaluation:  Plan of Care Reviewed With: patient, daughter        Progress: no change  Outcome Evaluation: Patient participates well in skilled PT evaluation and demonstrates decreased activity tolerance.  Patient is able to perform bed mobility without assitance.  He requires no more than CGA for transfers and to walk 5 feet to the chair.  He has fair balance, but becomes fatigued quickly.  He is expected to benefit from additional PT to improve activity tolerance prior to returning home.

## 2023-03-15 NOTE — PLAN OF CARE
Goal Outcome Evaluation:  Plan of Care Reviewed With: patient, daughter        Progress: no change  Outcome Evaluation: Pt agreeable to participate in OT IE at this time. Pt reports 0/10 pain positioned in fowlers in the bed. His bp was 70/33 and 02 sats @90% on room air. He denied dizziness or SOA. Pt reports living alone and independence with ADLs and household mobility. Pt mod I for supine to sit eob. Pt demonstrates good sitting static balance and moves to standing with CGA for balance and safety. Pt ambulated 2' to chair with CGA. Pt demonstrates  5/5 B UE MMT and ROM WFL. Pt demonstrates doffing/donning sock with increased time and CGA for balance with 02 sats declining to 88% and evidence of SOA. Pt bp increased to 81/50 after engaging in eval. Pt to benefit from skilled OT services to address activity tolerance and AE training for LB ADLs to ensure safe d/c and return to PLOF.

## 2023-03-15 NOTE — CONSULTS
[facility]-Cardiology Consult Note    Referring Provider: Dakota  Reason for Consultation: Congestive heart failure    Patient Care Team:  Melissa Perdomo APRN as PCP - General (Family Medicine)  Jose Alfredo Brewer MD as Consulting Physician (General Surgery)    Chief complaint : Edema    Subjective:    History of present illness: This is an 82-year-old male patient with chronic biventricular heart failure.  The patient was initially diagnosed with nonischemic dilated cardiomyopathy around 2016 when an echocardiogram suggested an ejection fraction of 20%.  The patient subsequently had a vasodilator nuclear stress test and a MUGA LVEF calculation around 2018 demonstrating an ejection fraction of greater than 40%.  His most recent echocardiogram in 2019 suggested his ejection fraction to be approximately 35%.  The patient has a history of chronic right heart failure due to a combination of untreated obstructive sleep apnea and Pickwickian syndrome.  He has morbid obesity.  He has chronic venous stasis disease with evidence of significant lymphedema.  He has had multiple bilateral lower extremity venous stasis ulcerations with associated cellulitis resulting in severe lymphatic scarring.  The patient was previously in a wound care clinic and had complete resolution of his venous stasis ulcerations but these have now subsequently returned with evidence of cellulitis.  He is noncompliant with previously prescribed Job stockings.  He is noncompliant with his fluid and sodium restriction.  We have made arrangements in the outpatient cardiology clinic to have the patient referred on multiple occasions to the outpatient dietitian to discuss strategies as to how to maintain his fluid and sodium restriction at home; however, the patient has never kept any of these follow-up appointments or adhered to recommended salt restrictions.  He is noncompliant with his Job stocking therapy.  The patient presented to the emergency room with  worsening shortness of breath and peripheral edema.  Chest x-ray demonstrated cardiomegaly with bilateral pleural effusions.  proBNP was elevated.  The patient has been significantly hypotensive with systolic blood pressures in the 70 mmHg range.  As an outpatient, his primary care provider has been escalating his diuretic therapy to assist in the treatment of his peripheral edema.  At his most recent outpatient cardiology clinic visit last month, I expressed to him concerned that he was at risk for overdiuresis given the multitude of high-dose loop and non-loop diuretics he was receiving.  The patient has a history of permanent atrial fibrillation treated with rate control and anticoagulation strategy.  He has had no bleeding complications related to anticoagulation therapy with Eliquis.  There have been no signs or symptoms to suggest stroke, TIA or other cardioembolic events.  The patient has a history of tachycardia-bradycardia syndrome and has a normally functioningVVIr permanent pacemaker.  He has been paced since presentation.    Review of Systems   Review of Systems   Constitutional: Negative for chills, diaphoresis, fever, malaise/fatigue, weight gain and weight loss.   HENT: Negative for ear discharge, hearing loss, hoarse voice and nosebleeds.    Eyes: Negative for discharge, double vision, pain and photophobia.   Cardiovascular: Positive for leg swelling. Negative for chest pain, claudication, cyanosis, dyspnea on exertion, irregular heartbeat, near-syncope, orthopnea, palpitations, paroxysmal nocturnal dyspnea and syncope.   Respiratory: Positive for shortness of breath. Negative for cough, hemoptysis, sputum production and wheezing.    Endocrine: Negative for cold intolerance, heat intolerance, polydipsia, polyphagia and polyuria.   Hematologic/Lymphatic: Negative for adenopathy and bleeding problem. Does not bruise/bleed easily.   Skin: Negative for color change, flushing, itching and rash.    Musculoskeletal: Negative for muscle cramps, muscle weakness, myalgias and stiffness.   Gastrointestinal: Negative for abdominal pain, diarrhea, hematemesis, hematochezia, nausea and vomiting.   Genitourinary: Negative for dysuria, frequency and nocturia.   Neurological: Negative for focal weakness, loss of balance, numbness, paresthesias and seizures.   Psychiatric/Behavioral: Negative for altered mental status, hallucinations and suicidal ideas.   Allergic/Immunologic: Negative for HIV exposure, hives and persistent infections.       History  Past Medical History:   Diagnosis Date   • A-fib (Formerly [facility])    • Acute renal failure (ARF) (Formerly [facility]) 08/24/2019    Hospital admission   • Arrhythmia    • Arthritis    • Bilateral renal cysts    • Cellulitis    • CHF (congestive heart failure) (Formerly [facility])    • Coronary artery disease    • Diarrhea 08/24/2019   • Diverticulosis 2019    Mild, noted on imaging    • Dysphagia    • Edema    • Elevated prostate specific antigen (PSA)    • Enlarged prostate    • GERD (gastroesophageal reflux disease)    • History of chest pain    • History of smoking    • History of stress test    • [place] (hard of hearing)     Mild-No hearing aids    • Hyperlipidemia    • Hypertension    • Hypogonadism in male    • Hypotension 08/24/2019    Upon hospital admission    • Iron deficiency anemia 2019   • Joint pain, elbow    • Kidney stones    • Knee pain    • Low back pain    • Obstructive sleep apnea     non-compliant with mask   • Pacemaker    • Palpitations    • Rheumatic fever    • SOB (shortness of breath)    • Trochanteric bursitis    • Urinary retention    ,   Past Surgical History:   Procedure Laterality Date   • CARDIAC ELECTROPHYSIOLOGY PROCEDURE N/A 6/4/2018    Procedure: Internal Cardioversion;  Surgeon: Tho Richards MD;  Location: [facility] INVASIVE LOCATION;  Service: Cardiology   • CARDIAC ELECTROPHYSIOLOGY PROCEDURE N/A 11/23/2021    Procedure: PPM generator change - dual;  Surgeon: Damian Doshi  D, DO;  Location:  [facility] INVASIVE LOCATION;  Service: Cardiology;  Laterality: N/A;   • CARDIOVERSION      per DR. BOYER   • CHOLECYSTECTOMY     • COLONOSCOPY     • CYSTOSCOPY TRANSURETHRAL RESECTION OF PROSTATE     • ENDOSCOPY N/A 10/1/2019    Procedure: ESOPHAGOGASTRODUODENOSCOPY WITH BIOPSIES;  Surgeon: Jose Alfredo Brewer MD;  Location: [facility] ENDOSCOPY;  Service: Gastroenterology   • EYE SURGERY Right     Retinal tear repair    • HERNIA REPAIR     • KNEE ARTHROSCOPY      RIGHT   • LUNG REMOVAL, PARTIAL Right    • OTHER SURGICAL HISTORY      GOITER REMOVED .   • PACEMAKER IMPLANTATION      [facility]   ,   Family History   Problem Relation Age of Onset   • Arthritis Mother    • Hypertension Mother    • Heart attack Father    • Arthritis Sister    ,   Social History     Tobacco Use   • Smoking status: Former     Packs/day: 1.00     Years: 30.00     Pack years: 30.00     Types: Cigarettes     Quit date:      Years since quittin.2   • Smokeless tobacco: Never   Vaping Use   • Vaping Use: Never used   Substance Use Topics   • Alcohol use: No   • Drug use: No   ,   Medications Prior to Admission   Medication Sig Dispense Refill Last Dose   • Entresto 24-26 MG tablet TAKE 1 TABLET BY MOUTH 2 (TWO) TIMES A DAY. 60 tablet 0 3/14/2023   • furosemide (LASIX) 40 MG tablet Take 1 tablet by mouth Daily. 90 tablet 4 3/14/2023   • nebivolol (BYSTOLIC) 10 MG tablet TAKE 1 TABLET BY MOUTH DAILY. 90 tablet 3 3/14/2023   • rosuvastatin (CRESTOR) 10 MG tablet TAKE 1 TABLET BY MOUTH DAILY. 90 tablet 2 3/14/2023   • spironolactone (ALDACTONE) 25 MG tablet TAKE 1/2 TABLET BY MOUTH EVERY DAY 45 tablet 2 3/14/2023   • apixaban (ELIQUIS) 2.5 MG tablet tablet Take 1 tablet by mouth 2 (Two) Times a Day. (Patient taking differently: Take 2 tablets by mouth Every 12 (Twelve) Hours.) 180 tablet 4    • cephalexin (KEFLEX) 500 MG capsule Take 1 capsule by mouth 4 (Four) Times a Day. (Patient not taking: Reported on 3/15/2023)    "Not Taking   • Cyanocobalamin 2500 MCG sublingual tablet Place 2,500 mcg under the tongue. (Patient not taking: Reported on 3/15/2023)   Not Taking   • ferrous sulfate 324 (65 Fe) MG tablet delayed-release EC tablet Take 324 mg by mouth 2 (Two) Times a Day With Meals. (Patient not taking: Reported on 3/15/2023)   Not Taking   • finasteride (PROSCAR) 5 MG tablet Take 1 tablet by mouth Daily. (Patient not taking: Reported on 3/15/2023)   Not Taking   • folic acid (FOLVITE) 1 MG tablet Take 1 tablet by mouth Daily. (Patient not taking: Reported on 3/15/2023)   Not Taking   • furosemide (LASIX) 20 MG tablet  (Patient not taking: Reported on 3/15/2023)   Not Taking   • HYDROcodone-acetaminophen (NORCO) 5-325 MG per tablet Take 1 tablet by mouth. (Patient not taking: Reported on 3/15/2023)   Not Taking   • Mirabegron ER (Myrbetriq) 25 MG tablet sustained-release 24 hour 24 hr tablet Take 1 tablet by mouth Daily. (Patient not taking: Reported on 3/15/2023) 30 tablet 11 Not Taking   • pantoprazole (PROTONIX) 40 MG EC tablet Take 1 tablet by mouth Daily. (Patient not taking: Reported on 3/15/2023)   Not Taking   • pantoprazole (PROTONIX) 40 MG EC tablet Take 1 tablet by mouth Every Morning Before Breakfast. (Patient not taking: Reported on 3/15/2023)   Not Taking    and Allergies:  Patient has no known allergies.    Objective:    Vital Sign Min/Max for last 24 hours  Temp  Min: 97.4 °F (36.3 °C)  Max: 98 °F (36.7 °C)   BP  Min: 72/43  Max: 111/76   Pulse  Min: 61  Max: 92   Resp  Min: 16  Max: 18   SpO2  Min: 93 %  Max: 98 %   Flow (L/min)  Min: 2  Max: 2   Weight  Min: 140 kg (308 lb 10.3 oz)  Max: 141 kg (309 lb 15.5 oz)     Flowsheet Rows    Flowsheet Row First Filed Value   Admission Height 177.8 cm (70\") Documented at 03/14/2023 1934   Admission Weight 140 kg (309 lb) Documented at 03/14/2023 1934             Echo EF Estimated  Lab Results   Component Value Date    ECHOEFEST 69.0 03/15/2023       Physical Exam:   Vitals " "and nursing note reviewed.   Constitutional:       Appearance: Healthy appearance. Not in distress.   Neck:      Vascular: No JVR. JVD normal.   Pulmonary:      Effort: Pulmonary effort is normal.      Breath sounds: Normal breath sounds. No wheezing. No rhonchi. No rales.   Chest:      Chest wall: Not tender to palpatation.   Cardiovascular:      PMI at left midclavicular line. Normal rate. Regular rhythm. Normal S1. Normal S2.      Murmurs: There is no murmur.      No gallop. No click. No rub.   Pulses:     Intact distal pulses.   Edema:     Peripheral edema absent.   Abdominal:      General: Bowel sounds are normal.      Palpations: Abdomen is soft.      Tenderness: There is no abdominal tenderness.   Musculoskeletal: Normal range of motion.         General: No tenderness. Skin:     General: Skin is warm and dry.      Comments: Typical venous stasis dermatitis with evidence of venous stasis ulceration of the left lower extremity anteriorly.  There is evidence of surrounding cellulitis.  Bilateral lower extremity lymphedema with \"peau d'orange\" appearance.   Neurological:      General: No focal deficit present.      Mental Status: Alert and oriented to person, place and time.         Results Review:   I reviewed the patient's new clinical results.  Results from last 7 days   Lab Units 03/15/23  0547 03/14/23 1952   WBC 10*3/mm3 4.50 5.72   HEMOGLOBIN g/dL 10.7* 11.7*   HEMATOCRIT % 34.4* 37.5   PLATELETS 10*3/mm3 153 176     Results from last 7 days   Lab Units 03/15/23  0547 03/14/23 1952   SODIUM mmol/L 139 138   POTASSIUM mmol/L 5.3* 5.3*   CHLORIDE mmol/L 101 99   CO2 mmol/L 31.3* 30.2*   BUN mg/dL 33* 33*   CREATININE mg/dL 1.76* 1.59*   GLUCOSE mg/dL 93 94   CALCIUM mg/dL 9.0 9.1     Lab Results   Lab Value Date/Time    TROPONINT 99 (C) 03/15/2023 0058    TROPONINT 102 (C) 03/14/2023 1952             Assessment/Plan:      Acute on chronic congestive heart failure, unspecified heart failure type " (HCC)      Right heart failure (acute on chronic cor pulmonale) disproportionately worse than left heart failure.  Compared to previous echocardiograms and other imaging studies, left ventricular ejection fraction is now normal (actually hyperdynamic) with no Doppler evidence of diastolic dysfunction or elevated resting mean left atrial pressure.  The patient is able to lay comfortably flat in bed without supplemental oxygen.  His primary issue is peripheral edema with chronic venous stasis and lymphedema.  He had previously had complete wound healing of his venous stasis ulceration, but these have now returned particularly on the left lower extremity.  The patient is noncompliant with fluid and sodium restriction.  He is noncompliant with his compression stockings.  He has previously been given a prescription for Jobst stockings.  He is noncompliant with using his CPAP therapy.  The patient's proBNP and peripheral edema are both manifestations of advanced right heart failure.  These patients tend to be exquisitely preload sensitive and overdiuresis will commonly result in hypotension and azotemia, which is the patient's current clinical scenario.    I would recommend a less than 1.5 L/day fluid restriction.  I would recommend a less than 1200 mg/day sodium restriction.  The inpatient dietitian should meet with the patient and his family to discuss strategies as to how to maintain his fluid and sodium restriction at home.  The patient has been given outpatient appointments on multiple occasions to meet with the dietitian, but has never bothered to follow through with this recommendation.    The patient needs to be fitted for Jobst stockings with at least 20 mm compression-above the knee model.    The patient needs escalation of wound care measures.    The patient has been counseled regarding the essential need to maintain compliance with his CPAP therapy.  He should probably be evaluated by pulmonology for further  "evaluation and management of his untreated sleep apnea, morbid obesity and obesity-related pulmonary alveolar hypoventilation syndrome.    The patient has been counseled regarding the essential need to keep his legs elevated throughout the course of the day, ideally his ankles above the level of his heart.  He has been counseled to avoid prolonged standing or sitting with his legs in a \"dangling\" position.    I have recommended discontinuation of all diuretic therapy as this will not improve his peripheral edema but will certainly worsen hypotension and azotemia.    It should be noted that we have had these discussions in the outpatient cardiology clinic more times than I can count.  The patient has never adhered to my advice.  Other involved providers have escalated diuretic therapies to \"nephrotoxic levels\" against my recommendations.    As his left ventricular ejection fraction has now normalized (and he is significantly hypotensive), I would recommend discontinuation of Entresto, bisoprolol and spironolactone.    I discussed the patient's findings and my recommendations with patient and family    José Miguel Martel MD  03/15/23  10:34 EDT          "

## 2023-03-15 NOTE — NURSING NOTE
Seen for wound consult. Pleasant and cooperative gentleman. Daughter and Son in law at bedside.  Bilateral lower legs with redness swelling and thickened skin consistent with recurrent celllulitis. Scabs to both legs. Scab on shin of left leg is cracked, not draining. Reports legs swell off and on since last spring and has blistered skin like a frog at times. Recommend Cleanse bilateral lower legs with soap and water, pat dry, apply amlactin cream twice daily to intact skin. Cleanse left lower leg shin with wound cleanser, pat dry, apply therahoney to cracked scab, cover with dry dressing daily.  Has compression socks at home that states has not been able to put on due to bending over and the amount of compression. Discussed using sock aids and 2 pair of  lower compression socks to achieve the increased compression recommended by Dr. Martel.   Recommendations for care include reminding patient to have a turning schedule, encourage increase mobility as appropriate and tolerated to reduce pressure, for dry skin apply lotion/cream and a nutrition consult for dietary needs. Staff to contact provider and re-consult wound nurse for new skin issues or lack of improvement with current recommendations. Thank you for the consult. If you have questions or concerns do not hesitate to contact me.

## 2023-03-15 NOTE — CONSULTS
"Adult Nutrition  Assessment/PES    Patient Name:  Bobby Dodd  YOB: 1940  MRN: [id number]  Admit Date:  3/14/2023    Assessment Date:  3/15/2023    Comments:    Consulted for skin breakdown, need for diet education. Pt with PMHx significant for A-fib, heart failure, CAD, HTN, HLD, ARF presented to [facility] 3/14 with c/o SOB. Pt with acute on chronic systolic heart failure, CKD. Obese per BMI 44.29. No recent weight loss reported. Currently receiving a cardiac diet with a 1,500 mL fluids restriction. One PO intake of 75% reported this a.m. at breakfast - will monitor. Wound to L leg, R calf reported. RD to order Arginaid BID. Will provide education on sodium, fluid restriction prior to d/c.     Recs:   1. Encourage PO intake on current diet as medically indicated and tolerated  2. Provide Arginaid BID and encourage intake  3. Monitor and replace electrolytes PRN  .4 RD to provide diet education    RD to F/U and available PRN.      Reason for Assessment     Row Name 03/15/23 1316          Reason for Assessment    Reason For Assessment nurse/nurse practitioner consult                  Labs/Tests/Procedures/Meds     Row Name 03/15/23 1316          Labs/Procedures/Meds    Lab Results Reviewed reviewed, pertinent     Lab Results Comments Low: Alb; High: K+, BUN, Cr        Medications    Pertinent Medications Reviewed reviewed, pertinent     Pertinent Medications Comments protonix, docusate, lasix                Physical Findings     Row Name 03/15/23 1323          Physical Findings    Overall Physical Appearance obese, wounds to L leg, R calf, Azar score 19                Estimated/Assessed Needs - Anthropometrics     Row Name 03/15/23 1324 03/15/23 0909       Anthropometrics    Height -- 177.8 cm (70\")    Weight -- 140 kg (308 lb 10.3 oz)    Height for Calculation 1.778 m (5' 10\") --    Weight for Calculation 90 kg (198 lb 6.6 oz)  adjusted --       Estimated/Assessed Needs    Additional " Documentation Fluid Requirements (Group);KCAL/KG (Group);Estimated Calorie Needs (Group);Protein Requirements (Group) --       Estimated Calorie Needs    Estimated Calorie Requirement (kcal/day) 2,250-2,700 --    Estimated Calorie Need Method kcal/kg --       KCAL/KG    KCAL/KG 25 Kcal/Kg (kcal);30 Kcal/Kg (kcal) --    25 Kcal/Kg (kcal) 2250 --    30 Kcal/Kg (kcal) 2700 --       Protein Requirements    Weight Used For Protein Calculations 140 kg (308 lb 10.3 oz)  current --    Est Protein Requirement Amount (gms/kg) 0.8 gm protein --    Estimated Protein Requirements (gms/day) 112 --       Fluid Requirements    Fluid Requirements (mL/day) 2250  1 mL/kcal --    RDA Method (mL) 2250 --               Nutrition Prescription Ordered     Row Name 03/15/23 1325          Nutrition Prescription PO    Current PO Diet Regular     Common Modifiers Cardiac;Fluid Restriction     Fluid Restriction mL per Day 1500 mL                Evaluation of Received Nutrient/Fluid Intake     Row Name 03/15/23 1415          PO Evaluation    Number of Days PO Intake Evaluated Insufficient Data                   Problem/Interventions:   Problem 1     Row Name 03/15/23 1415          Nutrition Diagnoses Problem 1    Problem 1 Increased Nutrient Needs     Macronutrient Kcal;Protein     Etiology (related to) Medical Diagnosis     Signs/Symptoms (evidenced by) Report/Observation  wound to L leg, R calf                Problem 2     Row Name 03/15/23 1416          Nutrition Diagnoses Problem 2    Problem 2 Knowledge Deficit     Etiology (related to) MNT for Treatment/Condition     Signs/Symptoms (evidenced by) Report/Observation  consult for Na+ and fluids restriction diet education                    Intervention Goal     Row Name 03/15/23 1417          Intervention Goal    General Maintain nutrition;Meet nutritional needs for age/condition;Provide information regarding MNT for treatment/condition;Disease management/therapy;Improved nutrition related  lab(s)     PO Establish PO;Tolerate PO;Meet estimated needs     Weight Maintain weight                Nutrition Intervention     Row Name 03/15/23 1417          Nutrition Intervention    RD/Tech Action Care plan reviewd;Recommend/ordered;Follow Tx progress;Encourage intake     Recommended/Ordered Supplement                Nutrition Prescription     Row Name 03/15/23 1417          Nutrition Prescription PO    PO Prescription Begin/change supplement     Supplement Other (comment)  Arginaid     Supplement Frequency 2 times a day     New PO Prescription Ordered? Yes        Other Orders    Obtain Weight Daily     Obtain Weight Ordered? No, recommended     Supplement Vitamin mineral supplement     Supplement Ordered? No, recommended     Labs K+;Phos;Mg++;Na+     Labs Ordered? No, recommended                Education/Evaluation     Row Name 03/15/23 1418          Education    Education Will Instruct as appropriate        Monitor/Evaluation    Monitor Per protocol;PO intake;Skin status;Supplement intake;Pertinent labs;Symptoms;Weight                 Electronically signed by:  Radha Caceres RD  03/15/23 14:20 EDT

## 2023-03-15 NOTE — THERAPY TREATMENT NOTE
Patient Name: Bobby Dodd  : 1940    MRN: [id number]                              Today's Date: 3/15/2023       Admit Date: 3/14/2023    Visit Dx:     ICD-10-CM ICD-9-CM   1. Acute on chronic congestive heart failure, unspecified heart failure type (HCC)  I50.9 428.0   2. Hypotension, unspecified hypotension type  I95.9 458.9   3. Elevated troponin  R77.8 790.6     Patient Active Problem List   Diagnosis   • Essential hypertension   • Palpitations   • Tachy-mercedes syndrome (HCC)   • SOB (shortness of breath)   • Acute systolic congestive heart failure (HCC)   • Morbid obesity (HCC)   • Chronic systolic congestive heart failure (HCC)   • Chronic atrial fibrillation (HCC)   • YUNG (obstructive sleep apnea)   • Chest wall mass   • Dysphagia   • Cardiac pacemaker in situ   • Acute renal failure (ARF) (HCC)   • Anemia   • Diarrhea   • Elevated PSA   • Folate deficiency   • Hypotension   • Low TSH level   • Renal cyst   • Chronic renal failure, stage 3a (HCC)   • Pacemaker at end of battery life   • Complete heart block (HCC)   • Coronary artery disease involving native coronary artery of native heart without angina pectoris   • Permanent atrial fibrillation (HCC)   • Chronic venous stasis   • Acute on chronic congestive heart failure, unspecified heart failure type (HCC)     Past Medical History:   Diagnosis Date   • A-fib (HCC)    • Acute renal failure (ARF) (HCC) 2019    Hospital admission   • Arrhythmia    • Arthritis    • Bilateral renal cysts    • Cellulitis    • CHF (congestive heart failure) (HCC)    • Coronary artery disease    • Diarrhea 2019   • Diverticulosis 2019    Mild, noted on imaging    • Dysphagia    • Edema    • Elevated prostate specific antigen (PSA)    • Enlarged prostate    • GERD (gastroesophageal reflux disease)    • History of chest pain    • History of smoking    • History of stress test    • [place] (hard of hearing)     Mild-No hearing aids    • Hyperlipidemia    • Hypertension     • Hypogonadism in male    • Hypotension 08/24/2019    Upon hospital admission    • Iron deficiency anemia 2019   • Joint pain, elbow    • Kidney stones    • Knee pain    • Low back pain    • Obstructive sleep apnea     non-compliant with mask   • Pacemaker    • Palpitations    • Rheumatic fever    • SOB (shortness of breath)    • Trochanteric bursitis    • Urinary retention      Past Surgical History:   Procedure Laterality Date   • CARDIAC ELECTROPHYSIOLOGY PROCEDURE N/A 6/4/2018    Procedure: Internal Cardioversion;  Surgeon: Tho Richards MD;  Location:  [facility];  Service: Cardiology   • CARDIAC ELECTROPHYSIOLOGY PROCEDURE N/A 11/23/2021    Procedure: PPM generator change - dual;  Surgeon: Damian Doshi DO;  Location:  [facility];  Service: Cardiology;  Laterality: N/A;   • CARDIOVERSION      per DR. RICHARDS   • CHOLECYSTECTOMY     • COLONOSCOPY     • CYSTOSCOPY TRANSURETHRAL RESECTION OF PROSTATE     • ENDOSCOPY N/A 10/1/2019    Procedure: ESOPHAGOGASTRODUODENOSCOPY WITH BIOPSIES;  Surgeon: Jose Alfredo Brewer MD;  Location:  [facility];  Service: Gastroenterology   • EYE SURGERY Right     Retinal tear repair    • HERNIA REPAIR     • KNEE ARTHROSCOPY      RIGHT   • LUNG REMOVAL, PARTIAL Right    • OTHER SURGICAL HISTORY      GOITER REMOVED 2001.   • PACEMAKER IMPLANTATION      [facility]      General Information     Row Name 03/15/23 120          OT Time and Intention    Document Type evaluation  -SP     Mode of Treatment occupational therapy  -SP     Row Name 03/15/23 1202          General Information    Patient Profile Reviewed yes  -SP     Prior Level of Function independent:;ADL's;all household mobility  -SP     Existing Precautions/Restrictions no known precautions/restrictions  -SP     Barriers to Rehab none identified  -SP     Row Name 03/15/23 1201          Living Environment    People in Home alone  -SP     Row Name 03/15/23 1205          Home Main Entrance     Number of Stairs, Main Entrance other (see comments)  ramp  -SP     Row Name 03/15/23 1204          Stairs Within Home, Primary    Number of Stairs, Within Home, Primary none  -SP     Row Name 03/15/23 1204          Cognition    Orientation Status (Cognition) oriented x 4  -SP     Row Name 03/15/23 1204          Safety Issues, Functional Mobility    Safety Issues Affecting Function (Mobility) insight into deficits/self-awareness  -SP     Impairments Affecting Function (Mobility) endurance/activity tolerance;shortness of breath  -SP           User Key  (r) = Recorded By, (t) = Taken By, (c) = Cosigned By    Initials Name Provider Type    SP Nohelia Lake OT Occupational Therapist                 Mobility/ADL's     Row Name 03/15/23 1205          Bed Mobility    Bed Mobility supine-sit  -SP     Supine-Sit Stephens (Bed Mobility) modified independence  -SP     Row Name 03/15/23 1205          Transfers    Transfers bed-chair transfer;sit-stand transfer  -SP     Row Name 03/15/23 1205          Bed-Chair Transfer    Bed-Chair Stephens (Transfers) contact guard  -SP     Assistive Device (Bed-Chair Transfers) --  gait belt  -SP     Row Name 03/15/23 1205          Sit-Stand Transfer    Sit-Stand Stephens (Transfers) contact guard  -SP     Row Name 03/15/23 1205          Functional Mobility    Functional Mobility- Ind. Level contact guard assist  -SP     Functional Mobility-Distance (Feet) 2  -SP     Row Name 03/15/23 1205          Activities of Daily Living    BADL Assessment/Intervention bathing;upper body dressing;lower body dressing;grooming;feeding;toileting  -SP     Row Name 03/15/23 1205          Bathing Assessment/Intervention    Stephens Level (Bathing) upper body;supervision;lower body;minimum assist (75% patient effort)  -SP     Comment, (Bathing) Pt to benefit from AE for LBB  -SP     Row Name 03/15/23 1205          Upper Body Dressing Assessment/Training    Stephens Level (Upper Body  Dressing) upper body dressing skills;set up  -SP     Row Name 03/15/23 1205          Lower Body Dressing Assessment/Training    [place] Level (Lower Body Dressing) lower body dressing skills;contact guard assist  -SP     Comment, (Lower Body Dressing) CGA for balance with increased time. Pt to benefit form AE for LBD  -SP     Row Name 03/15/23 1205          Grooming Assessment/Training    [place] Level (Grooming) grooming skills;set up  -SP     Row Name 03/15/23 1205          Self-Feeding Assessment/Training    [place] Level (Feeding) feeding skills;set up  -SP     Row Name 03/15/23 1205          Toileting Assessment/Training    [place] Level (Toileting) toileting skills;contact guard assist  -SP           User Key  (r) = Recorded By, (t) = Taken By, (c) = Cosigned By    Initials Name Provider Type    Nohelia Borrero OT Occupational Therapist               Obj/Interventions     Row Name 03/15/23 1216          Vision Assessment/Intervention    Visual Impairment/Limitations corrective lenses full-time  -SP     Row Name 03/15/23 1216          Range of Motion Comprehensive    General Range of Motion bilateral upper extremity ROM WFL  -SP     Row Name 03/15/23 1216          Strength Comprehensive (MMT)    General Manual Muscle Testing (MMT) Assessment no strength deficits identified  -SP     Row Name 03/15/23 1216          Balance    Balance Assessment sitting static balance;sitting dynamic balance;standing static balance;standing dynamic balance  -SP     Static Sitting Balance independent  -SP     Dynamic Sitting Balance supervision  -SP     Static Standing Balance contact guard  -SP     Dynamic Standing Balance contact guard  -SP           User Key  (r) = Recorded By, (t) = Taken By, (c) = Cosigned By    Initials Name Provider Type    Nohelia Borrero OT Occupational Therapist               Goals/Plan     Row Name 03/15/23 1228          Transfer Goal 1 (OT)    Activity/Assistive Device (Transfer  Goal 1, OT) transfers, all  -SP     [place] Level/Cues Needed (Transfer Goal 1, OT) modified independence  -SP     Time Frame (Transfer Goal 1, OT) by discharge  -SP     Progress/Outcome (Transfer Goal 1, OT) goal ongoing  -SP     Row Name 03/15/23 1228          Bathing Goal 1 (OT)    Activity/Device (Bathing Goal 1, OT) lower body bathing;long-handled sponge  -SP     [place] Level/Cues Needed (Bathing Goal 1, OT) modified independence  -SP     Time Frame (Bathing Goal 1, OT) short term goal (STG)  -SP     Progress/Outcomes (Bathing Goal 1, OT) goal ongoing  -SP     Row Name 03/15/23 1228          Dressing Goal 1 (OT)    Activity/Device (Dressing Goal 1, OT) lower body dressing;dressing stick;sock-aid;reacher  -SP     [place]/Cues Needed (Dressing Goal 1, OT) modified independence  -SP     Time Frame (Dressing Goal 1, OT) by discharge  -SP     Progress/Outcome (Dressing Goal 1, OT) goal ongoing  -SP     Row Name 03/15/23 1228          Toileting Goal 1 (OT)    Activity/Device (Toileting Goal 1, OT) toileting skills, all  -SP     [place] Level/Cues Needed (Toileting Goal 1, OT) modified independence  -SP     Time Frame (Toileting Goal 1, OT) by discharge  -SP     Progress/Outcome (Toileting Goal 1, OT) goal ongoing  -SP     Row Name 03/15/23 1228          Problem Specific Goal 1 (OT)    Problem Specific Goal 1 (OT) Pt to improve activity tolerance evidenced by participation in LBD with 02 sats >90%.  -SP     Time Frame (Problem Specific Goal 1, OT) by discharge  -SP     Progress/Outcome (Problem Specific Goal 1, OT) goal ongoing  -SP     Row Name 03/15/23 1228          Therapy Assessment/Plan (OT)    Planned Therapy Interventions (OT) activity tolerance training;adaptive equipment training;BADL retraining;patient/caregiver education/training  -SP           User Key  (r) = Recorded By, (t) = Taken By, (c) = Cosigned By    Initials Name Provider Type    Nohelia Borrero, OT Occupational Therapist                Clinical Impression     Row Name 03/15/23 1217          Pain Assessment    Pretreatment Pain Rating 0/10 - no pain  -SP     Posttreatment Pain Rating 0/10 - no pain  -SP     Row Name 03/15/23 1217          Plan of Care Review    Plan of Care Reviewed With patient;daughter  -SP     Progress no change  -SP     Outcome Evaluation Pt agreeable to participate in OT IE at this time. Pt reports 0/10 pain positioned in fowlers in the bed. His bp was 70/33 and 02 sats @90% on room air. He denied dizziness or SOA. Pt reports living alone and independence with ADLs and household mobility. Pt mod I for supine to sit eob. Pt demonstrates good sitting static balance and moves to standing with CGA for balance and safety. Pt ambulated 2' to chair with CGA. Pt demonstrates  5/5 B UE MMT and ROM WFL. Pt demonstrates doffing/donning sock with increased time and CGA for balance with 02 sats declining to 88% and evidence of SOA. Pt bp increased to 81/50 after engaging in eval. Pt to benefit from skilled OT services to address activity tolerance and AE training for LB ADLs to ensure safe d/c and return to PLOF.  -SP     Row Name 03/15/23 1217          Therapy Assessment/Plan (OT)    Rehab Potential (OT) good, to achieve stated therapy goals  -SP     Criteria for Skilled Therapeutic Interventions Met (OT) yes  -SP     Therapy Frequency (OT) 3 times/wk  -SP     Row Name 03/15/23 1217          Therapy Plan Review/Discharge Plan (OT)    Anticipated Discharge Disposition (OT) home with home health  -SP     Row Name 03/15/23 1217          Vital Signs    Pre SpO2 (%) 90  -SP     O2 Delivery Pre Treatment room air  -SP     Intra SpO2 (%) 88  -SP     O2 Delivery Intra Treatment room air  -SP     Post SpO2 (%) 91  -SP     O2 Delivery Post Treatment room air  -SP     Pre Patient Position Supine  -SP     Intra Patient Position Standing  -SP     Post Patient Position Sitting  -SP     Row Name 03/15/23 1217          Positioning and  Restraints    Pre-Treatment Position in bed  -SP     Post Treatment Position chair  -SP     In Chair sitting;call light within reach;encouraged to call for assist;with family/caregiver  -SP           User Key  (r) = Recorded By, (t) = Taken By, (c) = Cosigned By    Initials Name Provider Type    Nohelia Borrero OT Occupational Therapist               Outcome Measures     Row Name 03/15/23 1230          How much help from another is currently needed...    Putting on and taking off regular lower body clothing? 3  -SP     Bathing (including washing, rinsing, and drying) 3  -SP     Toileting (which includes using toilet bed pan or urinal) 3  -SP     Putting on and taking off regular upper body clothing 4  -SP     Taking care of personal grooming (such as brushing teeth) 4  -SP     Eating meals 4  -SP     AM-PAC 6 Clicks Score (OT) 21  -SP     Row Name 03/15/23 1230          Functional Assessment    Outcome Measure Options AM-PAC 6 Clicks Daily Activity (OT)  -SP           User Key  (r) = Recorded By, (t) = Taken By, (c) = Cosigned By    Initials Name Provider Type    Nohelia Borrero OT Occupational Therapist                Occupational Therapy Education     Title: PT OT SLP Therapies (In Progress)     Topic: Occupational Therapy (In Progress)     Point: ADL training (Done)     Description:   Instruct learner(s) on proper safety adaptation and remediation techniques during self care or transfers.   Instruct in proper use of assistive devices.              Learning Progress Summary           Patient Acceptance, E, VU by SP at 3/15/2023 1231    Comment: OT educated on purpose of OT and POC. Pt agreeable.                   Point: Home exercise program (Not Started)     Description:   Instruct learner(s) on appropriate technique for monitoring, assisting and/or progressing therapeutic exercises/activities.              Learner Progress:  Not documented in this visit.          Point: Precautions (Not Started)      Description:   Instruct learner(s) on prescribed precautions during self-care and functional transfers.              Learner Progress:  Not documented in this visit.          Point: Body mechanics (Not Started)     Description:   Instruct learner(s) on proper positioning and spine alignment during self-care, functional mobility activities and/or exercises.              Learner Progress:  Not documented in this visit.                      User Key     Initials Effective Dates Name Provider Type Discipline     09/08/22 -  Nohelia Lake OT Occupational Therapist OT              OT Recommendation and Plan  Planned Therapy Interventions (OT): activity tolerance training, adaptive equipment training, BADL retraining, patient/caregiver education/training  Therapy Frequency (OT): 3 times/wk  Plan of Care Review  Plan of Care Reviewed With: patient, daughter  Progress: no change  Outcome Evaluation: Pt agreeable to participate in OT IE at this time. Pt reports 0/10 pain positioned in fowlers in the bed. His bp was 70/33 and 02 sats @90% on room air. He denied dizziness or SOA. Pt reports living alone and independence with ADLs and household mobility. Pt mod I for supine to sit eob. Pt demonstrates good sitting static balance and moves to standing with CGA for balance and safety. Pt ambulated 2' to chair with CGA. Pt demonstrates  5/5 B UE MMT and ROM WFL. Pt demonstrates doffing/donning sock with increased time and CGA for balance with 02 sats declining to 88% and evidence of SOA. Pt bp increased to 81/50 after engaging in eval. Pt to benefit from skilled OT services to address activity tolerance and AE training for LB ADLs to ensure safe d/c and return to PLOF.     Time Calculation:    Time Calculation- OT     Row Name 03/15/23 1232             Time Calculation- OT    OT Start Time 1102  -SP      OT Received On 03/15/23  -SP      OT Goal Re-Cert Due Date 03/25/23  -SP         Untimed Charges    OT Eval/Re-eval Minutes 39   -SP         Total Minutes    Untimed Charges Total Minutes 39  -SP       Total Minutes 39  -SP            User Key  (r) = Recorded By, (t) = Taken By, (c) = Cosigned By    Initials Name Provider Type    Nohelia Borrero OT Occupational Therapist              Therapy Charges for Today     Code Description Service Date Service Provider Modifiers Qty    [id number]  OT EVAL LOW COMPLEXITY 3 3/15/2023 Nohelia Lake OT GO 1               Nohelia Lake OT  3/15/2023

## 2023-03-15 NOTE — THERAPY EVALUATION
Patient Name: Bobby Dodd  : 1940    MRN: [id number]                              Today's Date: 3/15/2023       Admit Date: 3/14/2023    Visit Dx:     ICD-10-CM ICD-9-CM   1. Acute on chronic congestive heart failure, unspecified heart failure type (HCC)  I50.9 428.0   2. Hypotension, unspecified hypotension type  I95.9 458.9   3. Elevated troponin  R77.8 790.6     Patient Active Problem List   Diagnosis   • Essential hypertension   • Palpitations   • Tachy-mercedes syndrome (HCC)   • SOB (shortness of breath)   • Acute systolic congestive heart failure (HCC)   • Morbid obesity (HCC)   • Chronic systolic congestive heart failure (HCC)   • Chronic atrial fibrillation (HCC)   • YUNG (obstructive sleep apnea)   • Chest wall mass   • Dysphagia   • Cardiac pacemaker in situ   • Acute renal failure (ARF) (HCC)   • Anemia   • Diarrhea   • Elevated PSA   • Folate deficiency   • Hypotension   • Low TSH level   • Renal cyst   • Chronic renal failure, stage 3a (HCC)   • Pacemaker at end of battery life   • Complete heart block (HCC)   • Coronary artery disease involving native coronary artery of native heart without angina pectoris   • Permanent atrial fibrillation (HCC)   • Chronic venous stasis   • Acute on chronic congestive heart failure, unspecified heart failure type (HCC)     Past Medical History:   Diagnosis Date   • A-fib (HCC)    • Acute renal failure (ARF) (HCC) 2019    Hospital admission   • Arrhythmia    • Arthritis    • Bilateral renal cysts    • Cellulitis    • CHF (congestive heart failure) (HCC)    • Coronary artery disease    • Diarrhea 2019   • Diverticulosis 2019    Mild, noted on imaging    • Dysphagia    • Edema    • Elevated prostate specific antigen (PSA)    • Enlarged prostate    • GERD (gastroesophageal reflux disease)    • History of chest pain    • History of smoking    • History of stress test    • [place] (hard of hearing)     Mild-No hearing aids    • Hyperlipidemia    • Hypertension     • Hypogonadism in male    • Hypotension 08/24/2019    Upon hospital admission    • Impaired functional mobility, balance, gait, and endurance    • Iron deficiency anemia 2019   • Joint pain, elbow    • Kidney stones    • Knee pain    • Low back pain    • Obstructive sleep apnea     non-compliant with mask   • Pacemaker    • Palpitations    • Rheumatic fever    • SOB (shortness of breath)    • Trochanteric bursitis    • Urinary retention      Past Surgical History:   Procedure Laterality Date   • CARDIAC ELECTROPHYSIOLOGY PROCEDURE N/A 6/4/2018    Procedure: Internal Cardioversion;  Surgeon: Tho Richards MD;  Location:  [facility];  Service: Cardiology   • CARDIAC ELECTROPHYSIOLOGY PROCEDURE N/A 11/23/2021    Procedure: PPM generator change - dual;  Surgeon: Damian Doshi DO;  Location:  [facility];  Service: Cardiology;  Laterality: N/A;   • CARDIOVERSION      per DR. RICHARDS   • CHOLECYSTECTOMY     • COLONOSCOPY     • CYSTOSCOPY TRANSURETHRAL RESECTION OF PROSTATE     • ENDOSCOPY N/A 10/1/2019    Procedure: ESOPHAGOGASTRODUODENOSCOPY WITH BIOPSIES;  Surgeon: Jose Alfredo Brewer MD;  Location:  [facility];  Service: Gastroenterology   • EYE SURGERY Right     Retinal tear repair    • HERNIA REPAIR     • KNEE ARTHROSCOPY      RIGHT   • LUNG REMOVAL, PARTIAL Right    • OTHER SURGICAL HISTORY      GOITER REMOVED 2001.   • PACEMAKER IMPLANTATION      [facility]      General Information     Row Name 03/15/23 1104          Physical Therapy Time and Intention    Document Type evaluation  -JR     Mode of Treatment physical therapy  -JR     Row Name 03/15/23 1104          General Information    Patient Profile Reviewed yes  -JR     Prior Level of Function independent:;community mobility  -JR     Existing Precautions/Restrictions fall  -JR     Barriers to Rehab none identified  -JR     Row Name 03/15/23 1105          Living Environment    People in Home alone  -JR     Row Name  03/15/23 1104          Home Main Entrance    Number of Stairs, Main Entrance other (see comments)  ramp  -JR     Row Name 03/15/23 1104          Stairs Within Home, Primary    Number of Stairs, Within Home, Primary none  -JR     Row Name 03/15/23 1104          Cognition    Orientation Status (Cognition) oriented x 4  -JR     Row Name 03/15/23 1104          Safety Issues, Functional Mobility    Safety Issues Affecting Function (Mobility) insight into deficits/self-awareness;safety precautions follow-through/compliance  -JR     Impairments Affecting Function (Mobility) endurance/activity tolerance;strength;shortness of breath  -JR           User Key  (r) = Recorded By, (t) = Taken By, (c) = Cosigned By    Initials Name Provider Type    Judi Thomas PT Physical Therapist               Mobility     Row Name 03/15/23 1104          Bed Mobility    Bed Mobility supine-sit  -JR     Supine-Sit [place] (Bed Mobility) modified independence  -     Row Name 03/15/23 1104          Bed-Chair Transfer    Bed-Chair [place] (Transfers) contact guard  -JR     Assistive Device (Bed-Chair Transfers) other (see comments)  gait belt  -JR     Row Name 03/15/23 1104          Sit-Stand Transfer    Sit-Stand [place] (Transfers) contact guard  -JR     Assistive Device (Sit-Stand Transfers) other (see comments)  gait belt  -JR     Row Name 03/15/23 1104          Gait/Stairs (Locomotion)    [place] Level (Gait) contact guard  -JR     Assistive Device (Gait) other (see comments)  gait belt  -JR     Distance in Feet (Gait) 5  -JR     Deviations/Abnormal Patterns (Gait) base of support, wide;festinating/shuffling;gait speed decreased  -JR           User Key  (r) = Recorded By, (t) = Taken By, (c) = Cosigned By    Initials Name Provider Type    Judi Thomas, PT Physical Therapist               Obj/Interventions     Row Name 03/15/23 1104          Range of Motion Comprehensive    General Range of Motion bilateral lower  extremity ROM WFL  -JR     Row Name 03/15/23 1104          Strength Comprehensive (MMT)    General Manual Muscle Testing (MMT) Assessment no strength deficits identified  -JR     Row Name 03/15/23 1104          Balance    Balance Assessment sitting static balance;sitting dynamic balance;sit to stand dynamic balance;standing static balance;standing dynamic balance  -JR     Static Sitting Balance independent  -JR     Dynamic Sitting Balance supervision  -JR     Position, Sitting Balance unsupported;sitting edge of bed  -JR     Sit to Stand Dynamic Balance contact guard  -JR     Static Standing Balance contact guard  -JR     Dynamic Standing Balance contact guard  -JR     Position/Device Used, Standing Balance unsupported  -JR           User Key  (r) = Recorded By, (t) = Taken By, (c) = Cosigned By    Initials Name Provider Type    Judi Thomas, PT Physical Therapist               Goals/Plan     [facility] Name 03/15/23 1104          Transfer Goal 1 (PT)    Activity/Assistive Device (Transfer Goal 1, PT) sit-to-stand/stand-to-sit;bed-to-chair/chair-to-bed  -JR     [place] Level/Cues Needed (Transfer Goal 1, PT) supervision required  -JR     Time Frame (Transfer Goal 1, PT) short term goal (STG)  -JR     Progress/Outcome (Transfer Goal 1, PT) goal ongoing  -     Row Name 03/15/23 1104          Gait Training Goal 1 (PT)    Activity/Assistive Device (Gait Training Goal 1, PT) gait (walking locomotion);assistive device use  -JR     [place] Level (Gait Training Goal 1, PT) supervision required  -JR     Time Frame (Gait Training Goal 1, PT) short term goal (STG)  -JR     Progress/Outcome (Gait Training Goal 1, PT) goal ongoing  -     Row Name 03/15/23 1104          Patient Education Goal (PT)    Activity (Patient Education Goal, PT) Perform BLE exercises x 20  -JR     [place]/Cues/Accuracy (Memory Goal 2, PT) demonstrates adequately  -JR     Time Frame (Patient Education Goal, PT) 3 days  -JR      Progress/Outcome (Patient Education Goal, PT) goal ongoing  -           User Key  (r) = Recorded By, (t) = Taken By, (c) = Cosigned By    Initials Name Provider Type    Judi Thomas, PT Physical Therapist               Clinical Impression     Row Name 03/15/23 1104          Pain    Pretreatment Pain Rating 0/10 - no pain  -JR     Posttreatment Pain Rating 0/10 - no pain  -JR     Row Name 03/15/23 1104          Plan of Care Review    Plan of Care Reviewed With patient;daughter  -     Progress no change  -     Outcome Evaluation Patient participates well in skilled PT evaluation and demonstrates decreased activity tolerance.  Patient is able to perform bed mobility without assitance.  He requires no more than CGA for transfers and to walk 5 feet to the chair.  He has fair balance, but becomes fatigued quickly.  He is expected to benefit from additional PT to improve activity tolerance prior to returning home.  -     Row Name 03/15/23 1104          Therapy Assessment/Plan (PT)    Patient/Family Therapy Goals Statement (PT) Patient is eager to go home  -     Rehab Potential (PT) good, to achieve stated therapy goals  -     Criteria for Skilled Interventions Met (PT) yes;meets criteria;skilled treatment is necessary  -     Therapy Frequency (PT) 5 times/wk  -     Predicted Duration of Therapy Intervention (PT) 2 weeks  -     Row Name 03/15/23 1104          Vital Signs    Pre SpO2 (%) 90  -JR     O2 Delivery Pre Treatment room air  -JR     Intra SpO2 (%) 88  -JR     O2 Delivery Intra Treatment room air  -JR     Post SpO2 (%) 91  -JR     O2 Delivery Post Treatment room air  -JR     Pre Patient Position Supine  -JR     Intra Patient Position Standing  -JR     Post Patient Position Sitting  -     Row Name 03/15/23 1104          Positioning and Restraints    Pre-Treatment Position in bed  -JR     Post Treatment Position chair  -JR     In Chair sitting;call light within reach;encouraged to call for  assist;with family/caregiver  -           User Key  (r) = Recorded By, (t) = Taken By, (c) = Cosigned By    Initials Name Provider Type     Judi Gonzalez, PT Physical Therapist               Outcome Measures     Row Name 03/15/23 1104          How much help from another person do you currently need...    Turning from your back to your side while in flat bed without using bedrails? 4  -JR     Moving from lying on back to sitting on the side of a flat bed without bedrails? 4  -JR     Moving to and from a bed to a chair (including a wheelchair)? 3  -JR     Standing up from a chair using your arms (e.g., wheelchair, bedside chair)? 3  -JR     Climbing 3-5 steps with a railing? 3  -JR     To walk in hospital room? 3  -JR     AM-PAC 6 Clicks Score (PT) 20  -JR     Highest level of mobility 6 --> Walked 10 steps or more  -     Row Name 03/15/23 1230 03/15/23 1104       Functional Assessment    Outcome Measure Options AM-PAC 6 Clicks Daily Activity (OT)  -SP AM-PAC 6 Clicks Basic Mobility (PT)  -          User Key  (r) = Recorded By, (t) = Taken By, (c) = Cosigned By    Initials Name Provider Type     Judi Gonzalez, CHAITANYA Physical Therapist    Nohelia Borrero OT Occupational Therapist                             Physical Therapy Education     Title: PT OT SLP Therapies (In Progress)     Topic: Physical Therapy (In Progress)     Point: Mobility training (Done)     Learning Progress Summary           Patient Acceptance, E,TB, VU by  at 3/15/2023 1346    Comment: Role of PT and POC                   Point: Home exercise program (Not Started)     Learner Progress:  Not documented in this visit.          Point: Body mechanics (Not Started)     Learner Progress:  Not documented in this visit.          Point: Precautions (Not Started)     Learner Progress:  Not documented in this visit.                      User Key     Initials Effective Dates Name Provider Type [facility] 03/23/22 -  Judi Gonzalez PT Physical  Therapist PT              PT Recommendation and Plan     Plan of Care Reviewed With: patient, daughter  Progress: no change  Outcome Evaluation: Patient participates well in skilled PT evaluation and demonstrates decreased activity tolerance.  Patient is able to perform bed mobility without assitance.  He requires no more than CGA for transfers and to walk 5 feet to the chair.  He has fair balance, but becomes fatigued quickly.  He is expected to benefit from additional PT to improve activity tolerance prior to returning home.     Time Calculation:    PT Charges     Row Name 03/15/23 1347             Time Calculation    Start Time 1104  -JR      PT Received On 03/15/23  -JR      PT Goal Re-Cert Due Date 03/25/23  -JR         Untimed Charges    PT Eval/Re-eval Minutes 45  -JR         Total Minutes    Untimed Charges Total Minutes 45  -JR       Total Minutes 45  -JR            User Key  (r) = Recorded By, (t) = Taken By, (c) = Cosigned By    Initials Name Provider Type    Judi Thomas, PT Physical Therapist              Therapy Charges for Today     Code Description Service Date Service Provider Modifiers Qty    11526363209 HC PT EVAL LOW COMPLEXITY 3 3/15/2023 Judi Gonzalez, PT GP 1          PT G-Codes  Outcome Measure Options: AM-PAC 6 Clicks Daily Activity (OT)  AM-PAC 6 Clicks Score (PT): 20  AM-PAC 6 Clicks Score (OT): 21  PT Discharge Summary  Anticipated Discharge Disposition (PT): home, home with home health    Judi Gonzalez, PT  3/15/2023

## 2023-03-15 NOTE — PROGRESS NOTES
[facility]    Name:  Bobby Dodd   Age:  82 y.o.  Sex:  male  :  1940  MRN:  [id number]   Visit Number:  [id number]  Admission Date:  3/14/2023  Date Of Service:  03/15/23  Primary Care Physician:  Melissa Perdomo APRN     LOS: 1 day :    Chief Complaint:      Follow-up shortness of breath    Subjective:    Patient feeling okay this morning.  He notes he has chronic edema and shortness of breath.  He did admit to noncompliance with CPAP.  He does wear compression stockings occasionally.  He is unsure about his volume status, feels he is fairly stable.  He follows with Dr. Martel as his cardiologist.    Hospital Course:    The patient is apparent 82-year-old gentleman with a history of atrial fibrillation on Eliquis, pacemaker, CAD, history of combined systolic/diastolic CHF, morbid obesity, who had presented from home due to weakness, edema, increasing shortness of breath.  He had apparently been taking Lasix at home.    In the ER, the patient did have borderline low blood pressures in the 80/40 range, reportedly fairly normal with the patient be hypotensive.  He had an elevated proBNP, baseline creatinine of 1.59, hemoglobin stable.  COVID flu negative.  Chest x-ray with effusions and pulmonary edema.  ER discussed the case with cardiology who would see in consultation upon admission.  He was started on Lasix drip upon admission.    Cardiology did see in consult today.  Dr. Martel noted patient has significant history of noncompliance and due to his severe right-sided heart failure, would likely not tolerate excessive diuretics.  Also noted the 2D echo showing improved ejection fraction and will discontinue his spironolactone/Bystolic/Entresto.    Review of Systems:     All systems were reviewed and negative except as mentioned in subjective, assessment and plan.    Vital Signs:    Temp:  [97.4 °F (36.3 °C)-98 °F (36.7 °C)] 97.5 °F (36.4 °C)  Heart Rate:   [61-92] 65  Resp:  [16-18] 18  BP: ()/(33-76) 71/50    Intake and output:    I/O last 3 completed shifts:  In: 127 [P.O.:60; I.V.:67]  Out: 400 [Urine:400]  I/O this shift:  In: 120 [P.O.:120]  Out: -     Physical Examination:    General Appearance:  Alert and cooperative.  No acute distress   Head:  Atraumatic and normocephalic.   Eyes: Conjunctivae and sclerae normal, no icterus. No pallor.   Throat: No oral lesions, no thrush, oral mucosa moist.   Neck: Supple, trachea midline, no thyromegaly.   Lungs:    Scattered rhonchi   Heart:  Normal S1 and S2, systolic murmur, no gallop, no rub. No JVD.   Abdomen:   Normal bowel sounds, no masses, no organomegaly. Soft, nontender, nondistended, no rebound tenderness.   Extremities: Supple, 2-3+ pitting lower   Skin:  Multiple chronic venous stasis ulcerations and wounds present.   Neurologic: Alert and oriented x 3. No facial asymmetry. Moves all four limbs. No tremors.      Laboratory results:    Results from last 7 days   Lab Units 03/15/23  0547 03/14/23 1952   SODIUM mmol/L 139 138   POTASSIUM mmol/L 5.3* 5.3*   CHLORIDE mmol/L 101 99   CO2 mmol/L 31.3* 30.2*   BUN mg/dL 33* 33*   CREATININE mg/dL 1.76* 1.59*   CALCIUM mg/dL 9.0 9.1   BILIRUBIN mg/dL  --  0.5   ALK PHOS U/L  --  76   ALT (SGPT) U/L  --  11   AST (SGOT) U/L  --  18   GLUCOSE mg/dL 93 94     Results from last 7 days   Lab Units 03/15/23  0547 03/14/23 1952   WBC 10*3/mm3 4.50 5.72   HEMOGLOBIN g/dL 10.7* 11.7*   HEMATOCRIT % 34.4* 37.5   PLATELETS 10*3/mm3 153 176         Results from last 7 days   Lab Units 03/15/23  0058 03/14/23 1952   HSTROP T ng/L 99* 102*         No results for input(s): PHART, WOL8GSK, PO2ART, GPH4DRG, BASEEXCESS in the last 8760 hours.   I have reviewed the patient's laboratory results.    Radiology results:    Adult Transthoracic Echo Complete w/ Color, Spectral and Contrast if necessary per protocol    Result Date: 3/15/2023  •  Left ventricular ejection fraction  appears to be 66 - 70%. •  The left ventricular cavity is small in size. •  Left ventricular wall thickness is consistent with mild concentric hypertrophy. •  Left ventricular diastolic function is consistent with age. •  Mildly reduced right ventricular systolic function noted. •  The right ventricular cavity is moderate to severely dilated. •  Left atrial volume is severely increased. •  The right atrial cavity is moderate to severely  dilated. •  Estimated right ventricular systolic pressure from tricuspid regurgitation is normal (<35 mmHg).     XR Chest 1 View    Result Date: 3/15/2023  CLINICAL INDICATION:  SOA Triage Protocol shortness of breath.  EXAMINATION TECHNIQUE: XR CHEST 1 VW-  COMPARISON: None.  FINDINGS: Left chest wall subclavian approach cardiac pacer in place. Surgical clips in the lower neck, likely from prior thyroidectomy. Cardiomegaly. Moderate bilateral pleural effusions. Adjacent lung opacities, likely atelectasis. No pneumothorax.      Impression: Cardiomegaly. Moderate bilateral pleural effusions and adjacent lung atelectasis. Probable congestive heart failure/volume overload.    Images personally reviewed, interpreted and dictated by MARTHA Childress.       This report was signed and finalized on 3/15/2023 7:39 AM by MARTHA Childress.    I have reviewed the patient's radiology reports.    Medication Review:     I have reviewed the patient's active and prn medications.     Problem List:      Acute on chronic congestive heart failure, unspecified heart failure type (HCC)      Assessment:    1. Acute on chronic diastolic chf exacerbation  2. Chronic cor pulmonale  3. YUNG untreated/noncompliant  4. Chronic hypotension  5. Chronic venous insufficiency    Plan:    Acute on chronic systolic/diastolic heart failure.  Has been placed on a fluid and sodium restricted diet.  Patient does not typically follow a strict regimen at home.  Dr. Martel is recommending stopping diuretic therapy  due to his hypotension and likely peripheral edema related to right-sided heart failure and uncontrolled sleep apnea with venous insufficiency.  He is also recommended popping of spironolactone, Entresto and bisoprolol which have been discontinued.    Patient's EF noted to be stable.     Elevated troponin  -Likely demand ischemia in the settings of CHF and CKD  -Patient denies any chest pain, low suspicion for ACS currently.  Troponins trended down     Chronic kidney disease  -Monitor while on diuretics  -Avoid nephrotoxic drugs  Holding antihypertensives.  Numbers currently stable.    We will have wound care see him for his chronic venous ulcers.  Patient has reiterated that he will likely not wear CPAP.    DVT Prophylaxis: Eliquis  Code Status: DNR/DNI  Diet: cardiac/fluid and sodium restricted  Discharge Plan: 1-2 days    Karen Duncan DO  03/15/23  12:50 EDT    Dictated utilizing Dragon dictation.

## 2023-03-16 LAB
ANION GAP SERPL CALCULATED.3IONS-SCNC: 5 MMOL/L (ref 5–15)
BACTERIA UR QL AUTO: NORMAL /HPF
BILIRUB UR QL STRIP: NEGATIVE
BUN SERPL-MCNC: 40 MG/DL (ref 8–23)
BUN/CREAT SERPL: 17.4 (ref 7–25)
CALCIUM SPEC-SCNC: 8.9 MG/DL (ref 8.6–10.5)
CHLORIDE SERPL-SCNC: 101 MMOL/L (ref 98–107)
CLARITY UR: CLEAR
CO2 SERPL-SCNC: 32 MMOL/L (ref 22–29)
COLOR UR: YELLOW
CREAT SERPL-MCNC: 2.3 MG/DL (ref 0.76–1.27)
EGFRCR SERPLBLD CKD-EPI 2021: 27.8 ML/MIN/1.73
GLUCOSE SERPL-MCNC: 98 MG/DL (ref 65–99)
GLUCOSE UR STRIP-MCNC: NEGATIVE MG/DL
HGB UR QL STRIP.AUTO: NEGATIVE
HYALINE CASTS UR QL AUTO: NORMAL /LPF
KETONES UR QL STRIP: NEGATIVE
LEUKOCYTE ESTERASE UR QL STRIP.AUTO: NEGATIVE
NITRITE UR QL STRIP: NEGATIVE
PH UR STRIP.AUTO: 5.5 [PH] (ref 5–8)
POTASSIUM SERPL-SCNC: 5.4 MMOL/L (ref 3.5–5.2)
PROT UR QL STRIP: ABNORMAL
RBC # UR STRIP: NORMAL /HPF
REF LAB TEST METHOD: NORMAL
SODIUM SERPL-SCNC: 138 MMOL/L (ref 136–145)
SODIUM UR-SCNC: 28 MMOL/L
SP GR UR STRIP: 1.01 (ref 1–1.03)
SQUAMOUS #/AREA URNS HPF: NORMAL /HPF
UROBILINOGEN UR QL STRIP: ABNORMAL
WBC # UR STRIP: NORMAL /HPF

## 2023-03-16 PROCEDURE — 97535 SELF CARE MNGMENT TRAINING: CPT

## 2023-03-16 PROCEDURE — 97116 GAIT TRAINING THERAPY: CPT

## 2023-03-16 PROCEDURE — 81001 URINALYSIS AUTO W/SCOPE: CPT | Performed by: INTERNAL MEDICINE

## 2023-03-16 PROCEDURE — 97110 THERAPEUTIC EXERCISES: CPT

## 2023-03-16 PROCEDURE — 80048 BASIC METABOLIC PNL TOTAL CA: CPT | Performed by: FAMILY MEDICINE

## 2023-03-16 PROCEDURE — 84300 ASSAY OF URINE SODIUM: CPT | Performed by: INTERNAL MEDICINE

## 2023-03-16 PROCEDURE — 99232 SBSQ HOSP IP/OBS MODERATE 35: CPT | Performed by: FAMILY MEDICINE

## 2023-03-16 RX ADMIN — ACETAMINOPHEN 650 MG: 325 TABLET, FILM COATED ORAL at 03:40

## 2023-03-16 RX ADMIN — ROSUVASTATIN CALCIUM 10 MG: 5 TABLET, COATED ORAL at 09:53

## 2023-03-16 RX ADMIN — SODIUM ZIRCONIUM CYCLOSILICATE 10 G: 10 POWDER, FOR SUSPENSION ORAL at 14:52

## 2023-03-16 RX ADMIN — MIDODRINE HYDROCHLORIDE 10 MG: 5 TABLET ORAL at 06:37

## 2023-03-16 RX ADMIN — APIXABAN 2.5 MG: 2.5 TABLET, FILM COATED ORAL at 20:43

## 2023-03-16 RX ADMIN — MIDODRINE HYDROCHLORIDE 10 MG: 5 TABLET ORAL at 12:31

## 2023-03-16 RX ADMIN — Medication 1 APPLICATION: at 03:41

## 2023-03-16 RX ADMIN — PANTOPRAZOLE SODIUM 40 MG: 40 TABLET, DELAYED RELEASE ORAL at 06:37

## 2023-03-16 RX ADMIN — Medication 1 APPLICATION: at 14:53

## 2023-03-16 RX ADMIN — APIXABAN 2.5 MG: 2.5 TABLET, FILM COATED ORAL at 09:53

## 2023-03-16 RX ADMIN — MIDODRINE HYDROCHLORIDE 10 MG: 5 TABLET ORAL at 16:40

## 2023-03-16 RX ADMIN — Medication 3 ML: at 09:54

## 2023-03-16 NOTE — THERAPY TREATMENT NOTE
Patient Name: Bobby Dodd  : 1940    MRN: [id number]                              Today's Date: 3/16/2023       Admit Date: 3/14/2023    Visit Dx:     ICD-10-CM ICD-9-CM   1. Acute on chronic congestive heart failure, unspecified heart failure type (HCC)  I50.9 428.0   2. Hypotension, unspecified hypotension type  I95.9 458.9   3. Elevated troponin  R77.8 790.6     Patient Active Problem List   Diagnosis   • Essential hypertension   • Palpitations   • Tachy-mercedes syndrome (HCC)   • SOB (shortness of breath)   • Acute systolic congestive heart failure (HCC)   • Morbid obesity (HCC)   • Chronic systolic congestive heart failure (HCC)   • Chronic atrial fibrillation (HCC)   • YUNG (obstructive sleep apnea)   • Chest wall mass   • Dysphagia   • Cardiac pacemaker in situ   • Acute renal failure (ARF) (HCC)   • Anemia   • Diarrhea   • Elevated PSA   • Folate deficiency   • Hypotension   • Low TSH level   • Renal cyst   • Chronic renal failure, stage 3a (HCC)   • Pacemaker at end of battery life   • Complete heart block (HCC)   • Coronary artery disease involving native coronary artery of native heart without angina pectoris   • Permanent atrial fibrillation (HCC)   • Chronic venous stasis   • Acute on chronic congestive heart failure, unspecified heart failure type (HCC)     Past Medical History:   Diagnosis Date   • A-fib (HCC)    • Acute renal failure (ARF) (HCC) 2019    Hospital admission   • Arrhythmia    • Arthritis    • Bilateral renal cysts    • Cellulitis    • CHF (congestive heart failure) (HCC)    • Coronary artery disease    • Diarrhea 2019   • Diverticulosis 2019    Mild, noted on imaging    • Dysphagia    • Edema    • Elevated prostate specific antigen (PSA)    • Enlarged prostate    • GERD (gastroesophageal reflux disease)    • History of chest pain    • History of smoking    • History of stress test    • [place] (hard of hearing)     Mild-No hearing aids    • Hyperlipidemia    • Hypertension     • Hypogonadism in male    • Hypotension 08/24/2019    Upon hospital admission    • Impaired functional mobility, balance, gait, and endurance    • Iron deficiency anemia 2019   • Joint pain, elbow    • Kidney stones    • Knee pain    • Low back pain    • Obstructive sleep apnea     non-compliant with mask   • Pacemaker    • Palpitations    • Rheumatic fever    • SOB (shortness of breath)    • Trochanteric bursitis    • Urinary retention      Past Surgical History:   Procedure Laterality Date   • CARDIAC ELECTROPHYSIOLOGY PROCEDURE N/A 6/4/2018    Procedure: Internal Cardioversion;  Surgeon: Tho Richards MD;  Location:  [facility];  Service: Cardiology   • CARDIAC ELECTROPHYSIOLOGY PROCEDURE N/A 11/23/2021    Procedure: PPM generator change - dual;  Surgeon: Damian Doshi DO;  Location:  [facility];  Service: Cardiology;  Laterality: N/A;   • CARDIOVERSION      per DR. RICHARDS   • CHOLECYSTECTOMY     • COLONOSCOPY     • CYSTOSCOPY TRANSURETHRAL RESECTION OF PROSTATE     • ENDOSCOPY N/A 10/1/2019    Procedure: ESOPHAGOGASTRODUODENOSCOPY WITH BIOPSIES;  Surgeon: Jose Alfredo Brewer MD;  Location:  [facility];  Service: Gastroenterology   • EYE SURGERY Right     Retinal tear repair    • HERNIA REPAIR     • KNEE ARTHROSCOPY      RIGHT   • LUNG REMOVAL, PARTIAL Right    • OTHER SURGICAL HISTORY      GOITER REMOVED 2001.   • PACEMAKER IMPLANTATION      [facility]      General Information     Row Name 03/16/23 1617          OT Time and Intention    Document Type therapy note (daily note)  -SD     Mode of Treatment occupational therapy  -SD     Row Name 03/16/23 1610          General Information    Patient Profile Reviewed yes  -SD           User Key  (r) = Recorded By, (t) = Taken By, (c) = Cosigned By    Initials Name Provider Type    Deena Durand OT Occupational Therapist                 Mobility/ADL's     Row Name 03/16/23 4965          Bed Mobility    Comment, (Bed  Mobility) in chair  -SD     Row Name 03/16/23 1618          Transfers    Transfers sit-stand transfer  -SD     Comment, (Transfers) x 5 SBA  -SD     [facility] Name 03/16/23 1618          Sit-Stand Transfer    Sit-Stand [place] (Transfers) standby assist  -SD     Assistive Device (Sit-Stand Transfers) walker, front-wheeled  -SD     Row Name 03/16/23 1618          Lower Body Dressing Assessment/Training    [place] Level (Lower Body Dressing) don;socks;minimum assist (75% patient effort)  -SD     Comment, (Lower Body Dressing) would benefit from reacher and sock aid  -SD     [facility] Name 03/16/23 1618          Toileting Assessment/Training    Comment, (Toileting) patient reports difficulty with perineal hygiene d/t edema; discussed long handled tati-aid  -SD           User Key  (r) = Recorded By, (t) = Taken By, (c) = Cosigned By    Initials Name Provider Type    SD Deena Davidson OT Occupational Therapist               Obj/Interventions     [facility] Name 03/16/23 1619          Shoulder (Therapeutic Exercise)    Shoulder (Therapeutic Exercise) AROM (active range of motion)  -SD     Shoulder AROM (Therapeutic Exercise) bilateral;flexion;extension;aBduction;aDduction;horizontal aBduction/aDduction;external rotation;internal rotation;10 repetitions  -SD     [facility] Name 03/16/23 1619          Elbow/Forearm (Therapeutic Exercise)    Elbow/Forearm (Therapeutic Exercise) AROM (active range of motion)  -SD     Elbow/Forearm AROM (Therapeutic Exercise) bilateral;flexion;extension;supination;pronation;10 repetitions  -SD     [facility] Name 03/16/23 1619          Wrist (Therapeutic Exercise)    Wrist (Therapeutic Exercise) AROM (active range of motion)  -SD     Wrist AROM (Therapeutic Exercise) bilateral;flexion;extension;10 repetitions  -SD     [facility] Name 03/16/23 1619          Hand (Therapeutic Exercise)    Hand (Therapeutic Exercise) AROM (active range of motion)  -SD     Hand AROM/AAROM (Therapeutic Exercise) bilateral;AROM (active range  of motion);finger flexion;finger extension;10 repetitions  -SD     Row Name 03/16/23 1619          Motor Skills    Therapeutic Exercise shoulder;elbow/forearm;wrist;hand  -SD           User Key  (r) = Recorded By, (t) = Taken By, (c) = Cosigned By    Initials Name Provider Type    Deena Durand OT Occupational Therapist               Goals/Plan    No documentation.                Clinical Impression     Row Name 03/16/23 1619          Pain Assessment    Pretreatment Pain Rating 0/10 - no pain  -SD     Posttreatment Pain Rating 0/10 - no pain  -SD     Row Name 03/16/23 1619          Plan of Care Review    Plan of Care Reviewed With patient  -SD     Progress improving  -SD     Outcome Evaluation OT tx completed. Patient sitting in chair, on 2L O2 satting 95%. Patient performed LBD task with min A, patient reports difficulty with donning socks d/t edema in legs; discussed benefit of a reacher and sock aid. Patient also reports having difficulty with perineal hygiene and discussed benefit of a long handled tati-aid. Patient performed 5 sit to stands with SBA followed by UB AROM. Continue OT POC  -SD     Row Name 03/16/23 1619          Vital Signs    O2 Delivery Pre Treatment supplemental O2  -SD     O2 Delivery Intra Treatment supplemental O2  -SD     O2 Delivery Post Treatment supplemental O2  -SD     Row Name 03/16/23 1619          Positioning and Restraints    Pre-Treatment Position sitting in chair/recliner  -SD     Post Treatment Position chair  -SD     In Chair reclined;call light within reach;encouraged to call for assist  -SD           User Key  (r) = Recorded By, (t) = Taken By, (c) = Cosigned By    Initials Name Provider Type    Deena Durand OT Occupational Therapist               Outcome Measures     Row Name 03/16/23 1622          How much help from another is currently needed...    Putting on and taking off regular lower body clothing? 3  -SD     Bathing (including washing, rinsing, and  drying) 3  -SD     Toileting (which includes using toilet bed pan or urinal) 3  -SD     Putting on and taking off regular upper body clothing 4  -SD     Taking care of personal grooming (such as brushing teeth) 4  -SD     Eating meals 4  -SD     AM-PAC 6 Clicks Score (OT) 21  -SD     Row Name 03/16/23 1333          How much help from another person do you currently need...    Turning from your back to your side while in flat bed without using bedrails? 4  -RM     Moving from lying on back to sitting on the side of a flat bed without bedrails? 4  -RM     Moving to and from a bed to a chair (including a wheelchair)? 4  -RM     Standing up from a chair using your arms (e.g., wheelchair, bedside chair)? 4  -RM     Climbing 3-5 steps with a railing? 3  -RM     To walk in hospital room? 3  -RM     AM-PAC 6 Clicks Score (PT) 22  -RM     Highest level of mobility 7 --> Walked 25 feet or more  -RM     Row Name 03/16/23 1622 03/16/23 1333       Functional Assessment    Outcome Measure Options AM-PAC 6 Clicks Daily Activity (OT)  -SD AM-PAC 6 Clicks Basic Mobility (PT)  -RM          User Key  (r) = Recorded By, (t) = Taken By, (c) = Cosigned By    Initials Name Provider Type    [place], PTA Physical Therapist Assistant    Deena Durand, OT Occupational Therapist                Occupational Therapy Education     Title: PT OT SLP Therapies (In Progress)     Topic: Occupational Therapy (In Progress)     Point: ADL training (Done)     Description:   Instruct learner(s) on proper safety adaptation and remediation techniques during self care or transfers.   Instruct in proper use of assistive devices.              Learning Progress Summary           Patient Acceptance, E,TB, VU by SD at 3/16/2023 1622    Comment: AE for LBD and toileting    Acceptance, E, VU by SP at 3/15/2023 1231    Comment: OT educated on purpose of OT and POC. Pt agreeable.                   Point: Home exercise program (Not Started)      Description:   Instruct learner(s) on appropriate technique for monitoring, assisting and/or progressing therapeutic exercises/activities.              Learner Progress:  Not documented in this visit.          Point: Precautions (Not Started)     Description:   Instruct learner(s) on prescribed precautions during self-care and functional transfers.              Learner Progress:  Not documented in this visit.          Point: Body mechanics (Not Started)     Description:   Instruct learner(s) on proper positioning and spine alignment during self-care, functional mobility activities and/or exercises.              Learner Progress:  Not documented in this visit.                      User Key     Initials Effective Dates Name Provider Type Discipline    SD 06/16/21 -  Deena Davidson OT Occupational Therapist OT    SP 09/08/22 -  [place]helia [place] OT Occupational Therapist OT              OT Recommendation and Plan     Plan of Care Review  Plan of Care Reviewed With: patient  Progress: improving  Outcome Evaluation: OT tx completed. Patient sitting in chair, on 2L O2 satting 95%. Patient performed LBD task with min A, patient reports difficulty with donning socks d/t edema in legs; discussed benefit of a reacher and sock aid. Patient also reports having difficulty with perineal hygiene and discussed benefit of a long handled tati-aid. Patient performed 5 sit to stands with SBA followed by UB AROM. Continue OT POC     Time Calculation:    Time Calculation- OT     Row Name 03/16/23 1622 03/16/23 1336          Time Calculation- OT    OT Start Time 1506  -SD --     OT Stop Time 1531  -SD --     OT Time Calculation (min) 25 min  -SD --     OT Received On 03/16/23  -SD --     OT Goal Re-Cert Due Date 03/25/23  -SD --        Timed Charges    67882 - OT Therapeutic Exercise Minutes 15  -SD --     59536 - Gait Training Minutes  -- 12  -RM     45928 - OT Self Care/Mgmt Minutes 10  -SD --        Total Minutes    Timed Charges Total  Minutes 25  -SD 12  -RM      Total Minutes 25  -SD 12  -RM           User Key  (r) = Recorded By, (t) = Taken By, (c) = Cosigned By    Initials Name Provider Type    [place], PTA Physical Therapist Assistant    Deena Durand OT Occupational Therapist              Therapy Charges for Today     Code Description Service Date Service Provider Modifiers Qty    77887027846  OT THER PROC EA 15 MIN 3/16/2023 Deena Davidson OT GO 1    [id number]  OT SELF CARE/MGMT/TRAIN EA 15 MIN 3/16/2023 Deena Davidson OT GO 1               Deena Davidson OT  3/16/2023

## 2023-03-16 NOTE — PROGRESS NOTES
[facility]    Name:  Bobby Dodd   Age:  82 y.o.  Sex:  male  :  1940  MRN:  [id number]   Visit Number:  [id number]  Admission Date:  3/14/2023  Date Of Service:  23  Primary Care Physician:  Melissa Perdomo APRN     LOS: 2 days :    Chief Complaint:      Follow-up shortness of breath    Subjective:    Pt siting up bedside chair, no issues. Discussed cardiologist recommendations, likely end stage CHF with no real options left. Plan on letting nephrology see today to discuss his ckd. Pt aware options for improvement are limited.     Hospital Course:    The patient is apparent 82-year-old gentleman with a history of atrial fibrillation on Eliquis, pacemaker, CAD, history of combined systolic/diastolic CHF, morbid obesity, who had presented from home due to weakness, edema, increasing shortness of breath.  He had apparently been taking Lasix at home.    In the ER, the patient did have borderline low blood pressures in the 80/40 range, reportedly fairly normal with the patient be hypotensive.  He had an elevated proBNP, baseline creatinine of 1.59, hemoglobin stable.  COVID flu negative.  Chest x-ray with effusions and pulmonary edema.  ER discussed the case with cardiology who would see in consultation upon admission.  He was started on Lasix drip upon admission.    Cardiology did see in consult today.  Dr. Martel noted patient has significant history of noncompliance and due to his severe right-sided heart failure, would likely not tolerate excessive diuretics.  Also noted the 2D echo showing improved ejection fraction and will discontinue his spironolactone/Bystolic/Entresto.    Review of Systems:     All systems were reviewed and negative except as mentioned in subjective, assessment and plan.    Vital Signs:    Temp:  [97.5 °F (36.4 °C)-98.2 °F (36.8 °C)] 97.8 °F (36.6 °C)  Heart Rate:  [64-68] 68  Resp:  [16-18] 18  BP: (67-89)/(33-57)  70/45    Intake and output:    I/O last 3 completed shifts:  In: 607 [P.O.:540; I.V.:67]  Out: 1200 [Urine:1200]  No intake/output data recorded.    Physical Examination:    General Appearance:  Alert and cooperative.  No acute distress   Head:  Atraumatic and normocephalic.   Eyes: Conjunctivae and sclerae normal, no icterus. No pallor.   Throat: No oral lesions, no thrush, oral mucosa moist.   Neck: Supple, trachea midline, no thyromegaly.   Lungs:    Scattered rhonchi   Heart:  Normal S1 and S2, systolic murmur, no gallop, no rub. No JVD.   Abdomen:   Normal bowel sounds, no masses, no organomegaly. Soft, nontender, nondistended, no rebound tenderness.   Extremities: Supple, 2-3+ pitting lower   Skin:  Multiple chronic venous stasis ulcerations and wounds present. unchanged   Neurologic: Alert and oriented x 3. No facial asymmetry. Moves all four limbs. No tremors.      Laboratory results:    Results from last 7 days   Lab Units 03/16/23  0524 03/15/23  0547 03/14/23 1952   SODIUM mmol/L 138 139 138   POTASSIUM mmol/L 5.4* 5.3* 5.3*   CHLORIDE mmol/L 101 101 99   CO2 mmol/L 32.0* 31.3* 30.2*   BUN mg/dL 40* 33* 33*   CREATININE mg/dL 2.30* 1.76* 1.59*   CALCIUM mg/dL 8.9 9.0 9.1   BILIRUBIN mg/dL  --   --  0.5   ALK PHOS U/L  --   --  76   ALT (SGPT) U/L  --   --  11   AST (SGOT) U/L  --   --  18   GLUCOSE mg/dL 98 93 94     Results from last 7 days   Lab Units 03/15/23  0547 03/14/23 1952   WBC 10*3/mm3 4.50 5.72   HEMOGLOBIN g/dL 10.7* 11.7*   HEMATOCRIT % 34.4* 37.5   PLATELETS 10*3/mm3 153 176         Results from last 7 days   Lab Units 03/15/23  0058 03/14/23 1952   HSTROP T ng/L 99* 102*         No results for input(s): PHART, WBZ1CSG, PO2ART, UWP0POM, BASEEXCESS in the last 8760 hours.   I have reviewed the patient's laboratory results.    Radiology results:    Adult Transthoracic Echo Complete w/ Color, Spectral and Contrast if necessary per protocol    Result Date: 3/15/2023  •  Left ventricular  ejection fraction appears to be 66 - 70%. •  The left ventricular cavity is small in size. •  Left ventricular wall thickness is consistent with mild concentric hypertrophy. •  Left ventricular diastolic function is consistent with age. •  Mildly reduced right ventricular systolic function noted. •  The right ventricular cavity is moderate to severely dilated. •  Left atrial volume is severely increased. •  The right atrial cavity is moderate to severely  dilated. •  Estimated right ventricular systolic pressure from tricuspid regurgitation is normal (<35 mmHg).     XR Chest 1 View    Result Date: 3/15/2023  CLINICAL INDICATION:  SOA Triage Protocol shortness of breath.  EXAMINATION TECHNIQUE: XR CHEST 1 VW-  COMPARISON: None.  FINDINGS: Left chest wall subclavian approach cardiac pacer in place. Surgical clips in the lower neck, likely from prior thyroidectomy. Cardiomegaly. Moderate bilateral pleural effusions. Adjacent lung opacities, likely atelectasis. No pneumothorax.      Impression: Cardiomegaly. Moderate bilateral pleural effusions and adjacent lung atelectasis. Probable congestive heart failure/volume overload.    Images personally reviewed, interpreted and dictated by MARTHA Childress.       This report was signed and finalized on 3/15/2023 7:39 AM by MARTHA Childress.    I have reviewed the patient's radiology reports.    Medication Review:     I have reviewed the patient's active and prn medications.     Problem List:      Acute on chronic congestive heart failure, unspecified heart failure type (HCC)      Assessment:    1. Acute on chronic diastolic chf exacerbation  2. Chronic cor pulmonale  3. YUNG untreated/noncompliant  4. Chronic hypotension  5. Chronic venous insufficiency    Plan:    Acute on chronic systolic/diastolic heart failure.  Has been placed on a fluid and sodium restricted diet.  Patient does not typically follow a strict regimen at home.  Dr. Martel is recommending stopping  diuretic therapy due to his hypotension and likely peripheral edema related to right-sided heart failure and uncontrolled sleep apnea with venous insufficiency.  He is also recommended popping of spironolactone, Entresto and bisoprolol which have been discontinued.    Patient's EF noted to be stable.     Elevated troponin  -Likely demand ischemia in the settings of CHF and CKD  -Patient denies any chest pain, low suspicion for ACS currently.  Troponins trended down     Chronic kidney disease  -Will ask nephrology to see prior to dc for any recommendations. Likely limited due to severe CHF. Not going to be a dialysis candidate I am sure.     We will have wound care see him for his chronic venous ulcers.  Patient has reiterated that he will likely not wear CPAP.    DVT Prophylaxis: Eliquis  Code Status: DNR/DNI  Diet: cardiac/fluid and sodium restricted  Discharge Plan: 1-2 days    Karen Duncan DO  03/16/23  10:52 EDT    Dictated utilizing Dragon dictation.

## 2023-03-16 NOTE — CASE MANAGEMENT/SOCIAL WORK
Case Management/Social Work    Patient Name:  Bobby Dodd  YOB: 1940  MRN: [id number]  Admit Date:  3/14/2023      SW continuing to follow pt for discharge needs.  Palliative consult was placed today for pt.  Anticipate discharge in 1-2 days per Dr. Dean.  SW met with pt briefly at bedside. Denies any needs at this time.        Electronically signed by:  Jyoti Cho  03/16/23 16:07 EDT

## 2023-03-16 NOTE — THERAPY TREATMENT NOTE
Patient Name: Bobby Dodd  : 1940    MRN: [id number]                              Today's Date: 3/16/2023       Admit Date: 3/14/2023    Visit Dx:     ICD-10-CM ICD-9-CM   1. Acute on chronic congestive heart failure, unspecified heart failure type (HCC)  I50.9 428.0   2. Hypotension, unspecified hypotension type  I95.9 458.9   3. Elevated troponin  R77.8 790.6     Patient Active Problem List   Diagnosis   • Essential hypertension   • Palpitations   • Tachy-mercedes syndrome (HCC)   • SOB (shortness of breath)   • Acute systolic congestive heart failure (HCC)   • Morbid obesity (HCC)   • Chronic systolic congestive heart failure (HCC)   • Chronic atrial fibrillation (HCC)   • YUNG (obstructive sleep apnea)   • Chest wall mass   • Dysphagia   • Cardiac pacemaker in situ   • Acute renal failure (ARF) (HCC)   • Anemia   • Diarrhea   • Elevated PSA   • Folate deficiency   • Hypotension   • Low TSH level   • Renal cyst   • Chronic renal failure, stage 3a (HCC)   • Pacemaker at end of battery life   • Complete heart block (HCC)   • Coronary artery disease involving native coronary artery of native heart without angina pectoris   • Permanent atrial fibrillation (HCC)   • Chronic venous stasis   • Acute on chronic congestive heart failure, unspecified heart failure type (HCC)     Past Medical History:   Diagnosis Date   • A-fib (HCC)    • Acute renal failure (ARF) (HCC) 2019    Hospital admission   • Arrhythmia    • Arthritis    • Bilateral renal cysts    • Cellulitis    • CHF (congestive heart failure) (HCC)    • Coronary artery disease    • Diarrhea 2019   • Diverticulosis 2019    Mild, noted on imaging    • Dysphagia    • Edema    • Elevated prostate specific antigen (PSA)    • Enlarged prostate    • GERD (gastroesophageal reflux disease)    • History of chest pain    • History of smoking    • History of stress test    • [place] (hard of hearing)     Mild-No hearing aids    • Hyperlipidemia    • Hypertension     • Hypogonadism in male    • Hypotension 08/24/2019    Upon hospital admission    • Impaired functional mobility, balance, gait, and endurance    • Iron deficiency anemia 2019   • Joint pain, elbow    • Kidney stones    • Knee pain    • Low back pain    • Obstructive sleep apnea     non-compliant with mask   • Pacemaker    • Palpitations    • Rheumatic fever    • SOB (shortness of breath)    • Trochanteric bursitis    • Urinary retention      Past Surgical History:   Procedure Laterality Date   • CARDIAC ELECTROPHYSIOLOGY PROCEDURE N/A 6/4/2018    Procedure: Internal Cardioversion;  Surgeon: Tho Richarsd MD;  Location:  [facility];  Service: Cardiology   • CARDIAC ELECTROPHYSIOLOGY PROCEDURE N/A 11/23/2021    Procedure: PPM generator change - dual;  Surgeon: Damian Doshi DO;  Location:  [facility];  Service: Cardiology;  Laterality: N/A;   • CARDIOVERSION      per DR. RICHARDS   • CHOLECYSTECTOMY     • COLONOSCOPY     • CYSTOSCOPY TRANSURETHRAL RESECTION OF PROSTATE     • ENDOSCOPY N/A 10/1/2019    Procedure: ESOPHAGOGASTRODUODENOSCOPY WITH BIOPSIES;  Surgeon: Jose Alfredo Brewer MD;  Location:  [facility];  Service: Gastroenterology   • EYE SURGERY Right     Retinal tear repair    • HERNIA REPAIR     • KNEE ARTHROSCOPY      RIGHT   • LUNG REMOVAL, PARTIAL Right    • OTHER SURGICAL HISTORY      GOITER REMOVED 2001.   • PACEMAKER IMPLANTATION      [facility]      General Information     Row Name 03/16/23 1324          Physical Therapy Time and Intention    Document Type therapy note (daily note)  -RM     Mode of Treatment physical therapy  -RM     Row Name 03/16/23 1325          General Information    Patient Profile Reviewed yes  -RM     Existing Precautions/Restrictions no known precautions/restrictions  -RM     Row Name 03/16/23 1324          Cognition    Orientation Status (Cognition) oriented x 4  -RM     Row Name 03/16/23 1325          Safety Issues, Functional Mobility     Safety Issues Affecting Function (Mobility) insight into deficits/self-awareness  -RM     Impairments Affecting Function (Mobility) endurance/activity tolerance;shortness of breath  -RM           User Key  (r) = Recorded By, (t) = Taken By, (c) = Cosigned By    Initials Name Provider Type    Buck Sow PTA Physical Therapist Assistant               Mobility     Row Name 03/16/23 1325          Sit-Stand Transfer    Sit-Stand [place] (Transfers) standby assist;verbal cues  -RM     Assistive Device (Sit-Stand Transfers) other (see comments)  gait belt  -RM     Row Name 03/16/23 1325          Gait/Stairs (Locomotion)    [place] Level (Gait) standby assist  -RM     Assistive Device (Gait) other (see comments)  gait belt  -RM     Deviations/Abnormal Patterns (Gait) base of support, wide;weight shifting decreased;stride length decreased  -RM           User Key  (r) = Recorded By, (t) = Taken By, (c) = Cosigned By    Initials Name Provider Type    Buck Sow PTA Physical Therapist Assistant               Obj/Interventions     Row Name 03/16/23 1327          Motor Skills    Motor Skills --  sit to stand 2x 10 , standing heel raises x 20  -RM           User Key  (r) = Recorded By, (t) = Taken By, (c) = Cosigned By    Initials Name Provider Type    Buck Sow PTA Physical Therapist Assistant               Goals/Plan    No documentation.                Clinical Impression     Row Name 03/16/23 1328          Pain    Pretreatment Pain Rating 0/10 - no pain  -RM     Posttreatment Pain Rating 0/10 - no pain  -RM     Row Name 03/16/23 1328          Plan of Care Review    Plan of Care Reviewed With patient;daughter  -RM     Progress improving  -RM     Outcome Evaluation Pt sitting uic and willing to work with therapy.  Pt pre BP 69/53 and post BP 91/69.  Both taken in sitting.  Pt was able to perform standing exercises as well as ambulation SBA with gait belt 80' . . Pt performed  treatment with 2L o2 PNC. It was noted that patients o2 at the lowest 88% rebounded quickly to 94%.  See flowsheet for details  -     Row Name 03/16/23 1328          Vital Signs    Pre Systolic BP Rehab 69  -RM     Pre Treatment Diastolic BP 53  -RM     Post Systolic BP Rehab 91  -RM     Post Treatment Diastolic BP 69  -RM     Pre SpO2 (%) 92  -RM     O2 Delivery Pre Treatment supplemental O2  2L  -RM     Intra SpO2 (%) 88  -RM     O2 Delivery Intra Treatment supplemental O2  -RM     Post SpO2 (%) 94  -RM     O2 Delivery Post Treatment supplemental O2  -RM     Pre Patient Position Supine  -RM     Intra Patient Position Standing  -RM     Post Patient Position Sitting  -RM     Row Name 03/16/23 1328          Positioning and Restraints    Pre-Treatment Position sitting in chair/recliner  -RM     Post Treatment Position chair  -RM     In Chair reclined;call light within reach;encouraged to call for assist;with family/caregiver;notified nsg  -RM           User Key  (r) = Recorded By, (t) = Taken By, (c) = Cosigned By    Initials Name Provider Type     Buck Lerma, PTA Physical Therapist Assistant               Outcome Measures     Row Name 03/16/23 4212          How much help from another person do you currently need...    Turning from your back to your side while in flat bed without using bedrails? 4  -RM     Moving from lying on back to sitting on the side of a flat bed without bedrails? 4  -RM     Moving to and from a bed to a chair (including a wheelchair)? 4  -RM     Standing up from a chair using your arms (e.g., wheelchair, bedside chair)? 4  -RM     Climbing 3-5 steps with a railing? 3  -RM     To walk in hospital room? 3  -RM     AM-PAC 6 Clicks Score (PT) 22  -RM     Highest level of mobility 7 --> Walked 25 feet or more  -     Row Name 03/16/23 0937          Functional Assessment    Outcome Measure Options AM-PAC 6 Clicks Basic Mobility (PT)  -RM           User Key  (r) = Recorded By, (t) = Taken  By, (c) = Cosigned By    Initials Name Provider Type     Buck Lerma, PTA Physical Therapist Assistant                             Physical Therapy Education     Title: PT OT SLP Therapies (In Progress)     Topic: Physical Therapy (In Progress)     Point: Mobility training (Done)     Learning Progress Summary           Patient Acceptance, E,TB,D, VU by  at 3/16/2023 1334    Comment: safety with mobility, exercise techniques    Acceptance, E,TB, VU by  at 3/15/2023 1346    Comment: Role of PT and POC                   Point: Home exercise program (Done)     Learning Progress Summary           Patient Acceptance, E,TB,D, VU by  at 3/16/2023 1334    Comment: safety with mobility, exercise techniques                   Point: Body mechanics (Not Started)     Learner Progress:  Not documented in this visit.          Point: Precautions (Not Started)     Learner Progress:  Not documented in this visit.                      User Key     Initials Effective Dates Name Provider Type Discipline     03/23/22 -  Judi Gonzalez, PT Physical Therapist PT     06/16/21 -  Buck Lerma, LIS Physical Therapist Assistant PT              PT Recommendation and Plan     Plan of Care Reviewed With: patient, daughter  Progress: improving  Outcome Evaluation: Pt sitting uic and willing to work with therapy.  Pt pre BP 69/53 and post BP 91/69.  Both taken in sitting.  Pt was able to perform standing exercises as well as ambulation SBA with gait belt 80' . . Pt performed treatment with 2L o2 PNC. It was noted that patients o2 at the lowest 88% rebounded quickly to 94%.  See flowsheet for details     Time Calculation:    PT Charges     Row Name 03/16/23 3186             Time Calculation    Start Time 1141  -RM      Stop Time 1213  -RM      Time Calculation (min) 32 min  -RM      PT Received On 03/16/23  -RM      PT Goal Re-Cert Due Date 03/25/23  -RM         Time Calculation- PT    Total Timed Code Minutes- PT 32 minute(s)   -RM         Timed Charges    48992 - PT Therapeutic Exercise Minutes 20  -RM      55689 - Gait Training Minutes  12  -RM         Total Minutes    Timed Charges Total Minutes 32  -RM       Total Minutes 32  -RM            User Key  (r) = Recorded By, (t) = Taken By, (c) = Cosigned By    Initials Name Provider Type    Buck Sow, LIS Physical Therapist Assistant              Therapy Charges for Today     Code Description Service Date Service Provider Modifiers Qty    83438749514 HC PT THER PROC EA 15 MIN 3/16/2023 Buck Lerma, PTA GP 1    96349122455 HC GAIT TRAINING EA 15 MIN 3/16/2023 Buck Lerma, PTA GP 1          PT G-Codes  Outcome Measure Options: AM-PAC 6 Clicks Basic Mobility (PT)  AM-PAC 6 Clicks Score (PT): 22  AM-PAC 6 Clicks Score (OT): 21       Buck Lerma PTA  3/16/2023

## 2023-03-16 NOTE — PLAN OF CARE
Goal Outcome Evaluation:  Plan of Care Reviewed With: patient        Progress: improving  Outcome Evaluation: OT tx completed. Patient sitting in chair, on 2L O2 satting 95%. Patient performed LBD task with min A, patient reports difficulty with donning socks d/t edema in legs; discussed benefit of a reacher and sock aid. Patient also reports having difficulty with perineal hygiene and discussed benefit of a long handled tati-aid. Patient performed 5 sit to stands with SBA followed by [facility] AROM. Continue OT POC

## 2023-03-16 NOTE — PLAN OF CARE
Goal Outcome Evaluation:  Plan of Care Reviewed With: patient, daughter        Progress: improving  Outcome Evaluation: Pt sitting uic and willing to work with therapy.  Pt pre BP 69/53 and post BP 91/69.  Both taken in sitting.  Pt was able to perform standing exercises as well as ambulation SBA with gait belt 80' . . Pt performed treatment with 2L o2 PNC. It was noted that patients o2 at the lowest 88% rebounded quickly to 94%.  See flowsheet for details

## 2023-03-16 NOTE — PLAN OF CARE
Pt presented to the hospital with c/o worsening SOB x 2 days and a dry cough.    Admit dx:  Acute on chronic Systolic HF; Elevated troponin    PMH:  A. Fib; Systolic HF; CAD S/P pacemaker    Code status:  DNR DNI    No advanced directives are on chart.    Visited pt's room; his daughter was at bedside.  The pt was sitting up in the bedside recliner and appeared to be comfortable.    The pt shared that he lives alone.  He has 4 children (3 sons and 1 daughter).  Children all live close and able to check on him.   The pt expressed liking to get outside but had not been able to recently.  He did share that he has a son battling brain cancer currently.    Pt verbalized an understanding that both sides of his heart was not working and there were no other interventions.  He reported that cardiology was going to adjust his medication but was not able to get fluids because of his heart and that he would not be a candidate for dialysis.  Pt stated that he knows he hasn't taken care of himself as he should.  He stated he plans to start exercising and eat better.    Cardiac rehab vs home with HH and palliative care was discussed in detail.  Palliative services explained as an extra resource while continuing to seek treatment.  He stated that he was not sure if palliative services would be beneficial for him at this time.      Pt confirmed his decision for a DNR DNI code status.    Plan:  Pt does not feel palliative would be beneficial to him at this time.  He stated that he plans to modify his diet and start a daily exercise program when he gets home.  He would like to proceed with current medication adjustments.  Palliative services will continue to follow.

## 2023-03-17 VITALS
BODY MASS INDEX: 44.44 KG/M2 | SYSTOLIC BLOOD PRESSURE: 106 MMHG | HEIGHT: 70 IN | DIASTOLIC BLOOD PRESSURE: 62 MMHG | HEART RATE: 60 BPM | RESPIRATION RATE: 18 BRPM | TEMPERATURE: 98.3 F | OXYGEN SATURATION: 97 % | WEIGHT: 310.41 LBS

## 2023-03-17 LAB
ANION GAP SERPL CALCULATED.3IONS-SCNC: 8.7 MMOL/L (ref 5–15)
BUN SERPL-MCNC: 45 MG/DL (ref 8–23)
BUN/CREAT SERPL: 18.9 (ref 7–25)
CALCIUM SPEC-SCNC: 8.9 MG/DL (ref 8.6–10.5)
CHLORIDE SERPL-SCNC: 100 MMOL/L (ref 98–107)
CO2 SERPL-SCNC: 28.3 MMOL/L (ref 22–29)
CREAT SERPL-MCNC: 2.38 MG/DL (ref 0.76–1.27)
EGFRCR SERPLBLD CKD-EPI 2021: 26.7 ML/MIN/1.73
GLUCOSE SERPL-MCNC: 86 MG/DL (ref 65–99)
POTASSIUM SERPL-SCNC: 4.8 MMOL/L (ref 3.5–5.2)
SODIUM SERPL-SCNC: 137 MMOL/L (ref 136–145)

## 2023-03-17 PROCEDURE — 80048 BASIC METABOLIC PNL TOTAL CA: CPT | Performed by: FAMILY MEDICINE

## 2023-03-17 PROCEDURE — 99238 HOSP IP/OBS DSCHRG MGMT 30/<: CPT | Performed by: FAMILY MEDICINE

## 2023-03-17 RX ORDER — MIDODRINE HYDROCHLORIDE 10 MG/1
5 TABLET ORAL
Qty: 45 TABLET | Refills: 0 | Status: SHIPPED | OUTPATIENT
Start: 2023-03-17 | End: 2023-04-16

## 2023-03-17 RX ORDER — AMMONIUM LACTATE 12 G/100G
1 CREAM TOPICAL EVERY 12 HOURS
Qty: 140 G | Refills: 0 | Status: SHIPPED | OUTPATIENT
Start: 2023-03-17

## 2023-03-17 RX ADMIN — Medication 1 APPLICATION: at 06:43

## 2023-03-17 RX ADMIN — MIDODRINE HYDROCHLORIDE 10 MG: 5 TABLET ORAL at 06:43

## 2023-03-17 RX ADMIN — Medication 1 APPLICATION: at 13:44

## 2023-03-17 RX ADMIN — APIXABAN 2.5 MG: 2.5 TABLET, FILM COATED ORAL at 10:41

## 2023-03-17 RX ADMIN — MIDODRINE HYDROCHLORIDE 10 MG: 5 TABLET ORAL at 10:41

## 2023-03-17 RX ADMIN — ROSUVASTATIN CALCIUM 10 MG: 5 TABLET, COATED ORAL at 10:42

## 2023-03-17 RX ADMIN — PANTOPRAZOLE SODIUM 40 MG: 40 TABLET, DELAYED RELEASE ORAL at 06:42

## 2023-03-17 NOTE — CONSULTS
[facility]      Nephrology Consultation      Referring Provider:   Alex Lake, *    Reason for Consultation:  Acute Kidney Injury on chronic kidney disease 3b and associated problems.    Subjective:  Chief complaint   Chief Complaint   Patient presents with   • Shortness of Breath     History of present illness:    Patient is 82-year-old morbidly obese white male with history of atrial fibrillation on Eliquis, status post pacemaker, history of combined systolic and diastolic CHF with severe pulmonary hypertension and untreated sleep apnea.  Presented to the ER with worsening shortness of breath as well as increasing edema and CHF, patient was diuresed and became hypotensive currently on 10 mg of midodrine 3 times a day with blood pressures running in the 80s.  He also has some worsening renal function.  I was consulted for further evaluation and treatment.  Currently patient is sitting in the chair awake alert and interactive, he was fairly clear that he does not do much at home he cannot even get up from the bed to his bathroom without getting significantly short of breath.  Usually sits in the recliner.  I had fairly rut discussion with him about worsening renal function and low blood pressure as well as increasing edema likely secondary to all right-sided heart failure, his blood pressure is dependent on volume as the volume decreases his blood pressure drops and is not much left to do at this point.  I made it fairly clear to him that he will gradually get worse and is not much left that can be done to make it better.  I did recommend that he should consider comfort care and hospice.  Currently sitting in in the chair has some shortness of breath at baseline he does have oxygen with a saturation 80s.  He was very awake and alert was able to communicate very well.  I have reviewed labs/imaging/records from this hospitalization, including ER staff and admitting/attending  physicians H/P's and progress notes to establish a comprehensive understanding of this patient's clinical hospital course, as well as to establish plan of care appropriately.   Past Medical History:   Diagnosis Date   • A-fib (HCC)    • Acute renal failure (ARF) ([facility]) 08/24/2019    Hospital admission   • Arrhythmia    • Arthritis    • Bilateral renal cysts    • Cellulitis    • CHF (congestive heart failure) ([facility])    • Coronary artery disease    • Diarrhea 08/24/2019   • Diverticulosis 2019    Mild, noted on imaging    • Dysphagia    • Edema    • Elevated prostate specific antigen (PSA)    • Enlarged prostate    • GERD (gastroesophageal reflux disease)    • History of chest pain    • History of smoking    • History of stress test    • [place] (hard of hearing)     Mild-No hearing aids    • Hyperlipidemia    • Hypertension    • Hypogonadism in male    • Hypotension 08/24/2019    Upon hospital admission    • Impaired functional mobility, balance, gait, and endurance    • Iron deficiency anemia 2019   • Joint pain, elbow    • Kidney stones    • Knee pain    • Low back pain    • Obstructive sleep apnea     non-compliant with mask   • Pacemaker    • Palpitations    • Rheumatic fever    • SOB (shortness of breath)    • Trochanteric bursitis    • Urinary retention        Past Surgical History:   Procedure Laterality Date   • CARDIAC ELECTROPHYSIOLOGY PROCEDURE N/A 6/4/2018    Procedure: Internal Cardioversion;  Surgeon: Tho Richards MD;  Location:  [facility];  Service: Cardiology   • CARDIAC ELECTROPHYSIOLOGY PROCEDURE N/A 11/23/2021    Procedure: PPM generator change - dual;  Surgeon: Damian Doshi DO;  Location:  [facility];  Service: Cardiology;  Laterality: N/A;   • CARDIOVERSION      per DR. RICHARDS   • CHOLECYSTECTOMY     • COLONOSCOPY     • CYSTOSCOPY TRANSURETHRAL RESECTION OF PROSTATE     • ENDOSCOPY N/A 10/1/2019    Procedure: ESOPHAGOGASTRODUODENOSCOPY WITH BIOPSIES;   Surgeon: Jose Alfredo Brewer MD;  Location: [facility] ENDOSCOPY;  Service: Gastroenterology   • EYE SURGERY Right     Retinal tear repair    • HERNIA REPAIR     • KNEE ARTHROSCOPY      RIGHT   • LUNG REMOVAL, PARTIAL Right    • OTHER SURGICAL HISTORY      GOITER REMOVED .   • PACEMAKER IMPLANTATION      [facility]     Family History   Problem Relation Age of Onset   • Arthritis Mother    • Hypertension Mother    • Heart attack Father    • Arthritis Sister      negative h/o ESRD     Social History     Tobacco Use   • Smoking status: Former     Packs/day: 1.00     Years: 30.00     Pack years: 30.00     Types: Cigarettes     Quit date:      Years since quittin.2   • Smokeless tobacco: Never   Vaping Use   • Vaping Use: Never used   Substance Use Topics   • Alcohol use: No   • Drug use: No     Home medications:   Prior to Admission Medications     Prescriptions Last Dose Informant Patient Reported? Taking?    apixaban (ELIQUIS) 2.5 MG tablet tablet   No No    Take 1 tablet by mouth 2 (Two) Times a Day.    Patient taking differently:  Take 2 tablets by mouth Every 12 (Twelve) Hours.    cephalexin (KEFLEX) 500 MG capsule Not Taking  Yes No    Take 1 capsule by mouth 4 (Four) Times a Day.    Patient not taking:  Reported on 3/15/2023    Cyanocobalamin 2500 MCG sublingual tablet Not Taking  Yes No    Place 2,500 mcg under the tongue.    Patient not taking:  Reported on 3/15/2023    Entresto 24-26 MG tablet 3/14/2023  No Yes    TAKE 1 TABLET BY MOUTH 2 (TWO) TIMES A DAY.    ferrous sulfate 324 (65 Fe) MG tablet delayed-release EC tablet Not Taking  Yes No    Take 324 mg by mouth 2 (Two) Times a Day With Meals.    Patient not taking:  Reported on 3/15/2023    finasteride (PROSCAR) 5 MG tablet Not Taking  Yes No    Take 1 tablet by mouth Daily.    Patient not taking:  Reported on 3/15/2023    folic acid (FOLVITE) 1 MG tablet Not Taking Pharmacy Yes No    Take 1 tablet by mouth Daily.    Patient not taking:  Reported on  3/15/2023    furosemide (LASIX) 40 MG tablet 3/14/2023 Pharmacy No Yes    Take 1 tablet by mouth Daily.    HYDROcodone-acetaminophen (NORCO) 5-325 MG per tablet Not Taking  Yes No    Take 1 tablet by mouth.    Patient not taking:  Reported on 3/15/2023    Mirabegron ER (Myrbetriq) 25 MG tablet sustained-release 24 hour 24 hr tablet Not Taking  No No    Take 1 tablet by mouth Daily.    Patient not taking:  Reported on 3/15/2023    nebivolol (BYSTOLIC) 10 MG tablet 3/14/2023  No Yes    TAKE 1 TABLET BY MOUTH DAILY.    pantoprazole (PROTONIX) 40 MG EC tablet Not Taking  Yes No    Take 1 tablet by mouth Every Morning Before Breakfast.    Patient not taking:  Reported on 3/15/2023    rosuvastatin (CRESTOR) 10 MG tablet 3/14/2023  No Yes    TAKE 1 TABLET BY MOUTH DAILY.    spironolactone (ALDACTONE) 25 MG tablet 3/14/2023  No Yes    TAKE 1/2 TABLET BY MOUTH EVERY DAY        Emergency department medications:   Medications   sodium chloride 0.9 % flush 10 mL (has no administration in time range)   pantoprazole (PROTONIX) EC tablet 40 mg (40 mg Oral Given 3/17/23 0642)   apixaban (ELIQUIS) tablet 2.5 mg (2.5 mg Oral Given 3/16/23 2043)   rosuvastatin (CRESTOR) tablet 10 mg (10 mg Oral Given 3/16/23 0953)   sodium chloride 0.9 % flush 3 mL (3 mL Intravenous Given 3/16/23 0954)   sodium chloride 0.9 % flush 3-10 mL (has no administration in time range)   sodium chloride 0.9 % infusion 40 mL (has no administration in time range)   acetaminophen (TYLENOL) tablet 650 mg (650 mg Oral Given 3/16/23 0340)     Or   acetaminophen (TYLENOL) 160 MG/5ML solution 650 mg ( Oral Not Given:  See Alt 3/16/23 0340)     Or   acetaminophen (TYLENOL) suppository 650 mg ( Rectal Not Given:  See Alt 3/16/23 0340)   sennosides-docusate (PERICOLACE) 8.6-50 MG per tablet 2 tablet (2 tablets Oral Not Given 3/16/23 2043)     And   polyethylene glycol (MIRALAX) packet 17 g (has no administration in time range)     And   bisacodyl (DULCOLAX) EC tablet 5  "mg (has no administration in time range)     And   bisacodyl (DULCOLAX) suppository 10 mg (has no administration in time range)   ondansetron (ZOFRAN) injection 4 mg (has no administration in time range)   melatonin tablet 5 mg (5 mg Oral Given 3/15/23 0109)   ammonium lactate (AMLACTIN) 12 % cream 1 application (1 application Topical Given 3/17/23 0643)   midodrine (PROAMATINE) tablet 10 mg (10 mg Oral Given 3/17/23 0643)   Sulfur Hexafluoride Microsph (LUMASON) 60.7-25 MG IV reconstituted suspension reconstituted suspension 6 mL (6 mL Intravenous Given 3/15/23 0916)   sodium zirconium cyclosilicate (LOKELMA) pack 10 g (10 g Oral Given 3/16/23 1452)       Allergies:  Patient has no known allergies.    Review of Systems  All review of system were reviewed and are negative except as mentioned in the history of present illness and assessment and plan.    Objective:  Vital Signs  BP (!) 83/51 (BP Location: Right arm, Patient Position: Sitting)   Pulse 66   Temp 98.1 °F (36.7 °C) (Oral)   Resp 18   Ht 177.8 cm (70\")   Wt (!) 141 kg (310 lb 6.5 oz)   SpO2 97%   BMI 44.54 kg/m²          No intake/output data recorded.    Intake/Output Summary (Last 24 hours) at 3/17/2023 0859  Last data filed at 3/17/2023 0001  Gross per 24 hour   Intake 480 ml   Output 850 ml   Net -370 ml       Physical Exam:  General Appearance:   Alert, cooperative, in no acute distress.     Head:   Normocephalic, without obvious abnormality, atraumatic.     Eyes:      Normal, conjunctivae and sclerae, no icterus, no pallor, corneas clear, PERRLA        Throat:   Oral mucosa dry      Neck:  No adenopathy, supple, trachea midline, no thyromegaly, no carotid bruit, no JVD        Lungs:    Decreased breath sound at both bases and crackles noted all over the chest.      Heart::   Irregular rhythm and rate.       Abdomen:   Obese. Normal bowel sounds, no masses, no organomegaly, soft non-tender, non-distended, no guarding, no rebound tenderness    "   Genital urinary:   No urinary bladder palpable      Extremities:  Moves all extremities, 3-4+ edema, no cyanosis, no redness.  Does have generalized anasarca.     Pulses:  Pulses palpable and equal bilaterally but weak.     Skin:  No bleeding, bruising or rash        Neurologic:  Cranial nerves grossly intact, move all extremities         Results Review:   Results from last 7 days   Lab Units 03/17/23  0805 03/16/23  0524 03/15/23  0547 03/14/23 1952   SODIUM mmol/L 137 138 139 138   POTASSIUM mmol/L 4.8 5.4* 5.3* 5.3*   CHLORIDE mmol/L 100 101 101 99   CO2 mmol/L 28.3 32.0* 31.3* 30.2*   BUN mg/dL 45* 40* 33* 33*   CREATININE mg/dL 2.38* 2.30* 1.76* 1.59*   CALCIUM mg/dL 8.9 8.9 9.0 9.1   ALBUMIN g/dL  --   --   --  3.4*   BILIRUBIN mg/dL  --   --   --  0.5   ALK PHOS U/L  --   --   --  76   ALT (SGPT) U/L  --   --   --  11   AST (SGOT) U/L  --   --   --  18   GLUCOSE mg/dL 86 98 93 94     Estimated Creatinine Clearance: 34.4 mL/min (A) (by C-G formula based on SCr of 2.38 mg/dL (H)).          Results from last 7 days   Lab Units 03/15/23  0547 03/14/23 1952   WBC 10*3/mm3 4.50 5.72   HEMOGLOBIN g/dL 10.7* 11.7*   PLATELETS 10*3/mm3 153 176         Brief Urine Lab Results  (Last result in the past 365 days)      Color   Clarity   Blood   Leuk Est   Nitrite   Protein   CREAT   Urine HCG        03/16/23 2236 Yellow   Clear   Negative   Negative   Negative   30 mg/dL (1+)               No results found for: UTPCR  Imaging Results (Last 24 Hours)     ** No results found for the last 24 hours. **        ammonium lactate, 1 application, Topical, Q12H  apixaban, 2.5 mg, Oral, BID  midodrine, 10 mg, Oral, TID AC  pantoprazole, 40 mg, Oral, Daily  rosuvastatin, 10 mg, Oral, Daily  senna-docusate sodium, 2 tablet, Oral, BID  sodium chloride, 3 mL, Intravenous, Q12H           Assessment/Plan:    1. Acute kidney injury:  2. Chronic kidney disease stage IIIb:  3. Acute on chronic congestive heart failure, unspecified heart  failure type (HCC):  4. Pulmonary hypertension:  5. Generalized anasarca:  6. Untreated sleep apnea:  7. Anemia:  8. Atrial fibrillation:  9. Morbid obesity:      Risk and complexity: High      Plan:  · Patient will not tolerate diuretics secondary to hypotension.  It is secondary to right-sided heart failure, he is not a candidate for dialysis as his blood pressure will drop with every dialysis.  I did recommend that he should consider comfort care with hospice.  Patient does understand and is agreeable.  · Continue with rest of the current treatment plan and surveillance labs.  · Details were discussed with the patient no family in the room.    · Details were also discussed with the hospitalist service.   · Further recommendations will depend on clinical course of the patient during the current hospitalization.    · I also discussed the details with the nursing staff.  · Rest as ordered.    In closing, I sincerely appreciate opportunity to participate in care of this patient. If I can be of any further assistance with the management of this patient, please don’t hesitate to contact me.    Sheldon Barker MD, FASN    03/17/23  08:59 EDT    Dictated using Dragon.

## 2023-03-17 NOTE — PLAN OF CARE
Received update from Dr. Duncan that the pt would like to go home under hospice services.    Visited pt's room; his son was at bedside.  Pt confirmed conversation with doctor this morning and that he would like to have hospice services.  He again verbalized understanding that he doesn't have many option.  He expressed that it may have been avoided if he had done something different in the past but did state any regrets for this life.  He stated that he was put on this earth for purpose and when it was his time to go then it will happen.      Hospice services explained in detail to include a nurse 1x/week, on-call nurse 24/7, nursing tech 2 to 3x.week, DME and medication management.  The pt verbalized understanding.  He stated that he was not sure of what he would need but knew that he would need oxygen as he has never needed it in the past.  Referral process explained and the pt would like to proceed with referral.    Spoke to Angie, nurse with HCP, in person while at the hospital. Home hospice referral given.  She will plan to see the pt.    Received update that the pt only requested oxygen.  She will bring a tank to the hospital and contact DME delivery team.      Update received that oxygen set up would be completed by 4:30 today and portable has been delivered to pt's room.  Dr. Duncan and LOWELL Zapata updated.

## 2023-03-17 NOTE — DISCHARGE SUMMARY
[facility]   DISCHARGE SUMMARY      Name:  Bobby Dodd   Age:  82 y.o.  Sex:  male  :  1940  MRN:  [id number]   Visit Number:  [id number]    Admission Date:  3/14/2023  Date of Discharge:  3/17/2023  Primary Care Physician:  Melissa Perdomo APRN    Important issues to note:    The patient will be discharged with home hospice services after discussion with cardiology, nephrology, and primary service.  We are arranging him oxygen therapy upon discharge.    Discharge Diagnoses:     1. Acute on chronic diastolic chf exacerbation  2. Chronic cor pulmonale  3. YUNG untreated/noncompliant  4. Chronic hypotension  5. Chronic venous insufficiency    Problem List:     Active Hospital Problems    Diagnosis  POA   • **Acute on chronic congestive heart failure, unspecified heart failure type (HCC) [I50.9]  Yes      Resolved Hospital Problems   No resolved problems to display.     Presenting Problem:    Chief Complaint   Patient presents with   • Shortness of Breath      Consults:     Consulting Physician(s)     Provider Relationship Specialty    Sheldon Barker MD, [place] Consulting Physician Nephrology    José Miguel Martel MD Consulting Physician Cardiology        Procedures Performed:        History of presenting illness/Hospital Course:    The patient is apparent 82-year-old gentleman with a history of atrial fibrillation on Eliquis, pacemaker, CAD, history of combined systolic/diastolic CHF, morbid obesity, who had presented from home due to weakness, edema, increasing shortness of breath.  He had apparently been taking Lasix at home.     In the ER, the patient did have borderline low blood pressures in the 80/40 range, reportedly fairly normal with the patient be hypotensive.  He had an elevated proBNP, baseline creatinine of 1.59, hemoglobin stable.  COVID flu negative.  Chest x-ray with effusions and pulmonary edema.  ER discussed the case with cardiology who would see in consultation  upon admission.  He was started on Lasix drip upon admission.     His primary cardiologist, Dr. Martel, noted patient has significant history of noncompliance and due to his severe right-sided heart failure, would likely not tolerate excessive diuretics.  Also noted the 2D echo showing improved ejection fraction and will discontinue his spironolactone/Bystolic/Entresto due to his hypotension.  He was recommending hospice and palliative care measures.    I did ask for Dr. Barker to also see in consult due to his worsening renal failure.  He was also in agreement that patient is overall very poor candidate for any aggressive treatments including dialysis, and this was all discussed with the patient at bedside.  The patient understands and all of the questions were answered.  He was agreeable to hospice services at this time.  We are arranging some home oxygen for him.  I discussed he should hold all of his blood pressure medications upon discharge and hold any further diuretics.    Vital Signs:    Temp:  [97.3 °F (36.3 °C)-98.3 °F (36.8 °C)] 98.3 °F (36.8 °C)  Heart Rate:  [60-79] 60  Resp:  [16-18] 18  BP: ()/(46-66) 106/62    Physical Exam:    General Appearance:  Alert and cooperative. NAD   Head:  Atraumatic and normocephalic.   Eyes: Conjunctivae and sclerae normal, no icterus. No pallor.   Ears:  Ears with no abnormalities noted.   Throat: No oral lesions, no thrush, oral mucosa moist.   Neck: Supple, trachea midline, no thyromegaly.   Back:   No kyphoscoliosis present. No tenderness to palpation.   Lungs:   BS diminished, scattered crackles   Heart:  Normal S1 and S2, no murmur, no gallop, no rub. No JVD.   Abdomen:   Normal bowel sounds, no masses, no organomegaly. Soft, nontender, nondistended, no rebound tenderness.   Extremities: Chronic venous changes, ulcerations noted, edema throughout   Pulses: Pulses palpable bilaterally.   Skin: No bleeding or rash.   Neurologic: Alert and oriented x 3. No  facial asymmetry. Moves all four limbs. No tremors.     Pertinent Lab Results:     Results from last 7 days   Lab Units 03/17/23  0805 03/16/23  0524 03/15/23  0547 03/14/23 1952   SODIUM mmol/L 137 138 139 138   POTASSIUM mmol/L 4.8 5.4* 5.3* 5.3*   CHLORIDE mmol/L 100 101 101 99   CO2 mmol/L 28.3 32.0* 31.3* 30.2*   BUN mg/dL 45* 40* 33* 33*   CREATININE mg/dL 2.38* 2.30* 1.76* 1.59*   CALCIUM mg/dL 8.9 8.9 9.0 9.1   BILIRUBIN mg/dL  --   --   --  0.5   ALK PHOS U/L  --   --   --  76   ALT (SGPT) U/L  --   --   --  11   AST (SGOT) U/L  --   --   --  18   GLUCOSE mg/dL 86 98 93 94     Results from last 7 days   Lab Units 03/15/23  0547 03/14/23 1952   WBC 10*3/mm3 4.50 5.72   HEMOGLOBIN g/dL 10.7* 11.7*   HEMATOCRIT % 34.4* 37.5   PLATELETS 10*3/mm3 153 176         Results from last 7 days   Lab Units 03/15/23  0058 03/14/23 1952   HSTROP T ng/L 99* 102*     Results from last 7 days   Lab Units 03/14/23 1952   PROBNP pg/mL 6,929.0*                       Pertinent Radiology Results:    Imaging Results (All)     Procedure Component Value Units Date/Time    XR Chest 1 View [[id number]] Collected: 03/15/23 0738     Updated: 03/15/23 0741    Narrative:      CLINICAL INDICATION:    SOA Triage Protocol shortness of breath.      EXAMINATION TECHNIQUE:   XR CHEST 1 VW-     COMPARISON:  None.     FINDINGS:  Left chest wall subclavian approach cardiac pacer in place. Surgical  clips in the lower neck, likely from prior thyroidectomy. Cardiomegaly.  Moderate bilateral pleural effusions. Adjacent lung opacities, likely  atelectasis. No pneumothorax.       Impression:      Cardiomegaly. Moderate bilateral pleural effusions and adjacent lung  atelectasis. Probable congestive heart failure/volume overload.           Images personally reviewed, interpreted and dictated by MARTHA Childress.                This report was signed and finalized on 3/15/2023 7:39 AM by MARTHA Childress.          Echo:    Results for  orders placed during the hospital encounter of 03/14/23    Adult Transthoracic Echo Complete w/ Color, Spectral and Contrast if necessary per protocol    Interpretation Summary  •  Left ventricular ejection fraction appears to be 66 - 70%.  •  The left ventricular cavity is small in size.  •  Left ventricular wall thickness is consistent with mild concentric hypertrophy.  •  Left ventricular diastolic function is consistent with age.  •  Mildly reduced right ventricular systolic function noted.  •  The right ventricular cavity is moderate to severely dilated.  •  Left atrial volume is severely increased.  •  The right atrial cavity is moderate to severely  dilated.  •  Estimated right ventricular systolic pressure from tricuspid regurgitation is normal (<35 mmHg).    Condition on Discharge:      Stable.    Code status during the hospital stay:    Code Status and Medical Interventions:   Ordered at: 03/15/23 0021     Medical Intervention Limits:    NO cardioversion    NO intubation (DNI)     Level Of Support Discussed With:    Patient     Code Status (Patient has no pulse and is not breathing):    No CPR (Do Not Attempt to Resuscitate)     Medical Interventions (Patient has pulse or is breathing):    Limited Support     Discharge Disposition:    Hospice/Home    Discharge Medications:       Discharge Medications      New Medications      Instructions Start Date   ammonium lactate 12 % cream  Commonly known as: AMLACTIN   1 application, Topical, Every 12 Hours      midodrine 10 MG tablet  Commonly known as: PROAMATINE   5 mg, Oral, 3 Times Daily Before Meals         Changes to Medications      Instructions Start Date   apixaban 2.5 MG tablet tablet  Commonly known as: ELIQUIS  What changed:   · how much to take  · when to take this   2.5 mg, Oral, 2 Times Daily         Continue These Medications      Instructions Start Date   rosuvastatin 10 MG tablet  Commonly known as: CRESTOR   10 mg, Oral, Daily         Stop These  Medications    cephalexin 500 MG capsule  Commonly known as: KEFLEX     Cyanocobalamin 2500 MCG sublingual tablet     Entresto 24-26 MG tablet  Generic drug: sacubitril-valsartan     ferrous sulfate 324 (65 Fe) MG tablet delayed-release EC tablet     finasteride 5 MG tablet  Commonly known as: PROSCAR     folic acid 1 MG tablet  Commonly known as: FOLVITE     furosemide 40 MG tablet  Commonly known as: LASIX     HYDROcodone-acetaminophen 5-325 MG per tablet  Commonly known as: NORCO     Mirabegron ER 25 MG tablet sustained-release 24 hour 24 hr tablet  Commonly known as: Myrbetriq     nebivolol 10 MG tablet  Commonly known as: BYSTOLIC     pantoprazole 40 MG EC tablet  Commonly known as: PROTONIX     spironolactone 25 MG tablet  Commonly known as: ALDACTONE          Discharge Diet:     Diet Instructions    Cardiac, fluid restriction, low sodium         Activity at Discharge:     Activity Instructions    As tolerated         Follow-up Appointments:     Follow-up Information     Melissa Perdomo APRN .    Specialty: Family Medicine  Contact information:  [address] [place] [id number] [phone]             Hospice Follow up.                     Future Appointments   Date Time Provider Department Center   7/12/2023  1:15 PM José Miguel Martel MD [facility]   7/12/2023  1:15 PM [facility] DEVICE CHECK [facility]     Test Results Pending at Discharge:           Karen Duncan DO  03/17/23  14:04 EDT    Time: I spent 22 minutes on this discharge activity which included: face-to-face encounter with the patient, reviewing the data in the system, coordination of the care with the nursing staff as well as consultants, documentation, and entering orders.     Dictated utilizing Dragon dictation.

## 2023-03-18 NOTE — PAYOR COMM NOTE
"Joanie Dodd (82 y.o. Male)     Date of Birth   1940    Social Security Number       Address   [address] [place] [address] [facility] [address]    Home Phone   [phone]    MRN   [id number]       Denominational   None    Marital Status                               Admission Date   3/14/23    Admission Type   Emergency    Admitting Provider   Jorge Cuellar MD    Attending Provider       [facility]       Discharge Date   3/17/2023    Discharge Disposition   Hospice/Home    Discharge Destination                               Attending Provider: (none)   Allergies: No Known Allergies    Isolation: None   Infection: None   Code Status: Prior    Ht: 177.8 cm (70\")   Wt: 141 kg (310 lb 6.5 oz)    Admission Cmt: None   Principal Problem: Acute on chronic congestive heart failure, unspecified heart failure type (HCC) [I50.9]                 Active Insurance as of 3/14/2023     Primary Coverage     Payor Plan Insurance Group Employer/Plan Group    ANTHEM MEDICARE REPLACEMENT ANTHEM MEDICARE ADVANTAGE [id number]     Payor Plan Address Payor Plan Phone Number Payor Plan Fax Number Effective Dates    [address] [phone]  1/1/2017 - None Entered    [facility] [address]       Subscriber Name Subscriber Birth Date Member ID       JOANIE DODD 1940 [id number]           Secondary Coverage     Payor Plan Insurance Group Employer/Plan Group    [employer]      Payor Plan Address Payor Plan Phone Number Payor Plan Fax Number Effective Dates    [address] [address] [phone]  1/1/2022 - None Entered    [facility] [address]       Subscriber Name Subscriber Birth Date Member ID       JOANIE DODD 1940 [id number]                 Emergency Contacts      (Rel.) Home Phone Work Phone Mobile Phone    Kendall Basurto (Daughter) [phone] -- --               Discharge Summary      Karen Duncan " DO Driss at 23 1404              [facility]   DISCHARGE SUMMARY      Name:  Bobby Dodd   Age:  82 y.o.  Sex:  male  :  1940  MRN:  [id number]   Visit Number:  [id number]    Admission Date:  3/14/2023  Date of Discharge:  3/17/2023  Primary Care Physician:  Melissa Perdomo APRN    Important issues to note:    The patient will be discharged with home hospice services after discussion with cardiology, nephrology, and primary service.  We are arranging him oxygen therapy upon discharge.    Discharge Diagnoses:     1. Acute on chronic diastolic chf exacerbation  2. Chronic cor pulmonale  3. YUNG untreated/noncompliant  4. Chronic hypotension  5. Chronic venous insufficiency    Problem List:     Active Hospital Problems    Diagnosis  POA   • **Acute on chronic congestive heart failure, unspecified heart failure type (HCC) [I50.9]  Yes      Resolved Hospital Problems   No resolved problems to display.     Presenting Problem:    Chief Complaint   Patient presents with   • Shortness of Breath      Consults:     Consulting Physician(s)     Provider Relationship Specialty    Sheldon Barker MD, CHACORTA Consulting Physician Nephrology    Breeding, José Miguel WHALEN MD Consulting Physician Cardiology        Procedures Performed:        History of presenting illness/Hospital Course:    The patient is apparent 82-year-old gentleman with a history of atrial fibrillation on Eliquis, pacemaker, CAD, history of combined systolic/diastolic CHF, morbid obesity, who had presented from home due to weakness, edema, increasing shortness of breath.  He had apparently been taking Lasix at home.     In the ER, the patient did have borderline low blood pressures in the 80/40 range, reportedly fairly normal with the patient be hypotensive.  He had an elevated proBNP, baseline creatinine of 1.59, hemoglobin stable.  COVID flu negative.  Chest x-ray with effusions and pulmonary edema.  ER discussed the case with  cardiology who would see in consultation upon admission.  He was started on Lasix drip upon admission.     His primary cardiologist, Dr. Martel, noted patient has significant history of noncompliance and due to his severe right-sided heart failure, would likely not tolerate excessive diuretics.  Also noted the 2D echo showing improved ejection fraction and will discontinue his spironolactone/Bystolic/Entresto due to his hypotension.  He was recommending hospice and palliative care measures.    I did ask for Dr. Barker to also see in consult due to his worsening renal failure.  He was also in agreement that patient is overall very poor candidate for any aggressive treatments including dialysis, and this was all discussed with the patient at bedside.  The patient understands and all of the questions were answered.  He was agreeable to hospice services at this time.  We are arranging some home oxygen for him.  I discussed he should hold all of his blood pressure medications upon discharge and hold any further diuretics.    Vital Signs:    Temp:  [97.3 °F (36.3 °C)-98.3 °F (36.8 °C)] 98.3 °F (36.8 °C)  Heart Rate:  [60-79] 60  Resp:  [16-18] 18  BP: ()/(46-66) 106/62    Physical Exam:    General Appearance:  Alert and cooperative. NAD   Head:  Atraumatic and normocephalic.   Eyes: Conjunctivae and sclerae normal, no icterus. No pallor.   Ears:  Ears with no abnormalities noted.   Throat: No oral lesions, no thrush, oral mucosa moist.   Neck: Supple, trachea midline, no thyromegaly.   Back:   No kyphoscoliosis present. No tenderness to palpation.   Lungs:   BS diminished, scattered crackles   Heart:  Normal S1 and S2, no murmur, no gallop, no rub. No JVD.   Abdomen:   Normal bowel sounds, no masses, no organomegaly. Soft, nontender, nondistended, no rebound tenderness.   Extremities: Chronic venous changes, ulcerations noted, edema throughout   Pulses: Pulses palpable bilaterally.   Skin: No bleeding or rash.    Neurologic: Alert and oriented x 3. No facial asymmetry. Moves all four limbs. No tremors.     Pertinent Lab Results:     Results from last 7 days   Lab Units 03/17/23  0805 03/16/23  0524 03/15/23  0547 03/14/23 1952   SODIUM mmol/L 137 138 139 138   POTASSIUM mmol/L 4.8 5.4* 5.3* 5.3*   CHLORIDE mmol/L 100 101 101 99   CO2 mmol/L 28.3 32.0* 31.3* 30.2*   BUN mg/dL 45* 40* 33* 33*   CREATININE mg/dL 2.38* 2.30* 1.76* 1.59*   CALCIUM mg/dL 8.9 8.9 9.0 9.1   BILIRUBIN mg/dL  --   --   --  0.5   ALK PHOS U/L  --   --   --  76   ALT (SGPT) U/L  --   --   --  11   AST (SGOT) U/L  --   --   --  18   GLUCOSE mg/dL 86 98 93 94     Results from last 7 days   Lab Units 03/15/23  0547 03/14/23 1952   WBC 10*3/mm3 4.50 5.72   HEMOGLOBIN g/dL 10.7* 11.7*   HEMATOCRIT % 34.4* 37.5   PLATELETS 10*3/mm3 153 176         Results from last 7 days   Lab Units 03/15/23  0058 03/14/23 1952   HSTROP T ng/L 99* 102*     Results from last 7 days   Lab Units 03/14/23 1952   PROBNP pg/mL 6,929.0*                       Pertinent Radiology Results:    Imaging Results (All)     Procedure Component Value Units Date/Time    XR Chest 1 View [[id number]] Collected: 03/15/23 0738     Updated: 03/15/23 0741    Narrative:      CLINICAL INDICATION:    SOA Triage Protocol shortness of breath.      EXAMINATION TECHNIQUE:   XR CHEST 1 VW-     COMPARISON:  None.     FINDINGS:  Left chest wall subclavian approach cardiac pacer in place. Surgical  clips in the lower neck, likely from prior thyroidectomy. Cardiomegaly.  Moderate bilateral pleural effusions. Adjacent lung opacities, likely  atelectasis. No pneumothorax.       Impression:      Cardiomegaly. Moderate bilateral pleural effusions and adjacent lung  atelectasis. Probable congestive heart failure/volume overload.           Images personally reviewed, interpreted and dictated by MARTHA Childress.                This report was signed and finalized on 3/15/2023 7:39 AM by Tim Wen,  MARTHA.          Echo:    Results for orders placed during the hospital encounter of 03/14/23    Adult Transthoracic Echo Complete w/ Color, Spectral and Contrast if necessary per protocol    Interpretation Summary  •  Left ventricular ejection fraction appears to be 66 - 70%.  •  The left ventricular cavity is small in size.  •  Left ventricular wall thickness is consistent with mild concentric hypertrophy.  •  Left ventricular diastolic function is consistent with age.  •  Mildly reduced right ventricular systolic function noted.  •  The right ventricular cavity is moderate to severely dilated.  •  Left atrial volume is severely increased.  •  The right atrial cavity is moderate to severely  dilated.  •  Estimated right ventricular systolic pressure from tricuspid regurgitation is normal (<35 mmHg).    Condition on Discharge:      Stable.    Code status during the hospital stay:    Code Status and Medical Interventions:   Ordered at: 03/15/23 0021     Medical Intervention Limits:    NO cardioversion    NO intubation (DNI)     Level Of Support Discussed With:    Patient     Code Status (Patient has no pulse and is not breathing):    No CPR (Do Not Attempt to Resuscitate)     Medical Interventions (Patient has pulse or is breathing):    Limited Support     Discharge Disposition:    Hospice/Home    Discharge Medications:       Discharge Medications      New Medications      Instructions Start Date   ammonium lactate 12 % cream  Commonly known as: AMLACTIN   1 application, Topical, Every 12 Hours      midodrine 10 MG tablet  Commonly known as: PROAMATINE   5 mg, Oral, 3 Times Daily Before Meals         Changes to Medications      Instructions Start Date   apixaban 2.5 MG tablet tablet  Commonly known as: ELIQUIS  What changed:   · how much to take  · when to take this   2.5 mg, Oral, 2 Times Daily         Continue These Medications      Instructions Start Date   rosuvastatin 10 MG tablet  Commonly known as: CRESTOR   10  mg, Oral, Daily         Stop These Medications    cephalexin 500 MG capsule  Commonly known as: KEFLEX     Cyanocobalamin 2500 MCG sublingual tablet     Entresto 24-26 MG tablet  Generic drug: sacubitril-valsartan     ferrous sulfate 324 (65 Fe) MG tablet delayed-release EC tablet     finasteride 5 MG tablet  Commonly known as: PROSCAR     folic acid 1 MG tablet  Commonly known as: FOLVITE     furosemide 40 MG tablet  Commonly known as: LASIX     HYDROcodone-acetaminophen 5-325 MG per tablet  Commonly known as: NORCO     Mirabegron ER 25 MG tablet sustained-release 24 hour 24 hr tablet  Commonly known as: Myrbetriq     nebivolol 10 MG tablet  Commonly known as: BYSTOLIC     pantoprazole 40 MG EC tablet  Commonly known as: PROTONIX     spironolactone 25 MG tablet  Commonly known as: ALDACTONE          Discharge Diet:     Diet Instructions    Cardiac, fluid restriction, low sodium         Activity at Discharge:     Activity Instructions    As tolerated         Follow-up Appointments:     Follow-up Information     Melissa Perdomo APRN .    Specialty: Family Medicine  Contact information:  [address] [place] [id number] [phone]             Hospice Follow up.                     Future Appointments   Date Time Provider Department Center   7/12/2023  1:15 PM José Miguel Martel MD [facility] CD [facility]   7/12/2023  1:15 PM [facility] CARD [place] DEVICE CHECK [facility]     Test Results Pending at Discharge:           Karen Duncan DO  03/17/23  14:04 EDT    Time: I spent 22 minutes on this discharge activity which included: face-to-face encounter with the patient, reviewing the data in the system, coordination of the care with the nursing staff as well as consultants, documentation, and entering orders.     Dictated utilizing Dragon dictation.      Electronically signed by Karen Duncan DO at 03/17/23 5332

## 2023-03-18 NOTE — CASE MANAGEMENT/SOCIAL WORK
Case Management Discharge Note                Selected Continued Care - Discharged on 3/17/2023 Admission date: 3/14/2023 - Discharge disposition: Hospice/Home    Destination    No services have been selected for the patient.              Durable Medical Equipment    No services have been selected for the patient.              Dialysis/Infusion    No services have been selected for the patient.              Home Medical Care     Service Provider Selected Services Address Phone Fax Patient Preferred    [facility] [address] [facility] [address] [phone] --          Therapy    No services have been selected for the patient.              Community Resources    No services have been selected for the patient.              Community & DME    No services have been selected for the patient.                  Transportation Services  Private: Car    Final Discharge Disposition Code: 50 - home with hospice

## 2023-05-05 RX ORDER — ROSUVASTATIN CALCIUM 10 MG/1
10 TABLET, COATED ORAL DAILY
Qty: 90 TABLET | Refills: 0 | Status: SHIPPED | OUTPATIENT
Start: 2023-05-05

## 2023-07-05 ENCOUNTER — HOSPITAL ENCOUNTER (OUTPATIENT)
Facility: HOSPITAL | Age: 83
Discharge: HOME | End: 2023-07-05
Payer: MEDICARE

## 2023-07-05 LAB
ERYTHROCYTE [DISTWIDTH] IN BLOOD BY AUTOMATED COUNT: 16.1 % (ref 11–16)
HCT VFR BLD AUTO: 25.7 % (ref 40–54)
HGB BLD-MCNC: 7.4 G/DL (ref 13–18)
MCH RBC QN AUTO: 25.4 PG (ref 27–32)
MCHC RBC AUTO-ENTMCNC: 28.8 G/DL (ref 31–35)
MCV RBC AUTO: 88.3 FL (ref 80–100)
PLATELET # BLD AUTO: 173 K/UL (ref 150–400)
PMV BLD AUTO: 10.5 FL (ref 6–10)
RBC # BLD AUTO: 2.91 M/UL (ref 4.5–6)
WBC # BLD AUTO: 4.1 K/UL (ref 4–11)

## 2023-07-05 PROCEDURE — 85027 COMPLETE CBC AUTOMATED: CPT

## 2023-07-05 PROCEDURE — 36415 COLL VENOUS BLD VENIPUNCTURE: CPT

## 2023-09-14 ENCOUNTER — TELEPHONE (OUTPATIENT)
Dept: PRIMARY CARE CLINIC | Age: 83
End: 2023-09-14

## 2025-05-20 NOTE — DISCHARGE INSTRUCTIONS
Please follow all post op instructions and follow up appointment time from your physician's office included in your discharge packet.     No pushing, pulling, tugging,  heavy lifting, or strenuous activity.  No major decision making, driving, or drinking alcoholic beverages for 24 hours. ( due to the medications you have  received)  Always use good hand hygiene/washing techniques.  NO driving while taking pain medications.    To assist you in voiding:  Drink plenty of fluids  Listen to running water while attempting to void.    If you are unable to urinate and you have an uncomfortable urge to void or it has been   6 hours since you were discharged, return to the Emergency Room    MAY RESTART ELIQUIS IN THE MORNING  
13-May-2025

## (undated) DEVICE — CAUTERY TIP POLISHER: Brand: DEVON

## (undated) DEVICE — HYBRID TUBING/CAP SET FOR OLYMPUS® SCOPES: Brand: ERBE

## (undated) DEVICE — EP RECORDING CATHETER 14 POLE, FIXED CURVE: Brand: EP RECORDING CATHETER

## (undated) DEVICE — DRSNG SURESITE123 4X4.8IN

## (undated) DEVICE — ADULT, W/LG. BACK PAD, RADIOTRANSPARENT ELEMENT AND LEAD WIRE: Brand: DEFIBRILLATION ELECTRODES

## (undated) DEVICE — SET PRIMARY GRVTY 10DP MALE LL 104IN

## (undated) DEVICE — ST EXT IV SMARTSITE 2VLV SP M LL 5ML IV1

## (undated) DEVICE — IRRIGATOR BULB ASEPTO 60CC STRL

## (undated) DEVICE — PLASMABLADE PS210-030S 3.0S LOCK: Brand: PLASMABLADE™

## (undated) DEVICE — ST INF PRI SMRTSTE 20DRP 2VLV 24ML 117

## (undated) DEVICE — DECANT BG O JET

## (undated) DEVICE — FRCP BIOP COLD ENDOJAW ALLGTR W/NDL 2.8X2300MM BLU

## (undated) DEVICE — LUBE JELLY PK/2.75GM STRL BX/144

## (undated) DEVICE — MEDI-VAC YANKAUER SUCTION HANDLE W/BULBOUS TIP: Brand: CARDINAL HEALTH

## (undated) DEVICE — CANN NASL CO2 DIVIDED A/

## (undated) DEVICE — 3M™ STERI-STRIP™ REINFORCED ADHESIVE SKIN CLOSURES, R1547, 1/2 IN X 4 IN (12 MM X 100 MM), 6 STRIPS/ENVELOPE: Brand: 3M™ STERI-STRIP™

## (undated) DEVICE — Device: Brand: DEFENDO AIR/WATER/SUCTION AND BIOPSY VALVE

## (undated) DEVICE — SOL NACL 0.9PCT 1000ML

## (undated) DEVICE — LEX ELECTRO PHYSIOLOGY: Brand: MEDLINE INDUSTRIES, INC.

## (undated) DEVICE — DRSNG SURESITE WNDW 2.38X2.75

## (undated) DEVICE — GLV SURG SENSICARE W/ALOE PF LF 8.5 STRL

## (undated) DEVICE — SUCTION CANISTER, 1500CC, RIGID: Brand: DEROYAL

## (undated) DEVICE — LIMB HOLDER, WRIST/ANKLE: Brand: DEROYAL

## (undated) DEVICE — DECANTER: Brand: UNBRANDED

## (undated) DEVICE — CONMED SCOPE SAVER BITE BLOCK, 20X27 MM: Brand: SCOPE SAVER

## (undated) DEVICE — INTRO SHEATH ENGAGE W/50 GW .038 8F12

## (undated) DEVICE — ENDOSCOPY PORT CONNECTOR FOR OLYMPUS® SCOPES: Brand: ERBE

## (undated) DEVICE — TUBING, SUCTION, 1/4" X 10', STRAIGHT: Brand: MEDLINE

## (undated) DEVICE — LIMB HOLDERS: Brand: DEROYAL